# Patient Record
Sex: FEMALE | Race: WHITE | Employment: FULL TIME | URBAN - METROPOLITAN AREA
[De-identification: names, ages, dates, MRNs, and addresses within clinical notes are randomized per-mention and may not be internally consistent; named-entity substitution may affect disease eponyms.]

---

## 2017-01-12 ENCOUNTER — MYC MEDICAL ADVICE (OUTPATIENT)
Dept: NEUROLOGY | Facility: CLINIC | Age: 47
End: 2017-01-12

## 2017-01-12 ENCOUNTER — APPOINTMENT (OUTPATIENT)
Dept: VASCULAR SURGERY | Facility: CLINIC | Age: 47
End: 2017-01-12
Payer: COMMERCIAL

## 2017-01-12 PROCEDURE — 99201 ZZC OFFICE/OUTPT VISIT, NEW, LEVEL I: CPT | Performed by: SURGERY

## 2017-01-12 PROCEDURE — 93971 EXTREMITY STUDY: CPT | Performed by: SURGERY

## 2017-01-12 NOTE — TELEPHONE ENCOUNTER
Patient sent [employer] message stating that she is having right leg pain, and was evaluated by her PCP to rule out DVT; [employer] message returned to her to obtain a few more details.    Polly Rodríguez MS RN Care Coordinator

## 2017-01-12 NOTE — TELEPHONE ENCOUNTER
Dr Perez, please see Chayo's Sgnam message; I'm not sure what to recommend for her at all; Please let me know what you think; Thank you.    Polly Rodríguez, MS RN Care Coordinator

## 2017-01-13 NOTE — TELEPHONE ENCOUNTER
Her description is very vague not sure what to make of it. Does not sound MS related. She can see me if she likes when I am down in a couple weeks then I can examine her and try to pin her down a little more

## 2017-01-16 NOTE — TELEPHONE ENCOUNTER
Chayo is going to come see Dr Perez next week on Wednesday, January 25th at 9:30am.    Polly Rodríguez, MS RN Care Coordinator

## 2017-01-25 ENCOUNTER — MYC MEDICAL ADVICE (OUTPATIENT)
Dept: NEUROLOGY | Facility: CLINIC | Age: 47
End: 2017-01-25

## 2017-01-25 NOTE — TELEPHONE ENCOUNTER
Chayo is willing to see Loren; I will have our clinic coordinators call her to schedule.    Polly Rodríguez, MS RN Care Coordinator

## 2017-01-25 NOTE — TELEPHONE ENCOUNTER
Patient sent MyChart message stating that she can't come at all this week; MyChart message sent to her offering her to see Loren.    Polly Rodríguez, MS RN Care Coordinator

## 2017-02-09 ENCOUNTER — OFFICE VISIT (OUTPATIENT)
Dept: NEUROLOGY | Facility: CLINIC | Age: 47
End: 2017-02-09
Attending: NURSE PRACTITIONER
Payer: COMMERCIAL

## 2017-02-09 VITALS
WEIGHT: 189.4 LBS | HEIGHT: 66 IN | SYSTOLIC BLOOD PRESSURE: 132 MMHG | DIASTOLIC BLOOD PRESSURE: 78 MMHG | HEART RATE: 90 BPM | BODY MASS INDEX: 30.44 KG/M2

## 2017-02-09 DIAGNOSIS — M79.604 PAIN OF RIGHT LOWER EXTREMITY: ICD-10-CM

## 2017-02-09 DIAGNOSIS — R29.898 RIGHT LEG WEAKNESS: Primary | ICD-10-CM

## 2017-02-09 DIAGNOSIS — G37.9 DEMYELINATING DISEASE (H): ICD-10-CM

## 2017-02-09 PROCEDURE — 99212 OFFICE O/P EST SF 10 MIN: CPT | Mod: ZF

## 2017-02-09 RX ORDER — CYCLOBENZAPRINE HCL 5 MG
5 TABLET ORAL 2 TIMES DAILY
Qty: 30 TABLET | Refills: 0 | Status: SHIPPED | OUTPATIENT
Start: 2017-02-09 | End: 2019-01-08

## 2017-02-09 ASSESSMENT — PAIN SCALES - GENERAL: PAINLEVEL: EXTREME PAIN (8)

## 2017-02-09 NOTE — MR AVS SNAPSHOT
After Visit Summary   2/9/2017    Chayo Reid    MRN: [id number]           Patient Information     Date Of Birth          1970        Visit Information        Provider Department      2/9/2017 10:30 AM Loren Lennon APRN CNP [facility] Multiple Sclerosis        Today's Diagnoses     Right leg weakness    -  1     Demyelinating disease (H)         Pain of right lower extremity           Care Instructions    1. MRI Thoracic at your convenience    2. [facility] Physical Therapy referral       [facility]       [address], [place] [address]       Phone: [phone]       3. Flexeril 5 mg twice daily for 2 weeks for right leg pain        Follow-ups after your visit        Additional Services     PT Referral ([place])       *This therapy referral will be filtered to a centralized scheduling office at [facility] and the patient will receive a call to schedule an appointment at a [place] location most convenient for them. *     [facility] provides Physical Therapy evaluation and treatment and many specialty services across the [place] system.  If requesting a specialty program, please choose from the list below.    If you have not heard from the scheduling office within 2 business days, please call [phone] for all locations, with the exception of [place], please call [phone].  Treatment: Evaluation & Treatment  Special Instructions/Modalities: New onset right leg pain and weakness  Special Programs: [facility]      Please be aware that coverage of these services is subject to the terms and limitations of your health insurance plan.  Call member services at your health plan with any benefit or coverage questions.      **Note to Provider:  If you are referring outside of [place] for the therapy appointment, please list the name of the location in the  special instructions   above, print the referral and give to the patient to schedule the appointment.                  Your next 10 appointments already scheduled     Feb 17, 2017  4:45 PM   (Arrive by 4:30 PM)   MR THORACIC SPINE W/O & W CONTRAST with [facility] [facility] [place] [facility] ([facility])    [address] [address] [phone]           Take your medicines as usual, unless your doctor tells you not to. Bring a list of your current medicines to your exam (including vitamins, minerals and over-the-counter drugs).  You will be given intravenous contrast for this exam. To prepare:   The day before your exam, drink extra fluids at least six 8-ounce glasses (unless your doctor tells you to restrict your fluids).   Have a blood test (creatinine test) within 30 days of your exam. Go to your clinic or Diagnostic Imaging Department for this test.  The MRI machine uses a strong magnet. Please wear clothes without metal (snaps, zippers). A sweatsuit works well, or we may give you a hospital gown.  Please remove any body piercings and hair extensions before you arrive. You will also remove watches, jewelry, hairpins, wallets, dentures, partial dental plates and hearing aids. You may wear contact lenses, and you may be able to wear your rings. We have a safe place to keep your personal items, but it is safer to leave them at home.   **IMPORTANT** THE INSTRUCTIONS BELOW ARE ONLY FOR THOSE PATIENTS WHO HAVE BEEN TOLD THEY WILL RECEIVE SEDATION OR GENERAL ANESTHESIA DURING THEIR MRI PROCEDURE:  IF YOU WILL RECEIVE SEDATION (take medicine to help you relax during your exam):   You must get the medicine from your doctor before you arrive. Bring the medicine to the exam. Do not take it at home.   Arrive one hour early. Bring someone who can take you home after the test. Your medicine will make you sleepy. After the exam, you may not drive, take a bus or take a taxi by yourself.   No  eating 8 hours before your exam. You may have clear liquids up until 4 hours before your exam. (Clear liquids include water, clear tea, black coffee and fruit juice without pulp.)  IF YOU WILL RECEIVE ANESTHESIA (be asleep for your exam):   Arrive 1 1/2 hours early. Bring someone who can take you home after the test. You may not drive, take a bus or take a taxi by yourself.   No eating 8 hours before your exam. You may have clear liquids up until 4 hours before your exam. (Clear liquids include water, clear tea, black coffee and fruit juice without pulp.)  Please call the Imaging Department at your exam site with any questions.            May 23, 2017 10:00 AM   (Arrive by 9:45 AM)   Return Multiple Sclerosis with Neftaly Perez DO   [facility] Multiple Sclerosis ([email or web address])    [address] [place] [id number] [phone]              Future tests that were ordered for you today     Open Future Orders        Priority Expected Expires Ordered    MRI Thoracic spine w & w/o contrast Routine  2/9/2018 2/9/2017            Who to contact     If you have questions or need follow up information about today's clinic visit or your schedule please contact [facility] MULTIPLE SCLEROSIS directly at [phone].  Normal or non-critical lab and imaging results will be communicated to you by [employer], letter or phone within 4 business days after the clinic has received the results. If you do not hear from us within 7 days, please contact the clinic through [employer]oh[employer] or phone. If you have a critical or abnormal lab result, we will notify you by phone as soon as possible.  Submit refill requests through [employer] or call your pharmacy and they will forward the refill request to us. Please allow 3 business days for your refill to be completed.          Additional Information About Your Visit        [employer] Information     [employer] gives you secure access to your electronic health  "record. If you see a primary care provider, you can also send messages to your care team and make appointments. If you have questions, please call your primary care clinic.  If you do not have a primary care provider, please call [phone] and they will assist you.        Care EveryWhere ID     This is your Care EveryWhere ID. This could be used by other organizations to access your [place] medical records  [id number]        Your Vitals Were     Pulse Height BMI (Body Mass Index)             90 1.676 m (5' 6\") 30.58 kg/m2          Blood Pressure from Last 3 Encounters:   02/09/17 132/78   11/15/16 126/83   07/28/16 139/73    Weight from Last 3 Encounters:   02/09/17 85.911 kg (189 lb 6.4 oz)   11/15/16 81.511 kg (179 lb 11.2 oz)   04/29/15 91.627 kg (202 lb)              We Performed the Following     PT Referral ([place])          Today's Medication Changes          These changes are accurate as of: 2/9/17 11:43 AM.  If you have any questions, ask your nurse or doctor.               Start taking these medicines.        Dose/Directions    cyclobenzaprine 5 MG tablet   Commonly known as:  FLEXERIL   Used for:  Pain of right lower extremity   Started by:  Loren Lennon APRN CNP        Dose:  5 mg   Take 1 tablet (5 mg) by mouth 2 times daily   Quantity:  30 tablet   Refills:  0            Where to get your medicines      These medications were sent to [place] [address] [place] [address] [address]     Phone:  [phone]    - cyclobenzaprine 5 MG tablet             Primary Care Provider    Pcp Unknown Verified       No address on file        Thank you!     Thank you for choosing [facility] MULTIPLE SCLEROSIS  for your care. Our goal is always to provide you with excellent care. Hearing back from our patients is one way we can continue to improve our services. Please take a few minutes to complete the written survey that you may receive in the mail after your " visit with us. Thank you!             Your Updated Medication List - Protect others around you: Learn how to safely use, store and throw away your medicines at www.disposemymeds.org.          This list is accurate as of: 2/9/17 11:43 AM.  Always use your most recent med list.                   Brand Name Dispense Instructions for use    amitriptyline 25 MG tablet    ELAVIL    90 tablet    Take 1 at bedtime for 3 weeks then 2 at bedtime for 3 weeks then 3 at bedtime       amphetamine-dextroamphetamine 30 MG per 24 hr capsule    ADDERALL XR    30 capsule    Take 1 capsule (30 mg) by mouth daily       clonazePAM 1 MG tablet    klonoPIN    30 tablet    Take 1 at bedtime       cyclobenzaprine 5 MG tablet    FLEXERIL    30 tablet    Take 1 tablet (5 mg) by mouth 2 times daily       Glatiramer Acetate 40 MG/ML Sosy    COPAXONE    12 Syringe    Inject 40 mg Subcutaneous three times a week       modafinil 200 MG tablet    PROVIGIL    30 tablet    Take 1 tablet (200 mg) by mouth daily       oxybutynin 10 MG 24 hr tablet    DITROPAN XL    30 tablet    Take 1 tab 1 hour prior to bed       vitamin D 1000 UNITS capsule      Take 1 capsule by mouth daily

## 2017-02-09 NOTE — PROGRESS NOTES
[facility] Multiple Sclerosis Clinic  Name: Chayo Reid  MRN: [id number]  : 1970  JHONY: 2017  Purpose of Visit: New onset right leg pain and weakness  Last Visit: on 11/15/2016  with Dr. Perez   HPI: History collected from patient and previous clinic visit notes  : Patient had a brain MRI for long standing headaches and found to have white matter changes. MRI cervical spine and LP was unremarkable.   2012: Follow up brain MRI done which showed a slight increase in white matter lesions. She also had visual evoked potential which showed prolonged abduction in right anterior field. Repeat LP showed 6 oligoclonal bands and normal IgG index.   2013: Initial visit with Dr. Perez. 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 For fatigue, she was started on Provigil.   2013: Follow up brain MRI showed one subtle enhancing lesion and 3-4 new white matter lesions.   2014: She was started on Copaxone.   2015: Follow up brain MRI showed one enhancing lesion.   2016: Follow up brain MRI stable. Fatigue treatment switched to Adderall.  2016: Right inguinal pain. Diagnosed with myofascial pain and was started on Amitriptyline. Follow up brain and cervical spine unchanged.   2016: Most recent visit with Dr. Perez. Myofascial pain responded well to Amitriptyline. Overall doing well.   Interval history since last visit:   2017- 2017: Patient contacted our clinic with right leg pain and weakness. She saw me in the clinic today in regards to this. Today patient reports her pain as constant and not radiating. She had right inguinal pain in  which responded well to amitriptyline. She tells me this recent right leg pain is different. This involves her whole right leg. No back pain or swelling. She had an US done by her PCP and r/o DVT. Currently rates the pain at 8/10. She also reports weakness in her right leg which  "is new for her. Feels like her legs are \"heavy\" Climbing stairs is very difficult for her. She fell 3 times recently. Also reports some sensory changes- tingling in her right lower leg. No recent illness or infections. She had tried NSAIDs without much help. She is able to drive. Denies issues with speech, swallowing, bowel or bladder, balance, dizziness or vertigo. She had blurry vision in her right eye in the past and then completely resolved.   She continues to be on Copaxone 40 mg three times a week. Tolerating it well without any injection site reactions. Adderall helps with her fatigue.    Vital Signs: /78 mmHg  Pulse 90  Ht 1.676 m (5' 6\")  Wt 85.911 kg (189 lb 6.4 oz)  BMI 30.58 kg/m2  07/06/2017 MRI Brain:  The study demonstrates 5-10,  foci of T2-hyperintensity  within the cerebral white matter consistent with the clinical  suspicion of demyelinating disease. There is no enhancement noted.  There is no interval change from the prior study.    07/06/2017 MRI Cervical spine:  Single focus of cord signal abnormality at inferior C2 level on the  right, along the lateral aspect of the cord without associated  enhancement. This is most compatible with a demyelinating lesion given  history of multiple sclerosis. No other lesions noted.     C5-C6: Disc bulge with superimposed central disc extrusion and  left-sided prominent uncinate hypertrophy with resultant severe left  neural foraminal stenosis, mild right neural foraminal stenosis and  mild canal narrowing without myelopathy.  Current MS Medication: Copaxone 40 mg three times a week   REVIEW OF SYSTEMS: As per EPIC  PHYSICAL EXAMINATION:   GENERAL: The patient is a well-nourished female who presents to the evaluation alone, awake and alert and in no acute distress.     NEUROLOGIC:   MENTAL STATUS: Alert and awake, oriented times four.   CRANIAL NERVES:  The pupils are equal, round and react to light and there is no Antonio Arlene pupil. Funduscopic " examination demonstrates no optic pallor, papilledema or retinal hemorrhage/exudate. Extraocular movements are intact with no internuclear ophthalmoplegia. Facial strength and sensation are normal. Hearing is normal. Palate elevation and tongue protrusion are normal.   POWER: Strength is normal (5/5) in the following muscles/groups: Deltoids, biceps, triceps, wrist extensors, finger abductors, Left hip flexors, knee flexors, foot dorsiflexors. Mild weakness in right hip flexion 4/5.   SENSORY: Reports diminished sensation to light touch in right lower leg. Able to discriminate sharp/ dull sensation without any trouble.   REFLEXES: Reflexes are normal, present and symmetric at biceps, triceps, brachioradialis and ankles. Hyperreflexia in right knee.   MOTOR/CEREBELLAR: There are no tremors, myoclonus or other abnormal movements. Tone is within normal limits in the limbs. There is no appendicular ataxia on finger-to-nose testing and rapid alternating movements are normal in the extremities. There is no pronator drift.   GAIT: Gait is narrow-based and steady and the patient is able to do tandem without any difficulty.     ASSESSMENT/ PLAN:  1. Radiologically Isolated Syndrome (RIS), on Copaxone: New onset right leg pain and weakness along with sensory changes. Exam slightly different from her last exam in November. Her most recent MRI brain and cervical spine from July, 2016 were stable. We will plan to get a MRI thoracic spine to r/o new/ enhancing lesions. For right leg weakness and recent falls, I referred her to  [facility]. She agrees with this plan. I also gave her a prescription for Flexeril 5 mg twice daily for 2 weeks. I explained the side effects of dizziness and somnolence.     2. I will follow up with you after MRI.    3. Please contact us with questions or concerns.      I spent 50 minutes with the patient and over 50% of the time was spent counseling and coordinating care.    Loren Lennon,  CNP  Outpatient Neurology Clinic  [facility]

## 2017-02-09 NOTE — PATIENT INSTRUCTIONS
1. MRI Thoracic at your convenience    2.  [facility] Physical Therapy referral       [facility]       [address], [place] [address]       Phone: [phone]       3. Flexeril 5 mg twice daily for 2 weeks for right leg pain

## 2017-02-09 NOTE — NURSING NOTE
Chief Complaint   Patient presents with     RECHECK     [facility] RETURN MULTIPLE SCLEROSIS     Barbara Penn MA

## 2017-02-09 NOTE — Clinical Note
2017       RE: Chayo Reid  [address]  [facility] [facility] [address]     Dear Colleague,    Thank you for referring your patient, Chayo Reid, to the [facility] MULTIPLE SCLEROSIS at [facility]. Please see a copy of my visit note below.    [facility] Multiple Sclerosis Clinic  Name: Chayo Reid  MRN: [id number]  : 1970  JHONY: 2017  Purpose of Visit: New onset right leg pain and weakness  Last Visit: on 11/15/2016  with Dr. Perez   HPI: History collected from patient and previous clinic visit notes  : Patient had a brain MRI for long standing headaches and found to have white matter changes. MRI cervical spine and LP was unremarkable.   2012: Follow up brain MRI done which showed a slight increase in white matter lesions. She also had visual evoked potential which showed prolonged abduction in right anterior field. Repeat LP showed 6 oligoclonal bands and normal IgG index.   2013: Initial visit with Dr. Perez. 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 For fatigue, she was started on Provigil.   2013: Follow up brain MRI showed one subtle enhancing lesion and 3-4 new white matter lesions.   2014: She was started on Copaxone.   2015: Follow up brain MRI showed one enhancing lesion.   2016: Follow up brain MRI stable. Fatigue treatment switched to Adderall.  2016: Right inguinal pain. Diagnosed with myofascial pain and was started on Amitriptyline. Follow up brain and cervical spine unchanged.   2016: Most recent visit with Dr. Perez. Myofascial pain responded well to Amitriptyline. Overall doing well.   Interval history since last visit:   2017- 2017: Patient contacted our clinic with right leg pain and weakness. She saw me in the clinic today in regards to this. Today patient reports her pain as constant and not radiating. She had right inguinal pain in  "July, 2016 which responded well to amitriptyline. She tells me this recent right leg pain is different. This involves her whole right leg. No back pain or swelling. She had an US done by her PCP and r/o DVT. Currently rates the pain at 8/10. She also reports weakness in her right leg which is new for her. Feels like her legs are \"heavy\" Climbing stairs is very difficult for her. She fell 3 times recently. Also reports some sensory changes- tingling in her right lower leg. No recent illness or infections. She had tried NSAIDs without much help. She is able to drive. Denies issues with speech, swallowing, bowel or bladder, balance, dizziness or vertigo. She had blurry vision in her right eye in the past and then completely resolved.   She continues to be on Copaxone 40 mg three times a week. Tolerating it well without any injection site reactions. Adderall helps with her fatigue.    Vital Signs: /78 mmHg  Pulse 90  Ht 1.676 m (5' 6\")  Wt 85.911 kg (189 lb 6.4 oz)  BMI 30.58 kg/m2  07/06/2017 MRI Brain:  The study demonstrates 5-10,  foci of T2-hyperintensity  within the cerebral white matter consistent with the clinical  suspicion of demyelinating disease. There is no enhancement noted.  There is no interval change from the prior study.    07/06/2017 MRI Cervical spine:  Single focus of cord signal abnormality at inferior C2 level on the  right, along the lateral aspect of the cord without associated  enhancement. This is most compatible with a demyelinating lesion given  history of multiple sclerosis. No other lesions noted.     C5-C6: Disc bulge with superimposed central disc extrusion and  left-sided prominent uncinate hypertrophy with resultant severe left  neural foraminal stenosis, mild right neural foraminal stenosis and  mild canal narrowing without myelopathy.  Current MS Medication: Copaxone 40 mg three times a week   REVIEW OF SYSTEMS: As per EPIC  PHYSICAL EXAMINATION:   GENERAL: The patient is a " well-nourished female who presents to the evaluation alone, awake and alert and in no acute distress.     NEUROLOGIC:   MENTAL STATUS: Alert and awake, oriented times four.   CRANIAL NERVES:  The pupils are equal, round and react to light and there is no Antonio Arlene pupil. Funduscopic examination demonstrates no optic pallor, papilledema or retinal hemorrhage/exudate. Extraocular movements are intact with no internuclear ophthalmoplegia. Facial strength and sensation are normal. Hearing is normal. Palate elevation and tongue protrusion are normal.   POWER: Strength is normal (5/5) in the following muscles/groups: Deltoids, biceps, triceps, wrist extensors, finger abductors, Left hip flexors, knee flexors, foot dorsiflexors. Mild weakness in right hip flexion 4/5.   SENSORY: Reports diminished sensation to light touch in right lower leg. Able to discriminate sharp/ dull sensation without any trouble.   REFLEXES: Reflexes are normal, present and symmetric at biceps, triceps, brachioradialis and ankles. Hyperreflexia in right knee.   MOTOR/CEREBELLAR: There are no tremors, myoclonus or other abnormal movements. Tone is within normal limits in the limbs. There is no appendicular ataxia on finger-to-nose testing and rapid alternating movements are normal in the extremities. There is no pronator drift.   GAIT: Gait is narrow-based and steady and the patient is able to do tandem without any difficulty.     ASSESSMENT/ PLAN:  1. Radiologically Isolated Syndrome (RIS), on Copaxone: New onset right leg pain and weakness along with sensory changes. Exam slightly different from her last exam in November. Her most recent MRI brain and cervical spine from July, 2016 were stable. We will plan to get a MRI thoracic spine to r/o new/ enhancing lesions. For right leg weakness and recent falls, I referred her to  [facility]. She agrees with this plan. I also gave her a prescription for Flexeril 5 mg twice daily for 2  weeks. I explained the side effects of dizziness and somnolence.     2. I will follow up with you after MRI.    3. Please contact us with questions or concerns.      I spent 50 minutes with the patient and over 50% of the time was spent counseling and coordinating care.    Loren Lennon, CNP  Outpatient Neurology Clinic  [facility]

## 2017-02-17 ENCOUNTER — HOSPITAL ENCOUNTER (OUTPATIENT)
Dept: MRI IMAGING | Facility: CLINIC | Age: 47
Discharge: HOME | End: 2017-02-17
Attending: NURSE PRACTITIONER | Admitting: NURSE PRACTITIONER
Payer: COMMERCIAL

## 2017-02-17 ENCOUNTER — HOSPITAL ENCOUNTER (OUTPATIENT)
Dept: MRI IMAGING | Facility: CLINIC | Age: 47
End: 2017-02-17
Attending: NURSE PRACTITIONER
Payer: COMMERCIAL

## 2017-02-17 DIAGNOSIS — G35 MULTIPLE SCLEROSIS (H): ICD-10-CM

## 2017-02-17 DIAGNOSIS — G37.9 DEMYELINATING DISEASE (H): ICD-10-CM

## 2017-02-17 DIAGNOSIS — G35 MULTIPLE SCLEROSIS (H): Primary | ICD-10-CM

## 2017-02-17 PROCEDURE — A9585 GADOBUTROL INJECTION: HCPCS | Performed by: NURSE PRACTITIONER

## 2017-02-17 PROCEDURE — 25500064 ZZH RX 255 OP 636: Performed by: NURSE PRACTITIONER

## 2017-02-17 PROCEDURE — 72157 MRI CHEST SPINE W/O & W/DYE: CPT

## 2017-02-17 PROCEDURE — 72156 MRI NECK SPINE W/O & W/DYE: CPT

## 2017-02-17 RX ORDER — GADOBUTROL 604.72 MG/ML
7.5 INJECTION INTRAVENOUS ONCE
Status: COMPLETED | OUTPATIENT
Start: 2017-02-17 | End: 2017-02-17

## 2017-02-17 RX ADMIN — GADOBUTROL 7.5 ML: 604.72 INJECTION INTRAVENOUS at 13:31

## 2017-02-20 ENCOUNTER — MYC MEDICAL ADVICE (OUTPATIENT)
Dept: NEUROLOGY | Facility: CLINIC | Age: 47
End: 2017-02-20

## 2017-02-23 ENCOUNTER — HOSPITAL ENCOUNTER (OUTPATIENT)
Dept: PHYSICAL THERAPY | Facility: CLINIC | Age: 47
Setting detail: THERAPIES SERIES
End: 2017-02-23
Attending: NURSE PRACTITIONER
Payer: COMMERCIAL

## 2017-02-23 PROCEDURE — 97161 PT EVAL LOW COMPLEX 20 MIN: CPT | Mod: GP | Performed by: PHYSICAL THERAPIST

## 2017-02-23 PROCEDURE — 97530 THERAPEUTIC ACTIVITIES: CPT | Mod: GP | Performed by: PHYSICAL THERAPIST

## 2017-02-23 PROCEDURE — 97110 THERAPEUTIC EXERCISES: CPT | Mod: GP | Performed by: PHYSICAL THERAPIST

## 2017-02-23 PROCEDURE — 40000719 ZZHC STATISTIC PT DEPARTMENT NEURO VISIT: Performed by: PHYSICAL THERAPIST

## 2017-02-23 NOTE — PROGRESS NOTES
02/23/17 0900   General Information   Start of Care Date 02/23/17   Referring Physician Loren Lennon NP   Orders Evaluate and Treat as Indicated   Order Date 02/09/17   Medical Diagnosis R leg weakness   Surgical/Medical history reviewed Yes   Pertinent history of current problem (include personal factors and/or comorbidities that impact the POC) Chayo here alone -early Hiro R leg weaker, uses rail on stairs R leg weaker.  squat or kneel need to pull up w arms. so this is a change.  curbs are hard.  some N/T comes and goes.  no foot drag.  first PT.  falls?  twice last summer leg gave out and fell no injury.  never used cane or walking pole.  i can walk a long way was in [facility] walked curbs are hard but straight line is fine.  walked an hour.  on copaxone.     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     General Information Comments lives w husb and 15 yo dtr.   doing fine at home.     Fall Risk Screen   Fall screen completed by PT   Per patient - Fall 2 or more times in past year? Yes   Per patient - Fall with injury in past year? No   Pain   Patient currently in pain Yes   Pain location R ant knee/leg   Pain rating 5   Pain description Ache   Pain description comment worse at night.    Pain comments recently got mmrelaxants for night; they help.    Cognitive Status Examination   Orientation orientation to person, place and time   Level of Consciousness alert   Follows Commands and Answers Questions 100% of the time   Personal Safety and Judgment intact   Memory intact   Integumentary   Integumentary No deficits were identified   Posture   Posture Normal   Range of Motion (ROM)   ROM Quick Adds no deficits were identified   ROM Comment mild tight DF.     Strength   Strength Comments gr 4+ R  hip flexion and ext   Bed Mobility   Bed Mobility Comments indep   Transfer Skills   Transfer Comments floor tx indep w furn; needs cues/demo w/out furniture.  sit/stand indep w/out armrests   Locomotion   Wheel Chair Mobility Comments n/a   Gait Special Tests   Gait Special Tests FUNCTIONAL GAIT ASSESSMENT;25 FOOT TIMED WALK   Gait Special Tests 25 Foot Timed Walk   Seconds 6.6   Steps 13 Steps   Gait Special Tests Functional Gait Assessment Score out of 30   Score out of 30 28   Balance   Balance Comments can sls 10 sec bilat   Sensory Examination   Sensory Perception no deficits were identified   Coordination   Coordination no deficits were identified   Muscle Tone   Muscle Tone no deficits were identified   Clinical Impression   Criteria for Skilled Therapeutic Interventions Met yes, treatment indicated   PT Diagnosis RIS on copaxone   Influenced by the following impairments mild R hip weakness   Functional limitations due to impairments falls   Clinical Presentation Stable/Uncomplicated   Clinical Presentation Rationale stable disease, good support, no pmh concerning, drives/ functioning at work and home   Clinical Decision Making (Complexity) Low complexity   Therapy Frequency other (see comments)   Predicted Duration of Therapy Intervention (days/wks) up to 3x in 90 days   Risk & Benefits of therapy have been explained Yes   Patient, Family & other staff in agreement with plan of care Yes   Clinical Impression Comments 45 yo w RIS, open to exer for R leg   Education Assessment   Preferred Learning Style Listening;Demonstration   Barriers to Learning No barriers   GOALS   PT Eval Goals 1;2;3   Goal 1   Goal Identifier floor tx   Goal Description indep floor tx w/out furniture   Target Date 05/23/17   Goal 2   Goal Identifier falls prev   Goal Description pt to verbalize 3 falls prev strategies   Target Date 05/23/17   Goal 3   Goal Identifier HEP   Goal Description pt to be indep w gentle stretch/strengthen  and aerobic program to maximize R leg function   Target Date 05/23/17   Total Evaluation Time   Total Evaluation Time (Minutes) 30

## 2017-02-23 NOTE — IP AVS SNAPSHOT
"                  MRN:[id number]                      After Visit Summary   2/23/2017    Chayo Reid    MRN: [id number]           Visit Information        Provider Department      2/23/2017  9:30 AM Audra Reyes, PT [facility], [place], Physical Therapy - Outpatient        Your next 10 appointments already scheduled     May 23, 2017 10:00 AM CDT   (Arrive by 9:45 AM)   Return Multiple Sclerosis with Neftaly Perez DO   [facility] Multiple Sclerosis ([facility])    [address] [place] [address] [phone]                Further instructions from your care team       2/23/17    Exercises:    Go to floor and get to hands and knees position/ try to do this daily -practice w no furniture like we did.    Do \"arch and sag\" of the back 5-10 x  Then kick one leg back at a time - alternate and do 10 x each leg.      Then stand at a wall and do calf stretch 30-45 sec each leg - 2x each leg.    Audra Reyes PT  [email or web address]  [phone]  :  [phone]        MyChart Information     [employer] gives you secure access to your electronic health record. If you see a primary care provider, you can also send messages to your care team and make appointments. If you have questions, please call your primary care clinic.  If you do not have a primary care provider, please call [phone] and they will assist you.        Care EveryWhere ID     This is your Care EveryWhere ID. This could be used by other organizations to access your [place] medical records  [id number]        "

## 2017-02-23 NOTE — DISCHARGE INSTRUCTIONS
"2/23/17    Exercises:    Go to floor and get to hands and knees position/ try to do this daily -practice w no furniture like we did.    Do \"arch and sag\" of the back 5-10 x  Then kick one leg back at a time - alternate and do 10 x each leg.      Then stand at a wall and do calf stretch 30-45 sec each leg - 2x each leg.    Audra Reyes PT  [email or web address]  [phone]  :  [phone]      "

## 2017-02-26 ENCOUNTER — HOSPITAL ENCOUNTER (EMERGENCY)
Facility: CLINIC | Age: 47
Discharge: HOME | End: 2017-02-26
Attending: EMERGENCY MEDICINE | Admitting: EMERGENCY MEDICINE
Payer: COMMERCIAL

## 2017-02-26 ENCOUNTER — OFFICE VISIT (OUTPATIENT)
Dept: URGENT CARE | Facility: URGENT CARE | Age: 47
End: 2017-02-26
Payer: COMMERCIAL

## 2017-02-26 ENCOUNTER — APPOINTMENT (OUTPATIENT)
Dept: ULTRASOUND IMAGING | Facility: CLINIC | Age: 47
End: 2017-02-26
Attending: EMERGENCY MEDICINE
Payer: COMMERCIAL

## 2017-02-26 ENCOUNTER — APPOINTMENT (OUTPATIENT)
Dept: CT IMAGING | Facility: CLINIC | Age: 47
End: 2017-02-26
Attending: EMERGENCY MEDICINE
Payer: COMMERCIAL

## 2017-02-26 VITALS
OXYGEN SATURATION: 97 % | HEART RATE: 78 BPM | RESPIRATION RATE: 20 BRPM | TEMPERATURE: 97.9 F | SYSTOLIC BLOOD PRESSURE: 121 MMHG | DIASTOLIC BLOOD PRESSURE: 77 MMHG

## 2017-02-26 DIAGNOSIS — R10.31 ABDOMINAL PAIN, RIGHT LOWER QUADRANT: ICD-10-CM

## 2017-02-26 DIAGNOSIS — N83.201 CYST OF RIGHT OVARY: ICD-10-CM

## 2017-02-26 DIAGNOSIS — R10.31 ABDOMINAL PAIN, RIGHT LOWER QUADRANT: Primary | ICD-10-CM

## 2017-02-26 LAB
ALBUMIN SERPL-MCNC: 3.7 G/DL (ref 3.4–5)
ALBUMIN UR-MCNC: NEGATIVE MG/DL
ALP SERPL-CCNC: 68 U/L (ref 40–150)
ALT SERPL W P-5'-P-CCNC: 23 U/L (ref 0–50)
AMORPH CRY #/AREA URNS HPF: ABNORMAL /HPF
ANION GAP SERPL CALCULATED.3IONS-SCNC: 6 MMOL/L (ref 3–14)
APPEARANCE UR: ABNORMAL
AST SERPL W P-5'-P-CCNC: 20 U/L (ref 0–45)
BASOPHILS # BLD AUTO: 0.1 10E9/L (ref 0–0.2)
BASOPHILS NFR BLD AUTO: 0.7 %
BILIRUB SERPL-MCNC: 0.6 MG/DL (ref 0.2–1.3)
BILIRUB UR QL STRIP: NEGATIVE
BUN SERPL-MCNC: 15 MG/DL (ref 7–30)
CALCIUM SERPL-MCNC: 8.7 MG/DL (ref 8.5–10.1)
CHLORIDE SERPL-SCNC: 101 MMOL/L (ref 94–109)
CO2 SERPL-SCNC: 31 MMOL/L (ref 20–32)
COLOR UR AUTO: YELLOW
CREAT BLD-MCNC: 1 MG/DL (ref 0.52–1.04)
CREAT SERPL-MCNC: 0.99 MG/DL (ref 0.52–1.04)
DIFFERENTIAL METHOD BLD: ABNORMAL
EOSINOPHIL # BLD AUTO: 0.2 10E9/L (ref 0–0.7)
EOSINOPHIL NFR BLD AUTO: 1.4 %
ERYTHROCYTE [DISTWIDTH] IN BLOOD BY AUTOMATED COUNT: 11.8 % (ref 10–15)
GFR SERPL CREATININE-BSD FRML MDRD: 59 ML/MIN/1.7M2
GFR SERPL CREATININE-BSD FRML MDRD: 60 ML/MIN/1.7M2
GLUCOSE SERPL-MCNC: 122 MG/DL (ref 70–99)
GLUCOSE UR STRIP-MCNC: NEGATIVE MG/DL
HCG UR QL: NEGATIVE
HCT VFR BLD AUTO: 39 % (ref 35–47)
HGB BLD-MCNC: 13.1 G/DL (ref 11.7–15.7)
HGB UR QL STRIP: NEGATIVE
IMM GRANULOCYTES # BLD: 0.1 10E9/L (ref 0–0.4)
IMM GRANULOCYTES NFR BLD: 0.4 %
KETONES UR STRIP-MCNC: NEGATIVE MG/DL
LEUKOCYTE ESTERASE UR QL STRIP: NEGATIVE
LIPASE SERPL-CCNC: 144 U/L (ref 73–393)
LYMPHOCYTES # BLD AUTO: 1.5 10E9/L (ref 0.8–5.3)
LYMPHOCYTES NFR BLD AUTO: 10 %
MCH RBC QN AUTO: 29.7 PG (ref 26.5–33)
MCHC RBC AUTO-ENTMCNC: 33.6 G/DL (ref 31.5–36.5)
MCV RBC AUTO: 88 FL (ref 78–100)
MONOCYTES # BLD AUTO: 0.7 10E9/L (ref 0–1.3)
MONOCYTES NFR BLD AUTO: 4.7 %
MUCOUS THREADS #/AREA URNS LPF: PRESENT /LPF
NEUTROPHILS # BLD AUTO: 12.2 10E9/L (ref 1.6–8.3)
NEUTROPHILS NFR BLD AUTO: 82.8 %
NITRATE UR QL: NEGATIVE
NRBC # BLD AUTO: 0 10*3/UL
NRBC BLD AUTO-RTO: 0 /100
PH UR STRIP: 6.5 PH (ref 5–7)
PLATELET # BLD AUTO: 288 10E9/L (ref 150–450)
POTASSIUM SERPL-SCNC: 3.4 MMOL/L (ref 3.4–5.3)
PROT SERPL-MCNC: 8 G/DL (ref 6.8–8.8)
RBC # BLD AUTO: 4.41 10E12/L (ref 3.8–5.2)
RBC #/AREA URNS AUTO: 1 /HPF (ref 0–2)
SODIUM SERPL-SCNC: 138 MMOL/L (ref 133–144)
SP GR UR STRIP: 1.01 (ref 1–1.03)
SQUAMOUS #/AREA URNS AUTO: <1 /HPF (ref 0–1)
TRANS CELLS #/AREA URNS HPF: <1 /HPF (ref 0–1)
URN SPEC COLLECT METH UR: ABNORMAL
UROBILINOGEN UR STRIP-MCNC: NORMAL MG/DL (ref 0–2)
WBC # BLD AUTO: 14.7 10E9/L (ref 4–11)
WBC #/AREA URNS AUTO: 1 /HPF (ref 0–2)

## 2017-02-26 PROCEDURE — 25000128 H RX IP 250 OP 636: Performed by: EMERGENCY MEDICINE

## 2017-02-26 PROCEDURE — 81001 URINALYSIS AUTO W/SCOPE: CPT | Performed by: EMERGENCY MEDICINE

## 2017-02-26 PROCEDURE — 25500064 ZZH RX 255 OP 636: Performed by: EMERGENCY MEDICINE

## 2017-02-26 PROCEDURE — 76830 TRANSVAGINAL US NON-OB: CPT

## 2017-02-26 PROCEDURE — 74177 CT ABD & PELVIS W/CONTRAST: CPT

## 2017-02-26 PROCEDURE — 82565 ASSAY OF CREATININE: CPT

## 2017-02-26 PROCEDURE — 85025 COMPLETE CBC W/AUTO DIFF WBC: CPT | Performed by: EMERGENCY MEDICINE

## 2017-02-26 PROCEDURE — 96361 HYDRATE IV INFUSION ADD-ON: CPT

## 2017-02-26 PROCEDURE — 76705 ECHO EXAM OF ABDOMEN: CPT

## 2017-02-26 PROCEDURE — 96374 THER/PROPH/DIAG INJ IV PUSH: CPT

## 2017-02-26 PROCEDURE — 99207 ZZC NO CHARGE LOS: CPT

## 2017-02-26 PROCEDURE — 99285 EMERGENCY DEPT VISIT HI MDM: CPT | Mod: 25

## 2017-02-26 PROCEDURE — 83690 ASSAY OF LIPASE: CPT | Performed by: EMERGENCY MEDICINE

## 2017-02-26 PROCEDURE — 96375 TX/PRO/DX INJ NEW DRUG ADDON: CPT

## 2017-02-26 PROCEDURE — 80053 COMPREHEN METABOLIC PANEL: CPT | Performed by: EMERGENCY MEDICINE

## 2017-02-26 PROCEDURE — 81025 URINE PREGNANCY TEST: CPT | Performed by: EMERGENCY MEDICINE

## 2017-02-26 RX ORDER — ONDANSETRON 4 MG/1
4 TABLET, ORALLY DISINTEGRATING ORAL EVERY 8 HOURS PRN
Qty: 10 TABLET | Refills: 0 | Status: SHIPPED | OUTPATIENT
Start: 2017-02-26 | End: 2017-03-01

## 2017-02-26 RX ORDER — HYDROMORPHONE HYDROCHLORIDE 1 MG/ML
0.5 INJECTION, SOLUTION INTRAMUSCULAR; INTRAVENOUS; SUBCUTANEOUS ONCE
Status: COMPLETED | OUTPATIENT
Start: 2017-02-26 | End: 2017-02-26

## 2017-02-26 RX ORDER — IOPAMIDOL 755 MG/ML
500 INJECTION, SOLUTION INTRAVASCULAR ONCE
Status: COMPLETED | OUTPATIENT
Start: 2017-02-26 | End: 2017-02-26

## 2017-02-26 RX ORDER — KETOROLAC TROMETHAMINE 15 MG/ML
15 INJECTION, SOLUTION INTRAMUSCULAR; INTRAVENOUS ONCE
Status: COMPLETED | OUTPATIENT
Start: 2017-02-26 | End: 2017-02-26

## 2017-02-26 RX ORDER — OXYCODONE HYDROCHLORIDE 5 MG/1
5-10 TABLET ORAL EVERY 6 HOURS PRN
Qty: 15 TABLET | Refills: 0 | Status: SHIPPED | OUTPATIENT
Start: 2017-02-26 | End: 2017-09-13

## 2017-02-26 RX ORDER — HYDROMORPHONE HYDROCHLORIDE 1 MG/ML
0.5 INJECTION, SOLUTION INTRAMUSCULAR; INTRAVENOUS; SUBCUTANEOUS
Status: DISCONTINUED | OUTPATIENT
Start: 2017-02-26 | End: 2017-02-26 | Stop reason: HOSPADM

## 2017-02-26 RX ADMIN — KETOROLAC TROMETHAMINE 15 MG: 15 INJECTION, SOLUTION INTRAMUSCULAR; INTRAVENOUS at 16:17

## 2017-02-26 RX ADMIN — SODIUM CHLORIDE 64 ML: 9 INJECTION, SOLUTION INTRAVENOUS at 16:45

## 2017-02-26 RX ADMIN — IOPAMIDOL 95 ML: 755 INJECTION, SOLUTION INTRAVENOUS at 16:41

## 2017-02-26 RX ADMIN — HYDROMORPHONE HYDROCHLORIDE 0.5 MG: 10 INJECTION, SOLUTION INTRAMUSCULAR; INTRAVENOUS; SUBCUTANEOUS at 16:14

## 2017-02-26 RX ADMIN — HYDROMORPHONE HYDROCHLORIDE 0.5 MG: 10 INJECTION, SOLUTION INTRAMUSCULAR; INTRAVENOUS; SUBCUTANEOUS at 16:25

## 2017-02-26 RX ADMIN — HYDROMORPHONE HYDROCHLORIDE 0.5 MG: 10 INJECTION, SOLUTION INTRAMUSCULAR; INTRAVENOUS; SUBCUTANEOUS at 16:53

## 2017-02-26 RX ADMIN — SODIUM CHLORIDE 1000 ML: 9 INJECTION, SOLUTION INTRAVENOUS at 16:17

## 2017-02-26 ASSESSMENT — ENCOUNTER SYMPTOMS
VOMITING: 0
DIARRHEA: 0
ABDOMINAL PAIN: 1
DYSURIA: 0
HEMATURIA: 0
NAUSEA: 1

## 2017-02-26 NOTE — ED AVS SNAPSHOT
[facility] Emergency Department    [address]    [facility] [address]    Phone:  [phone]    Fax:  [phone]                                       Chayo Reid   MRN: [id number]    Department:  [facility] Emergency Department   Date of Visit:  2/26/2017           After Visit Summary Signature Page     I have received my discharge instructions, and my questions have been answered. I have discussed any challenges I see with this plan with the nurse or doctor.    ..........................................................................................................................................  Patient/Patient Representative Signature      ..........................................................................................................................................  Patient Representative Print Name and Relationship to Patient    ..................................................               ................................................  Date                                            Time    ..........................................................................................................................................  Reviewed by Signature/Title    ...................................................              ..............................................  Date                                                            Time

## 2017-02-26 NOTE — ED NOTES
In Triage: ABC's intact. Alert and oriented x 3.  Pt c/o right sided abdominal pain x2 hours. Denies n/v/d or having pain like this in the past.

## 2017-02-26 NOTE — ED PROVIDER NOTES
History     Chief Complaint:  Abdominal Pain    HPI   Chayo Reid is a 46 year old female with history of MS who presents to the emergency department today for evaluation of worsening generalized lower abdominal pain that began 2 hours PTA. Patient reports never having pain like this before. Pain constantly radiates into back and is more pronounced in the LRQ. Patient also reports nausea, but no vomiting. Patient has not tried anything for pain control. She reports no urinary or bowel movement symptoms. She also denies recent illness. Patient still has ovaries. No other concerns were voiced at this time. MS is well managed in clinic.     Allergies:  No Known Drug Allergies     Medications:    Flexeril  Adderall  Provigil   Copaxone   Elavil  Ditropan   Klonopin  Vitamin D    Past Medical History:    Corneal ulcer  Demyelinating disease  Fatigue  Hyperopia with astigmatism and presbyopia  Migraine  Ptosis     Past Surgical History:     section  Breast surgery     Family History:    Maternal grandmother - MS    Social History:  The patient was accompanied to the ED by family.  Smoking Status: Negative  Alcohol Use: Positive  Marital Status:   [4]     Review of Systems   Gastrointestinal: Positive for abdominal pain and nausea. Negative for diarrhea and vomiting.   Genitourinary: Negative for dysuria and hematuria.   All other systems reviewed and are negative.    Physical Exam   First Vitals:  BP: 134/69  Pulse: 78  Temp: 97.9  F (36.6  C)  Resp: 20  SpO2: 100 %    Physical Exam   Constitutional: She appears distressed (uncomfortable due to pain).   HENT:   Right Ear: External ear normal.   Left Ear: External ear normal.   Nose: Nose normal.   Eyes: Conjunctivae and lids are normal.   Neck: Neck supple. No tracheal deviation present.   Cardiovascular: Regular rhythm and intact distal pulses.    Pulmonary/Chest: Breath sounds normal. No respiratory distress.   Abdominal: Soft. She exhibits no  distension. There is tenderness (severe ttp RLQ, also some ttp epigastrium and RUQ). There is no rebound and no guarding.   Musculoskeletal:   No peripheral edema   Neurological:   MAEE, no gross focal motor or sensory deficit   Skin: Skin is warm and dry. She is not diaphoretic.   Psychiatric: She has a normal mood and affect.   Nursing note and vitals reviewed.      Emergency Department Course     Imaging:  Radiology findings were communicated with the patient who voiced understanding of the findings.    POC US ABDOMEN LIMITED  Final Result  PROCEDURE NOTE   Emergency Bedside Ultrasound - Right Upper Quadrant  Preformed by: Leroy Marino MD    Indication - RUQ/Epigastric abdominal pain       Suspicion of cholelithiasis/cholecystitis/CBD ston  Probe: low frequency curvilinear probe  Windows - longitudinal and transverse views   Findings - no Cholelithiasis, no pericholestatic fluid, no wall thickness, no sonographic Hopkins's sign, No R sided hydronephrosis, No free fluid R paracolic gutter  Impression - Unremarkable limited RUQ US  Images saved on ultrasound internal hard drive per protocol    Abd/ Pelvis CT IV contrast only:  Large amount of stool in the colon. No other findings to  suggest an etiology of the patient's abdominal pain. Specifically,  normal appendix.  Reading per radiology    US Pel w/trans  1.  3.1 cm follicle in the right ovary. Otherwise normal pelvic  ultrasound status post hysterectomy.  Reading per radiology    Laboratory:  Laboratory findings were communicated with the patient who voiced understanding of the findings.  UA: Mucous urine: Present(A), Amorphus crystals: Few(A)  HCG qualitative: Negative  CBC: WBC 14.7(H), HGB 13.1,    CMP: Glucose: 122(H), GFR Estimate: 60(L), Creatinine 0.99  Lipase: 144  Creatinine POCT: Creatinine: 1.0, GFR Estimate: 59(L)    Interventions:  1614 Dilaudid 0.5 mg IV  1617 Toradol 15 mg IV  1617 NS 1000 mL IV  1625 Dilaudid 0.5 mg IV  1653 Dilaudid 0.5  mg IV     Emergency Department Course:  Nursing notes and vitals reviewed.  I performed an exam of the patient as documented above.   IV was inserted and blood was drawn for laboratory testing, results above.  The patient provided a urine sample here in the emergency department. This was sent for laboratory testing, findings above.  The patient was sent for a US abdomen and CT abdomen while in the emergency department, results above.     Nursing notes that patient's pain was improved after the above interventions.   At 1610 the patient was rechecked.    1749: I spoke with Dr. Vaca of the radiology service regarding patient's imaging.   At 1756 the patient was rechecked and states that her pain is better after the above intervention.    At 1901 the patient was rechecked and updated on remaining imaging results. I discussed the treatment plan with the patient. They expressed understanding of this plan and consented to discharge. They will be discharged home with instructions for care and follow up. In addition, the patient will return to the emergency department if their symptoms persist, worsen, if new symptoms arise or if there is any concern.  All questions were answered.     I personally reviewed the laboratory results with the Patient and answered all related questions prior to discharge.    Impression & Plan      Medical Decision Making:  here with acute onset of abdominal pain of hours ago. She is quite tender in the RLQ on examination here, writhing in pain. Concerns are appendicitis, ureteral stone, pyelonephritis, ovarian cyst, ovarin torsion. Less likely hepatobiliarly etiology, referred pain from acute coronary syndrome, PE, leaking or ruptured AAA.     Initial workup unremarkable other than an ovarian cyst on CT scan. We will do an US to evaluate for torsion or rupture. Pain much better after initial analgesics. Pain improved. US showing no signs of torsion or pelvic free fluid. Symptoms may be related  to the cyst, it may be incidental finding as well. Regardless, there is no significant infectious, cervical, or vascular process ongoing at this time. I think she is safe and stable for discharge home. Return with new or worsening symptoms. Advised her to get repeat abdominal US.     Diagnosis:    ICD-10-CM    1. Abdominal pain, right lower quadrant R10.31    2. Cyst of right ovary N83.201        Disposition:   Discharge home; plan for follow up with Verified, Pcp Unknown    Discharge Medications:  New Prescriptions    ONDANSETRON (ZOFRAN ODT) 4 MG ODT TAB    Take 1 tablet (4 mg) by mouth every 8 hours as needed    OXYCODONE (ROXICODONE) 5 MG IR TABLET    Take 1-2 tablets (5-10 mg) by mouth every 6 hours as needed for moderate to severe pain (severe pain)       Scribe Disclosure:  I, Campbell Hodge, am serving as a scribe at 4:04 PM on 2/26/2017 to document services personally performed by Leroy Marino, *, based on my observations and the provider's statements to me.    [facility] EMERGENCY DEPARTMENT       Leroy Marino MD  02/26/17 8024

## 2017-02-26 NOTE — PROGRESS NOTES
Acute onset right-sided lower quadrant abdominal discomfort which is extremely severe to the point of using a wheelchair at this time. Slight body aches and chills. No fevers. No diarrhea. No constipation. No trauma. No urinary symptoms. She has MS.    Symptoms are very suspicious of appendicitis. Given her limited capacity for further evaluation on site, patient was referred to the emergency room for further evaluation and management. She will be transported to the emergency room with family members.      BETH Fitzpatrick CNP

## 2017-02-26 NOTE — MR AVS SNAPSHOT
After Visit Summary   2/26/2017    Chayo Reid    MRN: [id number]           Patient Information     Date Of Birth          1970        Visit Information        Provider Department      2/26/2017 3:30 PM Provider, Alexys Escobar [facility] URGENT CARE        Today's Diagnoses     Abdominal pain, right lower quadrant    -  1       Follow-ups after your visit        Your next 10 appointments already scheduled     May 23, 2017 10:00 AM CDT   (Arrive by 9:45 AM)   Return Multiple Sclerosis with Neftaly Perez DO   [facility] Multiple Sclerosis ([facility])    [address] [place] [address]  [address] [facility] [address] [phone]              Who to contact     If you have questions or need follow up information about today's clinic visit or your schedule please contact [facility] URGENT CARE directly at [phone].  Normal or non-critical lab and imaging results will be communicated to you by MyChart, letter or phone within 4 business days after the clinic has received the results. If you do not hear from us within 7 days, please contact the clinic through [employer] or phone. If you have a critical or abnormal lab result, we will notify you by phone as soon as possible.  Submit refill requests through [employer] or call your pharmacy and they will forward the refill request to us. Please allow 3 business days for your refill to be completed.          Additional Information About Your Visit        MyChart Information     [employer] gives you secure access to your electronic health record. If you see a primary care provider, you can also send messages to your care team and make appointments. If you have questions, please call your primary care clinic.  If you do not have a primary care provider, please call [phone] and they will assist you.        Care EveryWhere ID     This is your Care EveryWhere ID. This could be used by other organizations to access your  [place] medical records  [id number]         Blood Pressure from Last 3 Encounters:   02/09/17 132/78   11/15/16 126/83   07/28/16 139/73    Weight from Last 3 Encounters:   02/09/17 189 lb 6.4 oz (85.9 kg)   11/15/16 179 lb 11.2 oz (81.5 kg)   04/29/15 202 lb (91.6 kg)              Today, you had the following     No orders found for display       Primary Care Provider    Pcp Unknown Verified       No address on file        Thank you!     Thank you for choosing [facility] URGENT CARE  for your care. Our goal is always to provide you with excellent care. Hearing back from our patients is one way we can continue to improve our services. Please take a few minutes to complete the written survey that you may receive in the mail after your visit with us. Thank you!             Your Updated Medication List - Protect others around you: Learn how to safely use, store and throw away your medicines at www.disposemymeds.org.          This list is accurate as of: 2/26/17  3:33 PM.  Always use your most recent med list.                   Brand Name Dispense Instructions for use    amitriptyline 25 MG tablet    ELAVIL    90 tablet    Take 1 at bedtime for 3 weeks then 2 at bedtime for 3 weeks then 3 at bedtime       amphetamine-dextroamphetamine 30 MG per 24 hr capsule    ADDERALL XR    30 capsule    Take 1 capsule (30 mg) by mouth daily       clonazePAM 1 MG tablet    klonoPIN    30 tablet    Take 1 at bedtime       cyclobenzaprine 5 MG tablet    FLEXERIL    30 tablet    Take 1 tablet (5 mg) by mouth 2 times daily       Glatiramer Acetate 40 MG/ML Sosy    COPAXONE    12 Syringe    Inject 40 mg Subcutaneous three times a week       modafinil 200 MG tablet    PROVIGIL    30 tablet    Take 1 tablet (200 mg) by mouth daily       oxybutynin 10 MG 24 hr tablet    DITROPAN XL    30 tablet    Take 1 tab 1 hour prior to bed       vitamin D 1000 UNITS capsule      Take 1 capsule by mouth daily

## 2017-02-26 NOTE — ED AVS SNAPSHOT
[facility]    [address] [place] [address]    Phone:  [phone]    Fax:  [phone]                                       Chayo Reid   MRN: [id number]    Department:  [facility]   Date of Visit:  2/26/2017           Patient Information     Date Of Birth          1970        Your diagnoses for this visit were:     Abdominal pain, right lower quadrant     Cyst of right ovary        You were seen by Leroy Marino MD.        Discharge Instructions       Please make an appointment to follow up with your primary care provider in 3-5 days if not improving.    Return to ER immediately if you develop: worsening pain, Fever > 101, persistent nausea or vomiting OR you have any other concerns about your health.    You should have a repeat pelvic ultrasound in 4-6 weeks to reevaluate your ovarian cyst      Discharge Instructions  Abdominal Pain    Abdominal pain can be caused by many things. Your evaluation today does not show the exact cause for your pain. Your doctor today has decided that it is unlikely your pain is due to a life threatening problem, or a problem requiring surgery or hospital admission. Sometimes those problems cannot be found right away, so it is very important that you follow up as directed.  Sometimes only the changes which occur over time allow the cause of your pain to be found.    Return to the Emergency Department for a recheck in 8-12 hours if your pain continues.  If your pain gets worse, changes in location, or feels different, return to the Emergency Department right away.    ADULTS:  Return to the Emergency Department right away if:      You get an oral temperature above 102oF or as directed by your doctor.    You have blood in your stools (bright red or black, tarry stools).    You keep throwing up or can t drink liquids.    You see blood when you throw up.    You can t have a bowel  movement or you can t pass gas.    Your stomach gets bloated or bigger.    Your skin or the whites of your eyes look yellow.    You faint.    You have bloody, frequent or painful urination.    You have new symptoms or anything that worries you.    CHILDREN:  Return to the Emergency Department right away if your child has any of the above-listed symptoms or the following:      Pushes your hand away or screams/cries when his/her belly is touched.    You notice your child is very fussy or weak.    Your child is very tired and is too tired to eat or drink.    Your child is dehydrated.  Signs of dehydration can be:  o Your infant has had no wet diapers in 4-5 hours.  o Your older child has not passed urine in 6-8 hours.  o Your infant or child starts to have dry mouth and lips, or no saliva or tears.    PREGNANT WOMEN:  Return to the Emergency Department right away if you have any of the above-listed symptoms or the following:      You have bleeding, leaking fluid or passing tissue from the vagina.    You have worse pain or cramping, or pain in your shoulder or back.    You have vomiting that will not stop.    You have painful or bloody urination.    You have a temperature of 100oF or more.    Your baby is not moving as much as usual.    You faint.    You get a bad headache with or without eye problems and abdominal pain.    You have a convulsion or seizure.    You have unusual discharge from your vagina and abdominal pain.    Abdominal pain is pretty common during pregnancy.  Your pain may or may not be related to your pregnancy. You should follow-up closely with your OB doctor so they can evaluate you and your baby.  Until you follow-up with your regular doctor, do the following:       Avoid sex and do not put anything in your vagina.    Drink clear fluids.    Only take medications approved by your doctor.    MORE INFORMATION:    Appendicitis:  A possible cause of abdominal pain in any person who still has their  "appendix is acute appendicitis. Appendicitis is often hard to diagnose.  Testing does not always rule out early appendicitis or other causes of abdominal pain. Close follow-up with your doctor and re-evaluations may be needed to figure out the reason for your abdominal pain.    Follow-up:  It is very important that you make an appointment with your clinic and go to the appointment.  If you do not follow-up with your primary doctor, it may result in missing an important development which could result in permanent injury or disability and/or lasting pain.  If there is any problem keeping your appointment, call your doctor or return to the Emergency Department.    Medications:  Take your medications as directed by your doctor today.  Before using over-the-counter medications, ask your doctor and make sure to take the medications as directed.  If you have any questions about medications, ask your doctor.    Diet:  Resume your normal diet as much as possible, but do not eat fried, fatty or spicy foods while you have pain.  Do not drink alcohol or have caffeine.  Do not smoke tobacco.    Probiotics: If you have been given an antibiotic, you may want to also take a probiotic pill or eat yogurt with live cultures. Probiotics have \"good bacteria\" to help your intestines stay healthy. Studies have shown that probiotics help prevent diarrhea and other intestine problems (including C. diff infection) when you take antibiotics. You can buy these without a prescription in the pharmacy section of the store.     If you were given a prescription for medicine here today, be sure to read all of the information (including the package insert) that comes with your prescription.  This will include important information about the medicine, its side effects, and any warnings that you need to know about.  The pharmacist who fills the prescription can provide more information and answer questions you may have about the medicine.  If you have " questions or concerns that the pharmacist cannot address, please call or return to the Emergency Department.         Opioid Medication Information    Pain medications are among the most commonly prescribed medicines, so we are including this information for all our patients. If you did not receive pain medication or get a prescription for pain medicine, you can ignore it.     You may have been given a prescription for an opioid (narcotic) pain medicine and/or have received a pain medicine while here in the Emergency Department. These medicines can make you drowsy or impaired. You must not drive, operate dangerous equipment, or engage in any other dangerous activities while taking these medications. If you drive while taking these medications, you could be arrested for DUI, or driving under the influence. Do not drink any alcohol while you are taking these medications.     Opioid pain medications can cause addiction. If you have a history of chemical dependency of any type, you are at a higher risk of becoming addicted to pain medications.  Only take these prescribed medications to treat your pain when all other options have been tried. Take it for as short a time and as few doses as possible. Store your pain pills in a secure place, as they are frequently stolen and provide a dangerous opportunity for children or visitors in your house to start abusing these powerful medications. We will not replace any lost or stolen medicine.  As soon as your pain is better, you should flush all your remaining medication.     Many prescription pain medications contain Tylenol  (acetaminophen), including Vicodin , Tylenol #3 , Norco , Lortab , and Percocet .  You should not take any extra pills of Tylenol  if you are using these prescription medications or you can get very sick.  Do not ever take more than 3000 mg of acetaminophen in any 24 hour period.    All opioids tend to cause constipation. Drink plenty of water and eat foods  that have a lot of fiber, such as fruits, vegetables, prune juice, apple juice and high fiber cereal.  Take a laxative if you don t move your bowels at least every other day. Miralax , Milk of Magnesia, Colace , or Senna  can be used to keep you regular.      Remember that you can always come back to the Emergency Department if you are not able to see your regular doctor in the amount of time listed above, if you get any new symptoms, or if there is anything that worries you.      Discharge Instructions  Ovarian Cyst    Abdominal pain can be caused by many things. Your doctor today has found that you have a cyst on the ovary, which appears to be the cause of your pain. Women in their reproductive years form cysts every month, but only cause pain if they are very large, or if they rupture and release blood or fluid. Fortunately, they rarely require surgery or hospitalization. The pain from a ruptured cyst usually gets gradually better, and should be much better within a few days. If there is a large cyst, it will usually go away within 1-2 months, but needs to be watched to be sure it does go away, since sometimes a large cyst can become a cancer. There can be complications of a cyst, or other problems that cannot be found right away, so it is very important that you follow up as directed.      Return to the Emergency Department for a recheck if your pain gets worse, changes in location, or feels different.    Return to the Emergency Department right away if:    You get an oral temperature above 102oF or as directed by your doctor.    You have blood in your stools (bright red or black, tarry stools), or in your vomit.    You keep throwing up or can t drink liquids.    You can t have a bowel movement or you can t pass gas.    You faint, or feel very weak.    You have bloody, frequent or painful urination.    You have new symptoms or anything that worries you.    What can I do to help myself?    Take any medication  prescribed by your doctor.    You may use Tylenol  (acetaminophen) or Advil , Motrin  (ibuprofen) for pain. Be sure to read and follow the package directions, and ask your doctor if you have questions.    Narcotic pain pills. If you have been given a narcotic such as Vicodin  (hydrocodone with acetaminophen), Percocet  (oxycodone with acetaminophen), codeine, do not drive for four hours after you have taken it.  If the narcotic contains Tylenol  (acetaminophen), do not take Tylenol  with it. All narcotics will cause constipation, so eat a high fiber diet.      Avoid sex for several days, because it will probably be painful.    Follow-up:      See your doctor within 2-3 days for a re-check.    If you were given a prescription for medicine here today, be sure to read all of the information (including the package insert) that comes with your prescription.  This will include important information about the medicine, its side effects, and any warnings that you need to know about.  The pharmacist who fills the prescription can provide more information and answer questions you may have about the medicine.  If you have questions or concerns that the pharmacist cannot address, please call or return to the Emergency Department.         Opioid Medication Information    Pain medications are among the most commonly prescribed medicines, so we are including this information for all our patients. If you did not receive pain medication or get a prescription for pain medicine, you can ignore it.     You may have been given a prescription for an opioid (narcotic) pain medicine and/or have received a pain medicine while here in the Emergency Department. These medicines can make you drowsy or impaired. You must not drive, operate dangerous equipment, or engage in any other dangerous activities while taking these medications. If you drive while taking these medications, you could be arrested for DUI, or driving under the influence. Do  not drink any alcohol while you are taking these medications.     Opioid pain medications can cause addiction. If you have a history of chemical dependency of any type, you are at a higher risk of becoming addicted to pain medications.  Only take these prescribed medications to treat your pain when all other options have been tried. Take it for as short a time and as few doses as possible. Store your pain pills in a secure place, as they are frequently stolen and provide a dangerous opportunity for children or visitors in your house to start abusing these powerful medications. We will not replace any lost or stolen medicine.  As soon as your pain is better, you should flush all your remaining medication.     Many prescription pain medications contain Tylenol  (acetaminophen), including Vicodin , Tylenol #3 , Norco , Lortab , and Percocet .  You should not take any extra pills of Tylenol  if you are using these prescription medications or you can get very sick.  Do not ever take more than 3000 mg of acetaminophen in any 24 hour period.    All opioids tend to cause constipation. Drink plenty of water and eat foods that have a lot of fiber, such as fruits, vegetables, prune juice, apple juice and high fiber cereal.  Take a laxative if you don t move your bowels at least every other day. Miralax , Milk of Magnesia, Colace , or Senna  can be used to keep you regular.      Remember that you can always come back to the Emergency Department if you are not able to see your regular doctor in the amount of time listed above, if you get any new symptoms, or if there is anything that worries you.        Future Appointments        Provider Department Dept Phone Center    5/23/2017 10:00 AM Neftaly Perez DO [facility] [phone] [facility]      24 Hour Appointment Hotline       To make an appointment at any [facility], call [phone] ([phone]). If you don't have a family doctor or clinic, we  will help you find one. [facility] are conveniently located to serve the needs of you and your family.             Review of your medicines      START taking        Dose / Directions Last dose taken    ondansetron 4 MG ODT tab   Commonly known as:  ZOFRAN ODT   Dose:  4 mg   Quantity:  10 tablet        Take 1 tablet (4 mg) by mouth every 8 hours as needed   Refills:  0        oxyCODONE 5 MG IR tablet   Commonly known as:  ROXICODONE   Dose:  5-10 mg   Quantity:  15 tablet        Take 1-2 tablets (5-10 mg) by mouth every 6 hours as needed for moderate to severe pain (severe pain)   Refills:  0          Our records show that you are taking the medicines listed below. If these are incorrect, please call your family doctor or clinic.        Dose / Directions Last dose taken    amitriptyline 25 MG tablet   Commonly known as:  ELAVIL   Quantity:  90 tablet        Take 1 at bedtime for 3 weeks then 2 at bedtime for 3 weeks then 3 at bedtime   Refills:  6        amphetamine-dextroamphetamine 30 MG per 24 hr capsule   Commonly known as:  ADDERALL XR   Dose:  30 mg   Quantity:  30 capsule        Take 1 capsule (30 mg) by mouth daily   Refills:  0        clonazePAM 1 MG tablet   Commonly known as:  klonoPIN   Quantity:  30 tablet        Take 1 at bedtime   Refills:  3        cyclobenzaprine 5 MG tablet   Commonly known as:  FLEXERIL   Dose:  5 mg   Quantity:  30 tablet        Take 1 tablet (5 mg) by mouth 2 times daily   Refills:  0        Glatiramer Acetate 40 MG/ML Sosy   Commonly known as:  COPAXONE   Dose:  40 mg   Quantity:  12 Syringe        Inject 40 mg Subcutaneous three times a week   Refills:  11        modafinil 200 MG tablet   Commonly known as:  PROVIGIL   Dose:  200 mg   Quantity:  30 tablet        Take 1 tablet (200 mg) by mouth daily   Refills:  5        oxybutynin 10 MG 24 hr tablet   Commonly known as:  DITROPAN XL   Quantity:  30 tablet        Take 1 tab 1 hour prior to bed   Refills:  11         vitamin D 1000 UNITS capsule   Dose:  1 capsule        Take 1 capsule by mouth daily   Refills:  0                Prescriptions were sent or printed at these locations (2 Prescriptions)                   Other Prescriptions                Printed at Department/Unit printer (2 of 2)         oxyCODONE (ROXICODONE) 5 MG IR tablet               ondansetron (ZOFRAN ODT) 4 MG ODT tab                Procedures and tests performed during your visit     Abd/pelvis CT,  IV  contrast only TRAUMA / AAA    CBC with platelets differential    Comprehensive metabolic panel    Creatinine POCT    HCG qualitative urine    ISTAT creatinine nursing POCT    Lipase    POC US ABDOMEN LIMITED    UA with Microscopic    US Pel W/Trans*      Orders Needing Specimen Collection     None      Pending Results     No orders found from 2/24/2017 to 2/27/2017.            Pending Culture Results     No orders found from 2/24/2017 to 2/27/2017.             Test Results from your hospital stay     2/26/2017  4:25 PM - Interface, [employer] Results      Component Results     Component Value Ref Range & Units Status    WBC 14.7 (H) 4.0 - 11.0 10e9/L Final    RBC Count 4.41 3.8 - 5.2 10e12/L Final    Hemoglobin 13.1 11.7 - 15.7 g/dL Final    Hematocrit 39.0 35.0 - 47.0 % Final    MCV 88 78 - 100 fl Final    MCH 29.7 26.5 - 33.0 pg Final    MCHC 33.6 31.5 - 36.5 g/dL Final    RDW 11.8 10.0 - 15.0 % Final    Platelet Count 288 150 - 450 10e9/L Final    Diff Method Automated Method  Final    % Neutrophils 82.8 % Final    % Lymphocytes 10.0 % Final    % Monocytes 4.7 % Final    % Eosinophils 1.4 % Final    % Basophils 0.7 % Final    % Immature Granulocytes 0.4 % Final    Nucleated RBCs 0 0 /100 Final    Absolute Neutrophil 12.2 (H) 1.6 - 8.3 10e9/L Final    Absolute Lymphocytes 1.5 0.8 - 5.3 10e9/L Final    Absolute Monocytes 0.7 0.0 - 1.3 10e9/L Final    Absolute Eosinophils 0.2 0.0 - 0.7 10e9/L Final    Absolute Basophils 0.1 0.0 - 0.2 10e9/L Final    Abs  Immature Granulocytes 0.1 0 - 0.4 10e9/L Final    Absolute Nucleated RBC 0.0  Final         2/26/2017  4:49 PM - Interface, [employer] Results      Component Results     Component Value Ref Range & Units Status    Sodium 138 133 - 144 mmol/L Final    Potassium 3.4 3.4 - 5.3 mmol/L Final    Chloride 101 94 - 109 mmol/L Final    Carbon Dioxide 31 20 - 32 mmol/L Final    Anion Gap 6 3 - 14 mmol/L Final    Glucose 122 (H) 70 - 99 mg/dL Final    Urea Nitrogen 15 7 - 30 mg/dL Final    Creatinine 0.99 0.52 - 1.04 mg/dL Final    GFR Estimate 60 (L) >60 mL/min/1.7m2 Final    Non  GFR Calc    GFR Estimate If Black 73 >60 mL/min/1.7m2 Final    African American GFR Calc    Calcium 8.7 8.5 - 10.1 mg/dL Final    Bilirubin Total 0.6 0.2 - 1.3 mg/dL Final    Albumin 3.7 3.4 - 5.0 g/dL Final    Protein Total 8.0 6.8 - 8.8 g/dL Final    Alkaline Phosphatase 68 40 - 150 U/L Final    ALT 23 0 - 50 U/L Final    AST 20 0 - 45 U/L Final         2/26/2017  4:49 PM - Interface, [employer] Results      Component Results     Component Value Ref Range & Units Status    Lipase 144 73 - 393 U/L Final         2/26/2017  4:50 PM - Interface, [employer] Results      Component Results     Component Value Ref Range & Units Status    Color Urine Yellow  Final    Appearance Urine Slightly Cloudy  Final    Glucose Urine Negative NEG mg/dL Final    Bilirubin Urine Negative NEG Final    Ketones Urine Negative NEG mg/dL Final    Specific Gravity Urine 1.015 1.003 - 1.035 Final    Blood Urine Negative NEG Final    pH Urine 6.5 5.0 - 7.0 pH Final    Protein Albumin Urine Negative NEG mg/dL Final    Urobilinogen mg/dL Normal 0.0 - 2.0 mg/dL Final    Nitrite Urine Negative NEG Final    Leukocyte Esterase Urine Negative NEG Final    Source Midstream Urine  Final    WBC Urine 1 0 - 2 /HPF Final    RBC Urine 1 0 - 2 /HPF Final    Squamous Epithelial /HPF Urine <1 0 - 1 /HPF Final    Transitional Epi <1 0 - 1 /HPF Final    Mucous Urine Present (A) NEG  /LPF Final    Amorphous Crystals Few (A) NEG /HPF Final         2/26/2017  5:01 PM - Interface, [employer] Results      Component Results     Component Value Ref Range & Units Status    HCG Qual Urine Negative NEG Final         2/26/2017  5:13 PM - Interface, [facility]      Narrative     CT ABDOMEN AND PELVIS WITH CONTRAST  2/26/2017 4:48 PM    History: Acute onset right lower quadrant pain.    Comparison: None    Technique: Volumetric acquisition with reconstruction in the axial,  coronal planes through the abdomen and pelvis with contrast. Radiation  dose for this scan was reduced using automated exposure control,  adjustment of the mA and/or kV according to patient size, or iterative  reconstruction technique.    Contrast: 95mL Isovue-370    Findings:   Lung Bases: Mild dependent atelectasis. No pleural or pericardial  effusion.    Abdomen: Gallbladder is decompressed. Liver, spleen, kidneys, adrenal  glands and pancreas are normal. Tiny hiatal hernia.    Large amount of stool throughout the colon. No areas of bowel wall  thickening or bowel dilatation. Normal appendix. No free fluid. No  abdominal or pelvic lymphadenopathy.    Bones: No concerning lytic or sclerotic lesions.        Impression     Impression: Large amount of stool in the colon. No other findings to  suggest an etiology of the patient's abdominal pain. Specifically,  normal appendix.    ZARINA BAILEY         2/26/2017  4:55 PM - Leroy Marino MD      Impression     PROCEDURE NOTE     Emergency Bedside Ultrasound - Right Upper Quadrant    Preformed by: Leroy Marino MD    Indication - RUQ/Epigastric abdominal pain       Suspicion of cholelithiasis/cholecystitis/CBD ston    Probe: low frequency curvilinear probe    Windows - longitudinal and transverse views     Findings - no Cholelithiasis, no pericholestatic fluid, no wall thickness, no sonographic Hopkins's sign, No R sided hydronephrosis, No free fluid R paracolic gutter    Impression -  Unremarkable limited RUQ US    Images saved on ultrasound internal hard drive per protocol                 2/26/2017  4:26 PM - Interface, [employer] Results      Component Results     Component Value Ref Range & Units Status    Creatinine 1.0 0.52 - 1.04 mg/dL Final    GFR Estimate 59 (L) >60 mL/min/1.7m2 Final    GFR Estimate If Black 72 >60 mL/min/1.7m2 Final         2/26/2017  6:35 PM - Interface, [facility] Ib      Narrative     EXAMINATION: US PELVIC COMPLETE WITH TRANSVAGINAL, 2/26/2017 6:29 PM     COMPARISON: None.    HISTORY: Right lower quadrant pain, ovarian cyst on CT.    TECHNIQUE: The pelvis was scanned in standard fashion with  transabdominal and transvaginal transducer(s) using both grey scale  and color Doppler techniques.    FINDINGS:  The uterus is surgically absent. There is trace free fluid in the  pelvis.    The right ovary measures 4.0 x 3.0 x 3.3 cm and the left ovary  measures 3.2 x 1.3 x 1.6 cm. 3.1 x 2.7 x 2.6 cm simple cyst in the  right ovary, likely representing follicle. Normal blood flow is seen  in both ovaries.          Impression     IMPRESSION:   1.  3.1 cm follicle in the right ovary. Otherwise normal pelvic  ultrasound status post hysterectomy.    ZARINA BAILEY                Clinical Quality Measure: Blood Pressure Screening     Your blood pressure was checked while you were in the emergency department today. The last reading we obtained was  BP: 121/81 . Please read the guidelines below about what these numbers mean and what you should do about them.  If your systolic blood pressure (the top number) is less than 120 and your diastolic blood pressure (the bottom number) is less than 80, then your blood pressure is normal. There is nothing more that you need to do about it.  If your systolic blood pressure (the top number) is 120-139 or your diastolic blood pressure (the bottom number) is 80-89, your blood pressure may be higher than it should be. You should have your blood pressure  rechecked within a year by a primary care provider.  If your systolic blood pressure (the top number) is 140 or greater or your diastolic blood pressure (the bottom number) is 90 or greater, you may have high blood pressure. High blood pressure is treatable, but if left untreated over time it can put you at risk for heart attack, stroke, or kidney failure. You should have your blood pressure rechecked by a primary care provider within the next 4 weeks.  If your provider in the emergency department today gave you specific instructions to follow-up with your doctor or provider even sooner than that, you should follow that instruction and not wait for up to 4 weeks for your follow-up visit.        Thank you for choosing [facility]       Thank you for choosing [facility] for your care. Our goal is always to provide you with excellent care. Hearing back from our patients is one way we can continue to improve our services. Please take a few minutes to complete the written survey that you may receive in the mail after you visit with us. Thank you!        [employer] Information     [employer] gives you secure access to your electronic health record. If you see a primary care provider, you can also send messages to your care team and make appointments. If you have questions, please call your primary care clinic.  If you do not have a primary care provider, please call [phone] and they will assist you.        Care EveryWhere ID     This is your Care EveryWhere ID. This could be used by other organizations to access your [facility] medical records  [id number]        After Visit Summary       This is your record. Keep this with you and show to your community pharmacist(s) and doctor(s) at your next visit.

## 2017-02-27 ENCOUNTER — MYC MEDICAL ADVICE (OUTPATIENT)
Dept: NEUROLOGY | Facility: CLINIC | Age: 47
End: 2017-02-27

## 2017-02-27 DIAGNOSIS — R29.898 RIGHT LEG WEAKNESS: Primary | ICD-10-CM

## 2017-02-27 NOTE — DISCHARGE INSTRUCTIONS
Please make an appointment to follow up with your primary care provider in 3-5 days if not improving.    Return to ER immediately if you develop: worsening pain, Fever > 101, persistent nausea or vomiting OR you have any other concerns about your health.    You should have a repeat pelvic ultrasound in 4-6 weeks to reevaluate your ovarian cyst      Discharge Instructions  Abdominal Pain    Abdominal pain can be caused by many things. Your evaluation today does not show the exact cause for your pain. Your doctor today has decided that it is unlikely your pain is due to a life threatening problem, or a problem requiring surgery or hospital admission. Sometimes those problems cannot be found right away, so it is very important that you follow up as directed.  Sometimes only the changes which occur over time allow the cause of your pain to be found.    Return to the Emergency Department for a recheck in 8-12 hours if your pain continues.  If your pain gets worse, changes in location, or feels different, return to the Emergency Department right away.    ADULTS:  Return to the Emergency Department right away if:      You get an oral temperature above 102oF or as directed by your doctor.    You have blood in your stools (bright red or black, tarry stools).    You keep throwing up or can t drink liquids.    You see blood when you throw up.    You can t have a bowel movement or you can t pass gas.    Your stomach gets bloated or bigger.    Your skin or the whites of your eyes look yellow.    You faint.    You have bloody, frequent or painful urination.    You have new symptoms or anything that worries you.    CHILDREN:  Return to the Emergency Department right away if your child has any of the above-listed symptoms or the following:      Pushes your hand away or screams/cries when his/her belly is touched.    You notice your child is very fussy or weak.    Your child is very tired and is too tired to eat or drink.    Your  child is dehydrated.  Signs of dehydration can be:  o Your infant has had no wet diapers in 4-5 hours.  o Your older child has not passed urine in 6-8 hours.  o Your infant or child starts to have dry mouth and lips, or no saliva or tears.    PREGNANT WOMEN:  Return to the Emergency Department right away if you have any of the above-listed symptoms or the following:      You have bleeding, leaking fluid or passing tissue from the vagina.    You have worse pain or cramping, or pain in your shoulder or back.    You have vomiting that will not stop.    You have painful or bloody urination.    You have a temperature of 100oF or more.    Your baby is not moving as much as usual.    You faint.    You get a bad headache with or without eye problems and abdominal pain.    You have a convulsion or seizure.    You have unusual discharge from your vagina and abdominal pain.    Abdominal pain is pretty common during pregnancy.  Your pain may or may not be related to your pregnancy. You should follow-up closely with your OB doctor so they can evaluate you and your baby.  Until you follow-up with your regular doctor, do the following:       Avoid sex and do not put anything in your vagina.    Drink clear fluids.    Only take medications approved by your doctor.    MORE INFORMATION:    Appendicitis:  A possible cause of abdominal pain in any person who still has their appendix is acute appendicitis. Appendicitis is often hard to diagnose.  Testing does not always rule out early appendicitis or other causes of abdominal pain. Close follow-up with your doctor and re-evaluations may be needed to figure out the reason for your abdominal pain.    Follow-up:  It is very important that you make an appointment with your clinic and go to the appointment.  If you do not follow-up with your primary doctor, it may result in missing an important development which could result in permanent injury or disability and/or lasting pain.  If there is  "any problem keeping your appointment, call your doctor or return to the Emergency Department.    Medications:  Take your medications as directed by your doctor today.  Before using over-the-counter medications, ask your doctor and make sure to take the medications as directed.  If you have any questions about medications, ask your doctor.    Diet:  Resume your normal diet as much as possible, but do not eat fried, fatty or spicy foods while you have pain.  Do not drink alcohol or have caffeine.  Do not smoke tobacco.    Probiotics: If you have been given an antibiotic, you may want to also take a probiotic pill or eat yogurt with live cultures. Probiotics have \"good bacteria\" to help your intestines stay healthy. Studies have shown that probiotics help prevent diarrhea and other intestine problems (including C. diff infection) when you take antibiotics. You can buy these without a prescription in the pharmacy section of the store.     If you were given a prescription for medicine here today, be sure to read all of the information (including the package insert) that comes with your prescription.  This will include important information about the medicine, its side effects, and any warnings that you need to know about.  The pharmacist who fills the prescription can provide more information and answer questions you may have about the medicine.  If you have questions or concerns that the pharmacist cannot address, please call or return to the Emergency Department.         Opioid Medication Information    Pain medications are among the most commonly prescribed medicines, so we are including this information for all our patients. If you did not receive pain medication or get a prescription for pain medicine, you can ignore it.     You may have been given a prescription for an opioid (narcotic) pain medicine and/or have received a pain medicine while here in the Emergency Department. These medicines can make you drowsy or " impaired. You must not drive, operate dangerous equipment, or engage in any other dangerous activities while taking these medications. If you drive while taking these medications, you could be arrested for DUI, or driving under the influence. Do not drink any alcohol while you are taking these medications.     Opioid pain medications can cause addiction. If you have a history of chemical dependency of any type, you are at a higher risk of becoming addicted to pain medications.  Only take these prescribed medications to treat your pain when all other options have been tried. Take it for as short a time and as few doses as possible. Store your pain pills in a secure place, as they are frequently stolen and provide a dangerous opportunity for children or visitors in your house to start abusing these powerful medications. We will not replace any lost or stolen medicine.  As soon as your pain is better, you should flush all your remaining medication.     Many prescription pain medications contain Tylenol  (acetaminophen), including Vicodin , Tylenol #3 , Norco , Lortab , and Percocet .  You should not take any extra pills of Tylenol  if you are using these prescription medications or you can get very sick.  Do not ever take more than 3000 mg of acetaminophen in any 24 hour period.    All opioids tend to cause constipation. Drink plenty of water and eat foods that have a lot of fiber, such as fruits, vegetables, prune juice, apple juice and high fiber cereal.  Take a laxative if you don t move your bowels at least every other day. Miralax , Milk of Magnesia, Colace , or Senna  can be used to keep you regular.      Remember that you can always come back to the Emergency Department if you are not able to see your regular doctor in the amount of time listed above, if you get any new symptoms, or if there is anything that worries you.      Discharge Instructions  Ovarian Cyst    Abdominal pain can be caused by many things.  Your doctor today has found that you have a cyst on the ovary, which appears to be the cause of your pain. Women in their reproductive years form cysts every month, but only cause pain if they are very large, or if they rupture and release blood or fluid. Fortunately, they rarely require surgery or hospitalization. The pain from a ruptured cyst usually gets gradually better, and should be much better within a few days. If there is a large cyst, it will usually go away within 1-2 months, but needs to be watched to be sure it does go away, since sometimes a large cyst can become a cancer. There can be complications of a cyst, or other problems that cannot be found right away, so it is very important that you follow up as directed.      Return to the Emergency Department for a recheck if your pain gets worse, changes in location, or feels different.    Return to the Emergency Department right away if:    You get an oral temperature above 102oF or as directed by your doctor.    You have blood in your stools (bright red or black, tarry stools), or in your vomit.    You keep throwing up or can t drink liquids.    You can t have a bowel movement or you can t pass gas.    You faint, or feel very weak.    You have bloody, frequent or painful urination.    You have new symptoms or anything that worries you.    What can I do to help myself?    Take any medication prescribed by your doctor.    You may use Tylenol  (acetaminophen) or Advil , Motrin  (ibuprofen) for pain. Be sure to read and follow the package directions, and ask your doctor if you have questions.    Narcotic pain pills. If you have been given a narcotic such as Vicodin  (hydrocodone with acetaminophen), Percocet  (oxycodone with acetaminophen), codeine, do not drive for four hours after you have taken it.  If the narcotic contains Tylenol  (acetaminophen), do not take Tylenol  with it. All narcotics will cause constipation, so eat a high fiber diet.      Avoid  sex for several days, because it will probably be painful.    Follow-up:      See your doctor within 2-3 days for a re-check.    If you were given a prescription for medicine here today, be sure to read all of the information (including the package insert) that comes with your prescription.  This will include important information about the medicine, its side effects, and any warnings that you need to know about.  The pharmacist who fills the prescription can provide more information and answer questions you may have about the medicine.  If you have questions or concerns that the pharmacist cannot address, please call or return to the Emergency Department.         Opioid Medication Information    Pain medications are among the most commonly prescribed medicines, so we are including this information for all our patients. If you did not receive pain medication or get a prescription for pain medicine, you can ignore it.     You may have been given a prescription for an opioid (narcotic) pain medicine and/or have received a pain medicine while here in the Emergency Department. These medicines can make you drowsy or impaired. You must not drive, operate dangerous equipment, or engage in any other dangerous activities while taking these medications. If you drive while taking these medications, you could be arrested for DUI, or driving under the influence. Do not drink any alcohol while you are taking these medications.     Opioid pain medications can cause addiction. If you have a history of chemical dependency of any type, you are at a higher risk of becoming addicted to pain medications.  Only take these prescribed medications to treat your pain when all other options have been tried. Take it for as short a time and as few doses as possible. Store your pain pills in a secure place, as they are frequently stolen and provide a dangerous opportunity for children or visitors in your house to start abusing these powerful  medications. We will not replace any lost or stolen medicine.  As soon as your pain is better, you should flush all your remaining medication.     Many prescription pain medications contain Tylenol  (acetaminophen), including Vicodin , Tylenol #3 , Norco , Lortab , and Percocet .  You should not take any extra pills of Tylenol  if you are using these prescription medications or you can get very sick.  Do not ever take more than 3000 mg of acetaminophen in any 24 hour period.    All opioids tend to cause constipation. Drink plenty of water and eat foods that have a lot of fiber, such as fruits, vegetables, prune juice, apple juice and high fiber cereal.  Take a laxative if you don t move your bowels at least every other day. Miralax , Milk of Magnesia, Colace , or Senna  can be used to keep you regular.      Remember that you can always come back to the Emergency Department if you are not able to see your regular doctor in the amount of time listed above, if you get any new symptoms, or if there is anything that worries you.

## 2017-02-27 NOTE — TELEPHONE ENCOUNTER
Loren, please see Chayo's MyChart message and let me know what you think (or respond to her directly); Thank you.    Polly Rodríguez, MS RN Care Coordinator

## 2017-02-28 ENCOUNTER — MYC MEDICAL ADVICE (OUTPATIENT)
Dept: NEUROLOGY | Facility: CLINIC | Age: 47
End: 2017-02-28

## 2017-03-01 ENCOUNTER — TELEPHONE (OUTPATIENT)
Dept: OTHER | Facility: CLINIC | Age: 47
End: 2017-03-01

## 2017-03-01 NOTE — TELEPHONE ENCOUNTER
I called Chayo and went over her MRI cervical and thoracic results. I also explained to her why we would like to do a brain MRI now. She agrees understanding. She has the contact number for Radiology scheduling and will schedule the MRI at her convenience.

## 2017-03-02 ENCOUNTER — MYC REFILL (OUTPATIENT)
Dept: NEUROLOGY | Facility: CLINIC | Age: 47
End: 2017-03-02

## 2017-03-02 DIAGNOSIS — G35 MS (MULTIPLE SCLEROSIS) (H): ICD-10-CM

## 2017-03-02 DIAGNOSIS — R53.83 OTHER FATIGUE: ICD-10-CM

## 2017-03-02 RX ORDER — DEXTROAMPHETAMINE SACCHARATE, AMPHETAMINE ASPARTATE MONOHYDRATE, DEXTROAMPHETAMINE SULFATE AND AMPHETAMINE SULFATE 7.5; 7.5; 7.5; 7.5 MG/1; MG/1; MG/1; MG/1
30 CAPSULE, EXTENDED RELEASE ORAL DAILY
Qty: 30 CAPSULE | Refills: 0 | Status: SHIPPED | OUTPATIENT
Start: 2017-03-02 | End: 2017-05-15

## 2017-03-02 NOTE — TELEPHONE ENCOUNTER
Patient sent My Chart message requesting refill of their Adderall; Patient was last seen in February and has follow up appointment in May with Dr. Perez; Pended rx to Loren Lennon CNP for signature on behalf of Dr. Perez, as Dr. Perez is away from the office and will mail to the pharmacy once signed.    Nataliia Nunes MS RN Care Coordinator

## 2017-03-02 NOTE — TELEPHONE ENCOUNTER
Prescription for Adderall placed in mail to pharmacy. My Chart message sent to patient letting her know that this was done.    Nataliia Nunes MS RN Care Coordinator

## 2017-03-02 NOTE — TELEPHONE ENCOUNTER
Message from MyChart:  Original authorizing provider: Carlos Trotter MD    Chayo Aramisdivya would like a refill of the following medications:  amphetamine-dextroamphetamine (ADDERALL XR) 30 MG per 24 hr capsule [Carlos Trotter MD]    Preferred pharmacy: [facility] [id number] [address]    Comment:

## 2017-03-06 ENCOUNTER — TELEPHONE (OUTPATIENT)
Dept: NEUROLOGY | Facility: CLINIC | Age: 47
End: 2017-03-06

## 2017-03-06 NOTE — TELEPHONE ENCOUNTER
I left a voice message to Chayo regarding her recent MRI brain results which was done on 03/03. MRI brain is stable without any new or active lesions.       03/03/2017 MRI Brain:  Impression: The study demonstrates stable appearance of 5-10 foci of  T2-hyperintensity within the cerebral white matter consistent with the  clinical suspicion of demyelinating disease. No contrast enhancement  to suggest active demyelination. No interval development of new  lesion.

## 2017-03-29 ENCOUNTER — OFFICE VISIT (OUTPATIENT)
Dept: OBGYN | Facility: CLINIC | Age: 47
End: 2017-03-29
Payer: COMMERCIAL

## 2017-03-29 ENCOUNTER — TELEPHONE (OUTPATIENT)
Dept: OBGYN | Facility: CLINIC | Age: 47
End: 2017-03-29

## 2017-03-29 VITALS
WEIGHT: 189 LBS | SYSTOLIC BLOOD PRESSURE: 108 MMHG | BODY MASS INDEX: 30.51 KG/M2 | DIASTOLIC BLOOD PRESSURE: 64 MMHG | HEART RATE: 102 BPM | OXYGEN SATURATION: 99 %

## 2017-03-29 DIAGNOSIS — N83.201 CYST OF RIGHT OVARY: Primary | ICD-10-CM

## 2017-03-29 DIAGNOSIS — R10.2 PELVIC PAIN IN FEMALE: ICD-10-CM

## 2017-03-29 PROCEDURE — 99213 OFFICE O/P EST LOW 20 MIN: CPT | Performed by: NURSE PRACTITIONER

## 2017-03-29 NOTE — PROGRESS NOTES
"  SUBJECTIVE:                                                   Chayo Reid is a 46 year old who presents to clinic today for the following health issue(s):  Patient presents with:  Consult      HPI:  Patient went to ER on 17 due to RLQ abd pain that did not resolve for several hours.  During her workup in the ER, it was found that patient had right ovarian cyst; the rest of the workup was negative.  Patient states that since her visit to the ER she has continued to have RLQ.  She describes the pain as sharp, intermittent, cramping that sometimes pulls.  She states she also feels \"bloated\" with the pain.  Her mother was recently diagnosed with stage 3 ovarian cancer in 2016, and patient is concerned that her ovarian cyst may be cancerous.      No LMP recorded. Patient has had a hysterectomy.  Menstrual History: status post hysterectomy  Patient is sexually active  No obstetric history on file..  Using hysterectomy for contraception.   Health maintenance updated:  yes  STI infx testing offered:  NA    Last PHQ-9 score on record = No flowsheet data found.  Last GAD7 score on record = No flowsheet data found.  Alcohol Score = 0    Problem list and histories reviewed & adjusted, as indicated.  Additional history: as documented.    Patient Active Problem List   Diagnosis     MS (multiple sclerosis) (H)     Fatigue     Cyst of right ovary     Past Surgical History:   Procedure Laterality Date     BREAST SURGERY  2012      SECTION  ,      HYSTERECTOMY  2015    partial; still has tubes and ovaries     TUBAL LIGATION Bilateral       Social History   Substance Use Topics     Smoking status: Never Smoker     Smokeless tobacco: Never Used     Alcohol use 0.6 oz/week     1 Glasses of wine per week      Comment: occasional      Problem (# of Occurrences) Relation (Name,Age of Onset)    Neurologic Disorder (1) Maternal Grandmother: Multiple Sclerosis    Ovarian Cancer (1) Mother (76): " diagnosed 12/2016, stage 3            Current Outpatient Prescriptions   Medication Sig     amphetamine-dextroamphetamine (ADDERALL XR) 30 MG per 24 hr capsule Take 1 capsule (30 mg) by mouth daily     cyclobenzaprine (FLEXERIL) 5 MG tablet Take 1 tablet (5 mg) by mouth 2 times daily     modafinil (PROVIGIL) 200 MG tablet Take 1 tablet (200 mg) by mouth daily     Glatiramer Acetate (COPAXONE) 40 MG/ML SOSY Inject 40 mg Subcutaneous three times a week     amitriptyline (ELAVIL) 25 MG tablet Take 1 at bedtime for 3 weeks then 2 at bedtime for 3 weeks then 3 at bedtime     oxybutynin (DITROPAN XL) 10 MG 24 hr tablet Take 1 tab 1 hour prior to bed     clonazePAM (KLONOPIN) 1 MG tablet Take 1 at bedtime     Cholecalciferol (VITAMIN D) 1000 UNITS capsule Take 1 capsule by mouth daily     oxyCODONE (ROXICODONE) 5 MG IR tablet Take 1-2 tablets (5-10 mg) by mouth every 6 hours as needed for moderate to severe pain (severe pain) (Patient not taking: Reported on 3/29/2017)     No current facility-administered medications for this visit.      No Known Allergies    ROS:  12 point review of systems negative other than symptoms noted below.  Constitutional: Fatigue    OBJECTIVE:     /64 (BP Location: Right arm, Cuff Size: Adult Regular)  Pulse 102  Wt 189 lb (85.7 kg)  SpO2 99%  BMI 30.51 kg/m2  Body mass index is 30.51 kg/(m^2).    PHYSICAL EXAM:  Constitutional:  Appearance: Well nourished, well developed alert, in no acute distress  Neurologic/Psychiatric:  Mental Status:  Oriented X3      In-Clinic Test Results:  No results found for this or any previous visit (from the past 24 hour(s)).    ASSESSMENT/PLAN:                                                        ICD-10-CM    1. Cyst of right ovary N83.201 US Transvaginal Non OB   2. Pelvic pain in female R10.2        COUNSELING:    Counseled on need for US and MD follow up for right ovarian cyst with continued pain  Counseled on possible treatment options including  surgery for ovarian cyst  Counseled on need for possible BRCA screening d/t mother having ovarian CA  return to clinic for US and f/u with MD ASAP    25 minutes was spent face to face with the patient today discussing her history, diagnosis, and follow-up plan as noted above. Over 50% of the visit was spent in counseling and coordination of care.    Total Visit Time: 25 minutes.     Priscila CAMPOS CNM

## 2017-03-29 NOTE — NURSING NOTE
"Chief Complaint   Patient presents with     Consult   Right ovarian cyst - still having pain  Initial /64 (BP Location: Right arm, Cuff Size: Adult Regular)  Pulse 102  Wt 189 lb (85.7 kg)  SpO2 99%  BMI 30.51 kg/m2 Estimated body mass index is 30.51 kg/(m^2) as calculated from the following:    Height as of 2/9/17: 5' 6\" (1.676 m).    Weight as of this encounter: 189 lb (85.7 kg).  Medication Reconciliation: complete   Angella Drake MA      "

## 2017-03-29 NOTE — MR AVS SNAPSHOT
After Visit Summary   3/29/2017    Chayo Reid    MRN: [id number]           Patient Information     Date Of Birth          1970        Visit Information        Provider Department      3/29/2017 11:00 AM Priscila Aquino APRN CNM [facility]        Today's Diagnoses     Cyst of right ovary    -  1    Pelvic pain in female          Care Instructions    Bladder Infection (UTI'S)    To help reduce the symptoms of your bladder infection:       Drink 8-10 glasses of water every day      Decrease caffeine, sugar and alcohol      Take all of the medication as it has been prescribed, don't stop before the last dose is gone      You may use OTC Uristat or Pyridium (this can turn your urine orange)  to soothe your bladder and reduce the burning but be aware that it may stain your clothing      Take Ibuprofen as directed for pain (not in pregnancy)      Take Vitamin C:  250-500 mg a day, Zinc: 30-50 mg day      Cranactin (tableted cranberry juice concentrate) take 1-2 tablets every 3-4 hours with plenty of water        To prevent future urinary tract infections:    AVOID CHEMICAL IRRITANTS:  Bath gels, perfumed products, deodorant pads or tampons, douching    CLOTHING: That increases moisture and bacterial growth:  nylon, Lycra, panty liners, tight clothing and thong underwear      ACIDIFY URINE: Cranberry tablets (Cranactin) or juice (naturally sweetened) to acidify urine and decrease bacterial growth.     URINATE:  Frequently and before and after intercourse.    If bladder infections are a common problem for you, consider washing your vaginal area before intercourse as well.       If symptoms persist or you experience fever, chills or back pain, please call:    [facility]   [phone]             Follow-ups after your visit        Follow-up notes from your care team     Return in about 2 weeks (around 4/12/2017) for Office visit.      Your next 10  appointments already scheduled     Apr 11, 2017 11:15 AM CDT   US TRANSVAGINAL NON OB with OXUS1   [facility]    [address] [id number] [phone]           Please bring a list of your medicines (including vitamins, minerals and over-the-counter drugs). Also, tell your doctor about any allergies you may have. Wear comfortable clothes and leave your valuables at home.  You do not need to do anything special to prepare for your exam.  Please call the Imaging Department at your exam site with any questions.            Apr 11, 2017  1:45 PM CDT   Office Visit with Aries Bella MD   [facility] ([facility])    [address] [place] [id number] [phone]           Bring a current list of meds and any records pertaining to this visit.  For Physicals, please bring immunization records and any forms needing to be filled out.  Please arrive 10 minutes early to complete paperwork.            May 23, 2017 10:00 AM CDT   (Arrive by 9:45 AM)   Return Multiple Sclerosis with Neftaly Perez,    [facility] Multiple Sclerosis ([facility])    [address] [facility]  3rd [facility] [address] [phone]              Future tests that were ordered for you today     Open Future Orders        Priority Expected Expires Ordered    US Transvaginal Non OB Routine  3/30/2018 3/29/2017            Who to contact     If you have questions or need follow up information about today's clinic visit or your schedule please contact [facility] directly at [phone].  Normal or non-critical lab and imaging results will be communicated to you by MyChart, letter or phone within 4 business days after the clinic has received the results. If you do not hear from us within 7 days, please contact the clinic through [employer] or  phone. If you have a critical or abnormal lab result, we will notify you by phone as soon as possible.  Submit refill requests through SolAeroMed or call your pharmacy and they will forward the refill request to us. Please allow 3 business days for your refill to be completed.          Additional Information About Your Visit        Callio Technologieshart Information     [employer] gives you secure access to your electronic health record. If you see a primary care provider, you can also send messages to your care team and make appointments. If you have questions, please call your primary care clinic.  If you do not have a primary care provider, please call [phone] and they will assist you.        Care EveryWhere ID     This is your Care EveryWhere ID. This could be used by other organizations to access your [place] medical records  [id number]        Your Vitals Were     Pulse Pulse Oximetry BMI (Body Mass Index)             102 99% 30.51 kg/m2          Blood Pressure from Last 3 Encounters:   03/29/17 108/64   02/26/17 121/77   02/09/17 132/78    Weight from Last 3 Encounters:   03/29/17 189 lb (85.7 kg)   02/09/17 189 lb 6.4 oz (85.9 kg)   11/15/16 179 lb 11.2 oz (81.5 kg)               Primary Care Provider    Pcp Unknown Verified       No address on file        Thank you!     Thank you for choosing [facility]  for your care. Our goal is always to provide you with excellent care. Hearing back from our patients is one way we can continue to improve our services. Please take a few minutes to complete the written survey that you may receive in the mail after your visit with us. Thank you!             Your Updated Medication List - Protect others around you: Learn how to safely use, store and throw away your medicines at www.disposemymeds.org.          This list is accurate as of: 3/29/17  5:28 PM.  Always use your most recent med list.                   Brand Name Dispense Instructions for use     amitriptyline 25 MG tablet    ELAVIL    90 tablet    Take 1 at bedtime for 3 weeks then 2 at bedtime for 3 weeks then 3 at bedtime       amphetamine-dextroamphetamine 30 MG per 24 hr capsule    ADDERALL XR    30 capsule    Take 1 capsule (30 mg) by mouth daily       clonazePAM 1 MG tablet    klonoPIN    30 tablet    Take 1 at bedtime       cyclobenzaprine 5 MG tablet    FLEXERIL    30 tablet    Take 1 tablet (5 mg) by mouth 2 times daily       Glatiramer Acetate 40 MG/ML Sosy    COPAXONE    12 Syringe    Inject 40 mg Subcutaneous three times a week       modafinil 200 MG tablet    PROVIGIL    30 tablet    Take 1 tablet (200 mg) by mouth daily       oxybutynin 10 MG 24 hr tablet    DITROPAN XL    30 tablet    Take 1 tab 1 hour prior to bed       oxyCODONE 5 MG IR tablet    ROXICODONE    15 tablet    Take 1-2 tablets (5-10 mg) by mouth every 6 hours as needed for moderate to severe pain (severe pain)       vitamin D 1000 UNITS capsule      Take 1 capsule by mouth daily

## 2017-04-03 DIAGNOSIS — G47.9 SLEEP DISORDER: ICD-10-CM

## 2017-04-03 DIAGNOSIS — M79.18 MYOFASCIAL PAIN SYNDROME: ICD-10-CM

## 2017-04-03 NOTE — TELEPHONE ENCOUNTER
Received refill request for amitriptyline from [facility]; Patient was last seen in February by Loren Lennon and has follow up appointment in May with Dr Perez; Refilled for 1 year per [place] refill protocol.    Polly Rodríguez, MS RN Care Coordinator

## 2017-04-11 ENCOUNTER — RADIANT APPOINTMENT (OUTPATIENT)
Dept: ULTRASOUND IMAGING | Facility: CLINIC | Age: 47
End: 2017-04-11
Payer: COMMERCIAL

## 2017-04-11 ENCOUNTER — OFFICE VISIT (OUTPATIENT)
Dept: OBGYN | Facility: CLINIC | Age: 47
End: 2017-04-11
Payer: COMMERCIAL

## 2017-04-11 VITALS
BODY MASS INDEX: 30.51 KG/M2 | HEART RATE: 86 BPM | SYSTOLIC BLOOD PRESSURE: 110 MMHG | OXYGEN SATURATION: 99 % | DIASTOLIC BLOOD PRESSURE: 64 MMHG | WEIGHT: 189 LBS

## 2017-04-11 DIAGNOSIS — N83.202 CYSTS OF BOTH OVARIES: Primary | ICD-10-CM

## 2017-04-11 DIAGNOSIS — N83.201 CYST OF RIGHT OVARY: ICD-10-CM

## 2017-04-11 DIAGNOSIS — N83.201 CYSTS OF BOTH OVARIES: Primary | ICD-10-CM

## 2017-04-11 PROCEDURE — 76830 TRANSVAGINAL US NON-OB: CPT | Mod: 59 | Performed by: OBSTETRICS & GYNECOLOGY

## 2017-04-11 PROCEDURE — 99212 OFFICE O/P EST SF 10 MIN: CPT | Performed by: OBSTETRICS & GYNECOLOGY

## 2017-04-11 PROCEDURE — 76856 US EXAM PELVIC COMPLETE: CPT | Performed by: OBSTETRICS & GYNECOLOGY

## 2017-04-11 NOTE — MR AVS SNAPSHOT
After Visit Summary   4/11/2017    Chayo Reid    MRN: [id number]           Patient Information     Date Of Birth          1970        Visit Information        Provider Department      4/11/2017 3:30 PM Aries Bella MD [facility]        Today's Diagnoses     Cysts of both ovaries    -  1       Follow-ups after your visit        Your next 10 appointments already scheduled     Jun 06, 2017 11:15 AM CDT   US PELVIC LIMITED with OXUS1   [facility]    [address] [phone]           Please bring a list of your medicines (including vitamins, minerals and over-the-counter drugs). Also, tell your doctor about any allergies you may have. Wear comfortable clothes and leave your valuables at home.  Adults: Drink six 8-ounce glasses of fluid one hour before your exam. Do NOT empty your bladder.  If you need to empty your bladder before your exam, try to release only a little bit of urine. Then, drink another 8oz glass of fluid.  Children: Children who are potty trained should drink at least 4 cups (32 oz) of liquid 45 minutes to one hour prior to the exam. The child s bladder must be full in order to achieve a diagnostic exam. If your child is very uncomfortable or has an urgent need to pee, please notify a technologist; they will try to find out how much longer the wait may be and provide instructions to help relieve the pressure. Occasionally it is medically necessary to insert a urinary catheter to fill the bladder.  Please call the Imaging Department at your exam site with any questions.            Jun 06, 2017  1:30 PM CDT   Office Visit with Aries Bella MD   [facility]    [address] [phone]           Bring a current list of meds and any records  pertaining to this visit.  For Physicals, please bring immunization records and any forms needing to be filled out.  Please arrive 10 minutes early to complete paperwork.              Who to contact     If you have questions or need follow up information about today's clinic visit or your schedule please contact [facility] directly at [phone].  Normal or non-critical lab and imaging results will be communicated to you by MyChart, letter or phone within 4 business days after the clinic has received the results. If you do not hear from us within 7 days, please contact the clinic through YesGraphhart or phone. If you have a critical or abnormal lab result, we will notify you by phone as soon as possible.  Submit refill requests through [employer]ure or call your pharmacy and they will forward the refill request to us. Please allow 3 business days for your refill to be completed.          Additional Information About Your Visit        MyChart Information     [employer] gives you secure access to your electronic health record. If you see a primary care provider, you can also send messages to your care team and make appointments. If you have questions, please call your primary care clinic.  If you do not have a primary care provider, please call [phone] and they will assist you.        Care EveryWhere ID     This is your Care EveryWhere ID. This could be used by other organizations to access your [place] medical records  [id number]        Your Vitals Were     Pulse Pulse Oximetry BMI (Body Mass Index)             86 99% 30.51 kg/m2          Blood Pressure from Last 3 Encounters:   04/11/17 110/64   03/29/17 108/64   02/26/17 121/77    Weight from Last 3 Encounters:   04/11/17 189 lb (85.7 kg)   03/29/17 189 lb (85.7 kg)   02/09/17 189 lb 6.4 oz (85.9 kg)               Primary Care Provider    Pcp Unknown Verified       No address on file        Thank you!     Thank you for choosing [facility]  for your care. Our goal is always to provide you with excellent care. Hearing back from our patients is one way we can continue to improve our services. Please take a few minutes to complete the written survey that you may receive in the mail after your visit with us. Thank you!             Your Updated Medication List - Protect others around you: Learn how to safely use, store and throw away your medicines at www.disposemymeds.org.          This list is accurate as of: 4/11/17 11:59 PM.  Always use your most recent med list.                   Brand Name Dispense Instructions for use    amitriptyline 25 MG tablet    ELAVIL    90 tablet    Take 3 tablets (75 mg) by mouth At Bedtime       amphetamine-dextroamphetamine 30 MG per 24 hr capsule    ADDERALL XR    30 capsule    Take 1 capsule (30 mg) by mouth daily       clonazePAM 1 MG tablet    klonoPIN    30 tablet    Take 1 at bedtime       cyclobenzaprine 5 MG tablet    FLEXERIL    30 tablet    Take 1 tablet (5 mg) by mouth 2 times daily       Glatiramer Acetate 40 MG/ML Sosy    COPAXONE    12 Syringe    Inject 40 mg Subcutaneous three times a week       modafinil 200 MG tablet    PROVIGIL    30 tablet    Take 1 tablet (200 mg) by mouth daily       oxybutynin 10 MG 24 hr tablet    DITROPAN XL    30 tablet    Take 1 tab 1 hour prior to bed       oxyCODONE 5 MG IR tablet    ROXICODONE    15 tablet    Take 1-2 tablets (5-10 mg) by mouth every 6 hours as needed for moderate to severe pain (severe pain)       vitamin D 1000 UNITS capsule      Take 1 capsule by mouth daily

## 2017-04-11 NOTE — NURSING NOTE
"Chief Complaint   Patient presents with     Results   Repeat ultrasound completed today for Right Ovarian Cyst  Initial /64 (BP Location: Right arm, Patient Position: Chair, Cuff Size: Adult Regular)  Pulse 86  Wt 189 lb (85.7 kg)  SpO2 99%  BMI 30.51 kg/m2 Estimated body mass index is 30.51 kg/(m^2) as calculated from the following:    Height as of 2/9/17: 5' 6\" (1.676 m).    Weight as of this encounter: 189 lb (85.7 kg).  Medication Reconciliation: complete   Angella Drake MA      "

## 2017-04-11 NOTE — PROGRESS NOTES
SUBJECTIVE:  Chayo Reid is a 46 year old,female here for follow-up ultrasound that noted ovarian cysts. Complete pelvic ultrasound using realtime   transabdominal and transvaginal scanning    Bilateral simple ovarian cysts; in comparison to previous   -ultrasound 17, there are now bilateral ovarian cysts  -with the R ovarian cyst larger than previously noted      Past Medical History:   Diagnosis Date     Corneal ulcer      Demyelinating disease (H)     Multiple sclerosis     Fatigue      Hyperopia with astigmatism and presbyopia      Migraine      OAB (overactive bladder)      Ptosis                                           Past Surgical History:   Procedure Laterality Date     BREAST SURGERY  2012      SECTION  ,      HYSTERECTOMY  2015    partial; still has tubes and ovaries     TUBAL LIGATION Bilateral        Current Outpatient Prescriptions   Medication     amitriptyline (ELAVIL) 25 MG tablet     amphetamine-dextroamphetamine (ADDERALL XR) 30 MG per 24 hr capsule     oxyCODONE (ROXICODONE) 5 MG IR tablet     cyclobenzaprine (FLEXERIL) 5 MG tablet     modafinil (PROVIGIL) 200 MG tablet     Glatiramer Acetate (COPAXONE) 40 MG/ML SOSY     oxybutynin (DITROPAN XL) 10 MG 24 hr tablet     clonazePAM (KLONOPIN) 1 MG tablet     Cholecalciferol (VITAMIN D) 1000 UNITS capsule     No current facility-administered medications for this visit.          No Known Allergies                                                Social History   Substance Use Topics     Smoking status: Never Smoker     Smokeless tobacco: Never Used     Alcohol use 0.6 oz/week     1 Glasses of wine per week      Comment: occasional                      Review of Systems    CONSTITUTIONAL:NEGATIVE  EYES: NEGATIVE  ENT/MOUTH: NEGATIVE  RESP: NEGATIVE  CV: NEGATIVE  GI: NEGATIVE  : NEGATIVE  MUSCULOSKELATAL: NEGATIVE  INTEGUMENTARY/SKIN: NEGATIVE  BREAST: NEGATIVE  NEURO: NEGATIVE.      OBJECTIVE:  /64  (BP Location: Right arm, Patient Position: Chair, Cuff Size: Adult Regular)  Pulse 86  Wt 189 lb (85.7 kg)  SpO2 99%  BMI 30.51 kg/m2  Pelvis: deferred        ASSESSMENT:  Bilateral simple ovarian cysts.    PLAN:    1) Repeat US, further management dependent upon the results of that US. Total time spent was 20 minutes. 20 minutes of face to face time spent counseling and or coordination of care regarding ovarian cysts .

## 2017-04-24 ENCOUNTER — MYC MEDICAL ADVICE (OUTPATIENT)
Dept: OBGYN | Facility: CLINIC | Age: 47
End: 2017-04-24

## 2017-04-24 ENCOUNTER — MYC MEDICAL ADVICE (OUTPATIENT)
Dept: NEUROLOGY | Facility: CLINIC | Age: 47
End: 2017-04-24

## 2017-04-25 NOTE — TELEPHONE ENCOUNTER
[employer] message sent to patient providing her options; Will await to hear back from her.    Polly Rodríguez MS RN Care Coordinator

## 2017-04-25 NOTE — TELEPHONE ENCOUNTER
Dr Perez, please see Chayo's Neurodyn message; Should I have her come and see Loren then? You last saw her in November, and Loren saw her in February; Thank you.    Polly Rodríguez, MS RN Care Coordinator

## 2017-05-02 ENCOUNTER — MYC REFILL (OUTPATIENT)
Dept: NEUROLOGY | Facility: CLINIC | Age: 47
End: 2017-05-02

## 2017-05-02 DIAGNOSIS — R53.83 OTHER FATIGUE: ICD-10-CM

## 2017-05-02 DIAGNOSIS — G35 MS (MULTIPLE SCLEROSIS) (H): ICD-10-CM

## 2017-05-02 RX ORDER — DEXTROAMPHETAMINE SACCHARATE, AMPHETAMINE ASPARTATE MONOHYDRATE, DEXTROAMPHETAMINE SULFATE AND AMPHETAMINE SULFATE 7.5; 7.5; 7.5; 7.5 MG/1; MG/1; MG/1; MG/1
30 CAPSULE, EXTENDED RELEASE ORAL DAILY
Qty: 30 CAPSULE | Refills: 0 | Status: CANCELLED | OUTPATIENT
Start: 2017-05-02

## 2017-05-15 DIAGNOSIS — R53.83 OTHER FATIGUE: ICD-10-CM

## 2017-05-15 DIAGNOSIS — G35 MS (MULTIPLE SCLEROSIS) (H): ICD-10-CM

## 2017-05-15 RX ORDER — DEXTROAMPHETAMINE SACCHARATE, AMPHETAMINE ASPARTATE MONOHYDRATE, DEXTROAMPHETAMINE SULFATE AND AMPHETAMINE SULFATE 7.5; 7.5; 7.5; 7.5 MG/1; MG/1; MG/1; MG/1
30 CAPSULE, EXTENDED RELEASE ORAL DAILY
Qty: 30 CAPSULE | Refills: 0 | Status: SHIPPED | OUTPATIENT
Start: 2017-05-15 | End: 2017-05-22

## 2017-05-17 ENCOUNTER — MYC REFILL (OUTPATIENT)
Dept: NEUROLOGY | Facility: CLINIC | Age: 47
End: 2017-05-17

## 2017-05-17 DIAGNOSIS — G35 MS (MULTIPLE SCLEROSIS) (H): ICD-10-CM

## 2017-05-17 DIAGNOSIS — R53.83 OTHER FATIGUE: ICD-10-CM

## 2017-05-17 RX ORDER — DEXTROAMPHETAMINE SACCHARATE, AMPHETAMINE ASPARTATE MONOHYDRATE, DEXTROAMPHETAMINE SULFATE AND AMPHETAMINE SULFATE 7.5; 7.5; 7.5; 7.5 MG/1; MG/1; MG/1; MG/1
30 CAPSULE, EXTENDED RELEASE ORAL DAILY
Qty: 30 CAPSULE | Refills: 0 | Status: CANCELLED | OUTPATIENT
Start: 2017-05-17

## 2017-05-18 ENCOUNTER — MYC REFILL (OUTPATIENT)
Dept: NEUROLOGY | Facility: CLINIC | Age: 47
End: 2017-05-18

## 2017-05-18 DIAGNOSIS — G35 MS (MULTIPLE SCLEROSIS) (H): ICD-10-CM

## 2017-05-18 DIAGNOSIS — R53.83 OTHER FATIGUE: ICD-10-CM

## 2017-05-18 RX ORDER — DEXTROAMPHETAMINE SACCHARATE, AMPHETAMINE ASPARTATE MONOHYDRATE, DEXTROAMPHETAMINE SULFATE AND AMPHETAMINE SULFATE 7.5; 7.5; 7.5; 7.5 MG/1; MG/1; MG/1; MG/1
30 CAPSULE, EXTENDED RELEASE ORAL DAILY
Qty: 30 CAPSULE | Refills: 0 | Status: CANCELLED | OUTPATIENT
Start: 2017-05-18

## 2017-05-22 ENCOUNTER — MYC REFILL (OUTPATIENT)
Dept: NEUROLOGY | Facility: CLINIC | Age: 47
End: 2017-05-22

## 2017-05-22 DIAGNOSIS — G35 MS (MULTIPLE SCLEROSIS) (H): ICD-10-CM

## 2017-05-22 DIAGNOSIS — R53.83 OTHER FATIGUE: ICD-10-CM

## 2017-05-31 NOTE — TELEPHONE ENCOUNTER
Patient sent My Chart message requesting refill of their Adderall; Patient was last seen in February and has no follow up appointment with Loren Lennon CNP; Pended rx to Loren Lennon CNP for signature and will mail to the pharmacy once signed.    Nataliia Nunes MS RN Care Coordinator

## 2017-05-31 NOTE — TELEPHONE ENCOUNTER
Message from [facility]:  Suyapa Recinos LPN Wed May 31, 2017 8:03 AM        ----- Message -----   From: Chayo Reid   Sent: 5/22/2017 4:48 PM   To: Neurology Stephanie/Lpn-  Subject: Medication Renewal Request     Original authorizing provider: BETH Parada CNP would like a refill of the following medications:  amphetamine-dextroamphetamine (ADDERALL XR) 30 MG per 24 hr capsule [BETH Parada CNP]    Preferred pharmacy: [facility] [id number] [place] [address]    Comment:  I am following up on my request for Adderall. I sent a request in awhile ago and [facility] said they did not receive it. I requested for you to send the prescription last week and I have not heard if this was sent. I only have a few left so just following up on this. Thank you Chayo Reid

## 2017-06-01 RX ORDER — DEXTROAMPHETAMINE SACCHARATE, AMPHETAMINE ASPARTATE MONOHYDRATE, DEXTROAMPHETAMINE SULFATE AND AMPHETAMINE SULFATE 7.5; 7.5; 7.5; 7.5 MG/1; MG/1; MG/1; MG/1
30 CAPSULE, EXTENDED RELEASE ORAL DAILY
Qty: 30 CAPSULE | Refills: 0 | Status: SHIPPED | OUTPATIENT
Start: 2017-06-01 | End: 2017-07-24

## 2017-06-01 NOTE — TELEPHONE ENCOUNTER
Prescription mailed to pharmacy. My Chart message sent to Chayo letting her know this was done.    Nataliia Nunes MS RN Care Coordinator

## 2017-06-01 NOTE — TELEPHONE ENCOUNTER
Message from [facility]:  Suyapa Recinos LPN Wed May 31, 2017 8:05 AM        ----- Message -----   From: Chayo Reid   Sent: 5/18/2017 1:05 PM   To: Neurology Stephanie/Lpn-  Subject: Medication Renewal Request     Original authorizing provider: BETH Parada CNP would like a refill of the following medications:  amphetamine-dextroamphetamine (ADDERALL XR) 30 MG per 24 hr capsule [BETH Parada CNP]    Preferred pharmacy: [facility] [id number] [place] [address]    Comment:  Just following up to see if you got my request for the refill....I sent a request on May 2. [facility] did not have anything. This prescription seems to get lost a lot. Is there another way to have this filled.

## 2017-06-06 ENCOUNTER — OFFICE VISIT (OUTPATIENT)
Dept: OBGYN | Facility: CLINIC | Age: 47
End: 2017-06-06
Payer: COMMERCIAL

## 2017-06-06 ENCOUNTER — RADIANT APPOINTMENT (OUTPATIENT)
Dept: ULTRASOUND IMAGING | Facility: CLINIC | Age: 47
End: 2017-06-06
Attending: OBSTETRICS & GYNECOLOGY
Payer: COMMERCIAL

## 2017-06-06 VITALS
HEART RATE: 86 BPM | OXYGEN SATURATION: 99 % | BODY MASS INDEX: 30.86 KG/M2 | SYSTOLIC BLOOD PRESSURE: 110 MMHG | HEIGHT: 66 IN | WEIGHT: 192 LBS | DIASTOLIC BLOOD PRESSURE: 68 MMHG

## 2017-06-06 DIAGNOSIS — R10.84 ABDOMINAL PAIN, GENERALIZED: ICD-10-CM

## 2017-06-06 DIAGNOSIS — N83.202 CYSTS OF BOTH OVARIES: ICD-10-CM

## 2017-06-06 DIAGNOSIS — N83.201 CYST OF RIGHT OVARY: Primary | ICD-10-CM

## 2017-06-06 DIAGNOSIS — N83.201 CYSTS OF BOTH OVARIES: ICD-10-CM

## 2017-06-06 PROCEDURE — 76830 TRANSVAGINAL US NON-OB: CPT | Mod: 51 | Performed by: FAMILY MEDICINE

## 2017-06-06 PROCEDURE — 76857 US EXAM PELVIC LIMITED: CPT | Performed by: FAMILY MEDICINE

## 2017-06-06 PROCEDURE — 99212 OFFICE O/P EST SF 10 MIN: CPT | Performed by: OBSTETRICS & GYNECOLOGY

## 2017-06-06 NOTE — MR AVS SNAPSHOT
After Visit Summary   6/6/2017    Chayo Reid    MRN: [id number]           Patient Information     Date Of Birth          1970        Visit Information        Provider Department      6/6/2017 1:30 PM Aries Bella MD [facility]        Today's Diagnoses     Cyst of right ovary    -  1    Abdominal pain, generalized           Follow-ups after your visit        Your next 10 appointments already scheduled     Aug 25, 2017  9:15 AM CDT   (Arrive by 9:00 AM)   Return Multiple Sclerosis with Pietro Flores MD   [facility] Multiple [facility]clerosis ([facility])    [address] [facility]  3rd [facility] [address] [phone]              Who to contact     If you have questions or need follow up information about today's clinic visit or your schedule please contact [facility] directly at [phone].  Normal or non-critical lab and imaging results will be communicated to you by MyChart, letter or phone within 4 business days after the clinic has received the results. If you do not hear from us within 7 days, please contact the clinic through [employer]omond[employer] or phone. If you have a critical or abnormal lab result, we will notify you by phone as soon as possible.  Submit refill requests through LivePerson or call your pharmacy and they will forward the refill request to us. Please allow 3 business days for your refill to be completed.          Additional Information About Your Visit        MyChart Information     LivePerson gives you secure access to your electronic health record. If you see a primary care provider, you can also send messages to your care team and make appointments. If you have questions, please call your primary care clinic.  If you do not have a primary care provider, please call [phone] and they will assist you.        Care EveryWhere ID     This is your Care EveryWhere ID. This could be used by other  "organizations to access your [place] medical records  [id number]        Your Vitals Were     Pulse Height Pulse Oximetry BMI (Body Mass Index)          86 5' 6\" (1.676 m) 99% 30.99 kg/m2         Blood Pressure from Last 3 Encounters:   06/06/17 110/68   04/11/17 110/64   03/29/17 108/64    Weight from Last 3 Encounters:   06/06/17 192 lb (87.1 kg)   04/11/17 189 lb (85.7 kg)   03/29/17 189 lb (85.7 kg)              Today, you had the following     No orders found for display       Primary Care Provider    Pcp Unknown Verified       No address on file        Equal Access to Services     LÓPEZ ZAVALA : Hadii brianna Sarmiento, washey marie, anikte kaalmada nirmal, natalia willis . So [facility] [phone].    ATENCIÓN: Si habla español, tiene a hall disposición servicios gratuitos de asistencia lingüística. Llame al [phone].    We comply with applicable federal civil rights laws and [place] laws. We do not discriminate on the basis of race, color, national origin, age, disability sex, sexual orientation or gender identity.            Thank you!     Thank you for choosing [facility]  for your care. Our goal is always to provide you with excellent care. Hearing back from our patients is one way we can continue to improve our services. Please take a few minutes to complete the written survey that you may receive in the mail after your visit with us. Thank you!             Your Updated Medication List - Protect others around you: Learn how to safely use, store and throw away your medicines at www.disposemymeds.org.          This list is accurate as of: 6/6/17 11:59 PM.  Always use your most recent med list.                   Brand Name Dispense Instructions for use Diagnosis    amitriptyline 25 MG tablet    ELAVIL    90 tablet    Take 3 tablets (75 mg) by mouth At Bedtime    Myofascial pain syndrome, Sleep disorder       amphetamine-dextroamphetamine 30 MG " per 24 hr capsule    ADDERALL XR    30 capsule    Take 1 capsule (30 mg) by mouth daily    Other fatigue, MS (multiple sclerosis) (H)       clonazePAM 1 MG tablet    klonoPIN    30 tablet    Take 1 at bedtime    Sleep disorder       cyclobenzaprine 5 MG tablet    FLEXERIL    30 tablet    Take 1 tablet (5 mg) by mouth 2 times daily    Pain of right lower extremity       Glatiramer Acetate 40 MG/ML Sosy    COPAXONE    12 Syringe    Inject 40 mg Subcutaneous three times a week    MS (multiple sclerosis) (H)       modafinil 200 MG tablet    PROVIGIL    30 tablet    Take 1 tablet (200 mg) by mouth daily    Fatigue       oxybutynin 10 MG 24 hr tablet    DITROPAN XL    30 tablet    Take 1 tab 1 hour prior to bed    MS (multiple sclerosis) (H), Neurogenic bladder       oxyCODONE 5 MG IR tablet    ROXICODONE    15 tablet    Take 1-2 tablets (5-10 mg) by mouth every 6 hours as needed for moderate to severe pain (severe pain)        vitamin D 1000 UNITS capsule      Take 1 capsule by mouth daily

## 2017-06-06 NOTE — NURSING NOTE
"Chief Complaint   Patient presents with     RECHECK   Recheck for ovarian cysts and pain   Ultrasound completed today   Initial /68 (BP Location: Right arm, Patient Position: Chair, Cuff Size: Adult Regular)  Pulse 86  Ht 5' 6\" (1.676 m)  Wt 192 lb (87.1 kg)  SpO2 99%  BMI 30.99 kg/m2 Estimated body mass index is 30.99 kg/(m^2) as calculated from the following:    Height as of this encounter: 5' 6\" (1.676 m).    Weight as of this encounter: 192 lb (87.1 kg).  Medication Reconciliation: complete   Angella Drake MA      "

## 2017-06-21 ENCOUNTER — MYC REFILL (OUTPATIENT)
Dept: NEUROLOGY | Facility: CLINIC | Age: 47
End: 2017-06-21

## 2017-06-21 DIAGNOSIS — G35 MS (MULTIPLE SCLEROSIS) (H): ICD-10-CM

## 2017-06-21 DIAGNOSIS — R53.83 OTHER FATIGUE: ICD-10-CM

## 2017-06-21 RX ORDER — DEXTROAMPHETAMINE SACCHARATE, AMPHETAMINE ASPARTATE MONOHYDRATE, DEXTROAMPHETAMINE SULFATE AND AMPHETAMINE SULFATE 7.5; 7.5; 7.5; 7.5 MG/1; MG/1; MG/1; MG/1
30 CAPSULE, EXTENDED RELEASE ORAL DAILY
Qty: 30 CAPSULE | Refills: 0 | Status: CANCELLED | OUTPATIENT
Start: 2017-06-21

## 2017-06-24 NOTE — PROGRESS NOTES
SUBJECTIVE:  Chayo Reid is a 46 year old,  female,  who presents for follow-up ovarian cysts, pelvic pain discussion. Previous hysterectomy. Pain is vague but apparent. No localizing symptoms. Repeat US notes smaller right ovarian simple cyst and resolution of left ovarian cyst. Probably physiologic.    Past Medical History:   Diagnosis Date     Corneal ulcer      Demyelinating disease (H)     Multiple sclerosis     Fatigue      Hyperopia with astigmatism and presbyopia      Migraine      OAB (overactive bladder)      Ptosis                                           Past Surgical History:   Procedure Laterality Date     BREAST SURGERY  2012      SECTION  ,      HYSTERECTOMY  2015    partial; still has tubes and ovaries     TUBAL LIGATION Bilateral        Current Outpatient Prescriptions   Medication     amphetamine-dextroamphetamine (ADDERALL XR) 30 MG per 24 hr capsule     amitriptyline (ELAVIL) 25 MG tablet     oxyCODONE (ROXICODONE) 5 MG IR tablet     cyclobenzaprine (FLEXERIL) 5 MG tablet     modafinil (PROVIGIL) 200 MG tablet     Glatiramer Acetate (COPAXONE) 40 MG/ML SOSY     oxybutynin (DITROPAN XL) 10 MG 24 hr tablet     clonazePAM (KLONOPIN) 1 MG tablet     Cholecalciferol (VITAMIN D) 1000 UNITS capsule     No current facility-administered medications for this visit.          No Known Allergies                                                Social History   Substance Use Topics     Smoking status: Never Smoker     Smokeless tobacco: Never Used     Alcohol use 0.6 oz/week     1 Glasses of wine per week      Comment: occasional                      Review of Systems    CONSTITUTIONAL:NEGATIVE  EYES: NEGATIVE  ENT/MOUTH: NEGATIVE  RESP: NEGATIVE  CV: NEGATIVE  GI: NEGATIVE  : NEGATIVE  MUSCULOSKELATAL: NEGATIVE  INTEGUMENTARY/SKIN: NEGATIVE  BREAST: NEGATIVE  NEURO: NEGATIVE.      OBJECTIVE:  /68 (BP Location: Right arm, Patient Position: Chair, Cuff Size:  "Adult Regular)  Pulse 86  Ht 5' 6\" (1.676 m)  Wt 192 lb (87.1 kg)  SpO2 99%  BMI 30.99 kg/m2  Abdomen: BS active. Soft, non-tender, no masses or organomegaly.           ASSESSMENT:  Abdominal pain, probably not related to gyn.    PLAN:    1) Discussed option of diagnostic laparoscopy. Will have her see her PCP to r/o other causes of abdominal pain. Total time spent was 15 minutes. 15 minutes of face to face time spent counseling and or coordination of care regarding pain .   "

## 2017-06-28 ENCOUNTER — TELEPHONE (OUTPATIENT)
Dept: NEUROLOGY | Facility: CLINIC | Age: 47
End: 2017-06-28

## 2017-06-28 NOTE — TELEPHONE ENCOUNTER
Patient called asking if she should be having an MRI done prior to her visit with Dr Flores in September; Dr Perez, she had imaging done in March; Is there any reason you feel imaging would be warranted prior to that visit? Thank you.    Polly Rodríguez, MS RN Care Coordinator

## 2017-06-30 ENCOUNTER — MYC MEDICAL ADVICE (OUTPATIENT)
Dept: NEUROLOGY | Facility: CLINIC | Age: 47
End: 2017-06-30

## 2017-06-30 NOTE — TELEPHONE ENCOUNTER
[employer] message sent to patient with Dr Perez's input.    Polly Rodríguez, MS RN Care Coordinator

## 2017-07-05 ENCOUNTER — MYC MEDICAL ADVICE (OUTPATIENT)
Dept: NEUROLOGY | Facility: CLINIC | Age: 47
End: 2017-07-05

## 2017-07-24 ENCOUNTER — MYC REFILL (OUTPATIENT)
Dept: NEUROLOGY | Facility: CLINIC | Age: 47
End: 2017-07-24

## 2017-07-24 DIAGNOSIS — G35 MS (MULTIPLE SCLEROSIS) (H): ICD-10-CM

## 2017-07-24 DIAGNOSIS — N31.9 NEUROGENIC BLADDER: ICD-10-CM

## 2017-07-24 DIAGNOSIS — R53.83 OTHER FATIGUE: ICD-10-CM

## 2017-07-24 RX ORDER — GLATIRAMER 40 MG/ML
40 INJECTION, SOLUTION SUBCUTANEOUS
Qty: 12 SYRINGE | Refills: 11 | Status: CANCELLED | OUTPATIENT
Start: 2017-07-24

## 2017-07-24 RX ORDER — OXYBUTYNIN CHLORIDE 10 MG/1
TABLET, EXTENDED RELEASE ORAL
Qty: 30 TABLET | Refills: 11 | Status: SHIPPED | OUTPATIENT
Start: 2017-07-24 | End: 2019-01-08

## 2017-07-24 NOTE — TELEPHONE ENCOUNTER
Received refill request for oxybutynin from [facility]; Patient was last seen in February and has follow up appointment in September with Dr Flores; Refilled for 1 year per [place] refill protocol.    Polly Rodríguez MS RN Care Coordinator

## 2017-07-25 ENCOUNTER — MYC REFILL (OUTPATIENT)
Dept: NEUROLOGY | Facility: CLINIC | Age: 47
End: 2017-07-25

## 2017-07-25 DIAGNOSIS — R53.83 OTHER FATIGUE: ICD-10-CM

## 2017-07-25 DIAGNOSIS — G35 MS (MULTIPLE SCLEROSIS) (H): ICD-10-CM

## 2017-07-25 RX ORDER — DEXTROAMPHETAMINE SACCHARATE, AMPHETAMINE ASPARTATE MONOHYDRATE, DEXTROAMPHETAMINE SULFATE AND AMPHETAMINE SULFATE 7.5; 7.5; 7.5; 7.5 MG/1; MG/1; MG/1; MG/1
30 CAPSULE, EXTENDED RELEASE ORAL DAILY
Qty: 30 CAPSULE | Refills: 0 | Status: CANCELLED | OUTPATIENT
Start: 2017-07-25

## 2017-07-25 RX ORDER — DEXTROAMPHETAMINE SACCHARATE, AMPHETAMINE ASPARTATE MONOHYDRATE, DEXTROAMPHETAMINE SULFATE AND AMPHETAMINE SULFATE 7.5; 7.5; 7.5; 7.5 MG/1; MG/1; MG/1; MG/1
30 CAPSULE, EXTENDED RELEASE ORAL DAILY
Qty: 30 CAPSULE | Refills: 0 | Status: SHIPPED | OUTPATIENT
Start: 2017-07-25 | End: 2017-09-04

## 2017-07-25 NOTE — TELEPHONE ENCOUNTER
Patient sent [employer] message requesting refill of their Adderall; Patient was last seen in February by Loren Lennon and has follow up appointment in September with Dr Flores; Pended rx to Dr Stuart for signature, as Loren is out of the office the rest of the week, and will mail to the home once signed.    Polly Rodríguez, MS RN Care Coordinator

## 2017-07-25 NOTE — TELEPHONE ENCOUNTER
Message from [employer]:  Original authorizing provider: Neftaly Perez DO    Chayo Nickkay would like a refill of the following medications:  Glatiramer Acetate (COPAXONE) 40 MG/ML SOSY [Neftaly Perez DO]    Preferred pharmacy: Other - [facility] for the adderall and [facility]     Comment:      Medication renewals requested in this message routed to other providers:  amphetamine-dextroamphetamine (ADDERALL XR) 30 MG per 24 hr capsule [Loren Lennon, APRN CNP]

## 2017-07-25 NOTE — TELEPHONE ENCOUNTER
Patient sent [employer] message asking for a refill of her Copaxone; There is a current prescription on file with [facility]; Sent patient MyChart message advising her to call them to set up delivery.    Polly Rodríguez, MS RN Care Coordinator

## 2017-07-25 NOTE — TELEPHONE ENCOUNTER
Rx placed in the mail to patient's home; MyChart message sent to her letting her know this.    Polly Rodríguez, MS RN Care Coordinator

## 2017-07-27 NOTE — TELEPHONE ENCOUNTER
Message from [employer]:  Original authorizing provider: MD Santosh Hunteryn Franklin would like a refill of the following medications:  amphetamine-dextroamphetamine (ADDERALL XR) 30 MG per 24 hr capsule [Umer Stuart MD]    Preferred pharmacy: [place] [id number] [place] [address]    Comment:  i was wondering Polly. Did you send this to my home or to [facility]. Guillermo Domingo, Your Adderall prescription is being placed in the mail to your home today. Have a great day! Sincerely, Polly Rodríguez, MS RN Care Coordinator

## 2017-09-04 ENCOUNTER — MYC REFILL (OUTPATIENT)
Dept: NEUROLOGY | Facility: CLINIC | Age: 47
End: 2017-09-04

## 2017-09-04 DIAGNOSIS — R53.83 FATIGUE: ICD-10-CM

## 2017-09-04 DIAGNOSIS — G35 MS (MULTIPLE SCLEROSIS) (H): ICD-10-CM

## 2017-09-04 DIAGNOSIS — R53.83 OTHER FATIGUE: ICD-10-CM

## 2017-09-04 RX ORDER — MODAFINIL 200 MG/1
200 TABLET ORAL DAILY
Qty: 30 TABLET | Refills: 5 | Status: CANCELLED | OUTPATIENT
Start: 2017-09-04

## 2017-09-05 RX ORDER — DEXTROAMPHETAMINE SACCHARATE, AMPHETAMINE ASPARTATE MONOHYDRATE, DEXTROAMPHETAMINE SULFATE AND AMPHETAMINE SULFATE 7.5; 7.5; 7.5; 7.5 MG/1; MG/1; MG/1; MG/1
30 CAPSULE, EXTENDED RELEASE ORAL DAILY
Qty: 30 CAPSULE | Refills: 0 | Status: SHIPPED | OUTPATIENT
Start: 2017-09-05 | End: 2017-10-13

## 2017-09-05 NOTE — TELEPHONE ENCOUNTER
Message from [employer]:  Original authorizing provider: Umer Stuart MD    Chayo Reid would like a refill of the following medications:  modafinil (PROVIGIL) 200 MG tablet [Umer Stuart MD]  amphetamine-dextroamphetamine (ADDERALL XR) 30 MG per 24 hr capsule [Umer Stuart MD]    Preferred pharmacy: [address]    Comment:

## 2017-09-05 NOTE — TELEPHONE ENCOUNTER
Adderall prescription placed in the mail to patient's home, as her pharmacy has previously lost the prescriptions for Adderall more than once; Loren, regarding her request for Modafinil, I don't see that she discussed with you dual use of Modafinil and Adderall when you saw her, and was not comfortable having Dr. Stuart sign this on your behalf; Is this something she discussed with you? Or would you rather she discuss these medications with Dr. Flores next week when she sees him? Thank you.    Polly Rodríguez, MS RN Care Coordinator

## 2017-09-05 NOTE — TELEPHONE ENCOUNTER
Patient sent [employer] message requesting refill of their Adderall XR; Patient was last seen in February by Loren Lennon and has follow up appointment in September with Dr. Flores; Pended rx to Dr. Stuart, on behalf of Loren, for signature and will mail to the pharmacy once signed.    Polly Rodríguez, MS RN Care Coordinator

## 2017-09-06 NOTE — PROGRESS NOTES
Chart review obtained from Dr. Perez s note on 5/8/13  Since 2005, she reportedly started getting migraines. Her headaches occurred intermittently; at most, it would be 1-2 times per week.  Imitrex aborts the headaches well, but because of the headaches, she eventual had a MRI, which revealed some nonspecific white matter changes.  The patient did have an MRI of the cervical spine done at that time, which was unremarkable.  The patient also had a lumbar puncture performed, which was normal on 04/15/2005.  The patient did well, and then around 07/2012, a decision was made just to go ahead and do a followup MRI of the brain given the fact she had these white matter lesions, and there was a slight increase in number.  Visual evoked potential was done and showed to have prolonged abduction in the right anterior field.  Because of this, a repeat lumbar puncture was done.  This lumbar puncture revealed the presence of 9 oligoclonal bands.  The IgG index was normal at 0.63.  She has been seen at [facility].  She presented to Dr. Perez for a second opinion. 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 She has not received any DMT over the years. Her last MRI However, she has had progressive worsening of fatigue and pain.

## 2017-09-07 ENCOUNTER — MYC MEDICAL ADVICE (OUTPATIENT)
Dept: NEUROLOGY | Facility: CLINIC | Age: 47
End: 2017-09-07

## 2017-09-07 NOTE — TELEPHONE ENCOUNTER
Florence message sent to patient letting her know that the stimulant discussion needs to be had with Dr. Flores next week.    Polly Rodríguez, MS RN Care Coordinator

## 2017-09-13 ENCOUNTER — OFFICE VISIT (OUTPATIENT)
Dept: NEUROLOGY | Facility: CLINIC | Age: 47
End: 2017-09-13
Attending: PSYCHIATRY & NEUROLOGY
Payer: COMMERCIAL

## 2017-09-13 VITALS
HEIGHT: 66 IN | WEIGHT: 199.2 LBS | DIASTOLIC BLOOD PRESSURE: 67 MMHG | BODY MASS INDEX: 32.02 KG/M2 | SYSTOLIC BLOOD PRESSURE: 117 MMHG | HEART RATE: 86 BPM

## 2017-09-13 DIAGNOSIS — M54.16 LUMBAR RADICULOPATHY: ICD-10-CM

## 2017-09-13 DIAGNOSIS — R13.12 OROPHARYNGEAL DYSPHAGIA: Primary | ICD-10-CM

## 2017-09-13 PROCEDURE — 99212 OFFICE O/P EST SF 10 MIN: CPT | Mod: ZF

## 2017-09-13 RX ORDER — DULOXETIN HYDROCHLORIDE 20 MG/1
20 CAPSULE, DELAYED RELEASE ORAL 2 TIMES DAILY
Qty: 60 CAPSULE | Refills: 1 | Status: SHIPPED | OUTPATIENT
Start: 2017-09-13 | End: 2017-10-13

## 2017-09-13 ASSESSMENT — PAIN SCALES - GENERAL: PAINLEVEL: EXTREME PAIN (8)

## 2017-09-13 NOTE — MR AVS SNAPSHOT
After Visit Summary   9/13/2017    Chayo Reid    MRN: [id number]           Patient Information     Date Of Birth          1970        Visit Information        Provider Department      9/13/2017 10:00 AM Pietro Flores MD [facility] Multiple Sclerosis        Today's Diagnoses     Oropharyngeal dysphagia    -  1    Lumbar radiculopathy          Care Instructions    We get an EMG    Will get a swallow study    Take 2 Elavil tonight    Tomorrow take 1 Cymbalta in the morning and  2 Elavil at night    Do this for 1 week     Then take 2 Cymbalta in the morning and 1 elavil at night for a week    Then just Cymbalta 2 pill per days    Talk to PCP about your stuffiness.          Follow-ups after your visit        Additional Services     Speech Therapy Referral       *This order will print in the [facility] Central Scheduling Office*    [facility] provides Speech Therapy evaluation and treatment and many specialty services across the [facility].  If requesting a specialty program, please choose from the list below.    Call [phone] to schedule [facility] at all locations, with the exception of [facility], please call [phone].     Treatment: Evaluation & Treatment  Speech Treatment Diagnosis: Dysphagia  Special Instructions: Swallow study    Please be aware that coverage of these services is subject to the terms and limitations of your health insurance plan.  Call member services at your health plan with any benefit or coverage questions.      **Note to Provider** To refer patients to therapy outside of the location list, change the order class to External Referral in the order composer.                  Follow-up notes from your care team     Return in about 2 months (around 11/13/2017).      Your next 10 appointments already scheduled     Oct 04, 2017  1:30 PM CDT   (Arrive by 1:15 PM)   EMG with Bradly BOOKER  MD Jin   [facility] EMG ([facility])    [address] [address] [phone]           Do not use lotions or creams on the area to be tested. If you are on blood thinners (Warfarin, Coumadin, Lovenox, etc), please contact your primary care physician to check if it is safe to stop them 3 days prior to testing. If you have anxiety, please check with your referring physician to obtain anti-anxiety medication for the procedure.            Nov 21, 2017  1:00 PM CST   (Arrive by 12:45 PM)   Return Multiple Sclerosis with Pietro Flores MD   [facility] Multiple Sclerosis ([facility])    [address] [address] [phone]              Future tests that were ordered for you today     Open Future Orders        Priority Expected Expires Ordered    EMG Routine  9/13/2018 9/13/2017            Who to contact     If you have questions or need follow up information about today's clinic visit or your schedule please contact [facility] directly at [phone].  Normal or non-critical lab and imaging results will be communicated to you by MyChart, letter or phone within 4 business days after the clinic has received the results. If you do not hear from us within 7 days, please contact the clinic through [employer] or phone. If you have a critical or abnormal lab result, we will notify you by phone as soon as possible.  Submit refill requests through [employer] or call your pharmacy and they will forward the refill request to us. Please allow 3 business days for your refill to be completed.          Additional Information About Your Visit        [employer] Information     [employer] gives you secure access to your electronic health record. If you see a primary care provider, you can also send messages to your care team and make appointments. If you have questions, please call your primary care  "clinic.  If you do not have a primary care provider, please call [phone] and they will assist you.        Care EveryWhere ID     This is your Care EveryWhere ID. This could be used by other organizations to access your [place] medical records  [id number]        Your Vitals Were     Pulse Height BMI (Body Mass Index)             86 1.676 m (5' 6\") 32.15 kg/m2          Blood Pressure from Last 3 Encounters:   09/13/17 117/67   06/06/17 110/68   04/11/17 110/64    Weight from Last 3 Encounters:   09/13/17 90.4 kg (199 lb 3.2 oz)   06/06/17 87.1 kg (192 lb)   04/11/17 85.7 kg (189 lb)              We Performed the Following     Speech Therapy Referral          Today's Medication Changes          These changes are accurate as of: 9/13/17 11:20 AM.  If you have any questions, ask your nurse or doctor.               Start taking these medicines.        Dose/Directions    DULoxetine 20 MG EC capsule   Commonly known as:  CYMBALTA   Used for:  Lumbar radiculopathy   Started by:  Pietro Flores MD        Dose:  20 mg   Take 1 capsule (20 mg) by mouth 2 times daily   Quantity:  60 capsule   Refills:  1            Where to get your medicines      These medications were sent to [place] [address] [place] [address] [address]     Phone:  [phone]     DULoxetine 20 MG EC capsule                Primary Care Provider    Pcp Unknown Verified       No address on file        Equal Access to Services     FAN ZAVALA AH: Mary vazo Soburak, waaxda luqadaha, qaybta kaalmada natalia kinney. So [facility] [phone].    ATENCIÓN: Si habla amrita, tiene a hall disposición servicios gratuitos de asistencia lingüística. Llame al [phone].    We comply with applicable federal civil rights laws and [place] laws. We do not discriminate on the basis of race, color, national origin, age, disability sex, sexual orientation or gender " identity.            Thank you!     Thank you for choosing [facility]  for your care. Our goal is always to provide you with excellent care. Hearing back from our patients is one way we can continue to improve our services. Please take a few minutes to complete the written survey that you may receive in the mail after your visit with us. Thank you!             Your Updated Medication List - Protect others around you: Learn how to safely use, store and throw away your medicines at www.disposemymeds.org.          This list is accurate as of: 9/13/17 11:20 AM.  Always use your most recent med list.                   Brand Name Dispense Instructions for use Diagnosis    amitriptyline 25 MG tablet    ELAVIL    90 tablet    Take 3 tablets (75 mg) by mouth At Bedtime    Myofascial pain syndrome, Sleep disorder       amphetamine-dextroamphetamine 30 MG per 24 hr capsule    ADDERALL XR    30 capsule    Take 1 capsule (30 mg) by mouth daily    Other fatigue, MS (multiple sclerosis) (H)       clonazePAM 1 MG tablet    klonoPIN    30 tablet    Take 1 at bedtime    Sleep disorder       cyclobenzaprine 5 MG tablet    FLEXERIL    30 tablet    Take 1 tablet (5 mg) by mouth 2 times daily    Pain of right lower extremity       DULoxetine 20 MG EC capsule    CYMBALTA    60 capsule    Take 1 capsule (20 mg) by mouth 2 times daily    Lumbar radiculopathy       Glatiramer Acetate 40 MG/ML Sosy    COPAXONE    12 Syringe    Inject 40 mg Subcutaneous three times a week    MS (multiple sclerosis) (H)       modafinil 200 MG tablet    PROVIGIL    30 tablet    Take 1 tablet (200 mg) by mouth daily    Fatigue       oxybutynin 10 MG 24 hr tablet    DITROPAN-XL    30 tablet    TAKE 1 TABLET BY MOUTH ONE HOUR PRIOR TO BED.    MS (multiple sclerosis) (H), Neurogenic bladder       vitamin D 1000 UNITS capsule      Take 1 capsule by mouth daily

## 2017-09-13 NOTE — PROGRESS NOTES
Leg pain start in right hip. The pain is present 80% of the time. The pain is 8-9/10. Shoot down the right leg down to the foot. Massage makes it better. Nothing makes it worse. This has been getting slowly worse over the time.

## 2017-09-13 NOTE — PROGRESS NOTES
MULTIPLE SCLEROSIS CLINIC AT THE [facility]  FOLLOWUP/ESTABLISHED PATIENT VISIT      PRINCIPAL NEUROLOGIC DIAGNOSIS: RIS    Date of Onset: 7/2012  Date of Diagnosis: 7/2012  Initial Clinical Course: Progressive  Current Clinical Course: Progressive  Past Disease Modifying Therapy(ies): None  Current Disease Modifying Therapy(ies): None  Most Recent MRI of the Brain: 1/27  Most Recent MRI of the Cervical Cord 2/17  Most Recent MRI of the Thoracic Cord: 2/17  Most Recent Lumbar Puncture: Positive for OCB        CHIEF COMPLAINT: RIS follow up    HPI:Since 2005, she reportedly started getting migraines. Her headaches occurred intermittently; at most, it would be 1-2 times per week.  Imitrex aborts the headaches well, but because of the headaches, she eventual had a MRI, which revealed some nonspecific white matter changes.  The patient did have an MRI of the cervical spine done at that time, which was unremarkable.  The patient also had a lumbar puncture performed, which was normal on 04/15/2005.  The patient did well, and then around 07/2012, a decision was made just to go ahead and do a followup MRI of the brain given the fact she had these white matter lesions, and there was a slight increase in number.  Visual evoked potential was done and showed to have prolonged abduction in the right anterior field.  Because of this, a repeat lumbar puncture was done.  This lumbar puncture revealed the presence of 9 oligoclonal bands.  The IgG index was normal at 0.63.  She has been seen at [facility].  She presented to Dr. Perez for a second opinion. 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 She has not received any DMT over the years. Her last MRI However, she has had progressive worsening of fatigue and pain.     INTERVAL HISTORY:  She reports that the chronic pain in her left leg that starts in her right hip and radiated down her leg to her toes. The pain is present 80% of the time. The pain is 8-9/10 stabbing pain. Massage  makes it better. Nothing makes it worse. This has been getting slowly worse over the time.    Also reports for the past year and a half she s been having difficulty swallowing food. A occurs whenever she eats. The difficulties described as food getting stuck in her esophagus. She denies choking on food. This is been slowly getting worse over time.    She also endorses high anxiety. She has difficulty leaving her house. This has been getting worse over time.      Issues with current MS therapy: Not on DMT    REVIEW OF SYSTEMS:    Mood: Very anxious  Spasticity:None  Bladder: none  Bowel: none  Pain related to today's visit:reviewed on nursing intake documentation  Fatigue: severe tired all the time not sleeping well  Sleep: not sleeping well      PAST HISTORY was reviewed and updated:      MEDICATIONS and ALLERGIES were reviewed and updated.    SOCIAL HISTORY was reviewed and updated:      EXAM:    PHYSICAL EXAMINATION:   VITAL SIGNS:  B/P: 117/67, T: Data Unavailable, P: 86, R: Data Unavailable    GENERAL: The patient is a well-nourished who presents to the evaluation with her .    NEUROLOGIC:   MENTAL STATUS: Alert,awake and oriented times four.   CRANIAL NERVES: Visual acuity is 20/20 in OS and 20/20-3 OD with a near card (with corrective lenses). Visual fields are full to confrontation. The pupils are round and react to light and there is no Antonio Arlene pupil. Funduscopic examination demonstrates no optic pallor, papilledema or retinal hemorrhage/exudate. Extraocular movements are intact with no internuclear ophthalmoplegia. No nystagmus. Facial strength and sensation are  normal. Hearing is  normal. Palate elevation and tongue protrusion are  normal.   POWER:     Motor    Upper      Right Left   Shoulder Abduction 5 5   Elbow Flexion 5 5   Elbow Extension 5 5   Wrist Extension 5 5   Digit Extension 5 5   Digit Flexion 5 5   APB 5 5   Tone 2 Normal 2 Normal   Lower       Right Left   Hip Flexion 5 5   Knee  Extension 5 5   Knee Flexion 5 5   Foot Dorsiflexion 5 5   Foot Plantar Flexion 5 5   EH 5 5   Toe Flexion 5 5   Tone 2 Normal 2 Normal         SENSORY:   Light touch: intact      MOTOR/CEREBELLAR:    Right Left   RRM 0 Normal 0 Normal   MAGDA 0 Normal 0 Normal   FTN 0 Normal 0 Normal   RRM 0 Normal 0 Normal   HKS 0 Normal 0 Normal           GAIT: Gait is narrow-based and steady and the patient is able to walk on heels, toes and in tandem without difficulty.    Romberg Stable    RESULTS:  Monitoring labs:  LABS    No visits with results within 6 Month(s) from this visit.  Latest known visit with results is:    Admission on 02/26/2017, Discharged on 02/26/2017   Component Date Value Ref Range Status     WBC 02/26/2017 14.7* 4.0 - 11.0 10e9/L Final     RBC Count 02/26/2017 4.41  3.8 - 5.2 10e12/L Final     Hemoglobin 02/26/2017 13.1  11.7 - 15.7 g/dL Final     Hematocrit 02/26/2017 39.0  35.0 - 47.0 % Final     MCV 02/26/2017 88  78 - 100 fl Final     MCH 02/26/2017 29.7  26.5 - 33.0 pg Final     MCHC 02/26/2017 33.6  31.5 - 36.5 g/dL Final     RDW 02/26/2017 11.8  10.0 - 15.0 % Final     Platelet Count 02/26/2017 288  150 - 450 10e9/L Final     Diff Method 02/26/2017 Automated Method   Final     % Neutrophils 02/26/2017 82.8  % Final     % Lymphocytes 02/26/2017 10.0  % Final     % Monocytes 02/26/2017 4.7  % Final     % Eosinophils 02/26/2017 1.4  % Final     % Basophils 02/26/2017 0.7  % Final     % Immature Granulocytes 02/26/2017 0.4  % Final     Nucleated RBCs 02/26/2017 0  0 /100 Final     Absolute Neutrophil 02/26/2017 12.2* 1.6 - 8.3 10e9/L Final     Absolute Lymphocytes 02/26/2017 1.5  0.8 - 5.3 10e9/L Final     Absolute Monocytes 02/26/2017 0.7  0.0 - 1.3 10e9/L Final     Absolute Eosinophils 02/26/2017 0.2  0.0 - 0.7 10e9/L Final     Absolute Basophils 02/26/2017 0.1  0.0 - 0.2 10e9/L Final     Abs Immature Granulocytes 02/26/2017 0.1  0 - 0.4 10e9/L Final     Absolute Nucleated RBC 02/26/2017 0.0   Final      Sodium 02/26/2017 138  133 - 144 mmol/L Final     Potassium 02/26/2017 3.4  3.4 - 5.3 mmol/L Final     Chloride 02/26/2017 101  94 - 109 mmol/L Final     Carbon Dioxide 02/26/2017 31  20 - 32 mmol/L Final     Anion Gap 02/26/2017 6  3 - 14 mmol/L Final     Glucose 02/26/2017 122* 70 - 99 mg/dL Final     Urea Nitrogen 02/26/2017 15  7 - 30 mg/dL Final     Creatinine 02/26/2017 0.99  0.52 - 1.04 mg/dL Final     GFR Estimate 02/26/2017 60* >60 mL/min/1.7m2 Final     GFR Estimate If Black 02/26/2017 73  >60 mL/min/1.7m2 Final     Calcium 02/26/2017 8.7  8.5 - 10.1 mg/dL Final     Bilirubin Total 02/26/2017 0.6  0.2 - 1.3 mg/dL Final     Albumin 02/26/2017 3.7  3.4 - 5.0 g/dL Final     Protein Total 02/26/2017 8.0  6.8 - 8.8 g/dL Final     Alkaline Phosphatase 02/26/2017 68  40 - 150 U/L Final     ALT 02/26/2017 23  0 - 50 U/L Final     AST 02/26/2017 20  0 - 45 U/L Final     Lipase 02/26/2017 144  73 - 393 U/L Final     Color Urine 02/26/2017 Yellow   Final     Appearance Urine 02/26/2017 Slightly Cloudy   Final     Glucose Urine 02/26/2017 Negative  NEG mg/dL Final     Bilirubin Urine 02/26/2017 Negative  NEG Final     Ketones Urine 02/26/2017 Negative  NEG mg/dL Final     Specific Gravity Urine 02/26/2017 1.015  1.003 - 1.035 Final     Blood Urine 02/26/2017 Negative  NEG Final     pH Urine 02/26/2017 6.5  5.0 - 7.0 pH Final     Protein Albumin Urine 02/26/2017 Negative  NEG mg/dL Final     Urobilinogen mg/dL 02/26/2017 Normal  0.0 - 2.0 mg/dL Final     Nitrite Urine 02/26/2017 Negative  NEG Final     Leukocyte Esterase Urine 02/26/2017 Negative  NEG Final     Source 02/26/2017 Midstream Urine   Final     WBC Urine 02/26/2017 1  0 - 2 /HPF Final     RBC Urine 02/26/2017 1  0 - 2 /HPF Final     Squamous Epithelial /HPF Urine 02/26/2017 <1  0 - 1 /HPF Final     Transitional Epi 02/26/2017 <1  0 - 1 /HPF Final     Mucous Urine 02/26/2017 Present* NEG /LPF Final     Amorphous Crystals 02/26/2017 Few* NEG /HPF Final      HCG Qual Urine 02/26/2017 Negative  NEG Final     Creatinine 02/26/2017 1.0  0.52 - 1.04 mg/dL Final     GFR Estimate 02/26/2017 59* >60 mL/min/1.7m2 Final     GFR Estimate If Black 02/26/2017 72  >60 mL/min/1.7m2 Final         MRI neuroaxis  2/2017  Mild t2 disease burden in brain  Single T2 lesion in the cercial cord        ASSESSMENT/PLAN:  Patient is a 47-year-old female past medical history of radiographic isolated syndrome is been followed by Dr. De the past and is presenting today to establish care with me. Overall, the patient appears slightly worse subjectively. However, objectively her exam is fairly benign. Therefore, it is difficult to determine whether this slow worsening could represent primary progressive multiple sclerosis or potential fibromyalgia. Given the quality of pain and she s having her right hip is mildly concerning for radiculopathy. Therefore, I will give EMG to evaluate this. Also I ll get a swallow study to evaluate for her difficulty eating. The EMG is negative and there is definitive pathology on her swallowing test then the diagnosis of primary progressive multiple sclerosis needs to be entertained. After this work is done I will follow-up in 2 months.    To try to treat her pain, I will start her on Cymbalta. She was given a titration schedule to wean off Elavil and start Cymbalta over the next 2 weeks.    PLan  Get EMG.  Get Swallow Study.  Start Cymbalta, wean elavil.  Follow up in 2 months.     Pietro Flores MD [facility]  Staff Neurologist   09/13/17

## 2017-09-13 NOTE — NURSING NOTE
"Chief Complaint   Patient presents with     RECHECK     [facility] RETURN - MULTIPE SCLEROSIS       Initial /67 (BP Location: Left arm, Patient Position: Sitting, Cuff Size: Adult Large)  Pulse 86  Ht 1.676 m (5' 6\")  Wt 90.4 kg (199 lb 3.2 oz)  BMI 32.15 kg/m2 Estimated body mass index is 32.15 kg/(m^2) as calculated from the following:    Height as of this encounter: 1.676 m (5' 6\").    Weight as of this encounter: 90.4 kg (199 lb 3.2 oz).  Medication Reconciliation: complete     Barbara Penn MA      "

## 2017-09-13 NOTE — LETTER
9/13/2017       RE: Chayo Reid  [address] [facility] [address]     Dear Colleague,    Thank you for referring your patient, Chayo Reid, to the [facility] MULTIPLE SCLEROSIS at [facility]. Please see a copy of my visit note below.    MULTIPLE SCLEROSIS CLINIC AT THE [facility]  FOLLOWUP/ESTABLISHED PATIENT VISIT      PRINCIPAL NEUROLOGIC DIAGNOSIS: RIS    Date of Onset: 7/2012  Date of Diagnosis: 7/2012  Initial Clinical Course: Progressive  Current Clinical Course: Progressive  Past Disease Modifying Therapy(ies): None  Current Disease Modifying Therapy(ies): None  Most Recent MRI of the Brain: 1/27  Most Recent MRI of the Cervical Cord 2/17  Most Recent MRI of the Thoracic Cord: 2/17  Most Recent Lumbar Puncture: Positive for OCB        CHIEF COMPLAINT: RIS follow up    HPI:Since 2005, she reportedly started getting migraines. Her headaches occurred intermittently; at most, it would be 1-2 times per week.  Imitrex aborts the headaches well, but because of the headaches, she eventual had a MRI, which revealed some nonspecific white matter changes.  The patient did have an MRI of the cervical spine done at that time, which was unremarkable.  The patient also had a lumbar puncture performed, which was normal on 04/15/2005.  The patient did well, and then around 07/2012, a decision was made just to go ahead and do a followup MRI of the brain given the fact she had these white matter lesions, and there was a slight increase in number.  Visual evoked potential was done and showed to have prolonged abduction in the right anterior field.  Because of this, a repeat lumbar puncture was done.  This lumbar puncture revealed the presence of 9 oligoclonal bands.  The IgG index was normal at 0.63.  She has been seen at [facility].  She presented to Dr. Perez for a second opinion. 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 She has not received any DMT over the years. Her  last MRI However, she has had progressive worsening of fatigue and pain.     INTERVAL HISTORY:  She reports that the chronic pain in her left leg that starts in her right hip and radiated down her leg to her toes. The pain is present 80% of the time. The pain is 8-9/10 stabbing pain. Massage makes it better. Nothing makes it worse. This has been getting slowly worse over the time.    Also reports for the past year and a half she s been having difficulty swallowing food. A occurs whenever she eats. The difficulties described as food getting stuck in her esophagus. She denies choking on food. This is been slowly getting worse over time.    She also endorses high anxiety. She has difficulty leaving her house. This has been getting worse over time.      Issues with current MS therapy: Not on DMT    REVIEW OF SYSTEMS:    Mood: Very anxious  Spasticity:None  Bladder: none  Bowel: none  Pain related to today's visit:reviewed on nursing intake documentation  Fatigue: severe tired all the time not sleeping well  Sleep: not sleeping well      PAST HISTORY was reviewed and updated:      MEDICATIONS and ALLERGIES were reviewed and updated.    SOCIAL HISTORY was reviewed and updated:      EXAM:    PHYSICAL EXAMINATION:   VITAL SIGNS:  B/P: 117/67, T: Data Unavailable, P: 86, R: Data Unavailable    GENERAL: The patient is a well-nourished who presents to the evaluation with her .    NEUROLOGIC:   MENTAL STATUS: Alert,awake and oriented times four.   CRANIAL NERVES: Visual acuity is 20/20 in OS and 20/20-3 OD with a near card (with corrective lenses). Visual fields are full to confrontation. The pupils are round and react to light and there is no Antonio Arlene pupil. Funduscopic examination demonstrates no optic pallor, papilledema or retinal hemorrhage/exudate. Extraocular movements are intact with no internuclear ophthalmoplegia. No nystagmus. Facial strength and sensation are  normal. Hearing is  normal. Palate elevation  and tongue protrusion are  normal.   POWER:     Motor    Upper      Right Left   Shoulder Abduction 5 5   Elbow Flexion 5 5   Elbow Extension 5 5   Wrist Extension 5 5   Digit Extension 5 5   Digit Flexion 5 5   APB 5 5   Tone 2 Normal 2 Normal   Lower       Right Left   Hip Flexion 5 5   Knee Extension 5 5   Knee Flexion 5 5   Foot Dorsiflexion 5 5   Foot Plantar Flexion 5 5   EH 5 5   Toe Flexion 5 5   Tone 2 Normal 2 Normal         SENSORY:   Light touch: intact      MOTOR/CEREBELLAR:    Right Left   RRM 0 Normal 0 Normal   MAGDA 0 Normal 0 Normal   FTN 0 Normal 0 Normal   RRM 0 Normal 0 Normal   HKS 0 Normal 0 Normal           GAIT: Gait is narrow-based and steady and the patient is able to walk on heels, toes and in tandem without difficulty.    Romberg Stable    RESULTS:  Monitoring labs:  LABS    No visits with results within 6 Month(s) from this visit.  Latest known visit with results is:    Admission on 02/26/2017, Discharged on 02/26/2017   Component Date Value Ref Range Status     WBC 02/26/2017 14.7* 4.0 - 11.0 10e9/L Final     RBC Count 02/26/2017 4.41  3.8 - 5.2 10e12/L Final     Hemoglobin 02/26/2017 13.1  11.7 - 15.7 g/dL Final     Hematocrit 02/26/2017 39.0  35.0 - 47.0 % Final     MCV 02/26/2017 88  78 - 100 fl Final     MCH 02/26/2017 29.7  26.5 - 33.0 pg Final     MCHC 02/26/2017 33.6  31.5 - 36.5 g/dL Final     RDW 02/26/2017 11.8  10.0 - 15.0 % Final     Platelet Count 02/26/2017 288  150 - 450 10e9/L Final     Diff Method 02/26/2017 Automated Method   Final     % Neutrophils 02/26/2017 82.8  % Final     % Lymphocytes 02/26/2017 10.0  % Final     % Monocytes 02/26/2017 4.7  % Final     % Eosinophils 02/26/2017 1.4  % Final     % Basophils 02/26/2017 0.7  % Final     % Immature Granulocytes 02/26/2017 0.4  % Final     Nucleated RBCs 02/26/2017 0  0 /100 Final     Absolute Neutrophil 02/26/2017 12.2* 1.6 - 8.3 10e9/L Final     Absolute Lymphocytes 02/26/2017 1.5  0.8 - 5.3 10e9/L Final     Absolute  Monocytes 02/26/2017 0.7  0.0 - 1.3 10e9/L Final     Absolute Eosinophils 02/26/2017 0.2  0.0 - 0.7 10e9/L Final     Absolute Basophils 02/26/2017 0.1  0.0 - 0.2 10e9/L Final     Abs Immature Granulocytes 02/26/2017 0.1  0 - 0.4 10e9/L Final     Absolute Nucleated RBC 02/26/2017 0.0   Final     Sodium 02/26/2017 138  133 - 144 mmol/L Final     Potassium 02/26/2017 3.4  3.4 - 5.3 mmol/L Final     Chloride 02/26/2017 101  94 - 109 mmol/L Final     Carbon Dioxide 02/26/2017 31  20 - 32 mmol/L Final     Anion Gap 02/26/2017 6  3 - 14 mmol/L Final     Glucose 02/26/2017 122* 70 - 99 mg/dL Final     Urea Nitrogen 02/26/2017 15  7 - 30 mg/dL Final     Creatinine 02/26/2017 0.99  0.52 - 1.04 mg/dL Final     GFR Estimate 02/26/2017 60* >60 mL/min/1.7m2 Final     GFR Estimate If Black 02/26/2017 73  >60 mL/min/1.7m2 Final     Calcium 02/26/2017 8.7  8.5 - 10.1 mg/dL Final     Bilirubin Total 02/26/2017 0.6  0.2 - 1.3 mg/dL Final     Albumin 02/26/2017 3.7  3.4 - 5.0 g/dL Final     Protein Total 02/26/2017 8.0  6.8 - 8.8 g/dL Final     Alkaline Phosphatase 02/26/2017 68  40 - 150 U/L Final     ALT 02/26/2017 23  0 - 50 U/L Final     AST 02/26/2017 20  0 - 45 U/L Final     Lipase 02/26/2017 144  73 - 393 U/L Final     Color Urine 02/26/2017 Yellow   Final     Appearance Urine 02/26/2017 Slightly Cloudy   Final     Glucose Urine 02/26/2017 Negative  NEG mg/dL Final     Bilirubin Urine 02/26/2017 Negative  NEG Final     Ketones Urine 02/26/2017 Negative  NEG mg/dL Final     Specific Gravity Urine 02/26/2017 1.015  1.003 - 1.035 Final     Blood Urine 02/26/2017 Negative  NEG Final     pH Urine 02/26/2017 6.5  5.0 - 7.0 pH Final     Protein Albumin Urine 02/26/2017 Negative  NEG mg/dL Final     Urobilinogen mg/dL 02/26/2017 Normal  0.0 - 2.0 mg/dL Final     Nitrite Urine 02/26/2017 Negative  NEG Final     Leukocyte Esterase Urine 02/26/2017 Negative  NEG Final     Source 02/26/2017 Midstream Urine   Final     WBC Urine 02/26/2017 1   0 - 2 /HPF Final     RBC Urine 02/26/2017 1  0 - 2 /HPF Final     Squamous Epithelial /HPF Urine 02/26/2017 <1  0 - 1 /HPF Final     Transitional Epi 02/26/2017 <1  0 - 1 /HPF Final     Mucous Urine 02/26/2017 Present* NEG /LPF Final     Amorphous Crystals 02/26/2017 Few* NEG /HPF Final     HCG Qual Urine 02/26/2017 Negative  NEG Final     Creatinine 02/26/2017 1.0  0.52 - 1.04 mg/dL Final     GFR Estimate 02/26/2017 59* >60 mL/min/1.7m2 Final     GFR Estimate If Black 02/26/2017 72  >60 mL/min/1.7m2 Final         MRI neuroaxis  2/2017  Mild t2 disease burden in brain  Single T2 lesion in the cercial cord        ASSESSMENT/PLAN:  Patient is a 47-year-old female past medical history of radiographic isolated syndrome is been followed by Dr. De the past and is presenting today to establish care with me. Overall, the patient appears slightly worse subjectively. However, objectively her exam is fairly benign. Therefore, it is difficult to determine whether this slow worsening could represent primary progressive multiple sclerosis or potential fibromyalgia. Given the quality of pain and she s having her right hip is mildly concerning for radiculopathy. Therefore, I will give EMG to evaluate this. Also I ll get a swallow study to evaluate for her difficulty eating. The EMG is negative and there is definitive pathology on her swallowing test then the diagnosis of primary progressive multiple sclerosis needs to be entertained. After this work is done I will follow-up in 2 months.    To try to treat her pain, I will start her on Cymbalta. She was given a titration schedule to wean off Elavil and start Cymbalta over the next 2 weeks.    PLan  Get EMG.  Get Swallow Study.  Start Cymbalta, wean elavil.  Follow up in 2 months.     Pietro Flores MD [facility]  Staff Neurologist   09/13/17

## 2017-09-15 DIAGNOSIS — Z53.9 ERRONEOUS ENCOUNTER--DISREGARD: Primary | ICD-10-CM

## 2017-09-15 DIAGNOSIS — G35 MULTIPLE SCLEROSIS (H): Primary | ICD-10-CM

## 2017-09-15 DIAGNOSIS — R13.14 PHARYNGOESOPHAGEAL DYSPHAGIA: ICD-10-CM

## 2017-09-15 DIAGNOSIS — R13.19 ESOPHAGEAL DYSPHAGIA: Primary | ICD-10-CM

## 2017-10-04 ENCOUNTER — OFFICE VISIT (OUTPATIENT)
Dept: NEUROLOGY | Facility: CLINIC | Age: 47
End: 2017-10-04

## 2017-10-04 DIAGNOSIS — M54.16 LUMBAR RADICULOPATHY: ICD-10-CM

## 2017-10-04 DIAGNOSIS — M54.17 L-S RADICULOPATHY: Primary | ICD-10-CM

## 2017-10-04 NOTE — MR AVS SNAPSHOT
After Visit Summary   10/4/2017    Chayo Reid    MRN: [id number]           Patient Information     Date Of Birth          1970        Visit Information        Provider Department      10/4/2017 1:30 PM Bradly Abdi MD [facility] EMG        Today's Diagnoses     L-S radiculopathy    -  1    Lumbar radiculopathy           Follow-ups after your visit        Your next 10 appointments already scheduled     Oct 05, 2017  1:00 PM CDT   XR VIDEO SPEECH EVALUATION WITH ESOPHAGRAM with UCXR2, UC GIGU RAD   [facility] Imaging [place] [facility] ([facility])    [address] [facility] [address] [facility]  [address] [facility] [address] [phone]           Please bring a list of your current medicines to your exam. (Include vitamins, minerals and over-the-counter medicines.) Leave your valuables at home.  Tell the doctor if there is a chance you could be pregnant.  Do not eat for 4 hours before the exam. Keep drinking clear liquids until 2 hours before the exam.  You may take pain medicine (with a sip of water) up to 4 hours before the exam.  Do not swallow any other medicines unless your doctor tells you to. Talk to your doctor to be sure it s safe to stop your medicines.  Please call the Imaging Department at your exam site with any questions.            Oct 05, 2017  1:00 PM CDT   Video Swallow with JYOTI Lundy   [facility] Rehab ([facility])    [address] [place] [address]  [address]th [facility] [address] [phone]           Please check in for this visit in Imaging on the 1st floor.            Nov 21, 2017  1:00 PM CST   (Arrive by 12:45 PM)   Return Multiple Sclerosis with Pietro Flores MD   [facility] Multiple Sclerosis ([facility])    [address] [place] [address]  [address] [facility] [address] [phone]              Who to contact     Please call your clinic at [phone] to:    Ask questions about your health    Make or  cancel appointments    Discuss your medicines    Learn about your test results    Speak to your doctor   If you have compliments or concerns about an experience at your clinic, or if you wish to file a complaint, please contact [facility] Patient Relations at [phone] or email us at [email or web address]         Additional Information About Your Visit        Live[employer] Information     [employer] gives you secure access to your electronic health record. If you see a primary care provider, you can also send messages to your care team and make appointments. If you have questions, please call your primary care clinic.  If you do not have a primary care provider, please call [phone] and they will assist you.      "[employer] is an electronic gateway that provides easy, online access to your medical records. With "[employer] you can request a clinic appointment, read your test results, renew a prescription or communicate with your care team.     To access your existing account, please contact your [facility] or call [phone] for assistance.        Care EveryWhere ID     This is your Care EveryWhere ID. This could be used by other organizations to access your [facility] medical records  [id number]         Blood Pressure from Last 3 Encounters:   09/13/17 117/67   06/06/17 110/68   04/11/17 110/64    Weight from Last 3 Encounters:   09/13/17 90.4 kg (199 lb 3.2 oz)   06/06/17 87.1 kg (192 lb)   04/11/17 85.7 kg (189 lb)              We Performed the Following     EMG     HC NCS MOTOR W OR W/O F-WAVE, 3 OR 4     HC NEEDLE EMG EA EXTREMTY W/PARASPINAL AREA COMPLETE        Primary Care Provider    None Specified       No primary provider on file.        Equal Access to Services     FAN ZAVALA : bernarda Santana qaybta kaalmada adeegyada, waxay idiin hayaan adeeg kharash la'aan ah. So [facility] [phone].    ATENCIÓN: Miguel justin  español, tiene a hall disposición servicios gratuitos de asistencia lingüística. Silvino park [phone].    We comply with applicable federal civil rights laws and [place] laws. We do not discriminate on the basis of race, color, national origin, age, disability, sex, sexual orientation, or gender identity.            Thank you!     Thank you for choosing [facility]  for your care. Our goal is always to provide you with excellent care. Hearing back from our patients is one way we can continue to improve our services. Please take a few minutes to complete the written survey that you may receive in the mail after your visit with us. Thank you!             Your Updated Medication List - Protect others around you: Learn how to safely use, store and throw away your medicines at www.disposemymeds.org.          This list is accurate as of: 10/4/17  2:03 PM.  Always use your most recent med list.                   Brand Name Dispense Instructions for use Diagnosis    amitriptyline 25 MG tablet    ELAVIL    90 tablet    Take 3 tablets (75 mg) by mouth At Bedtime    Myofascial pain syndrome, Sleep disorder       amphetamine-dextroamphetamine 30 MG per 24 hr capsule    ADDERALL XR    30 capsule    Take 1 capsule (30 mg) by mouth daily    Other fatigue, MS (multiple sclerosis) (H)       clonazePAM 1 MG tablet    klonoPIN    30 tablet    Take 1 at bedtime    Sleep disorder       cyclobenzaprine 5 MG tablet    FLEXERIL    30 tablet    Take 1 tablet (5 mg) by mouth 2 times daily    Pain of right lower extremity       DULoxetine 20 MG EC capsule    CYMBALTA    60 capsule    Take 1 capsule (20 mg) by mouth 2 times daily    Lumbar radiculopathy       Glatiramer Acetate 40 MG/ML Sosy    COPAXONE    12 Syringe    Inject 40 mg Subcutaneous three times a week    MS (multiple sclerosis) (H)       modafinil 200 MG tablet    PROVIGIL    30 tablet    Take 1 tablet (200 mg) by mouth daily    Fatigue       oxybutynin 10 MG 24 hr tablet     DITROPAN-XL    30 tablet    TAKE 1 TABLET BY MOUTH ONE HOUR PRIOR TO BED.    MS (multiple sclerosis) (H), Neurogenic bladder       vitamin D 1000 UNITS capsule      Take 1 capsule by mouth daily

## 2017-10-04 NOTE — PROGRESS NOTES
[facility]  Electrodiagnostic Laboratory    Nerve Conduction & EMG Report          Patient:       Chayo Reid  Patient ID:    [id number]  Gender:        Female  YOB: 1970  Age:           47 Years 1 Months        History & Examination:  47 year old woman with pain in right groin and leg. No back pain. She denies numbness or weakness in the leg, but reports that sometimes the leg will give out. Eval for radiculopathy.     Techniques: Motor and sensory conduction studies were done with surface recording electrodes. EMG was done with a concentric needle electrode.     Results:  Nerve conduction studies:   1. Right sural and superficial peroneal sensory responses were normal.   2. Right peroneal-EDB and tibial-AH motor responses were normal.     Needle EMG of selected proximal and distal right leg muscles was performed as tabulated below. No abnormal spontaneous activity was observed in the sampled muscles. Motor unit potential morphology and recruitment patterns were normal.     Interpretation:  This is a normal study. There is no electrophysiologic evidence of a lumbosacral radiculopathy affecting the right lower limb on the basis of this study.     Bradly Abdi MD  Department of Neurology           Sensory NCS      Nerve / Sites Rec. Site Onset Peak NP Amp Ref. PP Amp Dist Didier Ref. Temp     ms ms  V  V  V cm m/s m/s  C   R SURAL - Lat Mall      Calf Ankle 2.45 3.02 12.4 8.0 11.4 12.5 51.1 38.0 30.4   R SUP PERONEAL      Lat Leg Shah 2.71 3.59 7.9  9.1 12.5 46.2 38.0 30.4       Motor NCS      Nerve / Sites Rec. Site Lat Ref. Amp Ref. Rel Amp Dist Didier Ref. Dur. Area Temp.     ms ms mV mV % cm m/s m/s ms %  C   R DEEP PERONEAL - EDB      Ankle EDB 3.75 6.00 3.6 2.5 100 8   6.04 100 30.4      FibHead EDB 10.63  3.1  86.2 30 43.6 38.0 7.03 86.7 30.4      Pop Fos EDB 11.98  2.8  77.9 8 59.1 38.0 6.72 80.3 30.6   R TIBIAL - AH      Ankle AH 3.07 6.00 5.5 4.0 100 8   6.46 100 30.4       Pop Fos AH 10.78  3.4  62.7 38 49.3 38.0 7.55 49.5 30.4       F  Wave      Nerve Min F Lat Max F Lat Mean FLat Temp.    ms ms ms  C   R TIBIAL 49.22 52.19 50.38 30.8       EMG Summary Table     Spontaneous MUAP Recruitment    IA Fib PSW Fasc H.F. Amp Dur. PPP Pattern   R. VAST LATERALIS N None None None None N N N N   R. TIB ANTERIOR N None None None None N N N N   R. GASTROCN (MED) N None None None None N N N N   R. PERON LONGUS N None None None None N N N N   R. TIB POSTERIOR N None None None None N N N N

## 2017-10-04 NOTE — LETTER
10/4/2017       RE: Chayo Reid  [address] [facility] [address]     Dear Colleague,    Thank you for referring your patient, Chayo Reid, to the [facility] EMG at [facility]. Please see a copy of my visit note below.        [facility]  Electrodiagnostic Laboratory    Nerve Conduction & EMG Report          Patient:       Chayo Reid  Patient ID:    [id number]  Gender:        Female  YOB: 1970  Age:           47 Years 1 Months        History & Examination:  47 year old woman with pain in right groin and leg. No back pain. She denies numbness or weakness in the leg, but reports that sometimes the leg will give out. Eval for radiculopathy.     Techniques: Motor and sensory conduction studies were done with surface recording electrodes. EMG was done with a concentric needle electrode.     Results:  Nerve conduction studies:   1. Right sural and superficial peroneal sensory responses were normal.   2. Right peroneal-EDB and tibial-AH motor responses were normal.     Needle EMG of selected proximal and distal right leg muscles was performed as tabulated below. No abnormal spontaneous activity was observed in the sampled muscles. Motor unit potential morphology and recruitment patterns were normal.     Interpretation:  This is a normal study. There is no electrophysiologic evidence of a lumbosacral radiculopathy affecting the right lower limb on the basis of this study.     Bradly Abdi MD  Department of Neurology           Sensory NCS      Nerve / Sites Rec. Site Onset Peak NP Amp Ref. PP Amp Dist Didier Ref. Temp     ms ms  V  V  V cm m/s m/s  C   R SURAL - Lat Mall      Calf Ankle 2.45 3.02 12.4 8.0 11.4 12.5 51.1 38.0 30.4   R SUP PERONEAL      Lat Leg Shah 2.71 3.59 7.9  9.1 12.5 46.2 38.0 30.4       Motor NCS      Nerve / Sites Rec. Site Lat Ref. Amp Ref. Rel Amp Dist Didier Ref. Dur. Area Temp.     ms ms mV mV % cm m/s m/s ms %  C   R  DEEP PERONEAL - EDB      Ankle EDB 3.75 6.00 3.6 2.5 100 8   6.04 100 30.4      FibHead EDB 10.63  3.1  86.2 30 43.6 38.0 7.03 86.7 30.4      Pop Fos EDB 11.98  2.8  77.9 8 59.1 38.0 6.72 80.3 30.6   R TIBIAL - AH      Ankle AH 3.07 6.00 5.5 4.0 100 8   6.46 100 30.4      Pop Fos AH 10.78  3.4  62.7 38 49.3 38.0 7.55 49.5 30.4       F  Wave      Nerve Min F Lat Max F Lat Mean FLat Temp.    ms ms ms  C   R TIBIAL 49.22 52.19 50.38 30.8       EMG Summary Table     Spontaneous MUAP Recruitment    IA Fib PSW Fasc H.F. Amp Dur. PPP Pattern   R. VAST LATERALIS N None None None None N N N N   R. TIB ANTERIOR N None None None None N N N N   R. GASTROCN (MED) N None None None None N N N N   R. PERON LONGUS N None None None None N N N N   R. TIB POSTERIOR N None None None None N N N N                      Again, thank you for allowing me to participate in the care of your patient.      Sincerely,    Bradly Abdi MD

## 2017-10-05 ENCOUNTER — TELEPHONE (OUTPATIENT)
Dept: NEUROLOGY | Facility: CLINIC | Age: 47
End: 2017-10-05

## 2017-10-05 ENCOUNTER — THERAPY VISIT (OUTPATIENT)
Dept: SPEECH THERAPY | Facility: CLINIC | Age: 47
End: 2017-10-05

## 2017-10-05 DIAGNOSIS — R13.10 DYSPHAGIA, UNSPECIFIED TYPE: Primary | ICD-10-CM

## 2017-10-05 DIAGNOSIS — R47.1 DYSARTHRIA: Primary | ICD-10-CM

## 2017-10-05 NOTE — MR AVS SNAPSHOT
After Visit Summary   10/5/2017    Chayo Reid    MRN: [id number]           Patient Information     Date Of Birth          1970        Visit Information        Provider Department      10/5/2017 1:00 PM Zee Guevara SLP [facility] Re[facility]        Today's Diagnoses     Dysphagia, unspecified type    -  1       Follow-ups after your visit        Your next 10 appointments already scheduled     Nov 21, 2017  1:00 PM CST   (Arrive by 12:45 PM)   Return Multiple Sclerosis with Pietro Flores MD   [facility] Multiple Sclerosis ([facility])    [address] [place] [address] [phone]              Who to contact     Please call your clinic at [phone] to:    Ask questions about your health    Make or cancel appointments    Discuss your medicines    Learn about your test results    Speak to your doctor   If you have compliments or concerns about an experience at your clinic, or if you wish to file a complaint, please contact [facility] Patient Relations at [phone] or email us at [email or web address]         Additional Information About Your Visit        MyChart Information     [employer] gives you secure access to your electronic health record. If you see a primary care provider, you can also send messages to your care team and make appointments. If you have questions, please call your primary care clinic.  If you do not have a primary care provider, please call [phone] and they will assist you.      [employer] is an electronic gateway that provides easy, online access to your medical records. With [employer], you can request a clinic appointment, read your test results, renew a prescription or communicate with your care team.     To access your existing account, please contact your [facility] or call [phone] for assistance.        Care EveryWhere ID     This is your  Care EveryWhere ID. This could be used by other organizations to access your [place] medical records  [id number]         Blood Pressure from Last 3 Encounters:   09/13/17 117/67   06/06/17 110/68   04/11/17 110/64    Weight from Last 3 Encounters:   09/13/17 90.4 kg (199 lb 3.2 oz)   06/06/17 87.1 kg (192 lb)   04/11/17 85.7 kg (189 lb)              Today, you had the following     No orders found for display       Primary Care Provider    None Specified       No primary provider on file.        Equal Access to Services     [facility]: Hadii brianna Sarmiento, bernarda marie, aniket kinney, natalia willis . So [facility] [phone].    ATENCIÓN: Si habla español, tiene a hall disposición servicios gratuitos de asistencia lingüística. Llame al [phone].    We comply with applicable federal civil rights laws and [place] laws. We do not discriminate on the basis of race, color, national origin, age, disability, sex, sexual orientation, or gender identity.            Thank you!     Thank you for choosing [facility]  for your care. Our goal is always to provide you with excellent care. Hearing back from our patients is one way we can continue to improve our services. Please take a few minutes to complete the written survey that you may receive in the mail after your visit with us. Thank you!             Your Updated Medication List - Protect others around you: Learn how to safely use, store and throw away your medicines at www.disposemymeds.org.          This list is accurate as of: 10/5/17  2:41 PM.  Always use your most recent med list.                   Brand Name Dispense Instructions for use Diagnosis    amitriptyline 25 MG tablet    ELAVIL    90 tablet    Take 3 tablets (75 mg) by mouth At Bedtime    Myofascial pain syndrome, Sleep disorder       amphetamine-dextroamphetamine 30 MG per 24 hr capsule    ADDERALL XR    30 capsule    Take 1 capsule (30 mg) by  mouth daily    Other fatigue, MS (multiple sclerosis) (H)       clonazePAM 1 MG tablet    klonoPIN    30 tablet    Take 1 at bedtime    Sleep disorder       cyclobenzaprine 5 MG tablet    FLEXERIL    30 tablet    Take 1 tablet (5 mg) by mouth 2 times daily    Pain of right lower extremity       DULoxetine 20 MG EC capsule    CYMBALTA    60 capsule    Take 1 capsule (20 mg) by mouth 2 times daily    Lumbar radiculopathy       Glatiramer Acetate 40 MG/ML Sosy    COPAXONE    12 Syringe    Inject 40 mg Subcutaneous three times a week    MS (multiple sclerosis) (H)       modafinil 200 MG tablet    PROVIGIL    30 tablet    Take 1 tablet (200 mg) by mouth daily    Fatigue       oxybutynin 10 MG 24 hr tablet    DITROPAN-XL    30 tablet    TAKE 1 TABLET BY MOUTH ONE HOUR PRIOR TO BED.    MS (multiple sclerosis) (H), Neurogenic bladder       vitamin D 1000 UNITS capsule      Take 1 capsule by mouth daily

## 2017-10-05 NOTE — TELEPHONE ENCOUNTER
Review results of Swallow study. Will refer patient to ENT and speech therapy per their recommendations

## 2017-10-05 NOTE — PROGRESS NOTES
10/05/17 1400   General Information   Type Of Visit Initial   Start Of Care Date 10/05/17   Referring Physician Pietro Flores   Orders Evaluate And Treat  (MBSS)   Medical Diagnosis Dysphagia; unspecified   Onset Of Illness/injury Or Date Of Surgery (2016)   Precautions/limitations No Known Precautions/limitations   Hearing WFL   Pertinent History of Current Problem/OT: Additional Occupational Profile Info Pt complaining primarily of foods and medications getting stuck after swallowing.  She states it burns and hurts.  No s/s with liquids.   notes her voice sounds more hoarse.  She also complains of persistent dry mouth.  She states these symptoms happen at all meals and that she wakes up at night sometimes with regurgitation.   Respiratory Status Room air   Prior Level Of Function Comment Independent   Living Environment [facility]   General Observations Pt pleasant and willing to participate in evaluation.   Patient/family Goals To learn more about her swallowing problems.   Clinical Swallow Evaluation   Oral Musculature generally intact   Structural Abnormalities none present   Dentition present and adequate   Mucosal Quality good  (pt c/o dry mouth)   Mandibular Strength and Mobility intact   Oral Labial Strength and Mobility WFL   Lingual Strength and Mobility WFL   Velar Elevation intact   Buccal Strength and Mobility intact   Laryngeal Function Swallow;Voicing initiated   Additional evaluation(s) completed today Yes   Rationale for completing additional evaluation VFSS indicated to assess oral pharyngeal phases of swallow function to rule out aspiration.   VFSS Evaluation   VFSS Additional Documentation Yes   VFSS Eval: Thin Liquid Texture Trial   Mode of Presentation, Thin Liquid cup   Order of Presentation 2;4   Preparatory Phase WFL   Oral Phase, Thin Liquid WFL   Pharyngeal Phase, Thin Liquid Residue in pyriform sinus  (mild on swallow #4, possible Zeinkers observed swallow #4)    Rosenbek's Penetration Aspiration Scale: Thin Liquid Trial Results 1 - no aspiration, contrast does not enter airway   Diagnostic Statement Normal oral pharyngeal swallow observed with mild pyriform residue 1/3 swallows.   VFSS Eval: Puree Solid Texture Trial   Mode of Presentation, Puree spoon   Order of Presentation 1   Preparatory Phase WFL   Oral Phase, Puree WFL   Pharyngeal Phase, Puree WFL   Rosenbek's Penetration Aspiration Scale: Puree Food Trial Results 1 - no aspiration, contrast does not enter airway   Diagnostic Statement Normal oral pharyngeal swallow observed.   VFSS Eval: Solid Food Texture Trial   Mode of Presentation, Solid self-fed   Order of Presentation 3; barium pill 5   Preparatory Phase WFL   Oral Phase, Solid WFL   Pharyngeal Phase, Solid Residue in valleculae  (mild)   Rosenbek's Penetration Aspiration Scale: Solid Food Trial Results 1 - no aspiration, contrast does not enter airway   Diagnostic Statement Normal oral pharyngeal swallow observed with mild vallecule residue x1.  Barium pill swallow (A-P view) timely entrance into and thru the UES.   Esophageal Phase of Swallow   Patient reports or presents with symptoms of esophageal dysphagia Yes   Esophageal sweep performed during today s vidofluoroscopic exam  Yes;Please refer to radiologist's report for details   Swallow Eval: Clinical Impressions   Skilled Criteria for Therapy Intervention No problems identified which require skilled intervention   Treatment Diagnosis Normal oral pharyngeal swallow function observed this date.   Diet texture recommendations Regular diet;Thin liquids   Risks and Benefits of Treatment have been explained. Yes   Patient, family and/or staff in agreement with Plan of Care Yes   Clinical Impression Comments Results reviewed with patient.  Pt's s/s appear consistent with possible GERD.  Recommend further evaluation of esophageal dysphagia.  Also discussed recommendation of referral to ENT to scope her vocal  folds secondary to her GERD s/s as well as to further evaluate for vocal hoarseness.  She would likely benefit from a session of voice therapy with SLP to address vocal hygiene and ways to reduce vocal hoarseness.   Total Session Time   Total Session Time 30   Total Evaluation Time 30

## 2017-10-06 ENCOUNTER — MYC MEDICAL ADVICE (OUTPATIENT)
Dept: NEUROLOGY | Facility: CLINIC | Age: 47
End: 2017-10-06

## 2017-10-09 NOTE — TELEPHONE ENCOUNTER
Clinic Coordinator called patient to have her schedule f/u with speech and ENT. They left a voicemail message with numbers to call and schedule.     Francy Simpson RN Care Coordinator

## 2017-10-10 NOTE — TELEPHONE ENCOUNTER
Dr. Flores, please see Chayo's Playroll message; I have no problem sending her the office visit note, but would you be able to help answer her questions? Thank you.    Polly Rodríguez, MS RN Care Coordinator

## 2017-10-13 ENCOUNTER — MYC REFILL (OUTPATIENT)
Dept: NEUROLOGY | Facility: CLINIC | Age: 47
End: 2017-10-13

## 2017-10-13 DIAGNOSIS — G35 MS (MULTIPLE SCLEROSIS) (H): ICD-10-CM

## 2017-10-13 DIAGNOSIS — M54.16 LUMBAR RADICULOPATHY: ICD-10-CM

## 2017-10-13 DIAGNOSIS — R53.83 OTHER FATIGUE: ICD-10-CM

## 2017-10-16 ENCOUNTER — MYC MEDICAL ADVICE (OUTPATIENT)
Dept: NEUROLOGY | Facility: CLINIC | Age: 47
End: 2017-10-16

## 2017-10-16 RX ORDER — DEXTROAMPHETAMINE SACCHARATE, AMPHETAMINE ASPARTATE MONOHYDRATE, DEXTROAMPHETAMINE SULFATE AND AMPHETAMINE SULFATE 7.5; 7.5; 7.5; 7.5 MG/1; MG/1; MG/1; MG/1
30 CAPSULE, EXTENDED RELEASE ORAL DAILY
Qty: 30 CAPSULE | Refills: 0 | Status: SHIPPED | OUTPATIENT
Start: 2017-10-16 | End: 2017-11-27

## 2017-10-16 RX ORDER — DULOXETIN HYDROCHLORIDE 20 MG/1
20 CAPSULE, DELAYED RELEASE ORAL 2 TIMES DAILY
Qty: 60 CAPSULE | Refills: 3 | Status: SHIPPED | OUTPATIENT
Start: 2017-10-16 | End: 2017-10-17

## 2017-10-16 NOTE — TELEPHONE ENCOUNTER
Dr. Flores, please see Chayo's MyChart message; I believe she is likely referring to the duloxetine; Anything else you recommend for her? Thank you.    Polly Rodríguez, MS RN Care Coordinator

## 2017-10-16 NOTE — TELEPHONE ENCOUNTER
Patient sent [employer] message requesting refill of therir duloxetine; Patient was last seen in September and has follow up appointment in November with Dr. Flores. Refilled for 4 months per [facility] refill protocol.    Francy Simpson RN Care Coordinator

## 2017-10-16 NOTE — TELEPHONE ENCOUNTER
This prescription was signed and mailed to the [facility] pharmacy in [place], [place] as requested below.    Priscila Ruth RN

## 2017-10-16 NOTE — TELEPHONE ENCOUNTER
Patient sent [employer] message requesting refill of their Adderall; Patient was last seen in September and has follow up appointment in November with Dr. Flores. Pended rx to Dr. Flores for signature and will mail to the pharmacy once signed.    Francy Simpson RN Care Coordinator

## 2017-10-17 ENCOUNTER — MYC MEDICAL ADVICE (OUTPATIENT)
Dept: NEUROLOGY | Facility: CLINIC | Age: 47
End: 2017-10-17

## 2017-10-17 DIAGNOSIS — M54.16 LUMBAR RADICULOPATHY: Primary | ICD-10-CM

## 2017-10-17 DIAGNOSIS — M54.16 LUMBAR RADICULOPATHY: ICD-10-CM

## 2017-10-17 RX ORDER — DULOXETIN HYDROCHLORIDE 20 MG/1
60 CAPSULE, DELAYED RELEASE ORAL DAILY
Qty: 90 CAPSULE | Refills: 3 | Status: SHIPPED | OUTPATIENT
Start: 2017-10-17 | End: 2017-10-19 | Stop reason: DRUGHIGH

## 2017-10-17 RX ORDER — DULOXETIN HYDROCHLORIDE 20 MG/1
60 CAPSULE, DELAYED RELEASE ORAL DAILY
Qty: 30 CAPSULE | Refills: 3 | Status: SHIPPED | OUTPATIENT
Start: 2017-10-17 | End: 2017-10-17

## 2017-10-17 NOTE — TELEPHONE ENCOUNTER
Dr. Flores, please see Chayo's MyChart message below. Sounds like she is willing to increase her Cymbalta. Also wondering what a pain clinic would do.  Please advise.    Thank you,    Francy Simpson RN Care Coordinator

## 2017-10-19 ENCOUNTER — TRANSFERRED RECORDS (OUTPATIENT)
Dept: HEALTH INFORMATION MANAGEMENT | Facility: CLINIC | Age: 47
End: 2017-10-19

## 2017-10-19 RX ORDER — DULOXETIN HYDROCHLORIDE 60 MG/1
60 CAPSULE, DELAYED RELEASE ORAL DAILY
Qty: 30 CAPSULE | Refills: 3 | Status: SHIPPED | OUTPATIENT
Start: 2017-10-19 | End: 2018-03-09

## 2017-10-19 NOTE — TELEPHONE ENCOUNTER
Dr. Flores, regarding the increase in Cymbalta..the pharmacy said that there is a max of 2 pills/day. We need to submit a new Rx for either 30mg tabs or 60mg tabs. What do you prefer?    Thank you,    Francy Simpson, RN Care Coordinator

## 2017-10-19 NOTE — TELEPHONE ENCOUNTER
Per Dr. Flores, order entered for Cymbalta 60mg QD and sent to pharmacy.    Francy Simpson, RN Care Coordinator

## 2017-11-07 ENCOUNTER — HOSPITAL ENCOUNTER (OUTPATIENT)
Dept: SPEECH THERAPY | Facility: CLINIC | Age: 47
Setting detail: THERAPIES SERIES
End: 2017-11-07
Attending: PSYCHIATRY & NEUROLOGY
Payer: COMMERCIAL

## 2017-11-07 PROCEDURE — 92507 TX SP LANG VOICE COMM INDIV: CPT | Mod: GN | Performed by: STUDENT IN AN ORGANIZED HEALTH CARE EDUCATION/TRAINING PROGRAM

## 2017-11-07 PROCEDURE — 40000251 ZZH STATISTIC VOICE CENTER VISIT: Performed by: STUDENT IN AN ORGANIZED HEALTH CARE EDUCATION/TRAINING PROGRAM

## 2017-11-07 PROCEDURE — 92524 BEHAVRAL QUALIT ANALYS VOICE: CPT | Mod: GN | Performed by: STUDENT IN AN ORGANIZED HEALTH CARE EDUCATION/TRAINING PROGRAM

## 2017-11-20 ENCOUNTER — TELEPHONE (OUTPATIENT)
Dept: NEUROLOGY | Facility: CLINIC | Age: 47
End: 2017-11-20

## 2017-11-20 DIAGNOSIS — G35 MS (MULTIPLE SCLEROSIS) (H): ICD-10-CM

## 2017-11-20 RX ORDER — GLATIRAMER 40 MG/ML
40 INJECTION, SOLUTION SUBCUTANEOUS
Qty: 12 SYRINGE | Refills: 11 | Status: SHIPPED | OUTPATIENT
Start: 2017-11-20 | End: 2019-01-08

## 2017-11-20 NOTE — TELEPHONE ENCOUNTER
Prior Authorization Approval    Authorization Effective Date: 11/20/2017  Authorization Expiration Date:    Medication: Copaxone 40mg NO PA REQUIRED  Approved Dose/Quantity: 12 per 28 days  Reference #:     Insurance Company: [employer] [place] - Phone [phone] Fax [phone]  Expected CoPay: $0     CoPay Card Available:      Foundation Assistance Needed:    Which Pharmacy is filling the prescription (Not needed for infusion/clinic administered): [facility] - [place] [address]  Pharmacy Notified: No  Patient Notified: No

## 2017-11-20 NOTE — TELEPHONE ENCOUNTER
Received refill request for Copaxone from [facility]; Patient was last seen in September and has follow up appointment in November with Dr. Flores; Refilled for 1 year per MS refill protocol.    Polly Rodríguez MS RN Care Coordinator

## 2017-11-21 ENCOUNTER — OFFICE VISIT (OUTPATIENT)
Dept: NEUROLOGY | Facility: CLINIC | Age: 47
End: 2017-11-21
Attending: PSYCHIATRY & NEUROLOGY
Payer: COMMERCIAL

## 2017-11-21 VITALS — SYSTOLIC BLOOD PRESSURE: 139 MMHG | HEART RATE: 91 BPM | DIASTOLIC BLOOD PRESSURE: 88 MMHG | HEIGHT: 66 IN

## 2017-11-21 DIAGNOSIS — G35 MS (MULTIPLE SCLEROSIS) (H): Primary | ICD-10-CM

## 2017-11-21 PROCEDURE — 40000809 ZZH STATISTIC NO DOCUMENTATION TO SUPPORT CHARGE

## 2017-11-21 PROCEDURE — 99211 OFF/OP EST MAY X REQ PHY/QHP: CPT | Mod: ZF

## 2017-11-21 RX ORDER — PREGABALIN 50 MG/1
50 CAPSULE ORAL 3 TIMES DAILY
Qty: 270 CAPSULE | Refills: 3 | Status: SHIPPED | OUTPATIENT
Start: 2017-11-21 | End: 2019-01-08

## 2017-11-21 ASSESSMENT — PAIN SCALES - GENERAL: PAINLEVEL: MODERATE PAIN (5)

## 2017-11-21 NOTE — PATIENT INSTRUCTIONS
Will try Lyrica. Take 50mg at night for one week. If you tolerate this dose then take one 50mg tab once in the morning and then once at night. Then after one week you can take 1 tab in the morning and two tabs at night. Call me in 4 weeks to tell me how you are doing    Will follow you up in 4 months with a MRI of the brain    You saw a neurology provider today at the [facility].  You may have also met with the MS RN Care Coordinator.  In order to get a message to your MS Center provider, you should contact [phone] option 3 for the triage nurse line.    You should contact us via this protocol if you have any of the following symptoms:    New or worsening neurologic symptoms that persist for 24-48 hours, such as:  o New onset of pain or marked worsening of pain  o Difficulty with speech, swallowing, or breathing  o New onset of vertigo or dizziness  o Change in bowel or bladder function (incontinence, difficulty urinating)    Increasing difficulty in self care    Marked changes in vision (double vision, blurred vision, graying of vision)    Change in mobility    Change in cognitive function    Falling    Worsening numbness, tingling or pain with a change in function    Worsening fatigue lasting more than 2 weeks  If you had labs completed today, we will contact you with the results.  If you are active in [employer], they will be released to you there.  Otherwise, your results will be provided to you via mail or telephone call.  Some results take up to 2 weeks for completion.  If you haven t heard anything about your lab results within 2 weeks, you can call or send a [employer] message to obtain your results.  If you have an MRI scheduled in the week or two prior to your next appointment, we will go over the results at your scheduled follow up appointment.  If you are not scheduled to see your MS Center provider within about 2 weeks after your MRI, please call or send a [employer]  message to obtain your results if you haven t heard anything within 2 weeks.  Please be aware that it takes at least 5 business days after routine MRIs for your results to be reviewed by both the radiologist and your doctor.  MRIs completed at facilities outside of the [facility] system take about 2 weeks in order for the MRI disc to be mailed to our clinic and uploaded into your medical record.    Prescription refills should be faxed to us by your pharmacy.  Our fax number for prescription refills is [phone].  Please do your best to come to your appointments, and to arrive 15 minutes early to allow time for checking in.  [facility] reserves the right to terminate care of established patients if a patient misses three or more appointments in a clinic without providing notification within a 12-month period.    Developing Your Care Team  Individuals living with chronic illnesses like MS may be unaware that they are at risk for the same range of medical problems as everyone else.  This is why you must establish a relationship with other health care providers in addition to your Multiple Sclerosis doctor.  It can be difficult at times to figure out whether a health concern is related to your MS, or whether it is related to something else, such as hormonal changes, pseduoexacerbations, changes in your core body temperature, flu-like reactions to interferons, exercise, or infections.  Urinary tract infections (UTIs) are common culprits that can cause fatigue, weakness, or other  MS attack -like symptoms without classic symptoms of a UTI.  For this reason, if you call or come in to discuss symptoms, you may be asked to get in touch with your primary provider or another specialist, so that you receive the comprehensive care you need.  What is Multiple Sclerosis (MS)?  MS is a disease in which the nerve tissues in the brain and/or spinal cord are attacked by immune cells in the body.  These immune  cells are present in everyone, and their normal role is to fight off infections.  In people with MS, these cells change the way they function and cross into the nervous system.  Once there, they cause inflammation that damages the myelin (or the protective coating of a nerve cell, much like the plastic covering on an electrical cord) and parts of the nerve cell itself.  So far, a clear cause for this immune system dysfunction has not been found.  MS often starts out as the  relapsing-remitting  form.  This means there are episodes when you have symptoms, and other times when you recover to normal or near-normal.  Over time, if the damage to the nervous system continues, the disease can cause additional disability, such as difficulty walking.  If the relapses and nerve damage can be prevented with available medications, many patients with MS can go many years between relapses and have relatively little disability.  Remember: MS is a condition that changes and must be evaluated on an ongoing basis!  What is a Relapse? (Also called flare-ups, attacks, or exacerbations)  Relapses are due to the occurrence of inflammation in some part of the brain and/or spinal cord.  A relapse is new or recurrent symptoms which persist for at least 24 hours and sometimes worsen over 48 hours.  New symptoms need to be  by at least 1 month in order to be considered separate relapses. Most of the time, symptoms reach their maximal intensity within 2 weeks and then begin to slowly resolve.  At times, your symptoms may not recover fully for up to 6 months, depending on the severity of the episode.  The frequency of relapses is generally higher early in the disease, but can vary greatly among individuals with MS.  Improvement of symptoms for an individual is unpredictable with each relapse.    It is important to remember that an increase in symptoms and changes in function may not necessarily be a relapse.  There are other factors  that contribute to such changes, such as hot weather, increased body temperature, infection/illness, stress and sleep deprivation.  The worsening of symptoms may feel like a relapse when in reality it is not.  These episodes are referred to as pseudorelapses.  Once the underlying cause is addressed, symptoms usually fade away and you feel better.  If you experience a worsening of symptoms that lasts more than 48 hours and does not improve with cooling down, decreasing stress, or treatment of an infection, please call us and we can help to better determine whether you are having a pseudorelapse versus a relapse.

## 2017-11-21 NOTE — LETTER
11/21/2017       RE: Chayo Reid  [address]  [facility][1] [id number]     Dear Colleague,    Thank you for referring your patient, Chayo Reid, to the [facility] MULTIPLE SCLEROSIS at [facility]. Please see a copy of my visit note below.    MULTIPLE SCLEROSIS CLINIC AT THE [facility]  FOLLOWUP/ESTABLISHED PATIENT VISIT      PRINCIPAL NEUROLOGIC DIAGNOSIS: RIS     Date of Onset: 7/2012  Date of Diagnosis: 7/2012  Initial Clinical Course: Progressive  Current Clinical Course: Progressive  Past Disease Modifying Therapy(ies): None  Current Disease Modifying Therapy(ies): None  Most Recent MRI of the Brain: 1/27  Most Recent MRI of the Cervical Cord 2/17  Most Recent MRI of the Thoracic Cord: 2/17  Most Recent Lumbar Puncture: Positive for OCB    CHIEF COMPLAINT: Follow up on DMT      INTERVAL HISTORY:    Overall the patient denies any change in her things. She is still having intense pain in her legs. On average the pain is about 5 out of 10. The pain is not changed in quality or character since last being seen. The patient is not getting any better or worse. The pain had no response to the Cymbalta. However, she has had a great improvement in mood per her .    She had her swallow study which was largely unremarkable. Therefore she was referred to in ENT. ENT did not find anything abnormal on her exam and recommended G.I. follow-up.    Issues with current MS therapy: Tolerating DMT without issue    REVIEW OF SYSTEMS:    Mood: unchanged  Spasticity:none  Bladder: none  Bowel: unchanged  Pain related to today's visit:reviewed on nursing intake documentation  Fatigue: unchanged  Sleep: well/ no problem  Memory/Concentration: unchanged    PAST HISTORY was reviewed and updated:      MEDICATIONS and ALLERGIES were reviewed and updated.    SOCIAL HISTORY was reviewed and updated:        EXAM:    PHYSICAL EXAMINATION:   VITAL SIGNS:  B/P: 139/88, T: Data  Unavailable, P: 91, R: Data Unavailable    GENERAL: The patient is a well-nourished  who presents to the evaluation with her .  NEUROLOGIC:   MENTAL STATUS: Alert,awake and  oriented times four.   CRANIAL NERVES: Visual fields are full to confrontation. The pupils are  round and react to light and there is no Antonio Arlene pupil. Extraocular movements are  intact with no  internuclear ophthalmoplegia. No nystagmus. Facial strength and sensation are  normal. Hearing is  normal. Palate elevation and tongue protrusion are  normal.   POWER:     Motor    Upper      Right Left   Shoulder Abduction 5 5   Elbow Flexion 5 5   Elbow Extension 5 5   Wrist Extension 5 5   Digit Extension 5 5   Digit Flexion 5 5   APB 5 5   Tone 0 0   Lower       Right Left   Hip Flexion 5 5   Knee Extension 5 5   Knee Flexion 5 5   Foot Dorsiflexion 5 5   Foot Plantar Flexion 5 5   EH 5 5   Toe Flexion 5 5   Tone 1 1           Grade Description   0 No increase in muscle tone   1 Slight increase in muscle tone, manifested by a catch and release or by minimal resistance at the end of the range of motion when the affected part(s) is moved in flexion or extension   1+ Slight increase in muscle tone, manifested by a catch, followed by minimal resistance throughout the remainder (less than half) of the ROM   2 More marked increase in muscle tone through most of the ROM, but affected part(s) easily moved   3 Considerable increase in muscle tone, passive movement difficult   4 Affected part(s) rigid in flexion or extension           SENSORY:     Light touch:  Intact in all extremities        MOTOR/CEREBELLAR:    Right Left   RRM 0 Normal 0 Normal   MAGDA 0 Normal 0 Normal   FTN 0 Normal 0 Normal   RRM 0 Normal 0 Normal   HKS 0 Normal 0 Normal           GAIT: Gait is   narrow-based and steady and the patient is  able to walk on heels, toes and in tandem without difficulty.    Romberg: Stable with eye(s) closed    RESULTS:  Monitoring labs:    No  visits with results within 6 Month(s) from this visit.  Latest known visit with results is:    Admission on 02/26/2017, Discharged on 02/26/2017   Component Date Value Ref Range Status     WBC 02/26/2017 14.7* 4.0 - 11.0 10e9/L Final     RBC Count 02/26/2017 4.41  3.8 - 5.2 10e12/L Final     Hemoglobin 02/26/2017 13.1  11.7 - 15.7 g/dL Final     Hematocrit 02/26/2017 39.0  35.0 - 47.0 % Final     MCV 02/26/2017 88  78 - 100 fl Final     MCH 02/26/2017 29.7  26.5 - 33.0 pg Final     MCHC 02/26/2017 33.6  31.5 - 36.5 g/dL Final     RDW 02/26/2017 11.8  10.0 - 15.0 % Final     Platelet Count 02/26/2017 288  150 - 450 10e9/L Final     Diff Method 02/26/2017 Automated Method   Final     % Neutrophils 02/26/2017 82.8  % Final     % Lymphocytes 02/26/2017 10.0  % Final     % Monocytes 02/26/2017 4.7  % Final     % Eosinophils 02/26/2017 1.4  % Final     % Basophils 02/26/2017 0.7  % Final     % Immature Granulocytes 02/26/2017 0.4  % Final     Nucleated RBCs 02/26/2017 0  0 /100 Final     Absolute Neutrophil 02/26/2017 12.2* 1.6 - 8.3 10e9/L Final     Absolute Lymphocytes 02/26/2017 1.5  0.8 - 5.3 10e9/L Final     Absolute Monocytes 02/26/2017 0.7  0.0 - 1.3 10e9/L Final     Absolute Eosinophils 02/26/2017 0.2  0.0 - 0.7 10e9/L Final     Absolute Basophils 02/26/2017 0.1  0.0 - 0.2 10e9/L Final     Abs Immature Granulocytes 02/26/2017 0.1  0 - 0.4 10e9/L Final     Absolute Nucleated RBC 02/26/2017 0.0   Final     Sodium 02/26/2017 138  133 - 144 mmol/L Final     Potassium 02/26/2017 3.4  3.4 - 5.3 mmol/L Final     Chloride 02/26/2017 101  94 - 109 mmol/L Final     Carbon Dioxide 02/26/2017 31  20 - 32 mmol/L Final     Anion Gap 02/26/2017 6  3 - 14 mmol/L Final     Glucose 02/26/2017 122* 70 - 99 mg/dL Final     Urea Nitrogen 02/26/2017 15  7 - 30 mg/dL Final     Creatinine 02/26/2017 0.99  0.52 - 1.04 mg/dL Final     GFR Estimate 02/26/2017 60* >60 mL/min/1.7m2 Final     GFR Estimate If Black 02/26/2017 73  >60  mL/min/1.7m2 Final     Calcium 02/26/2017 8.7  8.5 - 10.1 mg/dL Final     Bilirubin Total 02/26/2017 0.6  0.2 - 1.3 mg/dL Final     Albumin 02/26/2017 3.7  3.4 - 5.0 g/dL Final     Protein Total 02/26/2017 8.0  6.8 - 8.8 g/dL Final     Alkaline Phosphatase 02/26/2017 68  40 - 150 U/L Final     ALT 02/26/2017 23  0 - 50 U/L Final     AST 02/26/2017 20  0 - 45 U/L Final     Lipase 02/26/2017 144  73 - 393 U/L Final     Color Urine 02/26/2017 Yellow   Final     Appearance Urine 02/26/2017 Slightly Cloudy   Final     Glucose Urine 02/26/2017 Negative  NEG mg/dL Final     Bilirubin Urine 02/26/2017 Negative  NEG Final     Ketones Urine 02/26/2017 Negative  NEG mg/dL Final     Specific Gravity Urine 02/26/2017 1.015  1.003 - 1.035 Final     Blood Urine 02/26/2017 Negative  NEG Final     pH Urine 02/26/2017 6.5  5.0 - 7.0 pH Final     Protein Albumin Urine 02/26/2017 Negative  NEG mg/dL Final     Urobilinogen mg/dL 02/26/2017 Normal  0.0 - 2.0 mg/dL Final     Nitrite Urine 02/26/2017 Negative  NEG Final     Leukocyte Esterase Urine 02/26/2017 Negative  NEG Final     Source 02/26/2017 Midstream Urine   Final     WBC Urine 02/26/2017 1  0 - 2 /HPF Final     RBC Urine 02/26/2017 1  0 - 2 /HPF Final     Squamous Epithelial /HPF Urine 02/26/2017 <1  0 - 1 /HPF Final     Transitional Epi 02/26/2017 <1  0 - 1 /HPF Final     Mucous Urine 02/26/2017 Present* NEG /LPF Final     Amorphous Crystals 02/26/2017 Few* NEG /HPF Final     HCG Qual Urine 02/26/2017 Negative  NEG Final     Creatinine 02/26/2017 1.0  0.52 - 1.04 mg/dL Final     GFR Estimate 02/26/2017 59* >60 mL/min/1.7m2 Final     GFR Estimate If Black 02/26/2017 72  >60 mL/min/1.7m2 Final   ]      MRI brain:    no new MRI to review  EMG 10/4/17: unremarkable EMG  Swallow Study 10/5/17: Essentially normal concerning for GERD  ASSESSMENT/PLAN:  The patient is a 47-year-old woman with a past medical history of multiple sclerosis who is presenting for evaluation and follow-up of  her disease. The patient s EMG was negative. Therefore, the most likely explanation of her symptoms is central pain. There is no obvious MRI lesion that can explain this. However, is very well possible that this represents a sub resolution lesion. Therefore, I will try to once again treat her for neurologic pain. I will start the patient Lyrica plan to titrate up 150 mg per day. Given that she has had no change in her MRI after her symptoms started, I m uncertain that this truly represents relapse work this represents progression. Therefore, I will continue the patient on Copaxone for now I will follow up with an MRI in the spring. In regards to her swallowing I feel it s reasonable to continue a voice. I think it is more likely not GERD. I think a referral to [facility] would be reasonable.    Plan   Start lyrica with plan to wean up to one 50mg tab in the morning and two 50mg Tabs at night  Follow up with MRI in 4 months.   I spent 25 minutes in this visit, with >50% direct patient time spent counseling about prognosis, treatment options, and coordination of care.    Pietro Flores MD [facility]  Staff Neurologist   11/21/17

## 2017-11-21 NOTE — PROGRESS NOTES
MULTIPLE SCLEROSIS CLINIC AT THE [facility]  FOLLOWUP/ESTABLISHED PATIENT VISIT      PRINCIPAL NEUROLOGIC DIAGNOSIS: RIS     Date of Onset: 7/2012  Date of Diagnosis: 7/2012  Initial Clinical Course: Progressive  Current Clinical Course: Progressive  Past Disease Modifying Therapy(ies): None  Current Disease Modifying Therapy(ies): None  Most Recent MRI of the Brain: 1/27  Most Recent MRI of the Cervical Cord 2/17  Most Recent MRI of the Thoracic Cord: 2/17  Most Recent Lumbar Puncture: Positive for OCB    CHIEF COMPLAINT: Follow up on DMT      INTERVAL HISTORY:    Overall the patient denies any change in her things. She is still having intense pain in her legs. On average the pain is about 5 out of 10. The pain is not changed in quality or character since last being seen. The patient is not getting any better or worse. The pain had no response to the Cymbalta. However, she has had a great improvement in mood per her .    She had her swallow study which was largely unremarkable. Therefore she was referred to in ENT. ENT did not find anything abnormal on her exam and recommended G.I. follow-up.    Issues with current MS therapy: Tolerating DMT without issue    REVIEW OF SYSTEMS:    Mood: unchanged  Spasticity:none  Bladder: none  Bowel: unchanged  Pain related to today's visit:reviewed on nursing intake documentation  Fatigue: unchanged  Sleep: well/ no problem  Memory/Concentration: unchanged    PAST HISTORY was reviewed and updated:      MEDICATIONS and ALLERGIES were reviewed and updated.    SOCIAL HISTORY was reviewed and updated:        EXAM:    PHYSICAL EXAMINATION:   VITAL SIGNS:  B/P: 139/88, T: Data Unavailable, P: 91, R: Data Unavailable    GENERAL: The patient is a well-nourished  who presents to the evaluation with her .  NEUROLOGIC:   MENTAL STATUS: Alert,awake and  oriented times four.   CRANIAL NERVES: Visual fields are full to confrontation. The pupils are  round and react  to light and there is no Antonio Arlene pupil. Extraocular movements are  intact with no  internuclear ophthalmoplegia. No nystagmus. Facial strength and sensation are  normal. Hearing is  normal. Palate elevation and tongue protrusion are  normal.   POWER:     Motor    Upper      Right Left   Shoulder Abduction 5 5   Elbow Flexion 5 5   Elbow Extension 5 5   Wrist Extension 5 5   Digit Extension 5 5   Digit Flexion 5 5   APB 5 5   Tone 0 0   Lower       Right Left   Hip Flexion 5 5   Knee Extension 5 5   Knee Flexion 5 5   Foot Dorsiflexion 5 5   Foot Plantar Flexion 5 5   EH 5 5   Toe Flexion 5 5   Tone 1 1           Grade Description   0 No increase in muscle tone   1 Slight increase in muscle tone, manifested by a catch and release or by minimal resistance at the end of the range of motion when the affected part(s) is moved in flexion or extension   1+ Slight increase in muscle tone, manifested by a catch, followed by minimal resistance throughout the remainder (less than half) of the ROM   2 More marked increase in muscle tone through most of the ROM, but affected part(s) easily moved   3 Considerable increase in muscle tone, passive movement difficult   4 Affected part(s) rigid in flexion or extension           SENSORY:     Light touch:  Intact in all extremities        MOTOR/CEREBELLAR:    Right Left   RRM 0 Normal 0 Normal   MAGDA 0 Normal 0 Normal   FTN 0 Normal 0 Normal   RRM 0 Normal 0 Normal   HKS 0 Normal 0 Normal           GAIT: Gait is   narrow-based and steady and the patient is  able to walk on heels, toes and in tandem without difficulty.    Romberg: Stable with eye(s) closed    RESULTS:  Monitoring labs:    No visits with results within 6 Month(s) from this visit.  Latest known visit with results is:    Admission on 02/26/2017, Discharged on 02/26/2017   Component Date Value Ref Range Status     WBC 02/26/2017 14.7* 4.0 - 11.0 10e9/L Final     RBC Count 02/26/2017 4.41  3.8 - 5.2 10e12/L Final      Hemoglobin 02/26/2017 13.1  11.7 - 15.7 g/dL Final     Hematocrit 02/26/2017 39.0  35.0 - 47.0 % Final     MCV 02/26/2017 88  78 - 100 fl Final     MCH 02/26/2017 29.7  26.5 - 33.0 pg Final     MCHC 02/26/2017 33.6  31.5 - 36.5 g/dL Final     RDW 02/26/2017 11.8  10.0 - 15.0 % Final     Platelet Count 02/26/2017 288  150 - 450 10e9/L Final     Diff Method 02/26/2017 Automated Method   Final     % Neutrophils 02/26/2017 82.8  % Final     % Lymphocytes 02/26/2017 10.0  % Final     % Monocytes 02/26/2017 4.7  % Final     % Eosinophils 02/26/2017 1.4  % Final     % Basophils 02/26/2017 0.7  % Final     % Immature Granulocytes 02/26/2017 0.4  % Final     Nucleated RBCs 02/26/2017 0  0 /100 Final     Absolute Neutrophil 02/26/2017 12.2* 1.6 - 8.3 10e9/L Final     Absolute Lymphocytes 02/26/2017 1.5  0.8 - 5.3 10e9/L Final     Absolute Monocytes 02/26/2017 0.7  0.0 - 1.3 10e9/L Final     Absolute Eosinophils 02/26/2017 0.2  0.0 - 0.7 10e9/L Final     Absolute Basophils 02/26/2017 0.1  0.0 - 0.2 10e9/L Final     Abs Immature Granulocytes 02/26/2017 0.1  0 - 0.4 10e9/L Final     Absolute Nucleated RBC 02/26/2017 0.0   Final     Sodium 02/26/2017 138  133 - 144 mmol/L Final     Potassium 02/26/2017 3.4  3.4 - 5.3 mmol/L Final     Chloride 02/26/2017 101  94 - 109 mmol/L Final     Carbon Dioxide 02/26/2017 31  20 - 32 mmol/L Final     Anion Gap 02/26/2017 6  3 - 14 mmol/L Final     Glucose 02/26/2017 122* 70 - 99 mg/dL Final     Urea Nitrogen 02/26/2017 15  7 - 30 mg/dL Final     Creatinine 02/26/2017 0.99  0.52 - 1.04 mg/dL Final     GFR Estimate 02/26/2017 60* >60 mL/min/1.7m2 Final     GFR Estimate If Black 02/26/2017 73  >60 mL/min/1.7m2 Final     Calcium 02/26/2017 8.7  8.5 - 10.1 mg/dL Final     Bilirubin Total 02/26/2017 0.6  0.2 - 1.3 mg/dL Final     Albumin 02/26/2017 3.7  3.4 - 5.0 g/dL Final     Protein Total 02/26/2017 8.0  6.8 - 8.8 g/dL Final     Alkaline Phosphatase 02/26/2017 68  40 - 150 U/L Final     ALT  02/26/2017 23  0 - 50 U/L Final     AST 02/26/2017 20  0 - 45 U/L Final     Lipase 02/26/2017 144  73 - 393 U/L Final     Color Urine 02/26/2017 Yellow   Final     Appearance Urine 02/26/2017 Slightly Cloudy   Final     Glucose Urine 02/26/2017 Negative  NEG mg/dL Final     Bilirubin Urine 02/26/2017 Negative  NEG Final     Ketones Urine 02/26/2017 Negative  NEG mg/dL Final     Specific Gravity Urine 02/26/2017 1.015  1.003 - 1.035 Final     Blood Urine 02/26/2017 Negative  NEG Final     pH Urine 02/26/2017 6.5  5.0 - 7.0 pH Final     Protein Albumin Urine 02/26/2017 Negative  NEG mg/dL Final     Urobilinogen mg/dL 02/26/2017 Normal  0.0 - 2.0 mg/dL Final     Nitrite Urine 02/26/2017 Negative  NEG Final     Leukocyte Esterase Urine 02/26/2017 Negative  NEG Final     Source 02/26/2017 Midstream Urine   Final     WBC Urine 02/26/2017 1  0 - 2 /HPF Final     RBC Urine 02/26/2017 1  0 - 2 /HPF Final     Squamous Epithelial /HPF Urine 02/26/2017 <1  0 - 1 /HPF Final     Transitional Epi 02/26/2017 <1  0 - 1 /HPF Final     Mucous Urine 02/26/2017 Present* NEG /LPF Final     Amorphous Crystals 02/26/2017 Few* NEG /HPF Final     HCG Qual Urine 02/26/2017 Negative  NEG Final     Creatinine 02/26/2017 1.0  0.52 - 1.04 mg/dL Final     GFR Estimate 02/26/2017 59* >60 mL/min/1.7m2 Final     GFR Estimate If Black 02/26/2017 72  >60 mL/min/1.7m2 Final   ]      MRI brain:    no new MRI to review  EMG 10/4/17: unremarkable EMG  Swallow Study 10/5/17: Essentially normal concerning for GERD  ASSESSMENT/PLAN:  The patient is a 47-year-old woman with a past medical history of multiple sclerosis who is presenting for evaluation and follow-up of her disease. The patient s EMG was negative. Therefore, the most likely explanation of her symptoms is central pain. There is no obvious MRI lesion that can explain this. However, is very well possible that this represents a sub resolution lesion. Therefore, I will try to once again treat her for  neurologic pain. I will start the patient Lyrica plan to titrate up 150 mg per day. Given that she has had no change in her MRI after her symptoms started, I m uncertain that this truly represents relapse work this represents progression. Therefore, I will continue the patient on Copaxone for now I will follow up with an MRI in the spring. In regards to her swallowing I feel it s reasonable to continue a voice. I think it is more likely not GERD. I think a referral to G.I. would be reasonable.    Plan   Start lyrica with plan to wean up to one 50mg tab in the morning and two 50mg Tabs at night  Follow up with MRI in 4 months.   I spent 25 minutes in this visit, with >50% direct patient time spent counseling about prognosis, treatment options, and coordination of care.    Pietro Flores MD [facility]  Staff Neurologist   11/21/17

## 2017-11-21 NOTE — MR AVS SNAPSHOT
After Visit Summary   11/21/2017    Chayo Reid    MRN: [id number]           Patient Information     Date Of Birth          1970        Visit Information        Provider Department      11/21/2017 1:00 PM Pietro Flores MD [facility] Multiple Sclerosis        Today's Diagnoses     MS (multiple sclerosis) (H)    -  1      Care Instructions    Will try Lyrica. Take 50mg at night for one week. If you tolerate this dose then take one 50mg tab once in the morning and then once at night. Then after one week you can take 1 tab in the morning and two tabs at night. Call me in 4 weeks to tell me how you are doing    Will follow you up in 4 months with a MRI of the brain    You saw a neurology provider today at the [facility].  You may have also met with the MS RN Care Coordinator.  In order to get a message to your MS Center provider, you should contact [phone] option 3 for the triage nurse line.    You should contact us via this protocol if you have any of the following symptoms:    New or worsening neurologic symptoms that persist for 24-48 hours, such as:  o New onset of pain or marked worsening of pain  o Difficulty with speech, swallowing, or breathing  o New onset of vertigo or dizziness  o Change in bowel or bladder function (incontinence, difficulty urinating)    Increasing difficulty in self care    Marked changes in vision (double vision, blurred vision, graying of vision)    Change in mobility    Change in cognitive function    Falling    Worsening numbness, tingling or pain with a change in function    Worsening fatigue lasting more than 2 weeks  If you had labs completed today, we will contact you with the results.  If you are active in [place], they will be released to you there.  Otherwise, your results will be provided to you via mail or telephone call.  Some results take up to 2 weeks for completion.  If you haven t heard anything  about your lab results within 2 weeks, you can call or send a CloudWalkhart message to obtain your results.  If you have an MRI scheduled in the week or two prior to your next appointment, we will go over the results at your scheduled follow up appointment.  If you are not scheduled to see your MS Center provider within about 2 weeks after your MRI, please call or send a CloudWalkhart message to obtain your results if you haven t heard anything within 2 weeks.  Please be aware that it takes at least 5 business days after routine MRIs for your results to be reviewed by both the radiologist and your doctor.  MRIs completed at facilities outside of the [facility] take about 2 weeks in order for the MRI disc to be mailed to our clinic and uploaded into your medical record.    Prescription refills should be faxed to us by your pharmacy.  Our fax number for prescription refills is [phone].  Please do your best to come to your appointments, and to arrive 15 minutes early to allow time for checking in.  [facility] reserves the right to terminate care of established patients if a patient misses three or more appointments in a clinic without providing notification within a 12-month period.    Developing Your Care Team  Individuals living with chronic illnesses like MS may be unaware that they are at risk for the same range of medical problems as everyone else.  This is why you must establish a relationship with other health care providers in addition to your Multiple Sclerosis doctor.  It can be difficult at times to figure out whether a health concern is related to your MS, or whether it is related to something else, such as hormonal changes, pseduoexacerbations, changes in your core body temperature, flu-like reactions to interferons, exercise, or infections.  Urinary tract infections (UTIs) are common culprits that can cause fatigue, weakness, or other  MS attack -like symptoms without classic  symptoms of a UTI.  For this reason, if you call or come in to discuss symptoms, you may be asked to get in touch with your primary provider or another specialist, so that you receive the comprehensive care you need.  What is Multiple Sclerosis (MS)?  MS is a disease in which the nerve tissues in the brain and/or spinal cord are attacked by immune cells in the body.  These immune cells are present in everyone, and their normal role is to fight off infections.  In people with MS, these cells change the way they function and cross into the nervous system.  Once there, they cause inflammation that damages the myelin (or the protective coating of a nerve cell, much like the plastic covering on an electrical cord) and parts of the nerve cell itself.  So far, a clear cause for this immune system dysfunction has not been found.  MS often starts out as the  relapsing-remitting  form.  This means there are episodes when you have symptoms, and other times when you recover to normal or near-normal.  Over time, if the damage to the nervous system continues, the disease can cause additional disability, such as difficulty walking.  If the relapses and nerve damage can be prevented with available medications, many patients with MS can go many years between relapses and have relatively little disability.  Remember: MS is a condition that changes and must be evaluated on an ongoing basis!  What is a Relapse? (Also called flare-ups, attacks, or exacerbations)  Relapses are due to the occurrence of inflammation in some part of the brain and/or spinal cord.  A relapse is new or recurrent symptoms which persist for at least 24 hours and sometimes worsen over 48 hours.  New symptoms need to be  by at least 1 month in order to be considered separate relapses. Most of the time, symptoms reach their maximal intensity within 2 weeks and then begin to slowly resolve.  At times, your symptoms may not recover fully for up to 6 months,  depending on the severity of the episode.  The frequency of relapses is generally higher early in the disease, but can vary greatly among individuals with MS.  Improvement of symptoms for an individual is unpredictable with each relapse.    It is important to remember that an increase in symptoms and changes in function may not necessarily be a relapse.  There are other factors that contribute to such changes, such as hot weather, increased body temperature, infection/illness, stress and sleep deprivation.  The worsening of symptoms may feel like a relapse when in reality it is not.  These episodes are referred to as pseudorelapses.  Once the underlying cause is addressed, symptoms usually fade away and you feel better.  If you experience a worsening of symptoms that lasts more than 48 hours and does not improve with cooling down, decreasing stress, or treatment of an infection, please call us and we can help to better determine whether you are having a pseudorelapse versus a relapse.                 Follow-ups after your visit        Follow-up notes from your care team     Return in about 4 months (around 3/21/2018).      Your next 10 appointments already scheduled     Dec 05, 2017  9:30 AM CST   Voice Treatment with Allison E Alpers, SLP [facility] ([facility])    [address] [place] [id number]   [phone]            Dec 12, 2017 11:00 AM CST   Voice Treatment with Allison E Alpers, SLP [facility] ([facility])    [address] [place] [id number]   [phone]            Dec 19, 2017 10:30 AM CST   Voice Treatment with Allison E Alpers, SLP [facility] ([facility])    [address] [place] [address]   [phone]            Dec 27, 2017  1:30 PM CST   Voice Treatment with Allison E Alpers, SLP [facility] ([facility])    [facility]  "[address] [place] [address]  [address]  [place] [address]   [phone]            Jan 23, 2018 11:15 AM CST   Voice Treatment with Allison E Alpers, JYOTI   [facility] [place] [facility] ([facility])    [address] [place] [address] [address]  [place] [address]   [phone]              Future tests that were ordered for you today     Open Future Orders        Priority Expected Expires Ordered    MRI Brain w & w/o contrast Routine 3/21/2018 11/21/2018 11/21/2017            Who to contact     If you have questions or need follow up information about today's clinic visit or your schedule please contact [facility] MULTIPLE SCLEROSIS directly at [phone].  Normal or non-critical lab and imaging results will be communicated to you by Avocadoâ„¢hart, letter or phone within 4 business days after the clinic has received the results. If you do not hear from us within 7 days, please contact the clinic through Retora Blackt or phone. If you have a critical or abnormal lab result, we will notify you by phone as soon as possible.  Submit refill requests through Zazoom or call your pharmacy and they will forward the refill request to us. Please allow 3 business days for your refill to be completed.          Additional Information About Your Visit        Zazoom Information     Zazoom gives you secure access to your electronic health record. If you see a primary care provider, you can also send messages to your care team and make appointments. If you have questions, please call your primary care clinic.  If you do not have a primary care provider, please call [phone] and they will assist you.        Care EveryWhere ID     This is your Care EveryWhere ID. This could be used by other organizations to access your [place] medical records  [id number]        Your Vitals Were     Pulse Height                91 1.676 m (5' 6\")           Blood Pressure from Last 3 Encounters:   11/21/17 139/88   09/13/17 117/67   06/06/17 110/68    Weight from " Last 3 Encounters:   09/13/17 90.4 kg (199 lb 3.2 oz)   06/06/17 87.1 kg (192 lb)   04/11/17 85.7 kg (189 lb)                 Today's Medication Changes          These changes are accurate as of: 11/21/17  1:29 PM.  If you have any questions, ask your nurse or doctor.               Start taking these medicines.        Dose/Directions    pregabalin 50 MG capsule   Commonly known as:  LYRICA   Used for:  MS (multiple sclerosis) (H)   Started by:  Pietro Flores MD        Dose:  50 mg   Take 1 capsule (50 mg) by mouth 3 times daily   Quantity:  270 capsule   Refills:  3            Where to get your medicines      Some of these will need a paper prescription and others can be bought over the counter.  Ask your nurse if you have questions.     Bring a paper prescription for each of these medications     pregabalin 50 MG capsule                Primary Care Provider Fax #    Provider Not In System [phone]                Equal Access to Services     FAN ZAVALA : Mary Sarmiento, waaxda frank, qaybta kaalmada nirmal, natalia willis . So [facility] [phone].    ATENCIÓN: Si habla español, tiene a hall disposición servicios gratuitos de asistencia lingüística. Llame al [phone].    We comply with applicable federal civil rights laws and [place] laws. We do not discriminate on the basis of race, color, national origin, age, disability, sex, sexual orientation, or gender identity.            Thank you!     Thank you for choosing [facility] MULTIPLE SCLEROSIS  for your care. Our goal is always to provide you with excellent care. Hearing back from our patients is one way we can continue to improve our services. Please take a few minutes to complete the written survey that you may receive in the mail after your visit with us. Thank you!             Your Updated Medication List - Protect others around you: Learn how to safely use, store and throw away your medicines at  www.disposemymeds.org.          This list is accurate as of: 11/21/17  1:29 PM.  Always use your most recent med list.                   Brand Name Dispense Instructions for use Diagnosis    amitriptyline 25 MG tablet    ELAVIL    90 tablet    Take 3 tablets (75 mg) by mouth At Bedtime    Myofascial pain syndrome, Sleep disorder       amphetamine-dextroamphetamine 30 MG per 24 hr capsule    ADDERALL XR    30 capsule    Take 1 capsule (30 mg) by mouth daily    Other fatigue, MS (multiple sclerosis) (H)       clonazePAM 1 MG tablet    klonoPIN    30 tablet    Take 1 at bedtime    Sleep disorder       cyclobenzaprine 5 MG tablet    FLEXERIL    30 tablet    Take 1 tablet (5 mg) by mouth 2 times daily    Pain of right lower extremity       DULoxetine 60 MG EC capsule    CYMBALTA    30 capsule    Take 1 capsule (60 mg) by mouth daily    Lumbar radiculopathy       Glatiramer Acetate 40 MG/ML Sosy    COPAXONE    12 Syringe    Inject 40 mg Subcutaneous three times a week    MS (multiple sclerosis) (H)       modafinil 200 MG tablet    PROVIGIL    30 tablet    Take 1 tablet (200 mg) by mouth daily    Fatigue       oxybutynin 10 MG 24 hr tablet    DITROPAN-XL    30 tablet    TAKE 1 TABLET BY MOUTH ONE HOUR PRIOR TO BED.    MS (multiple sclerosis) (H), Neurogenic bladder       pregabalin 50 MG capsule    LYRICA    270 capsule    Take 1 capsule (50 mg) by mouth 3 times daily    MS (multiple sclerosis) (H)       vitamin D 1000 UNITS capsule      Take 1 capsule by mouth daily

## 2017-11-22 NOTE — PROGRESS NOTES
"[facility] OP SLP Voice Evaluation     11/07/17 1000   General Information   Type Of Visit Initial   Start Of Care Date 11/07/17   Referring Physician Pietro Flores MD  (Neurologist)   Orders Evaluate And Treat   Medical Diagnosis MS, Diseases of the vocal cords, Dysarthria, Dysphagia   Onset Of Illness/injury Or Date Of Surgery 10/05/17  (order date)   Precautions/Limitations no known precautions/limitations   Hearing WFL for 1:1 conversation   Surgical/Medical history reviewed Yes   Pertinent History Of Current Problem 48yo female with MS presenting with 6+month history of consistent dysphonia and discomfort in the chest with swallowing.  Pt reports that her voice is constantly hoarse and does not worsen with voice use.  She occasionally has episodes of dysarthria about 1x/week where \"the words don't come out right\" and it sounds like she is slurring her words.  She reports discomfort in the mid-chest with swallowing, noting that it feels like things get stuck in the esophagus.  She does have a hernia and acid reflux; she takes Prilosec for these but has not noticed a change in her symptoms with this medication.  VFSS indicated normal oropharyngeal swallow.  Pt has had anxiety for the past 3-4 years.  She has dry mouth.  She denies difficulty with breathing and cough/throat clear.   Prior Level of Functioning No previous problems.   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   Patient/family Goals To have a clear voice quality, to swallow without discomfort   Personal Rating / Voice Use Rating   Describe the amount of voice use required for your job Very little   Describe the intensity of voice use required for your job Moderate   Describe the amount of typical non-work voice use Moderate   Describe the intensity of typical non-work voice use " Moderate   Comments Voice use primarily consists of conversational speech.  Voice problems are upsetting.  People sometimes have difficulty understanding her because of her voice/speech, especially in background noise.  VHI-10: 9/40.   Evaluation Results   Voice Observations VISIBLE TENSION: neck, shoulders.  PALPATION OF THYROHYOID REGION: firm musculature with reduced and tender thyrohyoid space, no additional closure on phonation.  SCMs are also tight bilaterally, with tenderness on the right.   Voice Profile during conversation, 1 min monologue and paragraph reading   Voice Quality Glottal lopez   Voice quality comments SPEECH: Consistent mild-moderate strain and frequent intermittent mild-moderate roughness vs glottal lopez that is most significant at the ends of sentences.  No dysarthria observed today.  SINGING: Consistent mild strain with roughness at lower pitches, otherwise improved relative to speech.  VOWEL PROLONGATION: comfortable pitch /a/: Eb3, consistent strain and mild breathiness and roughness; high pitch /a/: Bb3, increased breathiness but less roughness and strain; low pitch /a/: F#3, Consistent breathiness and roughness but less strain.  THERAPY PROBES: improvement in voice quality with flow mode and forward focus probes.   Voice quality severity rating continuum (1=Severe, 7=WNL) 5  (CAPE-V Overall Severity: 34/100)   Breath control Tight   Breath Control comments Excessive thoracic muscle use pattern with clavicular elevation on inspiration for speech.  Inspirations are inadequate for speech in volume and frequency.  Talks to past end of breath stream with poor respiratory/phonatory coordination.   Breath control severity rating continuum (1=Severe, 7=WNL) 5   Voice Use / Effort Pinched / squeezed larynx;Contraction of neck muscles;Throat push   Voice Use / Effort comments Pt rates her phonatory effort for speech as 5/10 (10 is maximum effort).   Voice use / Effort severity rating continuum  "(1=Severe, 7=WNL) 5   Fundamental frequency (Hz) (Centered around F#3)   Pitch / Frequency comments Habitual pitch is WNL but sounds low for her, particularly due to the rough quality.   Pitch / Frequency severity rating continuum (1=Severe, 7=WNL) 6   Volume comments Volume for conversational speech is WFL and appropriate for the setting.  A whisper is normal.  Soft phonation is rough.  Loud phonation is strained and mildly reduced in volume, with neck involvement but no roughness.   Volume severity rating continuum (1=Severe, 7=WNL) 6   Neuromuscular Control WNL   Neuromuscular Control severity rating continuum (1=Severe, 7=WNL) 7   Resonance WNL   Resonance severity rating continuum (1=Severe, 7=WNL) 7   Comments Mild-moderate dysphonia characterized by strain, roughness vs glottal lopez, poor respiratory/phonatory coordination, increased phonatory effort, and tight laryngeal musculature with neck involvement.   Adduction /Abduction Function   Laryngeal diadokinetic speed (WNL)   Laryngeal diadokinetic strength (Strained)   Laryngeal diadokinetic consistency Regular   Adduction / Abduction function scale Age 11 - 65, norm per sec:  5+   Function of Lengtheners / Shorteners (CT and TA Muscles)   Pitch glides Lower pitches more dysphonic;Upper register present  (Lowest pitch: C#3; Highest pitch: Ab5)   Videostroboscopy / Endoscopy   Other observations Completed by ENT Dr. Simeon on 10/19/2017.  Significant findings, per report: \"Base of tongue visibly normal without any lesions or inflammation.  Hypopharynx normal without visible pooling secretions or masses.  Vocal cord mobility normal.  No nodules polyps or notable edema.\"   General Therapy Interventions   Planned Therapy Interventions Voice   Voice Breath flow to sound flow;Relaxed breath sequence techniques;Voice quality/pitch or volume tasks;Resonant voice techniques   Impressions and Recommendations   Communication Diagnosis Dysphonia   Summary Ms. Reid " presents with Mild-moderate dysphonia characterized by strain, roughness vs glottal lopez, poor respiratory/phonatory coordination, increased phonatory effort, and tight laryngeal musculature with neck involvement.  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  Dysphagia symptoms are likely esophageal in nature based on patient's reported symptoms and results of recent VFSS.  Dysphonia is upsetting and frustrating to Ms. Reid, and dysphonia makes it difficult for her to meet her voice quality demands.   Recommendations A course of skilled speech therapy is recommended in order to optimize vocal technique, improve voice quality, and reduce laryngeal effort, discomfort, and irritation so that patient is able to meet her daily vocal demands.  It is recommended that patient consult with GI regarding her swallowing discomfort.  While throat discomfort with swallowing is sometimes a symptom of muscle tension dysphonia, pt reports chest discomfort rather than throat discomfort with swallowing, and recent VFSS indicated that her swallowing symptoms may be esophageal in nature.   Frequency and Duration Six weekly sessions with two monthly follow-ups.   Prognosis  Good with intervention   Prognosis comments Prognosis good to meet voice-related goals stated below.  Swallowing discomfort will likely be best managed by GI given pt's symptoms and VFSS results.   Risks and Benefits of Treatment have been explained. Yes   Patient & /or Caregiver  in agreement with plan of care Yes   Patient Education SLP provided education regarding muscle tension dysphonia causes, symptoms, and treatment.  SLP recommended that pt pursue GI consult regarding her swallowing problems.  Therapy initiated today.   Educational Assessment   Barriers to Learning No barriers   Preferred Learning Style Listening;Reading;Demonstration;Pictures / Video   Voice Goals   Voice  Goals 1;2;3   Voice Goal 1   Goal Identifier Generalization   Goal Description Patient will report a week of typical activities in which dysphonia and effort do not exceed a level of 2 out of 10, 90% of the time, so that patient is able to meet her vocal demands and to improve quality of life.   Target Date 02/07/18   Voice Goal 2   Goal Identifier Voice quality   Goal Description In a 20-minute conversation task, patient will demonstrate roughness vs glottal lopez and strain that do not exceed a level of 2 out of 10, 90% of the time by SLP judgment, so that patient is able to meet her voice quality demands.   Target Date 02/07/18   Voice Goal 3   Goal Identifier Throat discomfort   Goal Description Patient will learn, demonstrate, and implement 2-3 techniques to decrease throat discomfort (e.g. circumlaryngeal massage, hydration, reflux management) independently so that patient reports a week of typical activities in which throat discomfort does not exceed a level of 2 out of 10, 90% of the time.   Target Date 02/07/18   Total Session Time   Total Session Time 45   Total Evaluation Time 35     Thank you for the referral of this patient.    Allison Alpers, B.A. (music), M.A., CCC-SLP  Speech-Language Pathologist  Certificate of Vocology  [facility]  [phone]

## 2017-11-27 ENCOUNTER — MYC REFILL (OUTPATIENT)
Dept: NEUROLOGY | Facility: CLINIC | Age: 47
End: 2017-11-27

## 2017-11-27 DIAGNOSIS — G35 MS (MULTIPLE SCLEROSIS) (H): ICD-10-CM

## 2017-11-27 DIAGNOSIS — R53.83 OTHER FATIGUE: ICD-10-CM

## 2017-11-27 NOTE — TELEPHONE ENCOUNTER
Patient sent [employer] message requesting refill of their Adderall; Patient was last seen in November and has no follow up appointment with Dr. Flores; Pended rx to Dr. Flores for signature and will mail to the pharmacy once signed.    Polly Rodríguez MS RN Care Coordinator

## 2017-11-27 NOTE — TELEPHONE ENCOUNTER
Message from [employer]:  Original authorizing provider: Pietro Flores MD    Chayo Reid would like a refill of the following medications:  amphetamine-dextroamphetamine (ADDERALL XR) 30 MG per 24 hr capsule [Pietro Flores MD]    Preferred pharmacy: [facility] [id number] [address]    Comment:

## 2017-11-28 RX ORDER — DEXTROAMPHETAMINE SACCHARATE, AMPHETAMINE ASPARTATE MONOHYDRATE, DEXTROAMPHETAMINE SULFATE AND AMPHETAMINE SULFATE 7.5; 7.5; 7.5; 7.5 MG/1; MG/1; MG/1; MG/1
30 CAPSULE, EXTENDED RELEASE ORAL DAILY
Qty: 30 CAPSULE | Refills: 0 | Status: SHIPPED | OUTPATIENT
Start: 2017-11-28 | End: 2018-05-16

## 2017-11-28 NOTE — TELEPHONE ENCOUNTER
Rx placed in the mail to the pharmacy; MyChart message sent to patient letting her know this.    Polly Rodríguez MS RN Care Coordinator

## 2017-12-07 ENCOUNTER — MYC MEDICAL ADVICE (OUTPATIENT)
Dept: NEUROLOGY | Facility: CLINIC | Age: 47
End: 2017-12-07

## 2017-12-07 DIAGNOSIS — R53.83 OTHER FATIGUE: ICD-10-CM

## 2017-12-07 DIAGNOSIS — G35 MS (MULTIPLE SCLEROSIS) (H): ICD-10-CM

## 2017-12-07 RX ORDER — DEXTROAMPHETAMINE SACCHARATE, AMPHETAMINE ASPARTATE MONOHYDRATE, DEXTROAMPHETAMINE SULFATE AND AMPHETAMINE SULFATE 5; 5; 5; 5 MG/1; MG/1; MG/1; MG/1
20 CAPSULE, EXTENDED RELEASE ORAL 2 TIMES DAILY
Qty: 60 CAPSULE | Refills: 0 | Status: SHIPPED | OUTPATIENT
Start: 2018-02-07 | End: 2018-05-16

## 2017-12-07 RX ORDER — DEXTROAMPHETAMINE SACCHARATE, AMPHETAMINE ASPARTATE MONOHYDRATE, DEXTROAMPHETAMINE SULFATE AND AMPHETAMINE SULFATE 5; 5; 5; 5 MG/1; MG/1; MG/1; MG/1
20 CAPSULE, EXTENDED RELEASE ORAL 2 TIMES DAILY
Qty: 60 CAPSULE | Refills: 0 | Status: SHIPPED | OUTPATIENT
Start: 2018-02-07 | End: 2017-12-07

## 2017-12-07 RX ORDER — DEXTROAMPHETAMINE SACCHARATE, AMPHETAMINE ASPARTATE MONOHYDRATE, DEXTROAMPHETAMINE SULFATE AND AMPHETAMINE SULFATE 5; 5; 5; 5 MG/1; MG/1; MG/1; MG/1
20 CAPSULE, EXTENDED RELEASE ORAL 2 TIMES DAILY
Qty: 60 CAPSULE | Refills: 0 | Status: SHIPPED | OUTPATIENT
Start: 2017-12-07 | End: 2017-12-07

## 2017-12-07 RX ORDER — DEXTROAMPHETAMINE SACCHARATE, AMPHETAMINE ASPARTATE MONOHYDRATE, DEXTROAMPHETAMINE SULFATE AND AMPHETAMINE SULFATE 5; 5; 5; 5 MG/1; MG/1; MG/1; MG/1
20 CAPSULE, EXTENDED RELEASE ORAL 2 TIMES DAILY
Qty: 60 CAPSULE | Refills: 0 | Status: SHIPPED | OUTPATIENT
Start: 2018-01-07 | End: 2017-12-07

## 2017-12-07 RX ORDER — DEXTROAMPHETAMINE SACCHARATE, AMPHETAMINE ASPARTATE MONOHYDRATE, DEXTROAMPHETAMINE SULFATE AND AMPHETAMINE SULFATE 5; 5; 5; 5 MG/1; MG/1; MG/1; MG/1
20 CAPSULE, EXTENDED RELEASE ORAL 2 TIMES DAILY
Qty: 60 CAPSULE | Refills: 0 | Status: SHIPPED | OUTPATIENT
Start: 2017-12-07 | End: 2018-02-07

## 2017-12-07 RX ORDER — DEXTROAMPHETAMINE SACCHARATE, AMPHETAMINE ASPARTATE MONOHYDRATE, DEXTROAMPHETAMINE SULFATE AND AMPHETAMINE SULFATE 5; 5; 5; 5 MG/1; MG/1; MG/1; MG/1
20 CAPSULE, EXTENDED RELEASE ORAL 2 TIMES DAILY
Qty: 60 CAPSULE | Refills: 0 | Status: SHIPPED | OUTPATIENT
Start: 2018-01-07 | End: 2018-02-07

## 2017-12-07 NOTE — TELEPHONE ENCOUNTER
Dr. Flores, please see Chayo's MyChart message; What, if anything, do you recommend for her? Thank you.    Polly Rodríguez MS RN Care Coordinator

## 2017-12-07 NOTE — TELEPHONE ENCOUNTER
Dr. Flores sent Chayo a [employer] message directly; Dr. Flores, would you like to have the prescription printed there and mailed out? Or would you like to wait until Tuesday? Thank you.    Polly Rodríguez MS RN Care Coordinator

## 2017-12-19 ENCOUNTER — TELEPHONE (OUTPATIENT)
Dept: NEUROLOGY | Facility: CLINIC | Age: 47
End: 2017-12-19

## 2017-12-19 NOTE — TELEPHONE ENCOUNTER
Central Prior Authorization Team   Phone: [phone]      PA Initiation    Medication: Adderall XR 20mg-PA Initiated  Insurance Company: [employer] [place] - Phone [phone] Fax [phone]  Pharmacy Filling the Rx: [id number] [place] - [address] [place] [address]  Filling Pharmacy Phone: [phone]  Filling Pharmacy Fax:    Start Date: 12/19/2017

## 2017-12-19 NOTE — TELEPHONE ENCOUNTER
Prior Authorization Retail Medication Request  Medication/Dose: Adderall XR 20mg twice daily  Diagnosis and ICD code: Chronic fatigue and multiple sclerosis, R53.82 and G35  New/Renewal/Insurance Change PA: New  Previously Tried and Failed Therapies: Adderall 30mg once daily and modafinil    Insurance ID (if provided): [id number]  Insurance Phone (if provided): n/a    Any additional info from fax request: Insurance denying twice daily coverage. Multiple Sclerosis patients experience a type of fatigue unique to the MS patient population, which is lassitude fatigue, meaning it worsens throughout the day, which is why twice a day dosing was prescribed. Please approve.    If you received a fax notification from an outside Pharmacy:  Pharmacy Name: [facility]  Pharmacy #: [phone]  Pharmacy Fax: n/a

## 2017-12-21 NOTE — TELEPHONE ENCOUNTER
Prior Authorization Approval    Authorization Effective Date: 12/14/2017  Authorization Expiration Date: 12/14/2018  Medication: Adderall XR 20mg-APPROVED  Approved Dose/Quantity:   Reference #: [id number]   Insurance Company: [employer] [place] - Phone [phone] Fax [phone]  Expected CoPay: $436.39     CoPay Card Available:      Foundation Assistance Needed:    Which Pharmacy is filling the prescription (Not needed for infusion/clinic administered): [id number] [place] [address]  Pharmacy Notified: Yes  Patient Notified: Yes

## 2017-12-28 ENCOUNTER — MYC MEDICAL ADVICE (OUTPATIENT)
Dept: NEUROLOGY | Facility: CLINIC | Age: 47
End: 2017-12-28

## 2017-12-28 NOTE — TELEPHONE ENCOUNTER
Dr. Flores, please see patient's [employer] message; She is currently scheduled with you for March with an MRI prior; What would you like to do? Thank you.    Polly Rodríguez, MS RN Care Coordinator

## 2018-01-02 NOTE — TELEPHONE ENCOUNTER
Dr. Flores registered Chayo for medical cannabis; I will have a clinic coordinator call her to arrange the MRI and appointment with Dr. Flores; [employer] message sent to patient letting her know this.    Polly Rodríguez, MS RN Care Coordinator

## 2018-02-07 ENCOUNTER — OFFICE VISIT (OUTPATIENT)
Dept: NEUROLOGY | Facility: CLINIC | Age: 48
End: 2018-02-07
Attending: PSYCHIATRY & NEUROLOGY
Payer: COMMERCIAL

## 2018-02-07 ENCOUNTER — RADIANT APPOINTMENT (OUTPATIENT)
Dept: MRI IMAGING | Facility: CLINIC | Age: 48
End: 2018-02-07
Attending: PSYCHIATRY & NEUROLOGY
Payer: COMMERCIAL

## 2018-02-07 ENCOUNTER — APPOINTMENT (OUTPATIENT)
Dept: LAB | Facility: CLINIC | Age: 48
End: 2018-02-07
Payer: COMMERCIAL

## 2018-02-07 VITALS
WEIGHT: 200.4 LBS | HEIGHT: 66 IN | OXYGEN SATURATION: 98 % | BODY MASS INDEX: 32.21 KG/M2 | DIASTOLIC BLOOD PRESSURE: 86 MMHG | SYSTOLIC BLOOD PRESSURE: 132 MMHG | RESPIRATION RATE: 24 BRPM | HEART RATE: 86 BPM | TEMPERATURE: 98 F

## 2018-02-07 DIAGNOSIS — G35 MS (MULTIPLE SCLEROSIS) (H): ICD-10-CM

## 2018-02-07 DIAGNOSIS — G35 MS (MULTIPLE SCLEROSIS) (H): Primary | ICD-10-CM

## 2018-02-07 PROBLEM — N83.201 CYST OF RIGHT OVARY: Status: ACTIVE | Noted: 2017-03-29

## 2018-02-07 LAB
ALBUMIN SERPL-MCNC: 3.7 G/DL (ref 3.4–5)
ALP SERPL-CCNC: 65 U/L (ref 40–150)
ALT SERPL W P-5'-P-CCNC: 20 U/L (ref 0–50)
ANION GAP SERPL CALCULATED.3IONS-SCNC: 5 MMOL/L (ref 3–14)
AST SERPL W P-5'-P-CCNC: 15 U/L (ref 0–45)
BASOPHILS # BLD AUTO: 0 10E9/L (ref 0–0.2)
BASOPHILS NFR BLD AUTO: 0.5 %
BILIRUB SERPL-MCNC: 0.3 MG/DL (ref 0.2–1.3)
BUN SERPL-MCNC: 18 MG/DL (ref 7–30)
CALCIUM SERPL-MCNC: 8.9 MG/DL (ref 8.5–10.1)
CHLORIDE SERPL-SCNC: 103 MMOL/L (ref 94–109)
CO2 SERPL-SCNC: 30 MMOL/L (ref 20–32)
CREAT SERPL-MCNC: 0.86 MG/DL (ref 0.52–1.04)
DIFFERENTIAL METHOD BLD: NORMAL
EOSINOPHIL # BLD AUTO: 0.7 10E9/L (ref 0–0.7)
EOSINOPHIL NFR BLD AUTO: 7.7 %
ERYTHROCYTE [DISTWIDTH] IN BLOOD BY AUTOMATED COUNT: 11.9 % (ref 10–15)
GFR SERPL CREATININE-BSD FRML MDRD: 70 ML/MIN/1.7M2
GLUCOSE SERPL-MCNC: 93 MG/DL (ref 70–99)
HCT VFR BLD AUTO: 40 % (ref 35–47)
HGB BLD-MCNC: 13.5 G/DL (ref 11.7–15.7)
IMM GRANULOCYTES # BLD: 0 10E9/L (ref 0–0.4)
IMM GRANULOCYTES NFR BLD: 0.4 %
LYMPHOCYTES # BLD AUTO: 1.7 10E9/L (ref 0.8–5.3)
LYMPHOCYTES NFR BLD AUTO: 19.4 %
MCH RBC QN AUTO: 30.2 PG (ref 26.5–33)
MCHC RBC AUTO-ENTMCNC: 33.8 G/DL (ref 31.5–36.5)
MCV RBC AUTO: 90 FL (ref 78–100)
MONOCYTES # BLD AUTO: 0.7 10E9/L (ref 0–1.3)
MONOCYTES NFR BLD AUTO: 7.7 %
NEUTROPHILS # BLD AUTO: 5.5 10E9/L (ref 1.6–8.3)
NEUTROPHILS NFR BLD AUTO: 64.3 %
NRBC # BLD AUTO: 0 10*3/UL
NRBC BLD AUTO-RTO: 0 /100
PLATELET # BLD AUTO: 257 10E9/L (ref 150–450)
POTASSIUM SERPL-SCNC: 4.1 MMOL/L (ref 3.4–5.3)
PROT SERPL-MCNC: 7.8 G/DL (ref 6.8–8.8)
RBC # BLD AUTO: 4.47 10E12/L (ref 3.8–5.2)
SODIUM SERPL-SCNC: 139 MMOL/L (ref 133–144)
TSH SERPL DL<=0.005 MIU/L-ACNC: 2.74 MU/L (ref 0.4–4)
WBC # BLD AUTO: 8.6 10E9/L (ref 4–11)

## 2018-02-07 PROCEDURE — 40000975 ZZHCL STATISTIC JC VIR AB INDEX INHIB: Performed by: PSYCHIATRY & NEUROLOGY

## 2018-02-07 PROCEDURE — 84443 ASSAY THYROID STIM HORMONE: CPT | Performed by: PSYCHIATRY & NEUROLOGY

## 2018-02-07 PROCEDURE — 82306 VITAMIN D 25 HYDROXY: CPT | Performed by: PSYCHIATRY & NEUROLOGY

## 2018-02-07 PROCEDURE — 86480 TB TEST CELL IMMUN MEASURE: CPT | Performed by: PSYCHIATRY & NEUROLOGY

## 2018-02-07 PROCEDURE — 86704 HEP B CORE ANTIBODY TOTAL: CPT | Performed by: PSYCHIATRY & NEUROLOGY

## 2018-02-07 PROCEDURE — 87522 HEPATITIS C REVRS TRNSCRPJ: CPT | Performed by: PSYCHIATRY & NEUROLOGY

## 2018-02-07 PROCEDURE — 86787 VARICELLA-ZOSTER ANTIBODY: CPT | Performed by: PSYCHIATRY & NEUROLOGY

## 2018-02-07 PROCEDURE — 87340 HEPATITIS B SURFACE AG IA: CPT | Performed by: PSYCHIATRY & NEUROLOGY

## 2018-02-07 PROCEDURE — 85025 COMPLETE CBC W/AUTO DIFF WBC: CPT | Performed by: PSYCHIATRY & NEUROLOGY

## 2018-02-07 PROCEDURE — 80053 COMPREHEN METABOLIC PANEL: CPT | Performed by: PSYCHIATRY & NEUROLOGY

## 2018-02-07 PROCEDURE — 86706 HEP B SURFACE ANTIBODY: CPT | Performed by: PSYCHIATRY & NEUROLOGY

## 2018-02-07 PROCEDURE — G0463 HOSPITAL OUTPT CLINIC VISIT: HCPCS | Mod: ZF

## 2018-02-07 PROCEDURE — 36415 COLL VENOUS BLD VENIPUNCTURE: CPT | Performed by: PSYCHIATRY & NEUROLOGY

## 2018-02-07 RX ORDER — BACLOFEN 10 MG/1
10 TABLET ORAL 2 TIMES DAILY
Qty: 60 TABLET | Refills: 1 | Status: SHIPPED | OUTPATIENT
Start: 2018-02-07 | End: 2018-04-09

## 2018-02-07 RX ORDER — FERROUS SULFATE 325(65) MG
1 TABLET ORAL DAILY
COMMUNITY
Start: 2015-11-20 | End: 2019-11-05

## 2018-02-07 RX ORDER — GADOBUTROL 604.72 MG/ML
10 INJECTION INTRAVENOUS ONCE
Status: COMPLETED | OUTPATIENT
Start: 2018-02-07 | End: 2018-02-07

## 2018-02-07 RX ADMIN — GADOBUTROL 10 ML: 604.72 INJECTION INTRAVENOUS at 11:43

## 2018-02-07 ASSESSMENT — PAIN SCALES - GENERAL: PAINLEVEL: SEVERE PAIN (7)

## 2018-02-07 NOTE — NURSING NOTE
"Chief Complaint   Patient presents with     RECHECK     UMP- MULTIPLE SCLEROSIS, 4 MONTHS F/U        Initial /86  Pulse 86  Temp 98  F (36.7  C) (Oral)  Resp 24  Ht 1.676 m (5' 6\")  Wt 90.9 kg (200 lb 6.4 oz)  SpO2 98%  Breastfeeding? No  BMI 32.35 kg/m2 Estimated body mass index is 32.35 kg/(m^2) as calculated from the following:    Height as of this encounter: 1.676 m (5' 6\").    Weight as of this encounter: 90.9 kg (200 lb 6.4 oz).  Medication Reconciliation: complete     Won Ohara, CMA      "

## 2018-02-07 NOTE — PROGRESS NOTES
MULTIPLE SCLEROSIS CLINIC AT THE [facility]  FOLLOWUP/ESTABLISHED PATIENT VISIT      PRINCIPAL NEUROLOGIC DIAGNOSIS: RIS      Date of Onset: 7/2012  Date of Diagnosis: 7/2012  Initial Clinical Course: Progressive  Current Clinical Course: Progressive  Past Disease Modifying Therapy(ies): None  Current Disease Modifying Therapy(ies): None  Most Recent MRI of the Brain: 2/6/18 last one 1/27  Most Recent MRI of the Cervical Cord 2/17  Most Recent MRI of the Thoracic Cord: 2/17  Most Recent Lumbar Puncture: Positive for OCB  Most Recent OCT: NA   Most Recent JCV: NA   Most Recent Remote Hepatitis Panel: NA   Most Recent VZV IgG: NA   Most Recent TB Quant: NA       CHIEF COMPLAINT: Follow up on DMT      INTERVAL HISTORY:    Overall the patient reports that she is doing better since discontinue her copaxone, elavil, and Cymbalta. She reports that she is still having pain in her legs. However her stiffness is significantly better with the medical marijuana. Her leg pain is described as soreness and spasms. Generally, she has this pain all day. However, it is worse at night. Overall, the patient reports little interest in going back on the medication she just stopped.    Issues with current MS therapy: Not on DMT    REVIEW OF SYSTEMS:    Mood: unchanged  Spasticity:better  Bladder: unchanged  Bowel: unchanged  Pain related to today's visit:reviewed on nursing intake documentation  Fatigue: unchanged  Sleep: unchanged  Memory/Concentration: unchanged    PAST HISTORY was reviewed and updated:      MEDICATIONS and ALLERGIES were reviewed and updated.    SOCIAL HISTORY was reviewed and updated:      EXAM:    PHYSICAL EXAMINATION:   VITAL SIGNS:  B/P: 132/86, T: 98, P: 86, R: 24    GENERAL: The patient is a well-nourished  who presents to the evaluation with her .  NEUROLOGIC:   MENTAL STATUS: Alert,awake and  oriented times four.   CRANIAL NERVES:  Visual fields are full to confrontation. The pupils are   round and react to light and there is no Antonio Arlene pupil.  Extraocular movements are  intact with no  internuclear ophthalmoplegia. No nystagmus. Facial strength and sensation are  normal. Hearing is  normal. Palate elevation and tongue protrusion are  normal.   POWER:     Motor    Upper      Right Left   Shoulder Abduction 5 5   Elbow Flexion 5 5   Elbow Extension 5 5   Wrist Extension 5 5   Digit Extension 5 5   Digit Flexion 5 5   APB 5 5   Tone 1+ 1+   Lower       Right Left   Hip Flexion 5 5   Knee Extension 5 5   Knee Flexion 5 5   Foot Dorsiflexion 5 5   Foot Plantar Flexion 5 5   EH 5 5   Toe Flexion 5 5   Tone 1+ 1+           Grade Description   0 No increase in muscle tone   1 Slight increase in muscle tone, manifested by a catch and release or by minimal resistance at the end of the range of motion when the affected part(s) is moved in flexion or extension   1+ Slight increase in muscle tone, manifested by a catch, followed by minimal resistance throughout the remainder (less than half) of the ROM   2 More marked increase in muscle tone through most of the ROM, but affected part(s) easily moved   3 Considerable increase in muscle tone, passive movement difficult   4 Affected part(s) rigid in flexion or extension           SENSORY:     Light touch:  Intact in all extremities    MOTOR/CEREBELLAR:    Right Left   RRM 0 Normal 0 Normal   MAGDA 0 Normal 0 Normal   FTN 0 Normal 0 Normal   RRM 0 Normal 0 Normal   HKS 0 Normal 0 Normal           GAIT: Gait is  narrow-based and steady and the patient is  able to walk on heels, toes and in tandem without difficulty.    Romberg: Sways with eye(s)  closed    RESULTS:  Monitoring labs:    No visits with results within 6 Month(s) from this visit.  Latest known visit with results is:    Admission on 02/26/2017, Discharged on 02/26/2017   Component Date Value Ref Range Status     WBC 02/26/2017 14.7* 4.0 - 11.0 10e9/L Final     RBC Count 02/26/2017 4.41  3.8 - 5.2  10e12/L Final     Hemoglobin 02/26/2017 13.1  11.7 - 15.7 g/dL Final     Hematocrit 02/26/2017 39.0  35.0 - 47.0 % Final     MCV 02/26/2017 88  78 - 100 fl Final     MCH 02/26/2017 29.7  26.5 - 33.0 pg Final     MCHC 02/26/2017 33.6  31.5 - 36.5 g/dL Final     RDW 02/26/2017 11.8  10.0 - 15.0 % Final     Platelet Count 02/26/2017 288  150 - 450 10e9/L Final     Diff Method 02/26/2017 Automated Method   Final     % Neutrophils 02/26/2017 82.8  % Final     % Lymphocytes 02/26/2017 10.0  % Final     % Monocytes 02/26/2017 4.7  % Final     % Eosinophils 02/26/2017 1.4  % Final     % Basophils 02/26/2017 0.7  % Final     % Immature Granulocytes 02/26/2017 0.4  % Final     Nucleated RBCs 02/26/2017 0  0 /100 Final     Absolute Neutrophil 02/26/2017 12.2* 1.6 - 8.3 10e9/L Final     Absolute Lymphocytes 02/26/2017 1.5  0.8 - 5.3 10e9/L Final     Absolute Monocytes 02/26/2017 0.7  0.0 - 1.3 10e9/L Final     Absolute Eosinophils 02/26/2017 0.2  0.0 - 0.7 10e9/L Final     Absolute Basophils 02/26/2017 0.1  0.0 - 0.2 10e9/L Final     Abs Immature Granulocytes 02/26/2017 0.1  0 - 0.4 10e9/L Final     Absolute Nucleated RBC 02/26/2017 0.0   Final     Sodium 02/26/2017 138  133 - 144 mmol/L Final     Potassium 02/26/2017 3.4  3.4 - 5.3 mmol/L Final     Chloride 02/26/2017 101  94 - 109 mmol/L Final     Carbon Dioxide 02/26/2017 31  20 - 32 mmol/L Final     Anion Gap 02/26/2017 6  3 - 14 mmol/L Final     Glucose 02/26/2017 122* 70 - 99 mg/dL Final     Urea Nitrogen 02/26/2017 15  7 - 30 mg/dL Final     Creatinine 02/26/2017 0.99  0.52 - 1.04 mg/dL Final     GFR Estimate 02/26/2017 60* >60 mL/min/1.7m2 Final     GFR Estimate If Black 02/26/2017 73  >60 mL/min/1.7m2 Final     Calcium 02/26/2017 8.7  8.5 - 10.1 mg/dL Final     Bilirubin Total 02/26/2017 0.6  0.2 - 1.3 mg/dL Final     Albumin 02/26/2017 3.7  3.4 - 5.0 g/dL Final     Protein Total 02/26/2017 8.0  6.8 - 8.8 g/dL Final     Alkaline Phosphatase 02/26/2017 68  40 - 150 U/L  Final     ALT 02/26/2017 23  0 - 50 U/L Final     AST 02/26/2017 20  0 - 45 U/L Final     Lipase 02/26/2017 144  73 - 393 U/L Final     Color Urine 02/26/2017 Yellow   Final     Appearance Urine 02/26/2017 Slightly Cloudy   Final     Glucose Urine 02/26/2017 Negative  NEG mg/dL Final     Bilirubin Urine 02/26/2017 Negative  NEG Final     Ketones Urine 02/26/2017 Negative  NEG mg/dL Final     Specific Gravity Urine 02/26/2017 1.015  1.003 - 1.035 Final     Blood Urine 02/26/2017 Negative  NEG Final     pH Urine 02/26/2017 6.5  5.0 - 7.0 pH Final     Protein Albumin Urine 02/26/2017 Negative  NEG mg/dL Final     Urobilinogen mg/dL 02/26/2017 Normal  0.0 - 2.0 mg/dL Final     Nitrite Urine 02/26/2017 Negative  NEG Final     Leukocyte Esterase Urine 02/26/2017 Negative  NEG Final     Source 02/26/2017 Midstream Urine   Final     WBC Urine 02/26/2017 1  0 - 2 /HPF Final     RBC Urine 02/26/2017 1  0 - 2 /HPF Final     Squamous Epithelial /HPF Urine 02/26/2017 <1  0 - 1 /HPF Final     Transitional Epi 02/26/2017 <1  0 - 1 /HPF Final     Mucous Urine 02/26/2017 Present* NEG /LPF Final     Amorphous Crystals 02/26/2017 Few* NEG /HPF Final     HCG Qual Urine 02/26/2017 Negative  NEG Final     Creatinine 02/26/2017 1.0  0.52 - 1.04 mg/dL Final     GFR Estimate 02/26/2017 59* >60 mL/min/1.7m2 Final     GFR Estimate If Black 02/26/2017 72  >60 mL/min/1.7m2 Final   ]      MRI brain:    MRI Dated 2/7/18:  Mild T2 disease burden with 0 enhancion lesion(s).  compared to MRI on 1/27/17 there are 0 new T2 lesion(s).    ASSESSMENT/PLAN:  The patient is a 47-year-old woman with a past medical history of relapsing remitting multiple sclerosis who is presenting today as a follow-up. Overall, the patient reports to be clinically improved after discontinuing several of her medications. She also attributes some of her improvement to the use of medical marijuana. I explained to her that medical marijuana only treat symptoms and could  potentially cause cognitive side effects with long-term use; however, it may be an acceptable usage of this medication given her severe subjective symptoms. The patient wanted to be treated off of disease modifying therapy for MS. Therefore, she stopped her Copaxone. We made an agreement that we will repeat a MRI in 6 months to ensure that she is not developing any new lesions. If she does develop new lesions, the she will have to go back on disease modifying therapy. I checked several screening labs determine what disease modifying therapy she could go on if she needs to. As long as she's doing well I will see her in 6 months. However, I'm sheltered her to call my office if she developed any issues or problems.    In the meantime to try to treat some of her symptoms I will try the patient on baclofen given her goods response medical marijuana. It could be possible that some of her symptoms are really just do to spasticity. Although admittedly, she does not have that much spasticity on exam. I review the risk and benefits of baclofen with the patient. I will also refer the patient to physical and occupational therapy as well. Finally in regards to her stomach discomfort I recommend that you try omeprazole.       Start Prilosec   Start baclofen  Refer to PT  Will follow up in 6 months with a MRI.         I spent 25 minutes in this visit, with >50% direct patient time spent counseling about prognosis, treatment options, and coordination of care.    Pietro Flores MD [facility]  Staff Neurologist   02/07/18

## 2018-02-07 NOTE — DISCHARGE INSTRUCTIONS
MRI Contrast Discharge Instructions    The IV contrast you received today will pass out of your body in your  urine. This will happen in the next 24 hours. You will not feel this process.  Your urine will not change color.    Drink at least 4 extra glasses of water or juice today (unless your doctor  has restricted your fluids). This reduces the stress on your kidneys.  You may take your regular medicines.    If you are on dialysis: It is best to have dialysis today.    If you have a reaction: Most reactions happen right away. If you have  any new symptoms after leaving the hospital (such as hives or swelling),  call your hospital at the correct number below. Or call your family doctor.  If you have breathing distress or wheezing, call 911.    Special instructions: ***    I have read and understand the above information.    Signature:______________________________________ Date:___________    Staff:__________________________________________ Date:___________     Time:__________    [facility] Departments:    ___[facility]: [phone]  ___[facility]: [phone]  ___[facility]: [phone] ___[facility]: [phone]  ___[facility]: [phone]  ___[facility]: [phone]  ___[phone]  ___[facility]: [phone]  ___[phone]

## 2018-02-07 NOTE — MR AVS SNAPSHOT
After Visit Summary   2/7/2018    Chayo Reid    MRN: [id number]           Patient Information     Date Of Birth          1970        Visit Information        Provider Department      2/7/2018 1:00 PM Pietro Flores MD [facility]        Today's Diagnoses     MS (multiple sclerosis) (H)    -  1      Care Instructions    Will start baclofen. Call me in one month to let me know how you are doing    Will refer to PT    Will have you follow up in 6 months with a MRI of the brain.    Will get blood work    Start taking Prilosec.     Patient Education    Baclofen Oral tablet    Baclofen Solution for injection  Baclofen Oral tablet  What is this medicine?  BACLOFEN (CINTIA mahin fen) helps relieve spasms and cramping of muscles. It may be used to treat symptoms of multiple sclerosis or spinal cord injury.  This medicine may be used for other purposes; ask your health care provider or pharmacist if you have questions.  What should I tell my health care provider before I take this medicine?  They need to know if you have any of these conditions:    kidney disease    seizures    stroke    an unusual or allergic reaction to baclofen, other medicines, foods, dyes, or preservatives    pregnant or trying to get pregnant    breast-feeding  How should I use this medicine?  Take this medicine by mouth. Swallow it with a drink of water. Follow the directions on the prescription label. Do not take more medicine than you are told to take.  Talk to your pediatrician regarding the use of this medicine in children. Special care may be needed.  Overdosage: If you think you have taken too much of this medicine contact a poison control center or emergency room at once.  NOTE: This medicine is only for you. Do not share this medicine with others.  What if I miss a dose?  If you miss a dose, take it as soon as you can. If it is almost time for your next dose, take only that dose. Do not take double  or extra doses.  What may interact with this medicine?    alcohol    antihistamines    medicines for depression, anxiety, and other mental conditions    medicines for pain like codeine, oxycodone, tramadol, and propoxyphene    medicines for sleep    phenobarbital  This list may not describe all possible interactions. Give your health care provider a list of all the medicines, herbs, non-prescription drugs, or dietary supplements you use. Also tell them if you smoke, drink alcohol, or use illegal drugs. Some items may interact with your medicine.  What should I watch for while using this medicine?  Do not suddenly stop taking your medicine. If you do, you may develop a severe reaction. If your doctor wants you to stop the medicine, the dose will be slowly lowered over time to avoid any side effects. Follow the advice of your doctor.  Baclofen can affect blood sugar levels. If you have diabetes, talk with your doctor or health care professional before you take the medicine.  You may get drowsy or dizzy when you first start taking the medicine or change doses. Do not drive, use machinery, or do anything that may be dangerous until you know how the medicine affects you. Stand or sit up slowly.  What side effects may I notice from receiving this medicine?  Side effects that you should report to your doctor or health care professional as soon as possible:    allergic reactions like skin rash, itching or hives, swelling of the face, lips, or tongue    chest pain    hallucinations    seizure  Side effects that usually do not require medical attention (report to your doctor or health care professional if they continue or are bothersome):    confusion    difficulty sleeping    headache    nausea  This list may not describe all possible side effects. Call your doctor for medical advice about side effects. You may report side effects to FDA at 0-112-FDA-8544.  Where should I keep my medicine?  Keep out of the reach of  children.  Store at room temperature between 15 and 30 degrees C (59 and 86 degrees F). Keep container tightly closed. Throw away any unused medicine after the expiration date.  NOTE:This sheet is a summary. It may not cover all possible information. If you have questions about this medicine, talk to your doctor, pharmacist, or health care provider. Copyright  2016 Gold Standard      You saw a neurology provider today at the [facility].  You may have also met with the MS RN Care Coordinator.  In order to get a message to your MS Center provider, you should contact [phone] option 3 for the triage nurse line.    You should contact us via this protocol if you have any of the following symptoms:    New or worsening neurologic symptoms that persist for 24-48 hours, such as:  o New onset of pain or marked worsening of pain  o Difficulty with speech, swallowing, or breathing  o New onset of vertigo or dizziness  o Change in bowel or bladder function (incontinence, difficulty urinating)    Increasing difficulty in self care    Marked changes in vision (double vision, blurred vision, graying of vision)    Change in mobility    Change in cognitive function    Falling    Worsening numbness, tingling or pain with a change in function    Worsening fatigue lasting more than 2 weeks  If you had labs completed today, we will contact you with the results.  If you are active in [employer], they will be released to you there.  Otherwise, your results will be provided to you via mail or telephone call.  Some results take up to 2 weeks for completion.  If you haven t heard anything about your lab results within 2 weeks, you can call or send a [employer] message to obtain your results.  If you have an MRI scheduled in the week or two prior to your next appointment, we will go over the results at your scheduled follow up appointment.  If you are not scheduled to see your MS Center provider within  about 2 weeks after your MRI, please call or send a [employer] message to obtain your results if you haven t heard anything within 2 weeks.  Please be aware that it takes at least 5 business days after routine MRIs for your results to be reviewed by both the radiologist and your doctor.  MRIs completed at facilities outside of the [facility] system take about 2 weeks in order for the MRI disc to be mailed to our clinic and uploaded into your medical record.    Prescription refills should be faxed to us by your pharmacy.  Our fax number for prescription refills is [phone].  Please do your best to come to your appointments, and to arrive 15 minutes early to allow time for checking in.  [facility] reserves the right to terminate care of established patients if a patient misses three or more appointments in a clinic without providing notification within a 12-month period.    Developing Your Care Team  Individuals living with chronic illnesses like MS may be unaware that they are at risk for the same range of medical problems as everyone else.  This is why you must establish a relationship with other health care providers in addition to your Multiple Sclerosis doctor.  It can be difficult at times to figure out whether a health concern is related to your MS, or whether it is related to something else, such as hormonal changes, pseduoexacerbations, changes in your core body temperature, flu-like reactions to interferons, exercise, or infections.  Urinary tract infections (UTIs) are common culprits that can cause fatigue, weakness, or other  MS attack -like symptoms without classic symptoms of a UTI.  For this reason, if you call or come in to discuss symptoms, you may be asked to get in touch with your primary provider or another specialist, so that you receive the comprehensive care you need.  What is Multiple Sclerosis (MS)?  MS is a disease in which the nerve tissues in the brain and/or spinal  cord are attacked by immune cells in the body.  These immune cells are present in everyone, and their normal role is to fight off infections.  In people with MS, these cells change the way they function and cross into the nervous system.  Once there, they cause inflammation that damages the myelin (or the protective coating of a nerve cell, much like the plastic covering on an electrical cord) and parts of the nerve cell itself.  So far, a clear cause for this immune system dysfunction has not been found.  MS often starts out as the  relapsing-remitting  form.  This means there are episodes when you have symptoms, and other times when you recover to normal or near-normal.  Over time, if the damage to the nervous system continues, the disease can cause additional disability, such as difficulty walking.  If the relapses and nerve damage can be prevented with available medications, many patients with MS can go many years between relapses and have relatively little disability.  Remember: MS is a condition that changes and must be evaluated on an ongoing basis!  What is a Relapse? (Also called flare-ups, attacks, or exacerbations)  Relapses are due to the occurrence of inflammation in some part of the brain and/or spinal cord.  A relapse is new or recurrent symptoms which persist for at least 24 hours and sometimes worsen over 48 hours.  New symptoms need to be  by at least 1 month in order to be considered separate relapses. Most of the time, symptoms reach their maximal intensity within 2 weeks and then begin to slowly resolve.  At times, your symptoms may not recover fully for up to 6 months, depending on the severity of the episode.  The frequency of relapses is generally higher early in the disease, but can vary greatly among individuals with MS.  Improvement of symptoms for an individual is unpredictable with each relapse.    It is important to remember that an increase in symptoms and changes in function  "may not necessarily be a relapse.  There are other factors that contribute to such changes, such as hot weather, increased body temperature, infection/illness, stress and sleep deprivation.  The worsening of symptoms may feel like a relapse when in reality it is not.  These episodes are referred to as pseudorelapses.  Once the underlying cause is addressed, symptoms usually fade away and you feel better.  If you experience a worsening of symptoms that lasts more than 48 hours and does not improve with cooling down, decreasing stress, or treatment of an infection, please call us and we can help to better determine whether you are having a pseudorelapse versus a relapse.                Follow-ups after your visit        Additional Services     PHYSICAL THERAPY REFERRAL       *This therapy referral will be filtered to a centralized scheduling office at [facility] and the patient will receive a call to schedule an appointment at a [place] location most convenient for them. *     [facility] provides Physical Therapy evaluation and treatment and many specialty services across the [place] system.  If requesting a specialty program, please choose from the list below.    If you have not heard from the scheduling office within 2 business days, please call [phone] for all locations, with the exception of [place], please call [phone].  Treatment: Evaluation & Treatment  Special Instructions/Modalities:   Special Programs: None    Please be aware that coverage of these services is subject to the terms and limitations of your health insurance plan.  Call member services at your health plan with any benefit or coverage questions.      **Note to Provider:  If you are referring outside of [facility] for the therapy appointment, please list the name of the location in the \"special instructions\" above, print the referral and give to the patient to schedule the appointment.            "       Follow-up notes from your care team     Return in about 6 months (around 8/7/2018).      Your next 10 appointments already scheduled     Feb 07, 2018  2:15 PM CST   LAB with  LAB   [facility] ([facility])    [address] [place] [id number] [phone]           Please do not eat 10-12 hours before your appointment if you are coming in fasting for labs on lipids, cholesterol, or glucose (sugar). This does not apply to pregnant women. Water, hot tea and black coffee (with nothing added) are okay. Do not drink other fluids, diet soda or chew gum.            Aug 08, 2018 12:15 PM CDT   (Arrive by 12:00 PM)   MR BRAIN W/O & W CONTRAST with [address] MRI ([facility])    [address] [place] [id number] [phone]           Take your medicines as usual, unless your doctor tells you not to. Bring a list of your current medicines to your exam (including vitamins, minerals and over-the-counter drugs).  You may or may not receive intravenous (IV) contrast for this exam pending the discretion of the Radiologist.  You do not need to do anything special to prepare.  The MRI machine uses a strong magnet. Please wear clothes without metal (snaps, zippers). A sweatsuit works well, or we may give you a hospital gown.  Please remove any body piercings and hair extensions before you arrive. You will also remove watches, jewelry, hairpins, wallets, dentures, partial dental plates and hearing aids. You may wear contact lenses, and you may be able to wear your rings. We have a safe place to keep your personal items, but it is safer to leave them at home.  **IMPORTANT** THE INSTRUCTIONS BELOW ARE ONLY FOR THOSE PATIENTS WHO HAVE BEEN PRESCRIBED SEDATION OR GENERAL ANESTHESIA DURING THEIR MRI PROCEDURE:  IF YOUR DOCTOR PRESCRIBED ORAL SEDATION (take medicine to help you relax during your exam):   You  must get the medicine from your doctor (oral medication) before you arrive. Bring the medicine to the exam. Do not take it at home. You ll be told when to take it upon arriving for your exam.   Arrive one hour early. Bring someone who can take you home after the test. Your medicine will make you sleepy. After the exam, you may not drive, take a bus or take a taxi by yourself.  IF YOUR DOCTOR PRESCRIBED IV SEDATION:   Arrive one hour early. Bring someone who can take you home after the test. Your medicine will make you sleepy. After the exam, you may not drive, take a bus or take a taxi by yourself.   No eating 6 hours before your exam. You may have clear liquids up until 4 hours before your exam. (Clear liquids include water, clear tea, black coffee and fruit juice without pulp.)  IF YOUR DOCTOR PRESCRIBED ANESTHESIA (be asleep for your exam):   Arrive 1 1/2 hours early. Bring someone who can take you home after the test. You may not drive, take a bus or take a taxi by yourself.   No eating 8 hours before your exam. You may have clear liquids up until 4 hours before your exam. (Clear liquids include water, clear tea, black coffee and fruit juice without pulp.)   You will spend four to five hours in the recovery room.  Please call the Imaging Department at your exam site with any questions.            Aug 08, 2018  1:30 PM CDT   (Arrive by 1:15 PM)   Return Multiple Sclerosis with Pietro Flores MD   [facility] ([facility])    [address] [place] [id number] [phone]              Future tests that were ordered for you today     Open Future Orders        Priority Expected Expires Ordered    MRI Brain w & w/o contrast Routine 8/7/2018 2/7/2019 2/7/2018            Who to contact     If you have questions or need follow up information about today's clinic visit or your schedule please contact [facility] directly at  "[phone].  Normal or non-critical lab and imaging results will be communicated to you by [employer], letter or phone within 4 business days after the clinic has received the results. If you do not hear from us within 7 days, please contact the clinic through [employer] or phone. If you have a critical or abnormal lab result, we will notify you by phone as soon as possible.  Submit refill requests through "[employer] or call your pharmacy and they will forward the refill request to us. Please allow 3 business days for your refill to be completed.          Additional Information About Your Visit        [employer] Information     "[employer] gives you secure access to your electronic health record. If you see a primary care provider, you can also send messages to your care team and make appointments. If you have questions, please call your primary care clinic.  If you do not have a primary care provider, please call [phone] and they will assist you.        Care EveryWhere ID     This is your Care EveryWhere ID. This could be used by other organizations to access your [facility] medical records  [id number]        Your Vitals Were     Pulse Temperature Respirations Height Pulse Oximetry Breastfeeding?    86 98  F (36.7  C) (Oral) 24 1.676 m (5' 6\") 98% No    BMI (Body Mass Index)                   32.35 kg/m2            Blood Pressure from Last 3 Encounters:   02/07/18 132/86   11/21/17 139/88   09/13/17 117/67    Weight from Last 3 Encounters:   02/07/18 90.9 kg (200 lb 6.4 oz)   09/13/17 90.4 kg (199 lb 3.2 oz)   06/06/17 87.1 kg (192 lb)              We Performed the Following     CBC with platelets differential     Comprehensive metabolic panel     Hepatitis B core antibody     Hepatitis B Surface Antibody     Hepatitis B surface antigen     Hepatitis C RNA quantitative     VICKIE Virus Ab Index Reflex Inhibition     M Tuberculosis by Quantiferon     PHYSICAL THERAPY REFERRAL     TSH with free T4 reflex     Varicella Zoster " Virus Antibody IgG     Vitamin D Deficiency          Today's Medication Changes          These changes are accurate as of 2/7/18  2:04 PM.  If you have any questions, ask your nurse or doctor.               Start taking these medicines.        Dose/Directions    baclofen 10 MG tablet   Commonly known as:  LIORESAL   Used for:  MS (multiple sclerosis) (H)   Started by:  Pietro Flores MD        Dose:  10 mg   Take 1 tablet (10 mg) by mouth 2 times daily   Quantity:  60 tablet   Refills:  1            Where to get your medicines      These medications were sent to [facility] [address] [facility] [employer] [facility] [place] [facility] [address]  [address] , [facility] [address]     Phone:  [phone]     baclofen 10 MG tablet                Primary Care Provider Fax #    Physician No Ref-Primary [phone]       No address on file        Equal Access to Services     FAN ZAVALA : Mary thao Soburak, waaxda luqadaha, qaybta kaalmada adeegyada, natalia willis . So [facility] [phone].    ATENCIÓN: Si habla español, tiene a hall disposición servicios gratuitos de asistencia lingüística. Llame al [phone].    We comply with applicable federal civil rights laws and [place] laws. We do not discriminate on the basis of race, color, national origin, age, disability, sex, sexual orientation, or gender identity.            Thank you!     Thank you for choosing [facility]  for your care. Our goal is always to provide you with excellent care. Hearing back from our patients is one way we can continue to improve our services. Please take a few minutes to complete the written survey that you may receive in the mail after your visit with us. Thank you!             Your Updated Medication List - Protect others around you: Learn how to safely use, store and throw away your medicines at www.disposemymeds.org.          This list is accurate as of 2/7/18  2:04 PM.  Always use your most recent  med list.                   Brand Name Dispense Instructions for use Diagnosis    amitriptyline 25 MG tablet    ELAVIL    90 tablet    Take 3 tablets (75 mg) by mouth At Bedtime    Myofascial pain syndrome, Sleep disorder       * amphetamine-dextroamphetamine 30 MG per 24 hr capsule    ADDERALL XR    30 capsule    Take 1 capsule (30 mg) by mouth daily    Other fatigue, MS (multiple sclerosis) (H)       * amphetamine-dextroamphetamine 20 MG per 24 hr capsule    ADDERALL XR    60 capsule    Take 1 capsule (20 mg) by mouth 2 times daily    MS (multiple sclerosis) (H), Other fatigue       baclofen 10 MG tablet    LIORESAL    60 tablet    Take 1 tablet (10 mg) by mouth 2 times daily    MS (multiple sclerosis) (H)       cyclobenzaprine 5 MG tablet    FLEXERIL    30 tablet    Take 1 tablet (5 mg) by mouth 2 times daily    Pain of right lower extremity       DULoxetine 60 MG EC capsule    CYMBALTA    30 capsule    Take 1 capsule (60 mg) by mouth daily    Lumbar radiculopathy       ferrous sulfate 325 (65 FE) MG tablet    IRON     Take 1 tablet by mouth daily        Glatiramer Acetate 40 MG/ML Sosy    COPAXONE    12 Syringe    Inject 40 mg Subcutaneous three times a week    MS (multiple sclerosis) (H)       modafinil 200 MG tablet    PROVIGIL    30 tablet    Take 1 tablet (200 mg) by mouth daily    Fatigue       oxybutynin 10 MG 24 hr tablet    DITROPAN-XL    30 tablet    TAKE 1 TABLET BY MOUTH ONE HOUR PRIOR TO BED.    MS (multiple sclerosis) (H), Neurogenic bladder       pregabalin 50 MG capsule    LYRICA    270 capsule    Take 1 capsule (50 mg) by mouth 3 times daily    MS (multiple sclerosis) (H)       vitamin D 1000 UNITS capsule      Take 1 capsule by mouth daily        * Notice:  This list has 2 medication(s) that are the same as other medications prescribed for you. Read the directions carefully, and ask your doctor or other care provider to review them with you.

## 2018-02-07 NOTE — LETTER
2/7/2018       RE: Chayo Reid  [address]  [facility] [address]     Dear Colleague,    Thank you for referring your patient, Chayo Reid, to the [facility] MULTIPLE SCLEROSIS at [facility]. Please see a copy of my visit note below.    MULTIPLE SCLEROSIS CLINIC AT THE [facility]  FOLLOWUP/ESTABLISHED PATIENT VISIT      PRINCIPAL NEUROLOGIC DIAGNOSIS: RIS      Date of Onset: 7/2012  Date of Diagnosis: 7/2012  Initial Clinical Course: Progressive  Current Clinical Course: Progressive  Past Disease Modifying Therapy(ies): None  Current Disease Modifying Therapy(ies): None  Most Recent MRI of the Brain: 2/6/18 last one 1/27  Most Recent MRI of the Cervical Cord 2/17  Most Recent MRI of the Thoracic Cord: 2/17  Most Recent Lumbar Puncture: Positive for OCB  Most Recent OCT: NA   Most Recent JCV: NA   Most Recent Remote Hepatitis Panel: NA   Most Recent VZV IgG: NA   Most Recent TB Quant: NA       CHIEF COMPLAINT: Follow up on DMT      INTERVAL HISTORY:    Overall the patient reports that she is doing better since discontinue her copaxone, elavil, and Cymbalta. She reports that she is still having pain in her legs. However her stiffness is significantly better with the medical marijuana. Her leg pain is described as soreness and spasms. Generally, she has this pain all day. However, it is worse at night. Overall, the patient reports little interest in going back on the medication she just stopped.    Issues with current MS therapy: Not on DMT    REVIEW OF SYSTEMS:    Mood: unchanged  Spasticity:better  Bladder: unchanged  Bowel: unchanged  Pain related to today's visit:reviewed on nursing intake documentation  Fatigue: unchanged  Sleep: unchanged  Memory/Concentration: unchanged    PAST HISTORY was reviewed and updated:      MEDICATIONS and ALLERGIES were reviewed and updated.    SOCIAL HISTORY was reviewed and updated:      EXAM:    PHYSICAL EXAMINATION:    VITAL SIGNS:  B/P: 132/86, T: 98, P: 86, R: 24    GENERAL: The patient is a well-nourished  who presents to the evaluation with her .  NEUROLOGIC:   MENTAL STATUS: Alert,awake and  oriented times four.   CRANIAL NERVES:  Visual fields are full to confrontation. The pupils are  round and react to light and there is no Antonio Arlene pupil.  Extraocular movements are  intact with no  internuclear ophthalmoplegia. No nystagmus. Facial strength and sensation are  normal. Hearing is  normal. Palate elevation and tongue protrusion are  normal.   POWER:     Motor    Upper      Right Left   Shoulder Abduction 5 5   Elbow Flexion 5 5   Elbow Extension 5 5   Wrist Extension 5 5   Digit Extension 5 5   Digit Flexion 5 5   APB 5 5   Tone 1+ 1+   Lower       Right Left   Hip Flexion 5 5   Knee Extension 5 5   Knee Flexion 5 5   Foot Dorsiflexion 5 5   Foot Plantar Flexion 5 5   EH 5 5   Toe Flexion 5 5   Tone 1+ 1+           Grade Description   0 No increase in muscle tone   1 Slight increase in muscle tone, manifested by a catch and release or by minimal resistance at the end of the range of motion when the affected part(s) is moved in flexion or extension   1+ Slight increase in muscle tone, manifested by a catch, followed by minimal resistance throughout the remainder (less than half) of the ROM   2 More marked increase in muscle tone through most of the ROM, but affected part(s) easily moved   3 Considerable increase in muscle tone, passive movement difficult   4 Affected part(s) rigid in flexion or extension           SENSORY:     Light touch:  Intact in all extremities    MOTOR/CEREBELLAR:    Right Left   RRM 0 Normal 0 Normal   MAGDA 0 Normal 0 Normal   FTN 0 Normal 0 Normal   RRM 0 Normal 0 Normal   HKS 0 Normal 0 Normal           GAIT: Gait is  narrow-based and steady and the patient is  able to walk on heels, toes and in tandem without difficulty.    Romberg: Sways with eye(s)  closed    RESULTS:  Monitoring  labs:    No visits with results within 6 Month(s) from this visit.  Latest known visit with results is:    Admission on 02/26/2017, Discharged on 02/26/2017   Component Date Value Ref Range Status     WBC 02/26/2017 14.7* 4.0 - 11.0 10e9/L Final     RBC Count 02/26/2017 4.41  3.8 - 5.2 10e12/L Final     Hemoglobin 02/26/2017 13.1  11.7 - 15.7 g/dL Final     Hematocrit 02/26/2017 39.0  35.0 - 47.0 % Final     MCV 02/26/2017 88  78 - 100 fl Final     MCH 02/26/2017 29.7  26.5 - 33.0 pg Final     MCHC 02/26/2017 33.6  31.5 - 36.5 g/dL Final     RDW 02/26/2017 11.8  10.0 - 15.0 % Final     Platelet Count 02/26/2017 288  150 - 450 10e9/L Final     Diff Method 02/26/2017 Automated Method   Final     % Neutrophils 02/26/2017 82.8  % Final     % Lymphocytes 02/26/2017 10.0  % Final     % Monocytes 02/26/2017 4.7  % Final     % Eosinophils 02/26/2017 1.4  % Final     % Basophils 02/26/2017 0.7  % Final     % Immature Granulocytes 02/26/2017 0.4  % Final     Nucleated RBCs 02/26/2017 0  0 /100 Final     Absolute Neutrophil 02/26/2017 12.2* 1.6 - 8.3 10e9/L Final     Absolute Lymphocytes 02/26/2017 1.5  0.8 - 5.3 10e9/L Final     Absolute Monocytes 02/26/2017 0.7  0.0 - 1.3 10e9/L Final     Absolute Eosinophils 02/26/2017 0.2  0.0 - 0.7 10e9/L Final     Absolute Basophils 02/26/2017 0.1  0.0 - 0.2 10e9/L Final     Abs Immature Granulocytes 02/26/2017 0.1  0 - 0.4 10e9/L Final     Absolute Nucleated RBC 02/26/2017 0.0   Final     Sodium 02/26/2017 138  133 - 144 mmol/L Final     Potassium 02/26/2017 3.4  3.4 - 5.3 mmol/L Final     Chloride 02/26/2017 101  94 - 109 mmol/L Final     Carbon Dioxide 02/26/2017 31  20 - 32 mmol/L Final     Anion Gap 02/26/2017 6  3 - 14 mmol/L Final     Glucose 02/26/2017 122* 70 - 99 mg/dL Final     Urea Nitrogen 02/26/2017 15  7 - 30 mg/dL Final     Creatinine 02/26/2017 0.99  0.52 - 1.04 mg/dL Final     GFR Estimate 02/26/2017 60* >60 mL/min/1.7m2 Final     GFR Estimate If Black 02/26/2017 73   >60 mL/min/1.7m2 Final     Calcium 02/26/2017 8.7  8.5 - 10.1 mg/dL Final     Bilirubin Total 02/26/2017 0.6  0.2 - 1.3 mg/dL Final     Albumin 02/26/2017 3.7  3.4 - 5.0 g/dL Final     Protein Total 02/26/2017 8.0  6.8 - 8.8 g/dL Final     Alkaline Phosphatase 02/26/2017 68  40 - 150 U/L Final     ALT 02/26/2017 23  0 - 50 U/L Final     AST 02/26/2017 20  0 - 45 U/L Final     Lipase 02/26/2017 144  73 - 393 U/L Final     Color Urine 02/26/2017 Yellow   Final     Appearance Urine 02/26/2017 Slightly Cloudy   Final     Glucose Urine 02/26/2017 Negative  NEG mg/dL Final     Bilirubin Urine 02/26/2017 Negative  NEG Final     Ketones Urine 02/26/2017 Negative  NEG mg/dL Final     Specific Gravity Urine 02/26/2017 1.015  1.003 - 1.035 Final     Blood Urine 02/26/2017 Negative  NEG Final     pH Urine 02/26/2017 6.5  5.0 - 7.0 pH Final     Protein Albumin Urine 02/26/2017 Negative  NEG mg/dL Final     Urobilinogen mg/dL 02/26/2017 Normal  0.0 - 2.0 mg/dL Final     Nitrite Urine 02/26/2017 Negative  NEG Final     Leukocyte Esterase Urine 02/26/2017 Negative  NEG Final     Source 02/26/2017 Midstream Urine   Final     WBC Urine 02/26/2017 1  0 - 2 /HPF Final     RBC Urine 02/26/2017 1  0 - 2 /HPF Final     Squamous Epithelial /HPF Urine 02/26/2017 <1  0 - 1 /HPF Final     Transitional Epi 02/26/2017 <1  0 - 1 /HPF Final     Mucous Urine 02/26/2017 Present* NEG /LPF Final     Amorphous Crystals 02/26/2017 Few* NEG /HPF Final     HCG Qual Urine 02/26/2017 Negative  NEG Final     Creatinine 02/26/2017 1.0  0.52 - 1.04 mg/dL Final     GFR Estimate 02/26/2017 59* >60 mL/min/1.7m2 Final     GFR Estimate If Black 02/26/2017 72  >60 mL/min/1.7m2 Final   ]      MRI brain:    MRI Dated 2/7/18:  Mild T2 disease burden with 0 enhancion lesion(s).  compared to MRI on 1/27/17 there are 0 new T2 lesion(s).    ASSESSMENT/PLAN:  The patient is a 47-year-old woman with a past medical history of relapsing remitting multiple sclerosis who is  presenting today as a follow-up. Overall, the patient reports to be clinically improved after discontinuing several of her medications. She also attributes some of her improvement to the use of medical marijuana. I explained to her that medical marijuana only treat symptoms and could potentially cause cognitive side effects with long-term use; however, it may be an acceptable usage of this medication given her severe subjective symptoms. The patient wanted to be treated off of disease modifying therapy for MS. Therefore, she stopped her Copaxone. We made an agreement that we will repeat a MRI in 6 months to ensure that she is not developing any new lesions. If she does develop new lesions, the she will have to go back on disease modifying therapy. I checked several screening labs determine what disease modifying therapy she could go on if she needs to. As long as she's doing well I will see her in 6 months. However, I'm sheltered her to call my office if she developed any issues or problems.    In the meantime to try to treat some of her symptoms I will try the patient on baclofen given her goods response medical marijuana. It could be possible that some of her symptoms are really just do to spasticity. Although admittedly, she does not have that much spasticity on exam. I review the risk and benefits of baclofen with the patient. I will also refer the patient to physical and occupational therapy as well. Finally in regards to her stomach discomfort I recommend that you try omeprazole.       Start Prilosec   Start baclofen  Refer to PT  Will follow up in 6 months with a MRI.         I spent 25 minutes in this visit, with >50% direct patient time spent counseling about prognosis, treatment options, and coordination of care.    Again, thank you for allowing me to participate in the care of your patient.      Sincerely,    Pietro Flores MD

## 2018-02-07 NOTE — PATIENT INSTRUCTIONS
Will start baclofen. Call me in one month to let me know how you are doing    Will refer to PT    Will have you follow up in 6 months with a MRI of the brain.    Will get blood work    Start taking Prilosec.     Patient Education    Baclofen Oral tablet    Baclofen Solution for injection  Baclofen Oral tablet  What is this medicine?  BACLOFEN (CINTIA mahin fen) helps relieve spasms and cramping of muscles. It may be used to treat symptoms of multiple sclerosis or spinal cord injury.  This medicine may be used for other purposes; ask your health care provider or pharmacist if you have questions.  What should I tell my health care provider before I take this medicine?  They need to know if you have any of these conditions:    kidney disease    seizures    stroke    an unusual or allergic reaction to baclofen, other medicines, foods, dyes, or preservatives    pregnant or trying to get pregnant    breast-feeding  How should I use this medicine?  Take this medicine by mouth. Swallow it with a drink of water. Follow the directions on the prescription label. Do not take more medicine than you are told to take.  Talk to your pediatrician regarding the use of this medicine in children. Special care may be needed.  Overdosage: If you think you have taken too much of this medicine contact a poison control center or emergency room at once.  NOTE: This medicine is only for you. Do not share this medicine with others.  What if I miss a dose?  If you miss a dose, take it as soon as you can. If it is almost time for your next dose, take only that dose. Do not take double or extra doses.  What may interact with this medicine?    alcohol    antihistamines    medicines for depression, anxiety, and other mental conditions    medicines for pain like codeine, oxycodone, tramadol, and propoxyphene    medicines for sleep    phenobarbital  This list may not describe all possible interactions. Give your health care provider a list of all the  medicines, herbs, non-prescription drugs, or dietary supplements you use. Also tell them if you smoke, drink alcohol, or use illegal drugs. Some items may interact with your medicine.  What should I watch for while using this medicine?  Do not suddenly stop taking your medicine. If you do, you may develop a severe reaction. If your doctor wants you to stop the medicine, the dose will be slowly lowered over time to avoid any side effects. Follow the advice of your doctor.  Baclofen can affect blood sugar levels. If you have diabetes, talk with your doctor or health care professional before you take the medicine.  You may get drowsy or dizzy when you first start taking the medicine or change doses. Do not drive, use machinery, or do anything that may be dangerous until you know how the medicine affects you. Stand or sit up slowly.  What side effects may I notice from receiving this medicine?  Side effects that you should report to your doctor or health care professional as soon as possible:    allergic reactions like skin rash, itching or hives, swelling of the face, lips, or tongue    chest pain    hallucinations    seizure  Side effects that usually do not require medical attention (report to your doctor or health care professional if they continue or are bothersome):    confusion    difficulty sleeping    headache    nausea  This list may not describe all possible side effects. Call your doctor for medical advice about side effects. You may report side effects to FDA at 0-784-FDA-9503.  Where should I keep my medicine?  Keep out of the reach of children.  Store at room temperature between 15 and 30 degrees C (59 and 86 degrees F). Keep container tightly closed. Throw away any unused medicine after the expiration date.  NOTE:This sheet is a summary. It may not cover all possible information. If you have questions about this medicine, talk to your doctor, pharmacist, or health care provider. Copyright  2016 [employer]      You saw a neurology provider today at the [facility].  You may have also met with the MS RN Care Coordinator.  In order to get a message to your MS Center provider, you should contact [phone] option 3 for the triage nurse line.    You should contact us via this protocol if you have any of the following symptoms:    New or worsening neurologic symptoms that persist for 24-48 hours, such as:  o New onset of pain or marked worsening of pain  o Difficulty with speech, swallowing, or breathing  o New onset of vertigo or dizziness  o Change in bowel or bladder function (incontinence, difficulty urinating)    Increasing difficulty in self care    Marked changes in vision (double vision, blurred vision, graying of vision)    Change in mobility    Change in cognitive function    Falling    Worsening numbness, tingling or pain with a change in function    Worsening fatigue lasting more than 2 weeks  If you had labs completed today, we will contact you with the results.  If you are active in [employer], they will be released to you there.  Otherwise, your results will be provided to you via mail or telephone call.  Some results take up to 2 weeks for completion.  If you haven t heard anything about your lab results within 2 weeks, you can call or send a [employer] message to obtain your results.  If you have an MRI scheduled in the week or two prior to your next appointment, we will go over the results at your scheduled follow up appointment.  If you are not scheduled to see your MS Center provider within about 2 weeks after your MRI, please call or send a [employer] message to obtain your results if you haven t heard anything within 2 weeks.  Please be aware that it takes at least 5 business days after routine MRIs for your results to be reviewed by both the radiologist and your doctor.  MRIs completed at facilities outside of the [facility] take about 2 weeks in order for  the MRI disc to be mailed to our clinic and uploaded into your medical record.    Prescription refills should be faxed to us by your pharmacy.  Our fax number for prescription refills is [phone].  Please do your best to come to your appointments, and to arrive 15 minutes early to allow time for checking in.  [facility] reserves the right to terminate care of established patients if a patient misses three or more appointments in a clinic without providing notification within a 12-month period.    Developing Your Care Team  Individuals living with chronic illnesses like MS may be unaware that they are at risk for the same range of medical problems as everyone else.  This is why you must establish a relationship with other health care providers in addition to your Multiple Sclerosis doctor.  It can be difficult at times to figure out whether a health concern is related to your MS, or whether it is related to something else, such as hormonal changes, pseduoexacerbations, changes in your core body temperature, flu-like reactions to interferons, exercise, or infections.  Urinary tract infections (UTIs) are common culprits that can cause fatigue, weakness, or other  MS attack -like symptoms without classic symptoms of a UTI.  For this reason, if you call or come in to discuss symptoms, you may be asked to get in touch with your primary provider or another specialist, so that you receive the comprehensive care you need.  What is Multiple Sclerosis (MS)?  MS is a disease in which the nerve tissues in the brain and/or spinal cord are attacked by immune cells in the body.  These immune cells are present in everyone, and their normal role is to fight off infections.  In people with MS, these cells change the way they function and cross into the nervous system.  Once there, they cause inflammation that damages the myelin (or the protective coating of a nerve cell, much like the plastic covering on an  electrical cord) and parts of the nerve cell itself.  So far, a clear cause for this immune system dysfunction has not been found.  MS often starts out as the  relapsing-remitting  form.  This means there are episodes when you have symptoms, and other times when you recover to normal or near-normal.  Over time, if the damage to the nervous system continues, the disease can cause additional disability, such as difficulty walking.  If the relapses and nerve damage can be prevented with available medications, many patients with MS can go many years between relapses and have relatively little disability.  Remember: MS is a condition that changes and must be evaluated on an ongoing basis!  What is a Relapse? (Also called flare-ups, attacks, or exacerbations)  Relapses are due to the occurrence of inflammation in some part of the brain and/or spinal cord.  A relapse is new or recurrent symptoms which persist for at least 24 hours and sometimes worsen over 48 hours.  New symptoms need to be  by at least 1 month in order to be considered separate relapses. Most of the time, symptoms reach their maximal intensity within 2 weeks and then begin to slowly resolve.  At times, your symptoms may not recover fully for up to 6 months, depending on the severity of the episode.  The frequency of relapses is generally higher early in the disease, but can vary greatly among individuals with MS.  Improvement of symptoms for an individual is unpredictable with each relapse.    It is important to remember that an increase in symptoms and changes in function may not necessarily be a relapse.  There are other factors that contribute to such changes, such as hot weather, increased body temperature, infection/illness, stress and sleep deprivation.  The worsening of symptoms may feel like a relapse when in reality it is not.  These episodes are referred to as pseudorelapses.  Once the underlying cause is addressed, symptoms usually  fade away and you feel better.  If you experience a worsening of symptoms that lasts more than 48 hours and does not improve with cooling down, decreasing stress, or treatment of an infection, please call us and we can help to better determine whether you are having a pseudorelapse versus a relapse.

## 2018-02-08 LAB
DEPRECATED CALCIDIOL+CALCIFEROL SERPL-MC: 109 UG/L (ref 20–75)
HBV CORE AB SERPL QL IA: NONREACTIVE
HBV SURFACE AB SERPL IA-ACNC: 0.71 M[IU]/ML
HBV SURFACE AG SERPL QL IA: NONREACTIVE
HCV RNA SERPL NAA+PROBE-ACNC: NORMAL [IU]/ML
HCV RNA SERPL NAA+PROBE-LOG IU: NORMAL LOG IU/ML
VZV IGG SER QL IA: 3.2 AI (ref 0–0.8)

## 2018-02-09 LAB
M TB TUBERC IFN-G BLD QL: NEGATIVE
M TB TUBERC IFN-G/MITOGEN IGNF BLD: 0.25 IU/ML

## 2018-02-18 LAB — LAB SCANNED RESULT: ABNORMAL

## 2018-03-06 ENCOUNTER — MYC MEDICAL ADVICE (OUTPATIENT)
Dept: NEUROLOGY | Facility: CLINIC | Age: 48
End: 2018-03-06

## 2018-03-06 NOTE — TELEPHONE ENCOUNTER
Dr. Flores, please see Chayo's [employer] message; Did you get these results back? Thank you.    Polly Rodríguez, MS RN Care Coordinator

## 2018-03-09 DIAGNOSIS — M54.16 LUMBAR RADICULOPATHY: ICD-10-CM

## 2018-03-09 RX ORDER — DULOXETIN HYDROCHLORIDE 60 MG/1
60 CAPSULE, DELAYED RELEASE ORAL DAILY
Qty: 30 CAPSULE | Refills: 3 | Status: SHIPPED | OUTPATIENT
Start: 2018-03-09 | End: 2019-01-08

## 2018-03-09 NOTE — TELEPHONE ENCOUNTER
Received refill request for duloxetine from patient;  She was last seen in February and has follow up appointment in August with Dr. Flores. Refilled per MS refill protocol.    Francy Simpson, RN Care Coordinator

## 2018-04-08 ENCOUNTER — HEALTH MAINTENANCE LETTER (OUTPATIENT)
Age: 48
End: 2018-04-08

## 2018-04-09 DIAGNOSIS — G35 MS (MULTIPLE SCLEROSIS) (H): ICD-10-CM

## 2018-04-09 RX ORDER — BACLOFEN 10 MG/1
10 TABLET ORAL 2 TIMES DAILY
Qty: 60 TABLET | Refills: 5 | Status: SHIPPED | OUTPATIENT
Start: 2018-04-09 | End: 2019-01-08

## 2018-04-09 NOTE — TELEPHONE ENCOUNTER
Received refill request for Baclofen from [facility]; Patient was last seen in February and has follow up appointment in August with Dr. Flores; Refilled for 6 months per MS refill protocol.    Polly Rodríguez MS RN Care Coordinator

## 2018-05-16 ENCOUNTER — MYC REFILL (OUTPATIENT)
Dept: NEUROLOGY | Facility: CLINIC | Age: 48
End: 2018-05-16

## 2018-05-16 DIAGNOSIS — R53.83 OTHER FATIGUE: ICD-10-CM

## 2018-05-16 DIAGNOSIS — G35 MS (MULTIPLE SCLEROSIS) (H): ICD-10-CM

## 2018-05-16 RX ORDER — DEXTROAMPHETAMINE SACCHARATE, AMPHETAMINE ASPARTATE MONOHYDRATE, DEXTROAMPHETAMINE SULFATE AND AMPHETAMINE SULFATE 5; 5; 5; 5 MG/1; MG/1; MG/1; MG/1
20 CAPSULE, EXTENDED RELEASE ORAL 2 TIMES DAILY
Qty: 60 CAPSULE | Refills: 0 | Status: SHIPPED | OUTPATIENT
Start: 2018-05-16 | End: 2018-05-16

## 2018-05-16 RX ORDER — DEXTROAMPHETAMINE SACCHARATE, AMPHETAMINE ASPARTATE MONOHYDRATE, DEXTROAMPHETAMINE SULFATE AND AMPHETAMINE SULFATE 7.5; 7.5; 7.5; 7.5 MG/1; MG/1; MG/1; MG/1
30 CAPSULE, EXTENDED RELEASE ORAL DAILY
Qty: 30 CAPSULE | Refills: 0 | Status: SHIPPED | OUTPATIENT
Start: 2018-06-16 | End: 2018-05-16

## 2018-05-16 RX ORDER — DEXTROAMPHETAMINE SACCHARATE, AMPHETAMINE ASPARTATE MONOHYDRATE, DEXTROAMPHETAMINE SULFATE AND AMPHETAMINE SULFATE 5; 5; 5; 5 MG/1; MG/1; MG/1; MG/1
20 CAPSULE, EXTENDED RELEASE ORAL 2 TIMES DAILY
Qty: 60 CAPSULE | Refills: 0 | Status: SHIPPED | OUTPATIENT
Start: 2018-07-16 | End: 2018-12-03

## 2018-05-16 RX ORDER — DEXTROAMPHETAMINE SACCHARATE, AMPHETAMINE ASPARTATE MONOHYDRATE, DEXTROAMPHETAMINE SULFATE AND AMPHETAMINE SULFATE 5; 5; 5; 5 MG/1; MG/1; MG/1; MG/1
20 CAPSULE, EXTENDED RELEASE ORAL 2 TIMES DAILY
Qty: 60 CAPSULE | Refills: 0 | Status: SHIPPED | OUTPATIENT
Start: 2018-06-16 | End: 2018-05-16

## 2018-05-16 RX ORDER — DEXTROAMPHETAMINE SACCHARATE, AMPHETAMINE ASPARTATE MONOHYDRATE, DEXTROAMPHETAMINE SULFATE AND AMPHETAMINE SULFATE 5; 5; 5; 5 MG/1; MG/1; MG/1; MG/1
20 CAPSULE, EXTENDED RELEASE ORAL 2 TIMES DAILY
Qty: 60 CAPSULE | Refills: 0 | Status: CANCELLED | OUTPATIENT
Start: 2018-05-16

## 2018-05-16 RX ORDER — DEXTROAMPHETAMINE SACCHARATE, AMPHETAMINE ASPARTATE MONOHYDRATE, DEXTROAMPHETAMINE SULFATE AND AMPHETAMINE SULFATE 7.5; 7.5; 7.5; 7.5 MG/1; MG/1; MG/1; MG/1
30 CAPSULE, EXTENDED RELEASE ORAL DAILY
Qty: 30 CAPSULE | Refills: 0 | Status: SHIPPED | OUTPATIENT
Start: 2018-05-16 | End: 2018-05-16

## 2018-05-16 RX ORDER — DEXTROAMPHETAMINE SACCHARATE, AMPHETAMINE ASPARTATE MONOHYDRATE, DEXTROAMPHETAMINE SULFATE AND AMPHETAMINE SULFATE 7.5; 7.5; 7.5; 7.5 MG/1; MG/1; MG/1; MG/1
30 CAPSULE, EXTENDED RELEASE ORAL DAILY
Qty: 30 CAPSULE | Refills: 0 | Status: SHIPPED | OUTPATIENT
Start: 2018-07-16 | End: 2018-12-03

## 2018-05-16 NOTE — TELEPHONE ENCOUNTER
3 months worth of prescriptions signed by Dr. Flores. These have been placed in the mail to the patient's home.

## 2018-05-16 NOTE — TELEPHONE ENCOUNTER
Patient sent "[employer] message requesting refill of their Adderall; Patient was last seen in February and has follow up appointment in August with Dr. Flores. Pended rx to Dr. Flores for signature and will mail to the pharmacy once signed.    Francy Simpson RN Care Coordinator

## 2018-05-16 NOTE — TELEPHONE ENCOUNTER
Patient sent [employer] message requesting refill of their Adderall; Patient was last seen in February and has follow up appointment in August with Dr. Flores. Pended rx to Dr. Flores for signature and will mail to the patient's home once signed.    Francy Simpson RN Care Coordinator

## 2018-08-22 ENCOUNTER — OFFICE VISIT (OUTPATIENT)
Dept: NEUROLOGY | Facility: CLINIC | Age: 48
End: 2018-08-22
Attending: PSYCHIATRY & NEUROLOGY
Payer: COMMERCIAL

## 2018-08-22 ENCOUNTER — HOSPITAL ENCOUNTER (OUTPATIENT)
Dept: MRI IMAGING | Facility: CLINIC | Age: 48
Discharge: HOME | End: 2018-08-22
Attending: PSYCHIATRY & NEUROLOGY | Admitting: PSYCHIATRY & NEUROLOGY
Payer: COMMERCIAL

## 2018-08-22 VITALS
HEART RATE: 83 BPM | SYSTOLIC BLOOD PRESSURE: 124 MMHG | HEIGHT: 66 IN | OXYGEN SATURATION: 98 % | RESPIRATION RATE: 20 BRPM | DIASTOLIC BLOOD PRESSURE: 85 MMHG | BODY MASS INDEX: 30.47 KG/M2 | WEIGHT: 189.6 LBS

## 2018-08-22 DIAGNOSIS — G35 MS (MULTIPLE SCLEROSIS) (H): ICD-10-CM

## 2018-08-22 DIAGNOSIS — G35 MS (MULTIPLE SCLEROSIS) (H): Primary | ICD-10-CM

## 2018-08-22 LAB
ALBUMIN SERPL-MCNC: 3.6 G/DL (ref 3.4–5)
ALP SERPL-CCNC: 67 U/L (ref 40–150)
ALT SERPL W P-5'-P-CCNC: 17 U/L (ref 0–50)
ANION GAP SERPL CALCULATED.3IONS-SCNC: 5 MMOL/L (ref 3–14)
AST SERPL W P-5'-P-CCNC: 14 U/L (ref 0–45)
BASOPHILS # BLD AUTO: 0.1 10E9/L (ref 0–0.2)
BASOPHILS NFR BLD AUTO: 0.8 %
BILIRUB SERPL-MCNC: 0.5 MG/DL (ref 0.2–1.3)
BUN SERPL-MCNC: 15 MG/DL (ref 7–30)
CALCIUM SERPL-MCNC: 8.5 MG/DL (ref 8.5–10.1)
CD19 CELLS # BLD: 289 CELLS/UL (ref 107–698)
CD19 CELLS NFR BLD: 15 % (ref 6–27)
CHLORIDE SERPL-SCNC: 104 MMOL/L (ref 94–109)
CO2 SERPL-SCNC: 30 MMOL/L (ref 20–32)
CREAT SERPL-MCNC: 0.89 MG/DL (ref 0.52–1.04)
DEPRECATED CALCIDIOL+CALCIFEROL SERPL-MC: 78 UG/L (ref 20–75)
DIFFERENTIAL METHOD BLD: NORMAL
EOSINOPHIL # BLD AUTO: 0.4 10E9/L (ref 0–0.7)
EOSINOPHIL NFR BLD AUTO: 4.8 %
ERYTHROCYTE [DISTWIDTH] IN BLOOD BY AUTOMATED COUNT: 12.1 % (ref 10–15)
GFR SERPL CREATININE-BSD FRML MDRD: 68 ML/MIN/1.7M2
GLUCOSE SERPL-MCNC: 88 MG/DL (ref 70–99)
HBV CORE AB SERPL QL IA: NONREACTIVE
HBV SURFACE AB SERPL IA-ACNC: 0.2 M[IU]/ML
HBV SURFACE AG SERPL QL IA: NONREACTIVE
HCT VFR BLD AUTO: 42.5 % (ref 35–47)
HGB BLD-MCNC: 14.3 G/DL (ref 11.7–15.7)
HIV 1+2 AB+HIV1 P24 AG SERPL QL IA: NONREACTIVE
IMM GRANULOCYTES # BLD: 0 10E9/L (ref 0–0.4)
IMM GRANULOCYTES NFR BLD: 0.2 %
LYMPHOCYTES # BLD AUTO: 1.9 10E9/L (ref 0.8–5.3)
LYMPHOCYTES NFR BLD AUTO: 22.6 %
MCH RBC QN AUTO: 29.9 PG (ref 26.5–33)
MCHC RBC AUTO-ENTMCNC: 33.6 G/DL (ref 31.5–36.5)
MCV RBC AUTO: 89 FL (ref 78–100)
MONOCYTES # BLD AUTO: 0.6 10E9/L (ref 0–1.3)
MONOCYTES NFR BLD AUTO: 6.9 %
NEUTROPHILS # BLD AUTO: 5.4 10E9/L (ref 1.6–8.3)
NEUTROPHILS NFR BLD AUTO: 64.7 %
NRBC # BLD AUTO: 0 10*3/UL
NRBC BLD AUTO-RTO: 0 /100
PLATELET # BLD AUTO: 285 10E9/L (ref 150–450)
POTASSIUM SERPL-SCNC: 3.6 MMOL/L (ref 3.4–5.3)
PROT SERPL-MCNC: 7.6 G/DL (ref 6.8–8.8)
RBC # BLD AUTO: 4.78 10E12/L (ref 3.8–5.2)
SODIUM SERPL-SCNC: 138 MMOL/L (ref 133–144)
TSH SERPL DL<=0.005 MIU/L-ACNC: 1.28 MU/L (ref 0.4–4)
VIT B12 SERPL-MCNC: 2444 PG/ML (ref 193–986)
WBC # BLD AUTO: 8.4 10E9/L (ref 4–11)

## 2018-08-22 PROCEDURE — 87340 HEPATITIS B SURFACE AG IA: CPT | Performed by: PSYCHIATRY & NEUROLOGY

## 2018-08-22 PROCEDURE — 36415 COLL VENOUS BLD VENIPUNCTURE: CPT | Performed by: PSYCHIATRY & NEUROLOGY

## 2018-08-22 PROCEDURE — 85025 COMPLETE CBC W/AUTO DIFF WBC: CPT | Performed by: PSYCHIATRY & NEUROLOGY

## 2018-08-22 PROCEDURE — 86355 B CELLS TOTAL COUNT: CPT | Performed by: PSYCHIATRY & NEUROLOGY

## 2018-08-22 PROCEDURE — G0463 HOSPITAL OUTPT CLINIC VISIT: HCPCS | Mod: 25

## 2018-08-22 PROCEDURE — A9585 GADOBUTROL INJECTION: HCPCS | Performed by: PSYCHIATRY & NEUROLOGY

## 2018-08-22 PROCEDURE — 84207 ASSAY OF VITAMIN B-6: CPT | Performed by: PSYCHIATRY & NEUROLOGY

## 2018-08-22 PROCEDURE — 87522 HEPATITIS C REVRS TRNSCRPJ: CPT | Performed by: PSYCHIATRY & NEUROLOGY

## 2018-08-22 PROCEDURE — 84443 ASSAY THYROID STIM HORMONE: CPT | Performed by: PSYCHIATRY & NEUROLOGY

## 2018-08-22 PROCEDURE — 80053 COMPREHEN METABOLIC PANEL: CPT | Performed by: PSYCHIATRY & NEUROLOGY

## 2018-08-22 PROCEDURE — 86706 HEP B SURFACE ANTIBODY: CPT | Performed by: PSYCHIATRY & NEUROLOGY

## 2018-08-22 PROCEDURE — 25000128 H RX IP 250 OP 636: Performed by: PSYCHIATRY & NEUROLOGY

## 2018-08-22 PROCEDURE — 70553 MRI BRAIN STEM W/O & W/DYE: CPT

## 2018-08-22 PROCEDURE — 87389 HIV-1 AG W/HIV-1&-2 AB AG IA: CPT | Performed by: PSYCHIATRY & NEUROLOGY

## 2018-08-22 PROCEDURE — 40000975 ZZHCL STATISTIC JC VIR AB INDEX INHIB: Performed by: PSYCHIATRY & NEUROLOGY

## 2018-08-22 PROCEDURE — 86704 HEP B CORE ANTIBODY TOTAL: CPT | Performed by: PSYCHIATRY & NEUROLOGY

## 2018-08-22 PROCEDURE — 82306 VITAMIN D 25 HYDROXY: CPT | Performed by: PSYCHIATRY & NEUROLOGY

## 2018-08-22 PROCEDURE — 86787 VARICELLA-ZOSTER ANTIBODY: CPT | Performed by: PSYCHIATRY & NEUROLOGY

## 2018-08-22 PROCEDURE — 86480 TB TEST CELL IMMUN MEASURE: CPT | Performed by: PSYCHIATRY & NEUROLOGY

## 2018-08-22 PROCEDURE — 82607 VITAMIN B-12: CPT | Performed by: PSYCHIATRY & NEUROLOGY

## 2018-08-22 RX ORDER — FAMOTIDINE 40 MG/1
40 TABLET, FILM COATED ORAL AT BEDTIME
Qty: 30 TABLET | Refills: 1 | Status: SHIPPED | OUTPATIENT
Start: 2018-08-22 | End: 2019-01-08

## 2018-08-22 RX ORDER — PREDNISONE 20 MG/1
TABLET ORAL
Qty: 24 TABLET | Refills: 0 | Status: SHIPPED | OUTPATIENT
Start: 2018-08-22 | End: 2019-01-08

## 2018-08-22 RX ORDER — PREDNISONE 50 MG/1
1250 TABLET ORAL DAILY
Qty: 75 TABLET | Refills: 0 | Status: SHIPPED | OUTPATIENT
Start: 2018-08-22 | End: 2019-01-08

## 2018-08-22 RX ORDER — GADOBUTROL 604.72 MG/ML
10 INJECTION INTRAVENOUS ONCE
Status: COMPLETED | OUTPATIENT
Start: 2018-08-22 | End: 2018-08-22

## 2018-08-22 RX ADMIN — GADOBUTROL 9 ML: 604.72 INJECTION INTRAVENOUS at 09:11

## 2018-08-22 ASSESSMENT — PAIN SCALES - GENERAL: PAINLEVEL: SEVERE PAIN (7)

## 2018-08-22 NOTE — PATIENT INSTRUCTIONS
Will get blood work    Will treat with 3 days of 25 fifty mg pills per day for 3 days    After completing the high dose steroids, then take three 20mg tabs for 4 days, then take two 20mg tabs for 4 days, then take one 20mg tab for 4 days.    Take pepcid while on the steroids    Will follow up in 2 week    You saw a neurology provider today at the [facility].  You may have also met with the MS RN Care Coordinator.  In order to get a message to your MS Center provider, you should contact [phone] option 3 for the triage nurse line.    You should contact us via this protocol if you have any of the following symptoms:    New or worsening neurologic symptoms that persist for 24-48 hours, such as:  o New onset of pain or marked worsening of pain  o Difficulty with speech, swallowing, or breathing  o New onset of vertigo or dizziness  o Change in bowel or bladder function (incontinence, difficulty urinating)    Increasing difficulty in self care    Marked changes in vision (double vision, blurred vision, graying of vision)    Change in mobility    Change in cognitive function    Falling    Worsening numbness, tingling or pain with a change in function    Worsening fatigue lasting more than 2 weeks  If you had labs completed today, we will contact you with the results.  If you are active in [employer], they will be released to you there.  Otherwise, your results will be provided to you via mail or telephone call.  Some results take up to 2 weeks for completion.  If you haven t heard anything about your lab results within 2 weeks, you can call or send a [employer] message to obtain your results.  If you have an MRI scheduled in the week or two prior to your next appointment, we will go over the results at your scheduled follow up appointment.  If you are not scheduled to see your MS Center provider within about 2 weeks after your MRI, please call or send a [employer] message to obtain your  results if you haven t heard anything within 2 weeks.  Please be aware that it takes at least 5 business days after routine MRIs for your results to be reviewed by both the radiologist and your doctor.  MRIs completed at facilities outside of the [place] system take about 2 weeks in order for the MRI disc to be mailed to our clinic and uploaded into your medical record.    Prescription refills should be faxed to us by your pharmacy.  Our fax number for prescription refills is [phone].  Please do your best to come to your appointments, and to arrive 15 minutes early to allow time for checking in.  [facility] reserves the right to terminate care of established patients if a patient misses three or more appointments in a clinic without providing notification within a 12-month period.    Developing Your Care Team  Individuals living with chronic illnesses like MS may be unaware that they are at risk for the same range of medical problems as everyone else.  This is why you must establish a relationship with other health care providers in addition to your Multiple Sclerosis doctor.  It can be difficult at times to figure out whether a health concern is related to your MS, or whether it is related to something else, such as hormonal changes, pseduoexacerbations, changes in your core body temperature, flu-like reactions to interferons, exercise, or infections.  Urinary tract infections (UTIs) are common culprits that can cause fatigue, weakness, or other  MS attack -like symptoms without classic symptoms of a UTI.  For this reason, if you call or come in to discuss symptoms, you may be asked to get in touch with your primary provider or another specialist, so that you receive the comprehensive care you need.  What is Multiple Sclerosis (MS)?  MS is a disease in which the nerve tissues in the brain and/or spinal cord are attacked by immune cells in the body.  These immune cells are present in  everyone, and their normal role is to fight off infections.  In people with MS, these cells change the way they function and cross into the nervous system.  Once there, they cause inflammation that damages the myelin (or the protective coating of a nerve cell, much like the plastic covering on an electrical cord) and parts of the nerve cell itself.  So far, a clear cause for this immune system dysfunction has not been found.  MS often starts out as the  relapsing-remitting  form.  This means there are episodes when you have symptoms, and other times when you recover to normal or near-normal.  Over time, if the damage to the nervous system continues, the disease can cause additional disability, such as difficulty walking.  If the relapses and nerve damage can be prevented with available medications, many patients with MS can go many years between relapses and have relatively little disability.  Remember: MS is a condition that changes and must be evaluated on an ongoing basis!  What is a Relapse? (Also called flare-ups, attacks, or exacerbations)  Relapses are due to the occurrence of inflammation in some part of the brain and/or spinal cord.  A relapse is new or recurrent symptoms which persist for at least 24 hours and sometimes worsen over 48 hours.  New symptoms need to be  by at least 1 month in order to be considered separate relapses. Most of the time, symptoms reach their maximal intensity within 2 weeks and then begin to slowly resolve.  At times, your symptoms may not recover fully for up to 6 months, depending on the severity of the episode.  The frequency of relapses is generally higher early in the disease, but can vary greatly among individuals with MS.  Improvement of symptoms for an individual is unpredictable with each relapse.    It is important to remember that an increase in symptoms and changes in function may not necessarily be a relapse.  There are other factors that contribute to such  changes, such as hot weather, increased body temperature, infection/illness, stress and sleep deprivation.  The worsening of symptoms may feel like a relapse when in reality it is not.  These episodes are referred to as pseudorelapses.  Once the underlying cause is addressed, symptoms usually fade away and you feel better.  If you experience a worsening of symptoms that lasts more than 48 hours and does not improve with cooling down, decreasing stress, or treatment of an infection, please call us and we can help to better determine whether you are having a pseudorelapse versus a relapse.

## 2018-08-22 NOTE — PROGRESS NOTES
MULTIPLE SCLEROSIS CLINIC AT THE [facility]  FOLLOWUP/ESTABLISHED PATIENT VISIT      PRINCIPAL NEUROLOGIC DIAGNOSIS: Multiple Sclerosis    Date of Onset: 7/2012  Date of Diagnosis: 7/2012  Initial Clinical Course: Progressive  Current Clinical Course: Progressive  Past Disease Modifying Therapy(ies): copaxone  Current Disease Modifying Therapy(ies): None  Most Recent MRI of the Brain: 8/21/18 last one 1/27  Most Recent MRI of the Cervical Cord 2/17  Most Recent MRI of the Thoracic Cord: 2/17  Most Recent Lumbar Puncture: Positive for OCB  Most Recent OCT: NA   Most Recent JCV: NA   Most Recent Remote Hepatitis Panel: NA   Most Recent VZV IgG: NA   Most Recent TB Quant: NA          CHIEF COMPLAINT: Review of diagnostic testing      INTERVAL HISTORY:    The patient reports that she is having more problems with her legs and is having more pain. She reports that her legs are hurting and twiching at night. The patient's pain can be 10 out of 10 on some days. The pain is hurting pain, that is woorse with touch.  She reports that massage does not seems help it. The patient reports that she it significantly improved with medical marijuana. She reports that is takes a lot then, she may be drowsy.  The patient denies any other side effects. The pain is getting worse over time. She reports that her leg is given out on her. The patient reports that she has had a doozen falls since I seen her last. She has not significantly injuried herself with the falls    Issues with current MS therapy: Not on disease modifying therapy    REVIEW OF SYSTEMS:    Mood: unchanged up and down, anxiety.  Spasticity:interupts sleep, painful and worse  Bladder: nocturia times multiple otherwise unchanged  Bowel: unchanged  Pain related to today's visit:reviewed on nursing intake documentation  Fatigue: worse and tried to take naps  Sleep: interrupted  Memory/Concentration: unchanged      Otherwise 10 point ROS was neg other than the  symptoms noted above.    PAST HISTORY was reviewed and updated:      MEDICATIONS and ALLERGIES were reviewed and updated.    SOCIAL HISTORY was reviewed and updated:        EXAM:    PHYSICAL EXAMINATION:   VITAL SIGNS:  B/P: 124/85, T: Data Unavailable, P: 83, R: 20    GENERAL: The patient is a well-nourished  who presents to the evaluation alone.  NEUROLOGIC:   MENTAL STATUS: Alert,awake and  oriented times four.   CRANIAL NERVES:  Visual fields are full to confrontation. The pupils are  round and react to light and there is no Antonio Arlene pupil.  Extraocular movements are  intact with no  internuclear ophthalmoplegia. No nystagmus. Facial strength and sensation are  normal. Hearing is  normal. Palate elevation and tongue protrusion are  normal.   POWER:      Motor     Upper         Right Left   Shoulder Abduction 5 5   Elbow Flexion 5 5   Elbow Extension 5 5   Wrist Extension 5 5   Digit Extension 5 5   Digit Flexion 5 5   APB 5 5   Tone 1+ 1+   Lower          Right Left   Hip Flexion 5 5   Knee Extension 5 5   Knee Flexion 5 5   Foot Dorsiflexion 5 5   Foot Plantar Flexion 5 5   EH 5 5   Toe Flexion 5 5   Tone 1+ 1+             Grade Description   0 No increase in muscle tone   1 Slight increase in muscle tone, manifested by a catch and release or by minimal resistance at the end of the range of motion when the affected part(s) is moved in flexion or extension   1+ Slight increase in muscle tone, manifested by a catch, followed by minimal resistance throughout the remainder (less than half) of the ROM   2 More marked increase in muscle tone through most of the ROM, but affected part(s) easily moved   3 Considerable increase in muscle tone, passive movement difficult   4 Affected part(s) rigid in flexion or extension               SENSORY:      Light touch:  Intact in all extremities     MOTOR/CEREBELLAR:     Right Left   RRM 0 Normal 0 Normal   MAGDA 0 Normal 0 Normal   FTN 0 Normal 0 Normal   RRM 0 Normal 0 Normal    HKS 0 Normal 0 Normal               GAIT: Gait is  narrow-based and steady and the patient is  able to walk on heels, toes and in tandem without difficulty.    Romberg: Sways with eye(s)  closed      RESULTS:  Monitoring labs:    No visits with results within 6 Month(s) from this visit.  Latest known visit with results is:    Office Visit on 02/07/2018   Component Date Value Ref Range Status     WBC 02/07/2018 8.6  4.0 - 11.0 10e9/L Final     RBC Count 02/07/2018 4.47  3.8 - 5.2 10e12/L Final     Hemoglobin 02/07/2018 13.5  11.7 - 15.7 g/dL Final     Hematocrit 02/07/2018 40.0  35.0 - 47.0 % Final     MCV 02/07/2018 90  78 - 100 fl Final     MCH 02/07/2018 30.2  26.5 - 33.0 pg Final     MCHC 02/07/2018 33.8  31.5 - 36.5 g/dL Final     RDW 02/07/2018 11.9  10.0 - 15.0 % Final     Platelet Count 02/07/2018 257  150 - 450 10e9/L Final     Diff Method 02/07/2018 Automated Method   Final     % Neutrophils 02/07/2018 64.3  % Final     % Lymphocytes 02/07/2018 19.4  % Final     % Monocytes 02/07/2018 7.7  % Final     % Eosinophils 02/07/2018 7.7  % Final     % Basophils 02/07/2018 0.5  % Final     % Immature Granulocytes 02/07/2018 0.4  % Final     Nucleated RBCs 02/07/2018 0  0 /100 Final     Absolute Neutrophil 02/07/2018 5.5  1.6 - 8.3 10e9/L Final     Absolute Lymphocytes 02/07/2018 1.7  0.8 - 5.3 10e9/L Final     Absolute Monocytes 02/07/2018 0.7  0.0 - 1.3 10e9/L Final     Absolute Eosinophils 02/07/2018 0.7  0.0 - 0.7 10e9/L Final     Absolute Basophils 02/07/2018 0.0  0.0 - 0.2 10e9/L Final     Abs Immature Granulocytes 02/07/2018 0.0  0 - 0.4 10e9/L Final     Absolute Nucleated RBC 02/07/2018 0.0   Final     Sodium 02/07/2018 139  133 - 144 mmol/L Final     Potassium 02/07/2018 4.1  3.4 - 5.3 mmol/L Final     Chloride 02/07/2018 103  94 - 109 mmol/L Final     Carbon Dioxide 02/07/2018 30  20 - 32 mmol/L Final     Anion Gap 02/07/2018 5  3 - 14 mmol/L Final     Glucose 02/07/2018 93  70 - 99 mg/dL Final     Urea  Nitrogen 02/07/2018 18  7 - 30 mg/dL Final     Creatinine 02/07/2018 0.86  0.52 - 1.04 mg/dL Final     GFR Estimate 02/07/2018 70  >60 mL/min/1.7m2 Final     GFR Estimate If Black 02/07/2018 85  >60 mL/min/1.7m2 Final     Calcium 02/07/2018 8.9  8.5 - 10.1 mg/dL Final     Bilirubin Total 02/07/2018 0.3  0.2 - 1.3 mg/dL Final     Albumin 02/07/2018 3.7  3.4 - 5.0 g/dL Final     Protein Total 02/07/2018 7.8  6.8 - 8.8 g/dL Final     Alkaline Phosphatase 02/07/2018 65  40 - 150 U/L Final     ALT 02/07/2018 20  0 - 50 U/L Final     AST 02/07/2018 15  0 - 45 U/L Final     Hepatitis B Core Caroline 02/07/2018 Nonreactive  NR^Nonreactive Final     Hepatitis B Surface Antibody 02/07/2018 0.71  <8.00 m[IU]/mL Final     Hep B Surface Agn 02/07/2018 Nonreactive  NR^Nonreactive Final     HCV RNA Quant IU/ml 02/07/2018 HCV RNA Not Detected  HCVND^HCV RNA Not Detected [IU]/mL Final     Log of HCV RNA Qt 02/07/2018 Not Calculated  <1.2 Log IU/mL Final     Lab Scanned Result 02/07/2018 VICKIE VIR AB INDEX REFLEX-Scanned*  Final     M Tuberculosis Result 02/07/2018 Negative  NEG^Negative Final     M Tuberculosis Antigen Value 02/07/2018 0.25  IU/mL Final     Varicella Zoster Virus Antibody IgG 02/07/2018 3.2* 0.0 - 0.8 AI Final     Vitamin D Deficiency screening 02/07/2018 109* 20 - 75 ug/L Final     TSH 02/07/2018 2.74  0.40 - 4.00 mU/L Final   ]      MRI brain:    MRI Dated 8/22/18:  Mild to moderate  T2 disease burden with 1 enhancion lesion(s).  compared to MRI on 2/6/18 there are 2 new T2 lesion(s).    ASSESSMENT/PLAN:  The patient is a 47-year-old woman with a past medical history relapsing remitting multiple sclerosis who is presenting today as a follow-up.  The patient has clear evidence of disease activity on MRI.  Given her size of enhancing lesion and symptoms are in her right legs, I will treat the patient with high-dose oral steroids for 3 days.  After which, I will have the patient be on a taper.  I encouraged the patient to  take Pepcid while on the high dose.  I will check screening labs to determine what disease find therapy would be safe in her.  Tentatively, Ocrevus would be the best answer for her given her primary progressive course of her disease.  I will tentatively follow her up in 10 days to review her treatment options.  I instructed the patient to call me if she has any questions or concerns.  I spent some time reviewing her MRI and the potential risk and side effects of high-dose oral steroids.      Prednisone 1250mg for 3 days  After completing the high dose steroids, then take three 20mg tabs for 4 days, then take two 20mg tabs for 4 days, then take one 20mg tab for 4 days.  pepcid 40mg  CMP, CBC +diff, VZV IgG, JCV+index, remote hepatitis panel, TB quant, Vitamin D, Vitamin b12, folate, TSH, and copper   Follow up in 10 days        I spent 25 minutes in this visit, with >50% direct patient time spent counseling about prognosis, treatment options, and coordination of care.    Pietro Flores MD [facility]  Staff Neurologist   08/22/18       (Chart documentation was completed in part with Dragon voice-recognition software. Even though reviewed, some grammatical, spelling, and word errors may remain.)

## 2018-08-22 NOTE — MR AVS SNAPSHOT
After Visit Summary   8/22/2018    Chayo Reid    MRN: [id number]           Patient Information     Date Of Birth          1970        Visit Information        Provider Department      8/22/2018 11:00 AM Pietro Flores MD [facility] Multiple Sclerosis        Today's Diagnoses     MS (multiple sclerosis) (H)    -  1      Care Instructions    Will get blood work    Will treat with 3 days of 25 fifty mg pills per day for 3 days    After completing the high dose steroids, then take three 20mg tabs for 4 days, then take two 20mg tabs for 4 days, then take one 20mg tab for 4 days.    Take pepcid while on the steroids    Will follow up in 2 week    You saw a neurology provider today at the [facility].  You may have also met with the MS RN Care Coordinator.  In order to get a message to your MS Center provider, you should contact [phone] option 3 for the triage nurse line.    You should contact us via this protocol if you have any of the following symptoms:    New or worsening neurologic symptoms that persist for 24-48 hours, such as:  o New onset of pain or marked worsening of pain  o Difficulty with speech, swallowing, or breathing  o New onset of vertigo or dizziness  o Change in bowel or bladder function (incontinence, difficulty urinating)    Increasing difficulty in self care    Marked changes in vision (double vision, blurred vision, graying of vision)    Change in mobility    Change in cognitive function    Falling    Worsening numbness, tingling or pain with a change in function    Worsening fatigue lasting more than 2 weeks  If you had labs completed today, we will contact you with the results.  If you are active in [employer], they will be released to you there.  Otherwise, your results will be provided to you via mail or telephone call.  Some results take up to 2 weeks for completion.  If you haven t heard anything about your lab results  within 2 weeks, you can call or send a [employer] message to obtain your results.  If you have an MRI scheduled in the week or two prior to your next appointment, we will go over the results at your scheduled follow up appointment.  If you are not scheduled to see your MS Center provider within about 2 weeks after your MRI, please call or send a [employer] message to obtain your results if you haven t heard anything within 2 weeks.  Please be aware that it takes at least 5 business days after routine MRIs for your results to be reviewed by both the radiologist and your doctor.  MRIs completed at facilities outside of the [place] system take about 2 weeks in order for the MRI disc to be mailed to our clinic and uploaded into your medical record.    Prescription refills should be faxed to us by your pharmacy.  Our fax number for prescription refills is [phone].  Please do your best to come to your appointments, and to arrive 15 minutes early to allow time for checking in.  [facility] reserves the right to terminate care of established patients if a patient misses three or more appointments in a clinic without providing notification within a 12-month period.    Developing Your Care Team  Individuals living with chronic illnesses like MS may be unaware that they are at risk for the same range of medical problems as everyone else.  This is why you must establish a relationship with other health care providers in addition to your Multiple Sclerosis doctor.  It can be difficult at times to figure out whether a health concern is related to your MS, or whether it is related to something else, such as hormonal changes, pseduoexacerbations, changes in your core body temperature, flu-like reactions to interferons, exercise, or infections.  Urinary tract infections (UTIs) are common culprits that can cause fatigue, weakness, or other  MS attack -like symptoms without classic symptoms of a UTI.  For this  reason, if you call or come in to discuss symptoms, you may be asked to get in touch with your primary provider or another specialist, so that you receive the comprehensive care you need.  What is Multiple Sclerosis (MS)?  MS is a disease in which the nerve tissues in the brain and/or spinal cord are attacked by immune cells in the body.  These immune cells are present in everyone, and their normal role is to fight off infections.  In people with MS, these cells change the way they function and cross into the nervous system.  Once there, they cause inflammation that damages the myelin (or the protective coating of a nerve cell, much like the plastic covering on an electrical cord) and parts of the nerve cell itself.  So far, a clear cause for this immune system dysfunction has not been found.  MS often starts out as the  relapsing-remitting  form.  This means there are episodes when you have symptoms, and other times when you recover to normal or near-normal.  Over time, if the damage to the nervous system continues, the disease can cause additional disability, such as difficulty walking.  If the relapses and nerve damage can be prevented with available medications, many patients with MS can go many years between relapses and have relatively little disability.  Remember: MS is a condition that changes and must be evaluated on an ongoing basis!  What is a Relapse? (Also called flare-ups, attacks, or exacerbations)  Relapses are due to the occurrence of inflammation in some part of the brain and/or spinal cord.  A relapse is new or recurrent symptoms which persist for at least 24 hours and sometimes worsen over 48 hours.  New symptoms need to be  by at least 1 month in order to be considered separate relapses. Most of the time, symptoms reach their maximal intensity within 2 weeks and then begin to slowly resolve.  At times, your symptoms may not recover fully for up to 6 months, depending on the severity of  the episode.  The frequency of relapses is generally higher early in the disease, but can vary greatly among individuals with MS.  Improvement of symptoms for an individual is unpredictable with each relapse.    It is important to remember that an increase in symptoms and changes in function may not necessarily be a relapse.  There are other factors that contribute to such changes, such as hot weather, increased body temperature, infection/illness, stress and sleep deprivation.  The worsening of symptoms may feel like a relapse when in reality it is not.  These episodes are referred to as pseudorelapses.  Once the underlying cause is addressed, symptoms usually fade away and you feel better.  If you experience a worsening of symptoms that lasts more than 48 hours and does not improve with cooling down, decreasing stress, or treatment of an infection, please call us and we can help to better determine whether you are having a pseudorelapse versus a relapse.                Follow-ups after your visit        Follow-up notes from your care team     Return in about 10 days (around 9/1/2018).      Your next 10 appointments already scheduled     Sep 05, 2018 10:30 AM CDT   (Arrive by 10:15 AM)   Return Multiple Sclerosis with Pietro Flores MD   [facility] ([facility])    [address] [place] [address]  [address] [place] [id number] [phone]              Future tests that were ordered for you today     Open Future Orders        Priority Expected Expires Ordered    CD19 B Cell Count Routine  8/23/2019 8/22/2018            Who to contact     If you have questions or need follow up information about today's clinic visit or your schedule please contact [facility] directly at [phone].  Normal or non-critical lab and imaging results will be communicated to you by MyChart, letter or phone within 4 business days after the clinic has received the results. If  "you do not hear from us within 7 days, please contact the clinic through [employer] or phone. If you have a critical or abnormal lab result, we will notify you by phone as soon as possible.  Submit refill requests through [employer] or call your pharmacy and they will forward the refill request to us. Please allow 3 business days for your refill to be completed.          Additional Information About Your Visit        [employer] Information     [employer] gives you secure access to your electronic health record. If you see a primary care provider, you can also send messages to your care team and make appointments. If you have questions, please call your primary care clinic.  If you do not have a primary care provider, please call [phone] and they will assist you.        Care EveryWhere ID     This is your Care EveryWhere ID. This could be used by other organizations to access your [place] medical records  [id number]        Your Vitals Were     Pulse Respirations Height Pulse Oximetry BMI (Body Mass Index)       83 20 1.676 m (5' 6\") 98% 30.6 kg/m2        Blood Pressure from Last 3 Encounters:   08/22/18 124/85   02/07/18 132/86   11/21/17 139/88    Weight from Last 3 Encounters:   08/22/18 86 kg (189 lb 9.6 oz)   02/07/18 90.9 kg (200 lb 6.4 oz)   09/13/17 90.4 kg (199 lb 3.2 oz)              We Performed the Following     CBC with platelets differential     Comprehensive metabolic panel     Hepatitis B core antibody     Hepatitis B Surface Antibody     Hepatitis B surface antigen     Hepatitis C RNA quantitative     HIV Antigen Antibody Combo     VICKIE Virus Ab Index Reflex Inhibition     M Tuberculosis by Quantiferon     TSH with free T4 reflex     Varicella Zoster Virus Antibody IgG     Vitamin B12     Vitamin B6     Vitamin D Deficiency          Today's Medication Changes          These changes are accurate as of 8/22/18 11:26 AM.  If you have any questions, ask your nurse or doctor.               Start taking these " medicines.        Dose/Directions    famotidine 40 MG tablet   Commonly known as:  PEPCID   Used for:  MS (multiple sclerosis) (H)   Started by:  Pietro Flores MD        Dose:  40 mg   Take 1 tablet (40 mg) by mouth At Bedtime   Quantity:  30 tablet   Refills:  1       * predniSONE 20 MG tablet   Commonly known as:  DELTASONE   Used for:  MS (multiple sclerosis) (H)   Started by:  Pietro Flores MD        Afterthe high dose steroids, then take three 20mg tabs for 4 days, then take two 20mg tabs for 4 days, then take one 20mg tab for 4 days.   Quantity:  24 tablet   Refills:  0       * predniSONE 50 MG tablet   Commonly known as:  DELTASONE   Used for:  MS (multiple sclerosis) (H)   Started by:  Pietro Flores MD        Dose:  1250 mg   Take 25 tablets (1,250 mg) by mouth daily   Quantity:  75 tablet   Refills:  0       * Notice:  This list has 2 medication(s) that are the same as other medications prescribed for you. Read the directions carefully, and ask your doctor or other care provider to review them with you.         Where to get your medicines      These medications were sent to [place] [address] [place] [address] [address]     Phone:  [phone]     famotidine 40 MG tablet    predniSONE 20 MG tablet    predniSONE 50 MG tablet                Primary Care Provider Fax #    Physician No Ref-Primary [phone]       No address on file        Equal Access to Services     FAN ZAVALA AH: Hadgiorgi Sarmiento, waaxda luqadaha, qaybta kaalmada adecarmela, natalia willis . So [facility] [phone].    ATENCIÓN: Si habla español, tiene a hall disposición servicios gratuitos de asistencia lingüística. Llame al [phone].    We comply with applicable federal civil rights laws and [place] laws. We do not discriminate on the basis of race, color, national origin, age, disability, sex, sexual orientation, or gender  identity.            Thank you!     Thank you for choosing [facility]  for your care. Our goal is always to provide you with excellent care. Hearing back from our patients is one way we can continue to improve our services. Please take a few minutes to complete the written survey that you may receive in the mail after your visit with us. Thank you!             Your Updated Medication List - Protect others around you: Learn how to safely use, store and throw away your medicines at www.disposemymeds.org.          This list is accurate as of 8/22/18 11:26 AM.  Always use your most recent med list.                   Brand Name Dispense Instructions for use Diagnosis    amitriptyline 25 MG tablet    ELAVIL    90 tablet    Take 3 tablets (75 mg) by mouth At Bedtime    Myofascial pain syndrome, Sleep disorder       * amphetamine-dextroamphetamine 20 MG per 24 hr capsule    ADDERALL XR    60 capsule    Take 1 capsule (20 mg) by mouth 2 times daily    MS (multiple sclerosis) (H), Other fatigue       * amphetamine-dextroamphetamine 30 MG per 24 hr capsule    ADDERALL XR    30 capsule    Take 1 capsule (30 mg) by mouth daily    Other fatigue, MS (multiple sclerosis) (H)       baclofen 10 MG tablet    LIORESAL    60 tablet    Take 1 tablet (10 mg) by mouth 2 times daily    MS (multiple sclerosis) (H)       cyclobenzaprine 5 MG tablet    FLEXERIL    30 tablet    Take 1 tablet (5 mg) by mouth 2 times daily    Pain of right lower extremity       DULoxetine 60 MG EC capsule    CYMBALTA    30 capsule    Take 1 capsule (60 mg) by mouth daily    Lumbar radiculopathy       famotidine 40 MG tablet    PEPCID    30 tablet    Take 1 tablet (40 mg) by mouth At Bedtime    MS (multiple sclerosis) (H)       ferrous sulfate 325 (65 Fe) MG tablet    IRON     Take 1 tablet by mouth daily        Glatiramer Acetate 40 MG/ML Sosy    COPAXONE    12 Syringe    Inject 40 mg Subcutaneous three times a week    MS (multiple sclerosis) (H)        medical cannabis (Patient's own supply.  Not a prescription)      See Admin Instructions (This is NOT a prescription, and does not certify that the patient has a qualifying medical condition for medical cannabis.  The purpose of this order is  to document that the patient reports taking medical cannabis.)        modafinil 200 MG tablet    PROVIGIL    30 tablet    Take 1 tablet (200 mg) by mouth daily    Fatigue       oxybutynin 10 MG 24 hr tablet    DITROPAN-XL    30 tablet    TAKE 1 TABLET BY MOUTH ONE HOUR PRIOR TO BED.    MS (multiple sclerosis) (H), Neurogenic bladder       * predniSONE 20 MG tablet    DELTASONE    24 tablet    Afterthe high dose steroids, then take three 20mg tabs for 4 days, then take two 20mg tabs for 4 days, then take one 20mg tab for 4 days.    MS (multiple sclerosis) (H)       * predniSONE 50 MG tablet    DELTASONE    75 tablet    Take 25 tablets (1,250 mg) by mouth daily    MS (multiple sclerosis) (H)       pregabalin 50 MG capsule    LYRICA    270 capsule    Take 1 capsule (50 mg) by mouth 3 times daily    MS (multiple sclerosis) (H)       vitamin D 1000 units capsule      Take 1 capsule by mouth daily        * Notice:  This list has 4 medication(s) that are the same as other medications prescribed for you. Read the directions carefully, and ask your doctor or other care provider to review them with you.

## 2018-08-22 NOTE — LETTER
8/22/2018       RE: Chayo Reid  [address]  [facility] [address]     Dear Colleague,    Thank you for referring your patient, Chayo Reid, to the [facility] at [facility]. Please see a copy of my visit note below.    [facility]  FOLLOWUP/ESTABLISHED PATIENT VISIT      PRINCIPAL NEUROLOGIC DIAGNOSIS: Multiple Sclerosis    Date of Onset: 7/2012  Date of Diagnosis: 7/2012  Initial Clinical Course: Progressive  Current Clinical Course: Progressive  Past Disease Modifying Therapy(ies): copaxone  Current Disease Modifying Therapy(ies): None  Most Recent MRI of the Brain: 8/21/18 last one 1/27  Most Recent MRI of the Cervical Cord 2/17  Most Recent MRI of the Thoracic Cord: 2/17  Most Recent Lumbar Puncture: Positive for OCB  Most Recent OCT: NA   Most Recent JCV: NA   Most Recent Remote Hepatitis Panel: NA   Most Recent VZV IgG: NA   Most Recent TB Quant: NA          CHIEF COMPLAINT: Review of diagnostic testing      INTERVAL HISTORY:    The patient reports that she is having more problems with her legs and is having more pain. She reports that her legs are hurting and twiching at night. The patient's pain can be 10 out of 10 on some days. The pain is hurting pain, that is woorse with touch.  She reports that massage does not seems help it. The patient reports that she it significantly improved with medical marijuana. She reports that is takes a lot then, she may be drowsy.  The patient denies any other side effects. The pain is getting worse over time. She reports that her leg is given out on her. The patient reports that she has had a doozen falls since I seen her last. She has not significantly injuried herself with the falls    Issues with current MS therapy: Not on disease modifying therapy    PAST HISTORY was reviewed and updated:      MEDICATIONS and ALLERGIES were reviewed and updated.    SOCIAL  HISTORY was reviewed and updated:        EXAM:    PHYSICAL EXAMINATION:   VITAL SIGNS:  B/P: 124/85, T: Data Unavailable, P: 83, R: 20    GENERAL: The patient is a well-nourished  who presents to the evaluation alone.  NEUROLOGIC:   MENTAL STATUS: Alert,awake and  oriented times four.   CRANIAL NERVES:  Visual fields are full to confrontation. The pupils are  round and react to light and there is no Antonio Arlene pupil.  Extraocular movements are  intact with no  internuclear ophthalmoplegia. No nystagmus. Facial strength and sensation are  normal. Hearing is  normal. Palate elevation and tongue protrusion are  normal.   POWER:      Motor     Upper         Right Left   Shoulder Abduction 5 5   Elbow Flexion 5 5   Elbow Extension 5 5   Wrist Extension 5 5   Digit Extension 5 5   Digit Flexion 5 5   APB 5 5   Tone 1+ 1+   Lower          Right Left   Hip Flexion 5 5   Knee Extension 5 5   Knee Flexion 5 5   Foot Dorsiflexion 5 5   Foot Plantar Flexion 5 5   EH 5 5   Toe Flexion 5 5   Tone 1+ 1+             Grade Description   0 No increase in muscle tone   1 Slight increase in muscle tone, manifested by a catch and release or by minimal resistance at the end of the range of motion when the affected part(s) is moved in flexion or extension   1+ Slight increase in muscle tone, manifested by a catch, followed by minimal resistance throughout the remainder (less than half) of the ROM   2 More marked increase in muscle tone through most of the ROM, but affected part(s) easily moved   3 Considerable increase in muscle tone, passive movement difficult   4 Affected part(s) rigid in flexion or extension               SENSORY:      Light touch:  Intact in all extremities     MOTOR/CEREBELLAR:     Right Left   RRM 0 Normal 0 Normal   MAGDA 0 Normal 0 Normal   FTN 0 Normal 0 Normal   RRM 0 Normal 0 Normal   HKS 0 Normal 0 Normal               GAIT: Gait is  narrow-based and steady and the patient is  able to walk on heels, toes and in  tandem without difficulty.    Romberg: Sways with eye(s)  closed      RESULTS:  Monitoring labs:    No visits with results within 6 Month(s) from this visit.  Latest known visit with results is:    Office Visit on 02/07/2018   Component Date Value Ref Range Status     WBC 02/07/2018 8.6  4.0 - 11.0 10e9/L Final     RBC Count 02/07/2018 4.47  3.8 - 5.2 10e12/L Final     Hemoglobin 02/07/2018 13.5  11.7 - 15.7 g/dL Final     Hematocrit 02/07/2018 40.0  35.0 - 47.0 % Final     MCV 02/07/2018 90  78 - 100 fl Final     MCH 02/07/2018 30.2  26.5 - 33.0 pg Final     MCHC 02/07/2018 33.8  31.5 - 36.5 g/dL Final     RDW 02/07/2018 11.9  10.0 - 15.0 % Final     Platelet Count 02/07/2018 257  150 - 450 10e9/L Final     Diff Method 02/07/2018 Automated Method   Final     % Neutrophils 02/07/2018 64.3  % Final     % Lymphocytes 02/07/2018 19.4  % Final     % Monocytes 02/07/2018 7.7  % Final     % Eosinophils 02/07/2018 7.7  % Final     % Basophils 02/07/2018 0.5  % Final     % Immature Granulocytes 02/07/2018 0.4  % Final     Nucleated RBCs 02/07/2018 0  0 /100 Final     Absolute Neutrophil 02/07/2018 5.5  1.6 - 8.3 10e9/L Final     Absolute Lymphocytes 02/07/2018 1.7  0.8 - 5.3 10e9/L Final     Absolute Monocytes 02/07/2018 0.7  0.0 - 1.3 10e9/L Final     Absolute Eosinophils 02/07/2018 0.7  0.0 - 0.7 10e9/L Final     Absolute Basophils 02/07/2018 0.0  0.0 - 0.2 10e9/L Final     Abs Immature Granulocytes 02/07/2018 0.0  0 - 0.4 10e9/L Final     Absolute Nucleated RBC 02/07/2018 0.0   Final     Sodium 02/07/2018 139  133 - 144 mmol/L Final     Potassium 02/07/2018 4.1  3.4 - 5.3 mmol/L Final     Chloride 02/07/2018 103  94 - 109 mmol/L Final     Carbon Dioxide 02/07/2018 30  20 - 32 mmol/L Final     Anion Gap 02/07/2018 5  3 - 14 mmol/L Final     Glucose 02/07/2018 93  70 - 99 mg/dL Final     Urea Nitrogen 02/07/2018 18  7 - 30 mg/dL Final     Creatinine 02/07/2018 0.86  0.52 - 1.04 mg/dL Final     GFR Estimate 02/07/2018 70   >60 mL/min/1.7m2 Final     GFR Estimate If Black 02/07/2018 85  >60 mL/min/1.7m2 Final     Calcium 02/07/2018 8.9  8.5 - 10.1 mg/dL Final     Bilirubin Total 02/07/2018 0.3  0.2 - 1.3 mg/dL Final     Albumin 02/07/2018 3.7  3.4 - 5.0 g/dL Final     Protein Total 02/07/2018 7.8  6.8 - 8.8 g/dL Final     Alkaline Phosphatase 02/07/2018 65  40 - 150 U/L Final     ALT 02/07/2018 20  0 - 50 U/L Final     AST 02/07/2018 15  0 - 45 U/L Final     Hepatitis B Core Caroline 02/07/2018 Nonreactive  NR^Nonreactive Final     Hepatitis B Surface Antibody 02/07/2018 0.71  <8.00 m[IU]/mL Final     Hep B Surface Agn 02/07/2018 Nonreactive  NR^Nonreactive Final     HCV RNA Quant IU/ml 02/07/2018 HCV RNA Not Detected  HCVND^HCV RNA Not Detected [IU]/mL Final     Log of HCV RNA Qt 02/07/2018 Not Calculated  <1.2 Log IU/mL Final     Lab Scanned Result 02/07/2018 VICKIE VIR AB INDEX REFLEX-Scanned*  Final     M Tuberculosis Result 02/07/2018 Negative  NEG^Negative Final     M Tuberculosis Antigen Value 02/07/2018 0.25  IU/mL Final     Varicella Zoster Virus Antibody IgG 02/07/2018 3.2* 0.0 - 0.8 AI Final     Vitamin D Deficiency screening 02/07/2018 109* 20 - 75 ug/L Final     TSH 02/07/2018 2.74  0.40 - 4.00 mU/L Final   ]      MRI brain:    MRI Dated 8/22/18:  Mild to moderate  T2 disease burden with 1 enhancion lesion(s).  compared to MRI on 2/6/18 there are 2 new T2 lesion(s).    ASSESSMENT/PLAN:  The patient is a 47-year-old woman with a past medical history relapsing remitting multiple sclerosis who is presenting today as a follow-up.  The patient has clear evidence of disease activity on MRI.  Given her size of enhancing lesion and symptoms are in her right legs, I will treat the patient with high-dose oral steroids for 3 days.  After which, I will have the patient be on a taper.  I encouraged the patient to take Pepcid while on the high dose.  I will check screening labs to determine what disease find therapy would be safe in her.   Tentatively, Ocrevus would be the best answer for her given her primary progressive course of her disease.  I will tentatively follow her up in 10 days to review her treatment options.  I instructed the patient to call me if she has any questions or concerns.  I spent some time reviewing her MRI and the potential risk and side effects of high-dose oral steroids.      Prednisone 1250mg for 3 days  After completing the high dose steroids, then take three 20mg tabs for 4 days, then take two 20mg tabs for 4 days, then take one 20mg tab for 4 days.  pepcid 40mg  CMP, CBC +diff, VZV IgG, JCV+index, remote hepatitis panel, TB quant, Vitamin D, Vitamin b12, folate, TSH, and copper   Follow up in 10 days        I spent 25 minutes in this visit, with >50% direct patient time spent counseling about prognosis, treatment options, and coordination of care.    Pietro Flores MD [facility]  Staff Neurologist   08/22/18       (Chart documentation was completed in part with Dragon voice-recognition software. Even though reviewed, some grammatical, spelling, and word errors may remain.)

## 2018-08-23 ENCOUNTER — TELEPHONE (OUTPATIENT)
Dept: NEUROLOGY | Facility: CLINIC | Age: 48
End: 2018-08-23

## 2018-08-23 LAB
M TB TUBERC IFN-G BLD QL: NEGATIVE
M TB TUBERC IFN-G/MITOGEN IGNF BLD: 0.13 IU/ML
VZV IGG SER QL IA: 3.5 AI (ref 0–0.8)

## 2018-08-23 NOTE — TELEPHONE ENCOUNTER
Famotidine 40mg dose tablets were sent to patient's pharmacy yesterday.    Polly Rodríguez MS RN Care Coordinator

## 2018-08-23 NOTE — TELEPHONE ENCOUNTER
[facility] Call Center    Phone Message    May a detailed message be left on voicemail: yes    Reason for Call: Other: Per call from PT the pharmacy is requesting approval for famotidine (PEPCID) 40 MG tablet for the higher dose.  PT can be reached at the above # with any questions.     Action Taken: Message routed to:  [facility] ([facility] Neurology

## 2018-08-24 LAB
HCV RNA SERPL NAA+PROBE-ACNC: NORMAL [IU]/ML
HCV RNA SERPL NAA+PROBE-LOG IU: NORMAL LOG IU/ML

## 2018-08-25 LAB — VIT B6 SERPL-MCNC: 20.7 NMOL/L (ref 20–125)

## 2018-08-29 LAB — LAB SCANNED RESULT: ABNORMAL

## 2018-09-05 ENCOUNTER — OFFICE VISIT (OUTPATIENT)
Dept: NEUROLOGY | Facility: CLINIC | Age: 48
End: 2018-09-05
Attending: PSYCHIATRY & NEUROLOGY
Payer: COMMERCIAL

## 2018-09-05 ENCOUNTER — MEDICAL CORRESPONDENCE (OUTPATIENT)
Dept: HEALTH INFORMATION MANAGEMENT | Facility: CLINIC | Age: 48
End: 2018-09-05

## 2018-09-05 VITALS
HEIGHT: 66 IN | DIASTOLIC BLOOD PRESSURE: 82 MMHG | RESPIRATION RATE: 18 BRPM | WEIGHT: 189.8 LBS | HEART RATE: 61 BPM | BODY MASS INDEX: 30.5 KG/M2 | OXYGEN SATURATION: 99 % | TEMPERATURE: 98 F | SYSTOLIC BLOOD PRESSURE: 131 MMHG

## 2018-09-05 DIAGNOSIS — G35 MS (MULTIPLE SCLEROSIS) (H): Primary | ICD-10-CM

## 2018-09-05 PROCEDURE — G0463 HOSPITAL OUTPT CLINIC VISIT: HCPCS | Mod: ZF

## 2018-09-05 RX ORDER — DIVALPROEX SODIUM 500 MG/1
TABLET, EXTENDED RELEASE ORAL
Qty: 60 TABLET | Refills: 3 | Status: SHIPPED | OUTPATIENT
Start: 2018-09-05 | End: 2019-01-08

## 2018-09-05 ASSESSMENT — PAIN SCALES - GENERAL: PAINLEVEL: EXTREME PAIN (8)

## 2018-09-05 NOTE — PATIENT INSTRUCTIONS
Start depakote     Take 1 tab at night for one week, then If tolerated take 2 tabs at bed time  Call me in one month to let me know how it goes    Will get monthly blood work while on depakote    Will started Ocrevus    Will follow up in 3 months      Patient Education    Divalproex Sodium Gastro-resistant tablet    Divalproex Sodium Oral capsule, gastro-resistant sprinkles    Divalproex Sodium Oral tablet, extended-release    Valproate Sodium Solution for injection    Valproic Acid Oral capsule    Valproic Acid Oral capsule, gastro-resistant    Valproic Acid Oral solution  Divalproex Sodium Gastro-resistant tablet  What is this medicine?  DIVALPROEX SODIUM (dye SKYLER pro ex SO barbara um) is used to prevent seizures caused by some forms of epilepsy. It is also used to treat bipolar lobito and to prevent migraine headaches.  This medicine may be used for other purposes; ask your health care provider or pharmacist if you have questions.  What should I tell my health care provider before I take this medicine?  They need to know if you have any of these conditions:    blood disease    brain damage or disease    kidney disease    liver disease    low blood proteins    mitochondrial disease    suicidal thoughts, plans, or attempt; a previous suicide attempt by you or a family member    urea cycle disorder (UCD)    an unusual or allergic reaction to divalproex sodium, other medicines, foods, dyes, or preservatives    pregnant or trying to get pregnant    breast-feeding  How should I use this medicine?  Take this medicine by mouth with a drink of water. Follow the directions on the prescription label. Do not crush or chew. If this medicine upsets your stomach, take it with food or milk. Take your medicine at regular intervals. Do not take it more often than directed.  Talk to your pediatrician regarding the use of this medicine in children. Special care may be needed.  Overdosage: If you think you have taken too much of this  medicine contact a poison control center or emergency room at once.  NOTE: This medicine is only for you. Do not share this medicine with others.  What if I miss a dose?  If you miss a dose, take it as soon as you can. If it is almost time for your next dose, take only that dose. Do not take double or extra doses.  What may interact with this medicine?    aspirin    barbiturates, like phenobarbital    diazepam    isoniazid    medicines for depression, anxiety, or psychotic disturbances    medicines that treat or prevent blood clots like warfarin    meropenem    other seizure medicines    rifampin    tolbutamide    zidovudine  This list may not describe all possible interactions. Give your health care provider a list of all the medicines, herbs, non-prescription drugs, or dietary supplements you use. Also tell them if you smoke, drink alcohol, or use illegal drugs. Some items may interact with your medicine.  What should I watch for while using this medicine?  Visit your doctor or health care professional for regular checks on your progress. If you are taking this medicine to treat epilepsy (seizures), do not stop taking it suddenly. This increases the risk of seizures. Wear a medical identification bracelet or chain to say you have epilepsy or seizures, and carry a card that lists all your medicines.  You may get drowsy, dizzy, or have blurred vision. Do not drive, use machinery, or do anything that needs mental alertness until you know how this medicine affects you. To reduce dizzy or fainting spells, do not sit or stand up quickly, especially if you are an older patient. Alcohol can increase drowsiness and dizziness. Avoid alcoholic drinks.  This medicine can cause blood problems. This can mean slow healing and a risk of infection. Problems can arise if you need dental work, and in the day to day care of your teeth. Try to avoid damage to your teeth and gums when you brush or floss your teeth.  This medicine can  make you more sensitive to the sun. Keep out of the sun. If you cannot avoid being in the sun, wear protective clothing and use sunscreen. Do not use sun lamps or tanning beds/booths.  The use of this medicine may increase the chance of suicidal thoughts or actions. Pay special attention to how you are responding while on this medicine. Any worsening of mood, or thoughts of suicide or dying should be reported to your health care professional right away.  Women who become pregnant while using this medicine may enroll in the [place] Antiepileptic Drug Pregnancy Registry by calling 1-814.910.9121. This registry collects information about the safety of antiepileptic drug use during pregnancy.  Contact your doctor or healthcare professional if you notice any part of your medicine in your stool. Your healthcare provider may want to check the amount of medicine in your blood if this happens.  What side effects may I notice from receiving this medicine?  Side effects that you should report to your doctor or health care professional as soon as possible:    allergic reactions like skin rash, itching or hives, swelling of the face, lips, or tongue    changes in the frequency or severity of seizures    double vision or uncontrollable eye movements    nausea and vomiting    redness, blistering, peeling or loosening of the skin, including inside the mouth    stomach pain or cramps    trembling of hands or arms    unusual bleeding or bruising or pinpoint red spots on the skin    unusual swelling of the arms or legs    unusually weak or tired    worsening of mood, thoughts or actions of suicide or dying    yellowing of skin or eyes  Side effects that usually do not require medical attention (report to your doctor or health care professional if they continue or are bothersome):    change in menstrual cycle    diarrhea or constipation    headache    loss of bladder control    loss of hair or unusual growth of hair    loss or  increase in appetite    weight gain or loss  This list may not describe all possible side effects. Call your doctor for medical advice about side effects. You may report side effects to FDA at 9-836-DPS-1764.  Where should I keep my medicine?  Keep out of reach of children.  Store at room temperature between 15 and 30 degrees C (59 and 86 degrees F). Keep container tightly closed. Throw away any unused medicine after the expiration date.  NOTE:This sheet is a summary. It may not cover all possible information. If you have questions about this medicine, talk to your doctor, pharmacist, or health care provider. Copyright  2016 Gold Standard      You saw a neurology provider today at the [facility].  You may have also met with the MS RN Care Coordinator.  In order to get a message to your [facility] Center provider, you should contact [phone] option 3 for the triage nurse line.    You should contact us via this protocol if you have any of the following symptoms:    New or worsening neurologic symptoms that persist for 24-48 hours, such as:  o New onset of pain or marked worsening of pain  o Difficulty with speech, swallowing, or breathing  o New onset of vertigo or dizziness  o Change in bowel or bladder function (incontinence, difficulty urinating)    Increasing difficulty in self care    Marked changes in vision (double vision, blurred vision, graying of vision)    Change in mobility    Change in cognitive function    Falling    Worsening numbness, tingling or pain with a change in function    Worsening fatigue lasting more than 2 weeks  If you had labs completed today, we will contact you with the results.  If you are active in [facility], they will be released to you there.  Otherwise, your results will be provided to you via mail or telephone call.  Some results take up to 2 weeks for completion.  If you haven t heard anything about your lab results within 2 weeks, you can call or  send a [employer] message to obtain your results.  If you have an MRI scheduled in the week or two prior to your next appointment, we will go over the results at your scheduled follow up appointment.  If you are not scheduled to see your [facility] Center provider within about 2 weeks after your MRI, please call or send a [employer] message to obtain your results if you haven t heard anything within 2 weeks.  Please be aware that it takes at least 5 business days after routine MRIs for your results to be reviewed by both the radiologist and your doctor.  MRIs completed at facilities outside of the [facility] system take about 2 weeks in order for the MRI disc to be mailed to our clinic and uploaded into your medical record.    Prescription refills should be faxed to us by your pharmacy.  Our fax number for prescription refills is [phone].  Please do your best to come to your appointments, and to arrive 15 minutes early to allow time for checking in.  [facility] reserves the right to terminate care of established patients if a patient misses three or more appointments in a clinic without providing notification within a 12-month period.    Developing Your Care Team  Individuals living with chronic illnesses like MS may be unaware that they are at risk for the same range of medical problems as everyone else.  This is why you must establish a relationship with other health care providers in addition to your Multiple Sclerosis doctor.  It can be difficult at times to figure out whether a health concern is related to your MS, or whether it is related to something else, such as hormonal changes, pseduoexacerbations, changes in your core body temperature, flu-like reactions to interferons, exercise, or infections.  Urinary tract infections (UTIs) are common culprits that can cause fatigue, weakness, or other  MS attack -like symptoms without classic symptoms of a UTI.  For this reason, if you call or come in to  discuss symptoms, you may be asked to get in touch with your primary provider or another specialist, so that you receive the comprehensive care you need.  What is Multiple Sclerosis (MS)?  MS is a disease in which the nerve tissues in the brain and/or spinal cord are attacked by immune cells in the body.  These immune cells are present in everyone, and their normal role is to fight off infections.  In people with MS, these cells change the way they function and cross into the nervous system.  Once there, they cause inflammation that damages the myelin (or the protective coating of a nerve cell, much like the plastic covering on an electrical cord) and parts of the nerve cell itself.  So far, a clear cause for this immune system dysfunction has not been found.  MS often starts out as the  relapsing-remitting  form.  This means there are episodes when you have symptoms, and other times when you recover to normal or near-normal.  Over time, if the damage to the nervous system continues, the disease can cause additional disability, such as difficulty walking.  If the relapses and nerve damage can be prevented with available medications, many patients with MS can go many years between relapses and have relatively little disability.  Remember: MS is a condition that changes and must be evaluated on an ongoing basis!  What is a Relapse? (Also called flare-ups, attacks, or exacerbations)  Relapses are due to the occurrence of inflammation in some part of the brain and/or spinal cord.  A relapse is new or recurrent symptoms which persist for at least 24 hours and sometimes worsen over 48 hours.  New symptoms need to be  by at least 1 month in order to be considered separate relapses. Most of the time, symptoms reach their maximal intensity within 2 weeks and then begin to slowly resolve.  At times, your symptoms may not recover fully for up to 6 months, depending on the severity of the episode.  The frequency of  relapses is generally higher early in the disease, but can vary greatly among individuals with MS.  Improvement of symptoms for an individual is unpredictable with each relapse.    It is important to remember that an increase in symptoms and changes in function may not necessarily be a relapse.  There are other factors that contribute to such changes, such as hot weather, increased body temperature, infection/illness, stress and sleep deprivation.  The worsening of symptoms may feel like a relapse when in reality it is not.  These episodes are referred to as pseudorelapses.  Once the underlying cause is addressed, symptoms usually fade away and you feel better.  If you experience a worsening of symptoms that lasts more than 48 hours and does not improve with cooling down, decreasing stress, or treatment of an infection, please call us and we can help to better determine whether you are having a pseudorelapse versus a relapse.

## 2018-09-05 NOTE — MR AVS SNAPSHOT
After Visit Summary   9/5/2018    Chayo Reid    MRN: [id number]           Patient Information     Date Of Birth          1970        Visit Information        Provider Department      9/5/2018 10:30 AM Pietro Flores MD [facility] Multiple Sclerosis        Today's Diagnoses     MS (multiple sclerosis) (H)    -  1      Care Instructions    Start depakote     Take 1 tab at night for one week, then If tolerated take 2 tabs at bed time  Call me in one month to let me know how it goes    Will get monthly blood work while on depakote    Will started Ocrevus    Will follow up in 3 months      Patient Education    Divalproex Sodium Gastro-resistant tablet    Divalproex Sodium Oral capsule, gastro-resistant sprinkles    Divalproex Sodium Oral tablet, extended-release    Valproate Sodium Solution for injection    Valproic Acid Oral capsule    Valproic Acid Oral capsule, gastro-resistant    Valproic Acid Oral solution  Divalproex Sodium Gastro-resistant tablet  What is this medicine?  DIVALPROEX SODIUM (dye SKYLER pro ex SO barbara um) is used to prevent seizures caused by some forms of epilepsy. It is also used to treat bipolar lobito and to prevent migraine headaches.  This medicine may be used for other purposes; ask your health care provider or pharmacist if you have questions.  What should I tell my health care provider before I take this medicine?  They need to know if you have any of these conditions:    blood disease    brain damage or disease    kidney disease    liver disease    low blood proteins    mitochondrial disease    suicidal thoughts, plans, or attempt; a previous suicide attempt by you or a family member    urea cycle disorder (UCD)    an unusual or allergic reaction to divalproex sodium, other medicines, foods, dyes, or preservatives    pregnant or trying to get pregnant    breast-feeding  How should I use this medicine?  Take this medicine by mouth with a drink of water. Follow the  directions on the prescription label. Do not crush or chew. If this medicine upsets your stomach, take it with food or milk. Take your medicine at regular intervals. Do not take it more often than directed.  Talk to your pediatrician regarding the use of this medicine in children. Special care may be needed.  Overdosage: If you think you have taken too much of this medicine contact a poison control center or emergency room at once.  NOTE: This medicine is only for you. Do not share this medicine with others.  What if I miss a dose?  If you miss a dose, take it as soon as you can. If it is almost time for your next dose, take only that dose. Do not take double or extra doses.  What may interact with this medicine?    aspirin    barbiturates, like phenobarbital    diazepam    isoniazid    medicines for depression, anxiety, or psychotic disturbances    medicines that treat or prevent blood clots like warfarin    meropenem    other seizure medicines    rifampin    tolbutamide    zidovudine  This list may not describe all possible interactions. Give your health care provider a list of all the medicines, herbs, non-prescription drugs, or dietary supplements you use. Also tell them if you smoke, drink alcohol, or use illegal drugs. Some items may interact with your medicine.  What should I watch for while using this medicine?  Visit your doctor or health care professional for regular checks on your progress. If you are taking this medicine to treat epilepsy (seizures), do not stop taking it suddenly. This increases the risk of seizures. Wear a medical identification bracelet or chain to say you have epilepsy or seizures, and carry a card that lists all your medicines.  You may get drowsy, dizzy, or have blurred vision. Do not drive, use machinery, or do anything that needs mental alertness until you know how this medicine affects you. To reduce dizzy or fainting spells, do not sit or stand up quickly, especially if you are  an older patient. Alcohol can increase drowsiness and dizziness. Avoid alcoholic drinks.  This medicine can cause blood problems. This can mean slow healing and a risk of infection. Problems can arise if you need dental work, and in the day to day care of your teeth. Try to avoid damage to your teeth and gums when you brush or floss your teeth.  This medicine can make you more sensitive to the sun. Keep out of the sun. If you cannot avoid being in the sun, wear protective clothing and use sunscreen. Do not use sun lamps or tanning beds/booths.  The use of this medicine may increase the chance of suicidal thoughts or actions. Pay special attention to how you are responding while on this medicine. Any worsening of mood, or thoughts of suicide or dying should be reported to your health care professional right away.  Women who become pregnant while using this medicine may enroll in the [place] Antiepileptic Drug Pregnancy Registry by calling 1-489.659.9218. This registry collects information about the safety of antiepileptic drug use during pregnancy.  Contact your doctor or healthcare professional if you notice any part of your medicine in your stool. Your healthcare provider may want to check the amount of medicine in your blood if this happens.  What side effects may I notice from receiving this medicine?  Side effects that you should report to your doctor or health care professional as soon as possible:    allergic reactions like skin rash, itching or hives, swelling of the face, lips, or tongue    changes in the frequency or severity of seizures    double vision or uncontrollable eye movements    nausea and vomiting    redness, blistering, peeling or loosening of the skin, including inside the mouth    stomach pain or cramps    trembling of hands or arms    unusual bleeding or bruising or pinpoint red spots on the skin    unusual swelling of the arms or legs    unusually weak or tired    worsening of mood,  thoughts or actions of suicide or dying    yellowing of skin or eyes  Side effects that usually do not require medical attention (report to your doctor or health care professional if they continue or are bothersome):    change in menstrual cycle    diarrhea or constipation    headache    loss of bladder control    loss of hair or unusual growth of hair    loss or increase in appetite    weight gain or loss  This list may not describe all possible side effects. Call your doctor for medical advice about side effects. You may report side effects to FDA at 0-657-MNQ-7686.  Where should I keep my medicine?  Keep out of reach of children.  Store at room temperature between 15 and 30 degrees C (59 and 86 degrees F). Keep container tightly closed. Throw away any unused medicine after the expiration date.  NOTE:This sheet is a summary. It may not cover all possible information. If you have questions about this medicine, talk to your doctor, pharmacist, or health care provider. Copyright  2016 Gold Standard      You saw a neurology provider today at the [facility].  You may have also met with the MS RN Care Coordinator.  In order to get a message to your MS Center provider, you should contact [phone] option 3 for the triage nurse line.    You should contact us via this protocol if you have any of the following symptoms:    New or worsening neurologic symptoms that persist for 24-48 hours, such as:  o New onset of pain or marked worsening of pain  o Difficulty with speech, swallowing, or breathing  o New onset of vertigo or dizziness  o Change in bowel or bladder function (incontinence, difficulty urinating)    Increasing difficulty in self care    Marked changes in vision (double vision, blurred vision, graying of vision)    Change in mobility    Change in cognitive function    Falling    Worsening numbness, tingling or pain with a change in function    Worsening fatigue lasting  more than 2 weeks  If you had labs completed today, we will contact you with the results.  If you are active in [employer] they will be released to you there.  Otherwise, your results will be provided to you via mail or telephone call.  Some results take up to 2 weeks for completion.  If you haven t heard anything about your lab results within 2 weeks, you can call or send a [employer] message to obtain your results.  If you have an MRI scheduled in the week or two prior to your next appointment, we will go over the results at your scheduled follow up appointment.  If you are not scheduled to see your MS Center provider within about 2 weeks after your MRI, please call or send a [employer] message to obtain your results if you haven t heard anything within 2 weeks.  Please be aware that it takes at least 5 business days after routine MRIs for your results to be reviewed by both the radiologist and your doctor.  MRIs completed at facilities outside of the [facility] system take about 2 weeks in order for the MRI disc to be mailed to our clinic and uploaded into your medical record.    Prescription refills should be faxed to us by your pharmacy.  Our fax number for prescription refills is [phone].  Please do your best to come to your appointments, and to arrive 15 minutes early to allow time for checking in.  [facility] reserves the right to terminate care of established patients if a patient misses three or more appointments in a clinic without providing notification within a 12-month period.    Developing Your Care Team  Individuals living with chronic illnesses like MS may be unaware that they are at risk for the same range of medical problems as everyone else.  This is why you must establish a relationship with other health care providers in addition to your Multiple Sclerosis doctor.  It can be difficult at times to figure out whether a health concern is related to your MS, or whether it is  related to something else, such as hormonal changes, pseduoexacerbations, changes in your core body temperature, flu-like reactions to interferons, exercise, or infections.  Urinary tract infections (UTIs) are common culprits that can cause fatigue, weakness, or other  MS attack -like symptoms without classic symptoms of a UTI.  For this reason, if you call or come in to discuss symptoms, you may be asked to get in touch with your primary provider or another specialist, so that you receive the comprehensive care you need.  What is Multiple Sclerosis (MS)?  MS is a disease in which the nerve tissues in the brain and/or spinal cord are attacked by immune cells in the body.  These immune cells are present in everyone, and their normal role is to fight off infections.  In people with MS, these cells change the way they function and cross into the nervous system.  Once there, they cause inflammation that damages the myelin (or the protective coating of a nerve cell, much like the plastic covering on an electrical cord) and parts of the nerve cell itself.  So far, a clear cause for this immune system dysfunction has not been found.  MS often starts out as the  relapsing-remitting  form.  This means there are episodes when you have symptoms, and other times when you recover to normal or near-normal.  Over time, if the damage to the nervous system continues, the disease can cause additional disability, such as difficulty walking.  If the relapses and nerve damage can be prevented with available medications, many patients with MS can go many years between relapses and have relatively little disability.  Remember: MS is a condition that changes and must be evaluated on an ongoing basis!  What is a Relapse? (Also called flare-ups, attacks, or exacerbations)  Relapses are due to the occurrence of inflammation in some part of the brain and/or spinal cord.  A relapse is new or recurrent symptoms which persist for at least 24  hours and sometimes worsen over 48 hours.  New symptoms need to be  by at least 1 month in order to be considered separate relapses. Most of the time, symptoms reach their maximal intensity within 2 weeks and then begin to slowly resolve.  At times, your symptoms may not recover fully for up to 6 months, depending on the severity of the episode.  The frequency of relapses is generally higher early in the disease, but can vary greatly among individuals with MS.  Improvement of symptoms for an individual is unpredictable with each relapse.    It is important to remember that an increase in symptoms and changes in function may not necessarily be a relapse.  There are other factors that contribute to such changes, such as hot weather, increased body temperature, infection/illness, stress and sleep deprivation.  The worsening of symptoms may feel like a relapse when in reality it is not.  These episodes are referred to as pseudorelapses.  Once the underlying cause is addressed, symptoms usually fade away and you feel better.  If you experience a worsening of symptoms that lasts more than 48 hours and does not improve with cooling down, decreasing stress, or treatment of an infection, please call us and we can help to better determine whether you are having a pseudorelapse versus a relapse.                Follow-ups after your visit        Follow-up notes from your care team     Return in about 3 months (around 12/5/2018).      Your next 10 appointments already scheduled     Dec 05, 2018 10:30 AM CST   (Arrive by 10:15 AM)   Return Multiple Sclerosis with Pietro Flores MD   [facility] Multiple Sclerosis ([facility])    [address] [place] [address]  [address] [place] [id number] [phone]              Future tests that were ordered for you today     Open Standing Orders        Priority Remaining Interval Expires Ordered    CBC with platelets differential Routine 12/12 monthly  "9/5/2019 9/5/2018    Comprehensive metabolic panel Routine 12/12 monthly 9/5/2019 9/5/2018            Who to contact     If you have questions or need follow up information about today's clinic visit or your schedule please contact [facility] directly at [phone].  Normal or non-critical lab and imaging results will be communicated to you by MyChart, letter or phone within 4 business days after the clinic has received the results. If you do not hear from us within 7 days, please contact the clinic through [employer] or phone. If you have a critical or abnormal lab result, we will notify you by phone as soon as possible.  Submit refill requests through [email or web address] or call your pharmacy and they will forward the refill request to us. Please allow 3 business days for your refill to be completed.          Additional Information About Your Visit        MyChart Information     [email or web address] gives you secure access to your electronic health record. If you see a primary care provider, you can also send messages to your care team and make appointments. If you have questions, please call your primary care clinic.  If you do not have a primary care provider, please call [phone] and they will assist you.        Care EveryWhere ID     This is your Care EveryWhere ID. This could be used by other organizations to access your [facility] medical records  [id number]        Your Vitals Were     Pulse Temperature Respirations Height Pulse Oximetry Breastfeeding?    61 98  F (36.7  C) (Oral) 18 1.676 m (5' 6\") 99% No    BMI (Body Mass Index)                   30.63 kg/m2            Blood Pressure from Last 3 Encounters:   09/05/18 131/82   08/22/18 124/85   02/07/18 132/86    Weight from Last 3 Encounters:   09/05/18 86.1 kg (189 lb 12.8 oz)   08/22/18 86 kg (189 lb 9.6 oz)   02/07/18 90.9 kg (200 lb 6.4 oz)                 Today's Medication Changes          These changes are accurate as of 9/5/18 11:20 AM.  If you have " any questions, ask your nurse or doctor.               Start taking these medicines.        Dose/Directions    divalproex sodium extended-release 500 MG 24 hr tablet   Commonly known as:  DEPAKOTE ER   Used for:  MS (multiple sclerosis) (H)   Started by:  Pietro Flores MD        Please take 1 tab at bedtime for one week, then take 2 tabs at bedtime   Quantity:  60 tablet   Refills:  3            Where to get your medicines      These medications were sent to [place] [facility] [address] [facility] - [place], [place] - [address]   [address] [facility] [place] [address]     Phone:  [phone]     divalproex sodium extended-release 500 MG 24 hr tablet                Primary Care Provider Fax #    Physician No Ref-Primary [phone]       No address on file        Equal Access to Services     LÓPEZ ZAVALA : Hadii aad ku hadasho Soolenaali, waaxda luqadaha, qaybta kaalmada adeegyada, waxay rikki willis . So [facility] [phone].    ATENCIÓN: Si habla español, tiene a hall disposición servicios gratuitos de asistencia lingüística. [facility] [phone].    We comply with applicable federal civil rights laws and [place] laws. We do not discriminate on the basis of race, color, national origin, age, disability, sex, sexual orientation, or gender identity.            Thank you!     Thank you for choosing [facility]  for your care. Our goal is always to provide you with excellent care. Hearing back from our patients is one way we can continue to improve our services. Please take a few minutes to complete the written survey that you may receive in the mail after your visit with us. Thank you!             Your Updated Medication List - Protect others around you: Learn how to safely use, store and throw away your medicines at www.disposemymeds.org.          This list is accurate as of 9/5/18 11:20 AM.  Always use your most recent med list.                   Brand Name Dispense Instructions for use  Diagnosis    amitriptyline 25 MG tablet    ELAVIL    90 tablet    Take 3 tablets (75 mg) by mouth At Bedtime    Myofascial pain syndrome, Sleep disorder       * amphetamine-dextroamphetamine 20 MG per 24 hr capsule    ADDERALL XR    60 capsule    Take 1 capsule (20 mg) by mouth 2 times daily    MS (multiple sclerosis) (H), Other fatigue       * amphetamine-dextroamphetamine 30 MG per 24 hr capsule    ADDERALL XR    30 capsule    Take 1 capsule (30 mg) by mouth daily    Other fatigue, MS (multiple sclerosis) (H)       baclofen 10 MG tablet    LIORESAL    60 tablet    Take 1 tablet (10 mg) by mouth 2 times daily    MS (multiple sclerosis) (H)       cyclobenzaprine 5 MG tablet    FLEXERIL    30 tablet    Take 1 tablet (5 mg) by mouth 2 times daily    Pain of right lower extremity       divalproex sodium extended-release 500 MG 24 hr tablet    DEPAKOTE ER    60 tablet    Please take 1 tab at bedtime for one week, then take 2 tabs at bedtime    MS (multiple sclerosis) (H)       DULoxetine 60 MG EC capsule    CYMBALTA    30 capsule    Take 1 capsule (60 mg) by mouth daily    Lumbar radiculopathy       famotidine 40 MG tablet    PEPCID    30 tablet    Take 1 tablet (40 mg) by mouth At Bedtime    MS (multiple sclerosis) (H)       ferrous sulfate 325 (65 Fe) MG tablet    IRON     Take 1 tablet by mouth daily        Glatiramer Acetate 40 MG/ML Sosy    COPAXONE    12 Syringe    Inject 40 mg Subcutaneous three times a week    MS (multiple sclerosis) (H)       medical cannabis (Patient's own supply.  Not a prescription)      See Admin Instructions (This is NOT a prescription, and does not certify that the patient has a qualifying medical condition for medical cannabis.  The purpose of this order is  to document that the patient reports taking medical cannabis.)        modafinil 200 MG tablet    PROVIGIL    30 tablet    Take 1 tablet (200 mg) by mouth daily    Fatigue       oxybutynin 10 MG 24 hr tablet    DITROPAN-XL    30  tablet    TAKE 1 TABLET BY MOUTH ONE HOUR PRIOR TO BED.    MS (multiple sclerosis) (H), Neurogenic bladder       * predniSONE 20 MG tablet    DELTASONE    24 tablet    Afterthe high dose steroids, then take three 20mg tabs for 4 days, then take two 20mg tabs for 4 days, then take one 20mg tab for 4 days.    MS (multiple sclerosis) (H)       * predniSONE 50 MG tablet    DELTASONE    75 tablet    Take 25 tablets (1,250 mg) by mouth daily    MS (multiple sclerosis) (H)       pregabalin 50 MG capsule    LYRICA    270 capsule    Take 1 capsule (50 mg) by mouth 3 times daily    MS (multiple sclerosis) (H)       vitamin D 1000 units capsule      Take 1 capsule by mouth daily        * Notice:  This list has 4 medication(s) that are the same as other medications prescribed for you. Read the directions carefully, and ask your doctor or other care provider to review them with you.

## 2018-09-05 NOTE — PROGRESS NOTES
MULTIPLE SCLEROSIS CLINIC AT THE [facility]  FOLLOWUP/ESTABLISHED PATIENT VISIT      PRINCIPAL NEUROLOGIC DIAGNOSIS: Multiple Sclerosis     Date of Onset: 2012  Date of Diagnosis: 2012  Initial Clinical Course: Progressive  Current Clinical Course: Progressive  Past Disease Modifying Therapy(ies): copaxone  Current Disease Modifying Therapy(ies): None  Most Recent MRI of the Brain: 18 last one   Most Recent MRI of the Cervical Cord   Most Recent MRI of the Thoracic Cord:   Most Recent Lumbar Puncture: Positive for OCB  Most Recent OCT: NA   Most Recent JCV: 18 positive  Most Recent Remote Hepatitis Panel: 18 negative  Most Recent VZV Ig18 positive  Most Recent TB Quant: 18 negative  Previous pain medication: Lyrica, Elavil, Cymbalta    CHIEF COMPLAINT: Review of diagnostic testing      INTERVAL HISTORY:    Overall, the patient's pain is not improved since she has last been seen.     Issues with current MS therapy: Tolerating DMT without issue    REVIEW OF SYSTEMS:    Mood: unchanged, her mood was better.   Spasticity:painful  Bladder: unchanged  Bowel: unchanged and urgency  Pain related to today's visit:reviewed on nursing intake documentation  Fatigue: unchanged  Sleep: interrupted  Memory/Concentration: unchanged      Otherwise 10 point ROS was neg other than the symptoms noted above.    PAST HISTORY was reviewed and updated:      MEDICATIONS and ALLERGIES were reviewed and updated.    SOCIAL HISTORY was reviewed and updated:      EXAM:    PHYSICAL EXAMINATION:   VITAL SIGNS:  B/P: 131/82, T: 98, P: 61, R: 18    GENERAL: The patient is a well-nourished  who presents to the evaluation with her .  NEUROLOGIC:   MENTAL STATUS: Alert,awake and  oriented times four.         GAIT: Gait is   narrow-based and steady and the patient is  able to walk on heels, toes and in tandem without difficulty.    Romberg: Stable with eye(s) closed    RESULTS:  Monitoring  labs:    Orders Only on 08/22/2018   Component Date Value Ref Range Status     CD19 B Cells 08/22/2018 15  6 - 27 % Final     Absolute CD19 08/22/2018 289  107 - 698 cells/uL Final   Office Visit on 08/22/2018   Component Date Value Ref Range Status     WBC 08/22/2018 8.4  4.0 - 11.0 10e9/L Final     RBC Count 08/22/2018 4.78  3.8 - 5.2 10e12/L Final     Hemoglobin 08/22/2018 14.3  11.7 - 15.7 g/dL Final     Hematocrit 08/22/2018 42.5  35.0 - 47.0 % Final     MCV 08/22/2018 89  78 - 100 fl Final     MCH 08/22/2018 29.9  26.5 - 33.0 pg Final     MCHC 08/22/2018 33.6  31.5 - 36.5 g/dL Final     RDW 08/22/2018 12.1  10.0 - 15.0 % Final     Platelet Count 08/22/2018 285  150 - 450 10e9/L Final     Diff Method 08/22/2018 Automated Method   Final     % Neutrophils 08/22/2018 64.7  % Final     % Lymphocytes 08/22/2018 22.6  % Final     % Monocytes 08/22/2018 6.9  % Final     % Eosinophils 08/22/2018 4.8  % Final     % Basophils 08/22/2018 0.8  % Final     % Immature Granulocytes 08/22/2018 0.2  % Final     Nucleated RBCs 08/22/2018 0  0 /100 Final     Absolute Neutrophil 08/22/2018 5.4  1.6 - 8.3 10e9/L Final     Absolute Lymphocytes 08/22/2018 1.9  0.8 - 5.3 10e9/L Final     Absolute Monocytes 08/22/2018 0.6  0.0 - 1.3 10e9/L Final     Absolute Eosinophils 08/22/2018 0.4  0.0 - 0.7 10e9/L Final     Absolute Basophils 08/22/2018 0.1  0.0 - 0.2 10e9/L Final     Abs Immature Granulocytes 08/22/2018 0.0  0 - 0.4 10e9/L Final     Absolute Nucleated RBC 08/22/2018 0.0   Final     Sodium 08/22/2018 138  133 - 144 mmol/L Final     Potassium 08/22/2018 3.6  3.4 - 5.3 mmol/L Final     Chloride 08/22/2018 104  94 - 109 mmol/L Final     Carbon Dioxide 08/22/2018 30  20 - 32 mmol/L Final     Anion Gap 08/22/2018 5  3 - 14 mmol/L Final     Glucose 08/22/2018 88  70 - 99 mg/dL Final     Urea Nitrogen 08/22/2018 15  7 - 30 mg/dL Final     Creatinine 08/22/2018 0.89  0.52 - 1.04 mg/dL Final     GFR Estimate 08/22/2018 68  >60 mL/min/1.7m2  Final     GFR Estimate If Black 08/22/2018 82  >60 mL/min/1.7m2 Final     Calcium 08/22/2018 8.5  8.5 - 10.1 mg/dL Final     Bilirubin Total 08/22/2018 0.5  0.2 - 1.3 mg/dL Final     Albumin 08/22/2018 3.6  3.4 - 5.0 g/dL Final     Protein Total 08/22/2018 7.6  6.8 - 8.8 g/dL Final     Alkaline Phosphatase 08/22/2018 67  40 - 150 U/L Final     ALT 08/22/2018 17  0 - 50 U/L Final     AST 08/22/2018 14  0 - 45 U/L Final     Hepatitis B Core Caroline 08/22/2018 Nonreactive  NR^Nonreactive Final     Hepatitis B Surface Antibody 08/22/2018 0.20  <8.00 m[IU]/mL Final     Hep B Surface Agn 08/22/2018 Nonreactive  NR^Nonreactive Final     HCV RNA Quant IU/ml 08/22/2018 HCV RNA Not Detected  HCVND^HCV RNA Not Detected [IU]/mL Final     Log of HCV RNA Qt 08/22/2018 Not Calculated  <1.2 Log IU/mL Final     HIV Antigen Antibody Combo 08/22/2018 Nonreactive  NR^Nonreactive     Final     Lab Scanned Result 08/22/2018 VICKIE VIR AB INDEX REFLEX-Scanned*  Final     M Tuberculosis Result 08/22/2018 Negative  NEG^Negative Final     M Tuberculosis Antigen Value 08/22/2018 0.13  IU/mL Final     Varicella Zoster Virus Antibody IgG 08/22/2018 3.5* 0.0 - 0.8 AI Final     TSH 08/22/2018 1.28  0.40 - 4.00 mU/L Final     Vitamin B12 08/22/2018 2444* 193 - 986 pg/mL Final     Vitamin B6 08/22/2018 20.7  20.0 - 125.0 nmol/L Final     Vitamin D Deficiency screening 08/22/2018 78* 20 - 75 ug/L Final   ]      MRI brain:    no new MRI to review    ASSESSMENT/PLAN:  The patient is a 48-year-old female with a past medical history of newly diagnosed presumed primary progressive multiple sclerosis who is presenting today to review her therapeutic options.  After reviewing her blood work, her history, her MRIs with the patient, her decision was to start her on Ocrevus.  I had her fill out the paperwork.  I once again reviewed the risk and benefits of this medication with the patient.  I plan to start her on this.  I will have the patient follow me at 3 months  after starting this medication.    In regards to her symptoms, I am disappointed that her pain was not improved by the high-dose IV steroids.  Admittedly, I am not surprised given that her enhancing lesion would not explain the pain that she was having.  Given that she is failed Lyrica, Elavil, and Cymbalta, I will try the patient on valproic acid.  I explained to the patient the risk and benefits of this therapy.  I explained to her that if she has side effects of critical that she seek immediate medical care.  While she is on this medication, I will have her get monthly blood draws.    Monthly CBC, CMP  Start depakote  Start Ocrevus  Follow up in 3 months        I spent 25 minutes in this visit, with >50% direct patient time spent counseling about prognosis, treatment options, and coordination of care.    Pietro Flores MD [facility]  Staff Neurologist   09/05/18       (Chart documentation was completed in part with Dragon voice-recognition software. Even though reviewed, some grammatical, spelling, and word errors may remain.)

## 2018-09-05 NOTE — NURSING NOTE
"Chief Complaint   Patient presents with     RECHECK     [facility] RETURN PATIENT VISIT FOR 2 WEEK F/U RELATED TO MS        /82  Pulse 61  Temp 98  F (36.7  C) (Oral)  Resp 18  Ht 1.676 m (5' 6\")  Wt 86.1 kg (189 lb 12.8 oz)  SpO2 99%  Breastfeeding? No  BMI 30.63 kg/m2    Astrid Fischer MA  "

## 2018-09-05 NOTE — LETTER
2018     RE: Chayo Reid  [address]  [place] [facility] [address]     Dear Colleague,    Thank you for referring your patient, Chayo Reid, to the [facility] MULTIPLE SCLEROSIS at [facility]. Please see a copy of my visit note below.    MULTIPLE SCLEROSIS CLINIC AT THE [facility]  FOLLOWUP/ESTABLISHED PATIENT VISIT      PRINCIPAL NEUROLOGIC DIAGNOSIS: Multiple Sclerosis     Date of Onset: 2012  Date of Diagnosis: 2012  Initial Clinical Course: Progressive  Current Clinical Course: Progressive  Past Disease Modifying Therapy(ies): copaxone  Current Disease Modifying Therapy(ies): None  Most Recent MRI of the Brain: 18 last one   Most Recent MRI of the Cervical Cord   Most Recent MRI of the Thoracic Cord:   Most Recent Lumbar Puncture: Positive for OCB  Most Recent OCT: NA   Most Recent JCV: 18 positive  Most Recent Remote Hepatitis Panel: 18 negative  Most Recent VZV Ig18 positive  Most Recent TB Quant: 18 negative  Previous pain medication: Lyrica, Elavil, Cymbalta    CHIEF COMPLAINT: Review of diagnostic testing      INTERVAL HISTORY:    Overall, the patient's pain is not improved since she has last been seen.     Issues with current MS therapy: Tolerating DMT without issue    REVIEW OF SYSTEMS:    Mood: unchanged, her mood was better.   Spasticity:painful  Bladder: unchanged  Bowel: unchanged and urgency  Pain related to today's visit:reviewed on nursing intake documentation  Fatigue: unchanged  Sleep: interrupted  Memory/Concentration: unchanged      Otherwise 10 point ROS was neg other than the symptoms noted above.    PAST HISTORY was reviewed and updated:      MEDICATIONS and ALLERGIES were reviewed and updated.    SOCIAL HISTORY was reviewed and updated:      EXAM:    PHYSICAL EXAMINATION:   VITAL SIGNS:  B/P: 131/82, T: 98, P: 61, R: 18    GENERAL: The patient is a well-nourished  who presents to the  evaluation with her .  NEUROLOGIC:   MENTAL STATUS: Alert,awake and  oriented times four.         GAIT: Gait is   narrow-based and steady and the patient is  able to walk on heels, toes and in tandem without difficulty.    Romberg: Stable with eye(s) closed    RESULTS:  Monitoring labs:    Orders Only on 08/22/2018   Component Date Value Ref Range Status     CD19 B Cells 08/22/2018 15  6 - 27 % Final     Absolute CD19 08/22/2018 289  107 - 698 cells/uL Final   Office Visit on 08/22/2018   Component Date Value Ref Range Status     WBC 08/22/2018 8.4  4.0 - 11.0 10e9/L Final     RBC Count 08/22/2018 4.78  3.8 - 5.2 10e12/L Final     Hemoglobin 08/22/2018 14.3  11.7 - 15.7 g/dL Final     Hematocrit 08/22/2018 42.5  35.0 - 47.0 % Final     MCV 08/22/2018 89  78 - 100 fl Final     MCH 08/22/2018 29.9  26.5 - 33.0 pg Final     MCHC 08/22/2018 33.6  31.5 - 36.5 g/dL Final     RDW 08/22/2018 12.1  10.0 - 15.0 % Final     Platelet Count 08/22/2018 285  150 - 450 10e9/L Final     Diff Method 08/22/2018 Automated Method   Final     % Neutrophils 08/22/2018 64.7  % Final     % Lymphocytes 08/22/2018 22.6  % Final     % Monocytes 08/22/2018 6.9  % Final     % Eosinophils 08/22/2018 4.8  % Final     % Basophils 08/22/2018 0.8  % Final     % Immature Granulocytes 08/22/2018 0.2  % Final     Nucleated RBCs 08/22/2018 0  0 /100 Final     Absolute Neutrophil 08/22/2018 5.4  1.6 - 8.3 10e9/L Final     Absolute Lymphocytes 08/22/2018 1.9  0.8 - 5.3 10e9/L Final     Absolute Monocytes 08/22/2018 0.6  0.0 - 1.3 10e9/L Final     Absolute Eosinophils 08/22/2018 0.4  0.0 - 0.7 10e9/L Final     Absolute Basophils 08/22/2018 0.1  0.0 - 0.2 10e9/L Final     Abs Immature Granulocytes 08/22/2018 0.0  0 - 0.4 10e9/L Final     Absolute Nucleated RBC 08/22/2018 0.0   Final     Sodium 08/22/2018 138  133 - 144 mmol/L Final     Potassium 08/22/2018 3.6  3.4 - 5.3 mmol/L Final     Chloride 08/22/2018 104  94 - 109 mmol/L Final     Carbon  Dioxide 08/22/2018 30  20 - 32 mmol/L Final     Anion Gap 08/22/2018 5  3 - 14 mmol/L Final     Glucose 08/22/2018 88  70 - 99 mg/dL Final     Urea Nitrogen 08/22/2018 15  7 - 30 mg/dL Final     Creatinine 08/22/2018 0.89  0.52 - 1.04 mg/dL Final     GFR Estimate 08/22/2018 68  >60 mL/min/1.7m2 Final     GFR Estimate If Black 08/22/2018 82  >60 mL/min/1.7m2 Final     Calcium 08/22/2018 8.5  8.5 - 10.1 mg/dL Final     Bilirubin Total 08/22/2018 0.5  0.2 - 1.3 mg/dL Final     Albumin 08/22/2018 3.6  3.4 - 5.0 g/dL Final     Protein Total 08/22/2018 7.6  6.8 - 8.8 g/dL Final     Alkaline Phosphatase 08/22/2018 67  40 - 150 U/L Final     ALT 08/22/2018 17  0 - 50 U/L Final     AST 08/22/2018 14  0 - 45 U/L Final     Hepatitis B Core Caroline 08/22/2018 Nonreactive  NR^Nonreactive Final     Hepatitis B Surface Antibody 08/22/2018 0.20  <8.00 m[IU]/mL Final     Hep B Surface Agn 08/22/2018 Nonreactive  NR^Nonreactive Final     HCV RNA Quant IU/ml 08/22/2018 HCV RNA Not Detected  HCVND^HCV RNA Not Detected [IU]/mL Final     Log of HCV RNA Qt 08/22/2018 Not Calculated  <1.2 Log IU/mL Final     HIV Antigen Antibody Combo 08/22/2018 Nonreactive  NR^Nonreactive     Final     Lab Scanned Result 08/22/2018 VICKIE VIR AB INDEX REFLEX-Scanned*  Final     M Tuberculosis Result 08/22/2018 Negative  NEG^Negative Final     M Tuberculosis Antigen Value 08/22/2018 0.13  IU/mL Final     Varicella Zoster Virus Antibody IgG 08/22/2018 3.5* 0.0 - 0.8 AI Final     TSH 08/22/2018 1.28  0.40 - 4.00 mU/L Final     Vitamin B12 08/22/2018 2444* 193 - 986 pg/mL Final     Vitamin B6 08/22/2018 20.7  20.0 - 125.0 nmol/L Final     Vitamin D Deficiency screening 08/22/2018 78* 20 - 75 ug/L Final   ]      MRI brain:    no new MRI to review    ASSESSMENT/PLAN:  The patient is a 48-year-old female with a past medical history of newly diagnosed presumed primary progressive multiple sclerosis who is presenting today to review her therapeutic options.  After  reviewing her blood work, her history, her MRIs with the patient, her decision was to start her on Ocrevus.  I had her fill out the paperwork.  I once again reviewed the risk and benefits of this medication with the patient.  I plan to start her on this.  I will have the patient follow me at 3 months after starting this medication.    In regards to her symptoms, I am disappointed that her pain was not improved by the high-dose IV steroids.  Admittedly, I am not surprised given that her enhancing lesion would not explain the pain that she was having.  Given that she is failed Lyrica, Elavil, and Cymbalta, I will try the patient on valproic acid.  I explained to the patient the risk and benefits of this therapy.  I explained to her that if she has side effects of critical that she seek immediate medical care.  While she is on this medication, I will have her get monthly blood draws.    Monthly CBC, CMP  Start depakote  Start Ocrevus  Follow up in 3 months        I spent 25 minutes in this visit, with >50% direct patient time spent counseling about prognosis, treatment options, and coordination of care.    Pietro Flores MD [facility]  Staff Neurologist   09/05/18     (Chart documentation was completed in part with Dragon voice-recognition software. Even though reviewed, some grammatical, spelling, and word errors may remain.)

## 2018-09-06 ENCOUNTER — TELEPHONE (OUTPATIENT)
Dept: NEUROLOGY | Facility: CLINIC | Age: 48
End: 2018-09-06

## 2018-09-06 RX ORDER — DIPHENHYDRAMINE HCL 25 MG
50 CAPSULE ORAL ONCE
Status: CANCELLED | OUTPATIENT
Start: 2018-09-06 | End: 2018-09-06

## 2018-09-06 RX ORDER — METHYLPREDNISOLONE SODIUM SUCCINATE 125 MG/2ML
125 INJECTION, POWDER, LYOPHILIZED, FOR SOLUTION INTRAMUSCULAR; INTRAVENOUS ONCE
Status: CANCELLED | OUTPATIENT
Start: 2018-09-06

## 2018-09-06 RX ORDER — ACETAMINOPHEN 325 MG/1
650 TABLET ORAL ONCE
Status: CANCELLED | OUTPATIENT
Start: 2018-09-06

## 2018-09-06 NOTE — TELEPHONE ENCOUNTER
[facility] Call Center    Phone Message    May a detailed message be left on voicemail: no    Reason for Call: Other: Pt returning call to [facility] for schedule. Please give call back.     Action Taken: Message routed to:  [facility] ([facility] Neurology Clinic

## 2018-09-06 NOTE — TELEPHONE ENCOUNTER
Patient was seen in clinic yesterday by Dr. Flores and the decision was made to start on Ocrevus. [facility] in [place] (phon: [phone]; fax: [phone]) would be a good option but it would require a MD within [facility] system to cosign the orders. I called and left a M for patient requesting a call back to discuss this.     In the meantime, Ocrevus therapy plan entered and routed to Dr. Flores for review and signature.

## 2018-09-07 ENCOUNTER — TELEPHONE (OUTPATIENT)
Dept: NEUROLOGY | Facility: CLINIC | Age: 48
End: 2018-09-07

## 2018-09-07 NOTE — TELEPHONE ENCOUNTER
Prior Authorization Infusion/Clinic Administered Request    Location: [facility]  Diagnosis and ICD: Primary Progressive Multiple Sclerosis G35  Drug/Therapy: Ocrevus 300 mg on day 1 and day 15 and then 600 mg every 6 months    Previously Tried and Failed Therapies: Copaxone    Date of provider note with supporting information: 9/5/18    Urgency (When is the patient scheduled?):     Would you like to include any research articles? na        If yes please call [phone] for further instructions about sending that information

## 2018-09-07 NOTE — TELEPHONE ENCOUNTER
No prior authorization required; on label and compliant with insurance policy.  is confident this will be covered and recommends moving forward with dosing.

## 2018-09-07 NOTE — TELEPHONE ENCOUNTER
Spoke with Chayo. She does not have a PCP at [facility], therefore no one to cosign infusion orders. Chayo would like to infuse at [facility]. Start form faxed to [employer]. PA submitted in separate encounter.

## 2018-09-10 NOTE — TELEPHONE ENCOUNTER
PA approved per infusion specialist. Called patient and gave her [facility] number to call and schedule.

## 2018-09-12 ENCOUNTER — RADIANT APPOINTMENT (OUTPATIENT)
Dept: ULTRASOUND IMAGING | Facility: CLINIC | Age: 48
End: 2018-09-12
Attending: OBSTETRICS & GYNECOLOGY
Payer: COMMERCIAL

## 2018-09-12 DIAGNOSIS — G35 MS (MULTIPLE SCLEROSIS) (H): ICD-10-CM

## 2018-09-12 DIAGNOSIS — R10.2 PELVIC PAIN IN FEMALE: ICD-10-CM

## 2018-09-12 LAB
ALBUMIN SERPL-MCNC: 3.5 G/DL (ref 3.4–5)
ALP SERPL-CCNC: 61 U/L (ref 40–150)
ALT SERPL W P-5'-P-CCNC: 21 U/L (ref 0–50)
ANION GAP SERPL CALCULATED.3IONS-SCNC: 6 MMOL/L (ref 3–14)
AST SERPL W P-5'-P-CCNC: 12 U/L (ref 0–45)
BASOPHILS # BLD AUTO: 0.1 10E9/L (ref 0–0.2)
BASOPHILS NFR BLD AUTO: 1.1 %
BILIRUB SERPL-MCNC: 0.7 MG/DL (ref 0.2–1.3)
BUN SERPL-MCNC: 12 MG/DL (ref 7–30)
CALCIUM SERPL-MCNC: 8.7 MG/DL (ref 8.5–10.1)
CHLORIDE SERPL-SCNC: 103 MMOL/L (ref 94–109)
CO2 SERPL-SCNC: 29 MMOL/L (ref 20–32)
CREAT SERPL-MCNC: 1.1 MG/DL (ref 0.52–1.04)
DIFFERENTIAL METHOD BLD: NORMAL
EOSINOPHIL # BLD AUTO: 0.4 10E9/L (ref 0–0.7)
EOSINOPHIL NFR BLD AUTO: 4.6 %
ERYTHROCYTE [DISTWIDTH] IN BLOOD BY AUTOMATED COUNT: 12.1 % (ref 10–15)
GFR SERPL CREATININE-BSD FRML MDRD: 53 ML/MIN/1.7M2
GLUCOSE SERPL-MCNC: 88 MG/DL (ref 70–99)
HCT VFR BLD AUTO: 43.2 % (ref 35–47)
HGB BLD-MCNC: 14.6 G/DL (ref 11.7–15.7)
IMM GRANULOCYTES # BLD: 0 10E9/L (ref 0–0.4)
IMM GRANULOCYTES NFR BLD: 0.2 %
LYMPHOCYTES # BLD AUTO: 2.3 10E9/L (ref 0.8–5.3)
LYMPHOCYTES NFR BLD AUTO: 27.5 %
MCH RBC QN AUTO: 30.3 PG (ref 26.5–33)
MCHC RBC AUTO-ENTMCNC: 33.8 G/DL (ref 31.5–36.5)
MCV RBC AUTO: 90 FL (ref 78–100)
MONOCYTES # BLD AUTO: 1 10E9/L (ref 0–1.3)
MONOCYTES NFR BLD AUTO: 11.4 %
NEUTROPHILS # BLD AUTO: 4.6 10E9/L (ref 1.6–8.3)
NEUTROPHILS NFR BLD AUTO: 55.2 %
NRBC # BLD AUTO: 0 10*3/UL
NRBC BLD AUTO-RTO: 0 /100
PLATELET # BLD AUTO: 226 10E9/L (ref 150–450)
POTASSIUM SERPL-SCNC: 3.3 MMOL/L (ref 3.4–5.3)
PROT SERPL-MCNC: 7.6 G/DL (ref 6.8–8.8)
RBC # BLD AUTO: 4.82 10E12/L (ref 3.8–5.2)
SODIUM SERPL-SCNC: 139 MMOL/L (ref 133–144)
WBC # BLD AUTO: 8.3 10E9/L (ref 4–11)

## 2018-09-14 ENCOUNTER — TELEPHONE (OUTPATIENT)
Dept: NEUROLOGY | Facility: CLINIC | Age: 48
End: 2018-09-14

## 2018-09-14 DIAGNOSIS — G35 MULTIPLE SCLEROSIS (H): ICD-10-CM

## 2018-09-14 PROCEDURE — 36415 COLL VENOUS BLD VENIPUNCTURE: CPT | Performed by: PSYCHIATRY & NEUROLOGY

## 2018-09-14 PROCEDURE — 80069 RENAL FUNCTION PANEL: CPT | Performed by: PSYCHIATRY & NEUROLOGY

## 2018-09-15 LAB
ALBUMIN SERPL-MCNC: 3.7 G/DL (ref 3.4–5)
ANION GAP SERPL CALCULATED.3IONS-SCNC: 7 MMOL/L (ref 3–14)
BUN SERPL-MCNC: 15 MG/DL (ref 7–30)
CALCIUM SERPL-MCNC: 8.9 MG/DL (ref 8.5–10.1)
CHLORIDE SERPL-SCNC: 105 MMOL/L (ref 94–109)
CO2 SERPL-SCNC: 28 MMOL/L (ref 20–32)
CREAT SERPL-MCNC: 0.98 MG/DL (ref 0.52–1.04)
GFR SERPL CREATININE-BSD FRML MDRD: 61 ML/MIN/1.7M2
GLUCOSE SERPL-MCNC: 105 MG/DL (ref 70–99)
PHOSPHATE SERPL-MCNC: 3.4 MG/DL (ref 2.5–4.5)
POTASSIUM SERPL-SCNC: 4 MMOL/L (ref 3.4–5.3)
SODIUM SERPL-SCNC: 140 MMOL/L (ref 133–144)

## 2018-09-20 ENCOUNTER — INFUSION THERAPY VISIT (OUTPATIENT)
Dept: INFUSION THERAPY | Facility: CLINIC | Age: 48
End: 2018-09-20
Attending: PSYCHIATRY & NEUROLOGY
Payer: COMMERCIAL

## 2018-09-20 VITALS
SYSTOLIC BLOOD PRESSURE: 121 MMHG | TEMPERATURE: 97.9 F | OXYGEN SATURATION: 95 % | HEART RATE: 89 BPM | DIASTOLIC BLOOD PRESSURE: 75 MMHG | RESPIRATION RATE: 18 BRPM | WEIGHT: 188.9 LBS | BODY MASS INDEX: 30.49 KG/M2

## 2018-09-20 DIAGNOSIS — G35 MS (MULTIPLE SCLEROSIS) (H): Primary | ICD-10-CM

## 2018-09-20 PROCEDURE — 96366 THER/PROPH/DIAG IV INF ADDON: CPT

## 2018-09-20 PROCEDURE — 96413 CHEMO IV INFUSION 1 HR: CPT

## 2018-09-20 PROCEDURE — 25000132 ZZH RX MED GY IP 250 OP 250 PS 637: Mod: ZF | Performed by: PSYCHIATRY & NEUROLOGY

## 2018-09-20 PROCEDURE — 25000128 H RX IP 250 OP 636: Mod: ZF | Performed by: PSYCHIATRY & NEUROLOGY

## 2018-09-20 PROCEDURE — 96415 CHEMO IV INFUSION ADDL HR: CPT

## 2018-09-20 PROCEDURE — 96365 THER/PROPH/DIAG IV INF INIT: CPT

## 2018-09-20 PROCEDURE — 96375 TX/PRO/DX INJ NEW DRUG ADDON: CPT

## 2018-09-20 RX ORDER — METHYLPREDNISOLONE SODIUM SUCCINATE 125 MG/2ML
125 INJECTION, POWDER, LYOPHILIZED, FOR SOLUTION INTRAMUSCULAR; INTRAVENOUS ONCE
Status: CANCELLED | OUTPATIENT
Start: 2018-09-20

## 2018-09-20 RX ORDER — ACETAMINOPHEN 325 MG/1
650 TABLET ORAL ONCE
Status: CANCELLED | OUTPATIENT
Start: 2018-09-20

## 2018-09-20 RX ORDER — ACETAMINOPHEN 325 MG/1
650 TABLET ORAL ONCE
Status: COMPLETED | OUTPATIENT
Start: 2018-09-20 | End: 2018-09-20

## 2018-09-20 RX ORDER — DIPHENHYDRAMINE HCL 25 MG
50 CAPSULE ORAL ONCE
Status: COMPLETED | OUTPATIENT
Start: 2018-09-20 | End: 2018-09-20

## 2018-09-20 RX ORDER — METHYLPREDNISOLONE SODIUM SUCCINATE 125 MG/2ML
125 INJECTION, POWDER, LYOPHILIZED, FOR SOLUTION INTRAMUSCULAR; INTRAVENOUS ONCE
Status: COMPLETED | OUTPATIENT
Start: 2018-09-20 | End: 2018-09-20

## 2018-09-20 RX ORDER — DIPHENHYDRAMINE HCL 25 MG
50 CAPSULE ORAL ONCE
Status: CANCELLED | OUTPATIENT
Start: 2018-09-20 | End: 2018-09-20

## 2018-09-20 RX ADMIN — DIPHENHYDRAMINE HYDROCHLORIDE 50 MG: 25 CAPSULE ORAL at 09:42

## 2018-09-20 RX ADMIN — ACETAMINOPHEN 650 MG: 325 TABLET ORAL at 09:42

## 2018-09-20 RX ADMIN — METHYLPREDNISOLONE SODIUM SUCCINATE 125 MG: 125 INJECTION, POWDER, FOR SOLUTION INTRAMUSCULAR; INTRAVENOUS at 09:42

## 2018-09-20 RX ADMIN — OCRELIZUMAB 300 MG: 300 INJECTION INTRAVENOUS at 10:12

## 2018-09-20 NOTE — MR AVS SNAPSHOT
After Visit Summary   9/20/2018    Chayo Reid    MRN: [id number]           Patient Information     Date Of Birth          1970        Visit Information        Provider Department      9/20/2018 9:00 AM UC 46 ATC; UC SPEC INFUSION [facility]        Today's Diagnoses     MS (multiple sclerosis) (H)    -  1      Care Instructions    Dear Chayo Reid    Thank you for choosing [facility] ([facility]) for your infusion.  The following information is a summary of our appointment as well as important reminders.      EDUCATION POST BIOLOGICAL/CHEMOTHERAPY INFUSION  Call the triage nurse at your clinic or seek medical attention if you have chills and/or temperature greater than or equal to 100.5, uncontrolled nausea/vomiting, diarrhea, constipation, dizziness, shortness of breath, chest pain, heart palpitations, weakness or any other new or concerning symptoms, questions or concerns.  You can not have any live virus vaccines prior to or during treatment or up to 6 months post infusion.  If you have an upcoming surgery, medical procedure or dental procedure during treatment, this should be discussed with your ordering physician and your surgeon/dentist.  If you are having any concerning symptom, if you are unsure if you should get your next infusion or wish to speak to a provider before your next infusion, please call your care coordinator or triage nurse at your clinic to notify them so we can adequately serve you.    Ocrelizumab injection  Brand Name: OCREVUS  What is this medicine?  OCRELIZUMAB (ok re THOMAS ue mab) treats multiple sclerosis. It helps to decrease the number of multiple sclerosis relapses. It is not a cure.  How should I use this medicine?  This medicine is for infusion into a vein. It is given by a health care professional in a hospital or clinic setting.  Talk to your  pediatrician regarding the use of this medicine in children. Special care may be needed.  What side effects may I notice from receiving this medicine?  Side effects that you should report to your doctor or health care professional as soon as possible:    allergic reactions like skin rash, itching or hives, swelling of the face, lips, or tongue    breathing problems    facial flushing    fast, irregular heartbeat    lump or soreness in the breast    signs and symptoms of herpes such as cold sore, shingles, or genital sores    signs and symptoms of infection like fever or chills, cough, sore throat, pain or trouble passing urine    signs and symptoms of low blood pressure like dizziness; feeling faint or lightheaded, falls; unusually weak or tired    signs and symptoms of progressive multifocal leukoencephalopathy (PML) like changes in vision; clumsiness; confusion; personality changes; weakness on one side of the body    swelling of the ankles, feet, hands  Side effects that usually do not require medical attention (report these to your doctor or health care professional if they continue or are bothersome):    back pain    depressed mood    diarrhea    pain, redness, or irritation at site where injected  What may interact with this medicine?      alemtuzumab    daclizumab    dimethyl fumarate    fingolimod    glatiramer    interferon beta    live virus vaccines    mitoxantrone    natalizumab    peginterferon beta    rituximab    steroid medicines like prednisone or cortisone    teriflunomide  What if I miss a dose?  Keep appointments for follow-up doses as directed. It is important not to miss your dose. Call your doctor or health care professional if you are unable to keep an appointment.  Where should I keep my medicine?  This drug is given in a hospital or clinic and will not be stored at home.  What should I tell my health care provider before I take this medicine?  They need to know if you have any of these  conditions:    cancer    hepatitis B infection    other infection (especially a virus infection such as chickenpox, cold sores, or herpes)    an unusual or allergic reaction to ocrelizumab, other medicines, foods, dyes or preservatives    pregnant or trying to get pregnant    breast-feeding  What should I watch for while using this medicine?  Tell your doctor or healthcare professional if your symptoms do not start to get better or if they get worse.  This medicine can cause serious allergic reactions. To reduce your risk you may need to take medicine before treatment with this medicine. Take your medicine as directed.  Women should inform their doctor if they wish to become pregnant or think they might be pregnant. There is a potential for serious side effects to an unborn child. Talk to your health care professional or pharmacist for more information. Female patients should use effective birth control methods while receiving this medicine and for 6 months after the last dose.  Call your doctor or health care professional for advice if you get a fever, chills or sore throat, or other symptoms of a cold or flu. Do not treat yourself. This drug decreases your body's ability to fight infections. Try to avoid being around people who are sick.  If you have a hepatitis B infection or a history of a hepatitis B infection, talk to your doctor. The symptoms of hepatitis B may get worse if you take this medicine.  In some patients, this medicine may cause a serious brain infection that may cause death. If you have any problems seeing, thinking, speaking, walking, or standing, tell your doctor right away. If you cannot reach your doctor, urgently seek other source of medical care.  This medicine can decrease the response to a vaccine. If you need to get vaccinated, tell your healthcare professional if you have received this medicine. Extra booster doses may be needed. Talk to your doctor to see if a different vaccination  schedule is needed.  Talk to your doctor about your risk of cancer. You may be more at risk for certain types of cancers if you take this medicine.  NOTE:This sheet is a summary. It may not cover all possible information. If you have questions about this medicine, talk to your doctor, pharmacist, or health care provider. Copyright  2018 [employer]        We look forward in seeing you on your next appointment here at [facility].  Please don t hesitate to call us at [phone] to reschedule any of your appointments or to speak with one of the [facility] registered nurses.  It was a pleasure taking care of you today.    Sincerely,    [facility]  [facility]  [address] [place]  [address]  Phone:  [phone]            Follow-ups after your visit        Your next 10 appointments already scheduled     Oct 04, 2018  9:00 AM CDT   Infusion 300 with UC SPEC INFUSION, UC 46 ATC   [facility] ([facility])    [address]  [address]  [facility] [address] [phone]            Dec 05, 2018 10:30 AM CST   (Arrive by 10:15 AM)   Return Multiple Sclerosis with Pietro Flores MD   [facility] Multiple Sclerosis ([facility])    [address] [place] [address]  [address]  [facility] [address] [phone]              Future tests that were ordered for you today     Open Standing Orders        Priority Remaining Interval Expires Ordered    Notify Physician Routine [id number] PRN  9/20/2018            Who to contact     If you have questions or need follow up information about today's clinic visit or your schedule please contact [facility] SPECIAL[facility] PROCED[facility] directly at [phone].  Normal or non-critical lab and imaging results will be communicated to you by MyChart, letter or phone within 4 business days after the clinic has  received the results. If you do not hear from us within 7 days, please contact the clinic through [employer] or phone. If you have a critical or abnormal lab result, we will notify you by phone as soon as possible.  Submit refill requests through [employer] or call your pharmacy and they will forward the refill request to us. Please allow 3 business days for your refill to be completed.          Additional Information About Your Visit        [employer] Information     [employer] gives you secure access to your electronic health record. If you see a primary care provider, you can also send messages to your care team and make appointments. If you have questions, please call your primary care clinic.  If you do not have a primary care provider, please call [phone] and they will assist you.        Care EveryWhere ID     This is your Care EveryWhere ID. This could be used by other organizations to access your [place] medical records  [id number]        Your Vitals Were     Pulse Temperature Respirations Pulse Oximetry BMI (Body Mass Index)       89 97.9  F (36.6  C) (Oral) 18 95% 30.49 kg/m2        Blood Pressure from Last 3 Encounters:   09/20/18 121/75   09/05/18 131/82   08/22/18 124/85    Weight from Last 3 Encounters:   09/20/18 85.7 kg (188 lb 14.4 oz)   09/05/18 86.1 kg (189 lb 12.8 oz)   08/22/18 86 kg (189 lb 9.6 oz)              Today, you had the following     No orders found for display       Primary Care Provider Fax #    Physician No Ref-Primary [phone]       No address on file        Equal Access to Services     LÓPEZ ZAVALA : Hadii aad ku hadasho Soomaali, waaxda luqadaha, qaybta kaalmada adeegyada, natalia willis . So [facility] [phone].    ATENCIÓN: Si habla español, tiene a hall disposición servicios gratuitos de asistencia lingüística. Llame al [phone].    We comply with applicable federal civil rights laws and [place] laws. We do not discriminate on the basis of race,  color, national origin, age, disability, sex, sexual orientation, or gender identity.            Thank you!     Thank you for choosing [facility] PROCED[facility]  for your care. Our goal is always to provide you with excellent care. Hearing back from our patients is one way we can continue to improve our services. Please take a few minutes to complete the written survey that you may receive in the mail after your visit with us. Thank you!             Your Updated Medication List - Protect others around you: Learn how to safely use, store and throw away your medicines at www.disposemymeds.org.          This list is accurate as of 9/20/18  2:17 PM.  Always use your most recent med list.                   Brand Name Dispense Instructions for use Diagnosis    amitriptyline 25 MG tablet    ELAVIL    90 tablet    Take 3 tablets (75 mg) by mouth At Bedtime    Myofascial pain syndrome, Sleep disorder       * amphetamine-dextroamphetamine 20 MG per 24 hr capsule    ADDERALL XR    60 capsule    Take 1 capsule (20 mg) by mouth 2 times daily    MS (multiple sclerosis) (H), Other fatigue       * amphetamine-dextroamphetamine 30 MG per 24 hr capsule    ADDERALL XR    30 capsule    Take 1 capsule (30 mg) by mouth daily    Other fatigue, MS (multiple sclerosis) (H)       baclofen 10 MG tablet    LIORESAL    60 tablet    Take 1 tablet (10 mg) by mouth 2 times daily    MS (multiple sclerosis) (H)       cyclobenzaprine 5 MG tablet    FLEXERIL    30 tablet    Take 1 tablet (5 mg) by mouth 2 times daily    Pain of right lower extremity       divalproex sodium extended-release 500 MG 24 hr tablet    DEPAKOTE ER    60 tablet    Please take 1 tab at bedtime for one week, then take 2 tabs at bedtime    MS (multiple sclerosis) (H)       DULoxetine 60 MG EC capsule    CYMBALTA    30 capsule    Take 1 capsule (60 mg) by mouth daily    Lumbar radiculopathy       famotidine 40 MG tablet    PEPCID    30 tablet     Take 1 tablet (40 mg) by mouth At Bedtime    MS (multiple sclerosis) (H)       ferrous sulfate 325 (65 Fe) MG tablet    IRON     Take 1 tablet by mouth daily        Glatiramer Acetate 40 MG/ML Sosy    COPAXONE    12 Syringe    Inject 40 mg Subcutaneous three times a week    MS (multiple sclerosis) (H)       medical cannabis (Patient's own supply.  Not a prescription)      See Admin Instructions (This is NOT a prescription, and does not certify that the patient has a qualifying medical condition for medical cannabis.  The purpose of this order is  to document that the patient reports taking medical cannabis.)        modafinil 200 MG tablet    PROVIGIL    30 tablet    Take 1 tablet (200 mg) by mouth daily    Fatigue       oxybutynin 10 MG 24 hr tablet    DITROPAN-XL    30 tablet    TAKE 1 TABLET BY MOUTH ONE HOUR PRIOR TO BED.    MS (multiple sclerosis) (H), Neurogenic bladder       * predniSONE 20 MG tablet    DELTASONE    24 tablet    Afterthe high dose steroids, then take three 20mg tabs for 4 days, then take two 20mg tabs for 4 days, then take one 20mg tab for 4 days.    MS (multiple sclerosis) (H)       * predniSONE 50 MG tablet    DELTASONE    75 tablet    Take 25 tablets (1,250 mg) by mouth daily    MS (multiple sclerosis) (H)       pregabalin 50 MG capsule    LYRICA    270 capsule    Take 1 capsule (50 mg) by mouth 3 times daily    MS (multiple sclerosis) (H)       vitamin D 1000 units capsule      Take 1 capsule by mouth daily        * Notice:  This list has 4 medication(s) that are the same as other medications prescribed for you. Read the directions carefully, and ask your doctor or other care provider to review them with you.

## 2018-09-20 NOTE — PROGRESS NOTES
Nursing Note  [facility]ayo Reid [facility] for:   Chief Complaint   Patient presents with     Infusion     Ocrevus     During today's [facility] appointment, orders from Dr. Pietro Flores were completed.  Frequency: today is dose 1 of 2, every 14 days for induction phase. Then Q 6 months. First dose    Progress note:  Patient identification verified by name and date of birth.  Assessment completed.  Vitals recorded in Doc Flowsheets.  Patient was provided with education regarding infusion and possible side effects.  Patient verbalized understanding.      needed: No  Premedications: administered per order. Waited 30 minutes after administration of pre-meds to proceed with Ocrevus infusion.   Infusion Rates: infusion starts at 30 ml/hr, then increased by 30 ml/hr every 30 minutes to final rate of 180 ml/hr.  Approximate Infusion length: 3 hours + 1 hour observation.  Labs: were not ordered for this appointment.  Vascular access: peripheral IV placed today.  Treatment Conditions: patient monitored for 1 hour after medication was infused.  ~~~ NOTE: If the patient answers yes to any of the questions below, hold the infusion and contact ordering provider or on-call provider.    1. Have you recently had an elevated temperature, fever, chills, productive cough, coughing for 3 weeks or longer or hemoptysis,  abnormal vital signs, night sweats,  chest pain or have you noticed a decrease in your appetite, unexplained weight loss or fatigue? No  2. Do you have any open wounds or new incisions? No  3. Do you have any recent or upcoming hospitalizations, surgeries or dental procedures? No  4. Do you currently have or recently have had any signs of illness or infection or are you on any antibiotics? No  5. Have you had any new, sudden or worsening abdominal pain? No  6. Have you or anyone in your household received a live vaccination in the past  4 weeks? Please note:  No live vaccines while on biologic/chemotherapy until 6 months after the last treatment.  Patient can receive the flu vaccine (shot only) and the pneumovax.  It is optimal for the patient to get these vaccines mid cycle, but they can be given at any time as long as it is not on the day of the infusion. No  7. Have you recently been diagnosed with any new nervous system diseases (ie. Multiple sclerosis, Guillain [place], seizures, neurological changes) or cancer diagnosis? Are you on any form of radiation or chemotherapy? No  8. Are you pregnant or breast feeding or do you have plans of pregnancy in the future? No  9. Have you been having any signs of worsening depression or suicidal ideations?  (benlysta only) No  10. Have there been any other new onset medical symptoms? No    Patient tolerated infusion: well.    POST-INFUSION OF BIOLOGICAL MEDICATION:  Reviewed with patient.  Given biologic medication or medication hand-out. Inform patient if any fever, chills or signs of infection, new symptoms, abdominal pain, heart palpitations, shortness of breath, reaction, weakness, neurological changes, seek medical attention immediately and should not receive infusions. No live virus vaccines prior to or during treatment or up to 6 months post infusion. If the patient has an upcoming procedure or surgery, this should be discussed with the rheumatologist and surgeon or provider.    Pt discharged by JOE Marquez RN    Discharge Plan:   AVS printed and given to patient.   Follow up plan of care with: ongoing infusions at [facility].  Discharge instructions were reviewed with patient.  Patient/representative verbalized understanding of discharge instructions and all questions answered.  Patient discharged from [facility] in stable condition.    Neeta Mensah RN    Administrations This Visit     acetaminophen (TYLENOL) tablet 650 mg     Admin Date  Action Dose Route Administered By             09/20/2018 Given 650 mg Oral Neeta Mensah RN                    diphenhydrAMINE (BENADRYL) capsule 50 mg     Admin Date Action Dose Route Administered By             09/20/2018 Given 50 mg Oral Neeta Mensah RN                    methylPREDNISolone sodium succinate (solu-MEDROL) injection 125 mg     Admin Date Action Dose Route Administered By             09/20/2018 Given 125 mg Intravenous Neeta Mensah RN                    ocrelizumab (OCREVUS) 300 mg in sodium chloride 0.9 % 250 mL infusion     Admin Date Action Dose Route Administered By             09/20/2018 New Bag 300 mg Intravenous Neeta Mensah RN                        /88 (BP Location: Left arm)  Pulse 76  Temp 97.9  F (36.6  C) (Oral)  Resp 18  Wt 85.7 kg (188 lb 14.4 oz)  SpO2 97%  BMI 30.49 kg/m2

## 2018-09-20 NOTE — PATIENT INSTRUCTIONS
Dear Chayo Reid    Thank you for choosing [facility] ([facility]) for your infusion.  The following information is a summary of our appointment as well as important reminders.      EDUCATION POST BIOLOGICAL/CHEMOTHERAPY INFUSION  Call the triage nurse at your clinic or seek medical attention if you have chills and/or temperature greater than or equal to 100.5, uncontrolled nausea/vomiting, diarrhea, constipation, dizziness, shortness of breath, chest pain, heart palpitations, weakness or any other new or concerning symptoms, questions or concerns.  You can not have any live virus vaccines prior to or during treatment or up to 6 months post infusion.  If you have an upcoming surgery, medical procedure or dental procedure during treatment, this should be discussed with your ordering physician and your surgeon/dentist.  If you are having any concerning symptom, if you are unsure if you should get your next infusion or wish to speak to a provider before your next infusion, please call your care coordinator or triage nurse at your clinic to notify them so we can adequately serve you.    Ocrelizumab injection  Brand Name: OCREVUS  What is this medicine?  OCRELIZUMAB (ok re THOMAS ue mab) treats multiple sclerosis. It helps to decrease the number of multiple sclerosis relapses. It is not a cure.  How should I use this medicine?  This medicine is for infusion into a vein. It is given by a health care professional in a hospital or clinic setting.  Talk to your pediatrician regarding the use of this medicine in children. Special care may be needed.  What side effects may I notice from receiving this medicine?  Side effects that you should report to your doctor or health care professional as soon as possible:    allergic reactions like skin rash, itching or hives, swelling of the face, lips, or tongue    breathing problems    facial flushing    fast, irregular  heartbeat    lump or soreness in the breast    signs and symptoms of herpes such as cold sore, shingles, or genital sores    signs and symptoms of infection like fever or chills, cough, sore throat, pain or trouble passing urine    signs and symptoms of low blood pressure like dizziness; feeling faint or lightheaded, falls; unusually weak or tired    signs and symptoms of progressive multifocal leukoencephalopathy (PML) like changes in vision; clumsiness; confusion; personality changes; weakness on one side of the body    swelling of the ankles, feet, hands  Side effects that usually do not require medical attention (report these to your doctor or health care professional if they continue or are bothersome):    back pain    depressed mood    diarrhea    pain, redness, or irritation at site where injected  What may interact with this medicine?      alemtuzumab    daclizumab    dimethyl fumarate    fingolimod    glatiramer    interferon beta    live virus vaccines    mitoxantrone    natalizumab    peginterferon beta    rituximab    steroid medicines like prednisone or cortisone    teriflunomide  What if I miss a dose?  Keep appointments for follow-up doses as directed. It is important not to miss your dose. Call your doctor or health care professional if you are unable to keep an appointment.  Where should I keep my medicine?  This drug is given in a hospital or clinic and will not be stored at home.  What should I tell my health care provider before I take this medicine?  They need to know if you have any of these conditions:    cancer    hepatitis B infection    other infection (especially a virus infection such as chickenpox, cold sores, or herpes)    an unusual or allergic reaction to ocrelizumab, other medicines, foods, dyes or preservatives    pregnant or trying to get pregnant    breast-feeding  What should I watch for while using this medicine?  Tell your doctor or healthcare professional if your symptoms do  not start to get better or if they get worse.  This medicine can cause serious allergic reactions. To reduce your risk you may need to take medicine before treatment with this medicine. Take your medicine as directed.  Women should inform their doctor if they wish to become pregnant or think they might be pregnant. There is a potential for serious side effects to an unborn child. Talk to your health care professional or pharmacist for more information. Female patients should use effective birth control methods while receiving this medicine and for 6 months after the last dose.  Call your doctor or health care professional for advice if you get a fever, chills or sore throat, or other symptoms of a cold or flu. Do not treat yourself. This drug decreases your body's ability to fight infections. Try to avoid being around people who are sick.  If you have a hepatitis B infection or a history of a hepatitis B infection, talk to your doctor. The symptoms of hepatitis B may get worse if you take this medicine.  In some patients, this medicine may cause a serious brain infection that may cause death. If you have any problems seeing, thinking, speaking, walking, or standing, tell your doctor right away. If you cannot reach your doctor, urgently seek other source of medical care.  This medicine can decrease the response to a vaccine. If you need to get vaccinated, tell your healthcare professional if you have received this medicine. Extra booster doses may be needed. Talk to your doctor to see if a different vaccination schedule is needed.  Talk to your doctor about your risk of cancer. You may be more at risk for certain types of cancers if you take this medicine.  NOTE:This sheet is a summary. It may not cover all possible information. If you have questions about this medicine, talk to your doctor, pharmacist, or health care provider. Copyright  2018 [employer]        We look forward in seeing you on your next appointment  here at [facility].  Please don t hesitate to call us at [phone] to reschedule any of your appointments or to speak with one of the [facility] registered nurses.  It was a pleasure taking care of you today.    Sincerely,    [facility]  [address] [place] [address]  [id number]  Phone:  [phone]

## 2018-10-04 ENCOUNTER — INFUSION THERAPY VISIT (OUTPATIENT)
Dept: INFUSION THERAPY | Facility: CLINIC | Age: 48
End: 2018-10-04
Attending: PSYCHIATRY & NEUROLOGY
Payer: COMMERCIAL

## 2018-10-04 VITALS
HEART RATE: 81 BPM | OXYGEN SATURATION: 97 % | SYSTOLIC BLOOD PRESSURE: 105 MMHG | RESPIRATION RATE: 16 BRPM | DIASTOLIC BLOOD PRESSURE: 69 MMHG | WEIGHT: 191.1 LBS | TEMPERATURE: 98 F | BODY MASS INDEX: 30.84 KG/M2

## 2018-10-04 DIAGNOSIS — G35 MS (MULTIPLE SCLEROSIS) (H): Primary | ICD-10-CM

## 2018-10-04 DIAGNOSIS — G35 MULTIPLE SCLEROSIS (H): ICD-10-CM

## 2018-10-04 LAB
ALBUMIN SERPL-MCNC: 3.2 G/DL (ref 3.4–5)
ALP SERPL-CCNC: 62 U/L (ref 40–150)
ALT SERPL W P-5'-P-CCNC: 18 U/L (ref 0–50)
ANION GAP SERPL CALCULATED.3IONS-SCNC: 8 MMOL/L (ref 3–14)
AST SERPL W P-5'-P-CCNC: 12 U/L (ref 0–45)
BASOPHILS # BLD AUTO: 0.1 10E9/L (ref 0–0.2)
BASOPHILS NFR BLD AUTO: 0.9 %
BILIRUB SERPL-MCNC: 0.4 MG/DL (ref 0.2–1.3)
BUN SERPL-MCNC: 16 MG/DL (ref 7–30)
CALCIUM SERPL-MCNC: 8.3 MG/DL (ref 8.5–10.1)
CHLORIDE SERPL-SCNC: 105 MMOL/L (ref 94–109)
CO2 SERPL-SCNC: 28 MMOL/L (ref 20–32)
CREAT SERPL-MCNC: 0.9 MG/DL (ref 0.52–1.04)
DIFFERENTIAL METHOD BLD: NORMAL
EOSINOPHIL # BLD AUTO: 0.3 10E9/L (ref 0–0.7)
EOSINOPHIL NFR BLD AUTO: 3.1 %
ERYTHROCYTE [DISTWIDTH] IN BLOOD BY AUTOMATED COUNT: 12 % (ref 10–15)
GFR SERPL CREATININE-BSD FRML MDRD: 67 ML/MIN/1.7M2
GLUCOSE SERPL-MCNC: 73 MG/DL (ref 70–99)
HCT VFR BLD AUTO: 42 % (ref 35–47)
HGB BLD-MCNC: 13.9 G/DL (ref 11.7–15.7)
IMM GRANULOCYTES # BLD: 0 10E9/L (ref 0–0.4)
IMM GRANULOCYTES NFR BLD: 0.3 %
LYMPHOCYTES # BLD AUTO: 1.6 10E9/L (ref 0.8–5.3)
LYMPHOCYTES NFR BLD AUTO: 16.5 %
MCH RBC QN AUTO: 29.4 PG (ref 26.5–33)
MCHC RBC AUTO-ENTMCNC: 33.1 G/DL (ref 31.5–36.5)
MCV RBC AUTO: 89 FL (ref 78–100)
MONOCYTES # BLD AUTO: 0.8 10E9/L (ref 0–1.3)
MONOCYTES NFR BLD AUTO: 8.4 %
NEUTROPHILS # BLD AUTO: 6.7 10E9/L (ref 1.6–8.3)
NEUTROPHILS NFR BLD AUTO: 70.8 %
NRBC # BLD AUTO: 0 10*3/UL
NRBC BLD AUTO-RTO: 0 /100
PHOSPHATE SERPL-MCNC: 3.5 MG/DL (ref 2.5–4.5)
PLATELET # BLD AUTO: 284 10E9/L (ref 150–450)
POTASSIUM SERPL-SCNC: 3.9 MMOL/L (ref 3.4–5.3)
PROT SERPL-MCNC: 7 G/DL (ref 6.8–8.8)
RBC # BLD AUTO: 4.73 10E12/L (ref 3.8–5.2)
SODIUM SERPL-SCNC: 141 MMOL/L (ref 133–144)
WBC # BLD AUTO: 9.4 10E9/L (ref 4–11)

## 2018-10-04 PROCEDURE — 96375 TX/PRO/DX INJ NEW DRUG ADDON: CPT

## 2018-10-04 PROCEDURE — 96366 THER/PROPH/DIAG IV INF ADDON: CPT

## 2018-10-04 PROCEDURE — 80053 COMPREHEN METABOLIC PANEL: CPT | Performed by: PSYCHIATRY & NEUROLOGY

## 2018-10-04 PROCEDURE — 96365 THER/PROPH/DIAG IV INF INIT: CPT

## 2018-10-04 PROCEDURE — 25000132 ZZH RX MED GY IP 250 OP 250 PS 637: Mod: ZF | Performed by: PSYCHIATRY & NEUROLOGY

## 2018-10-04 PROCEDURE — 25000128 H RX IP 250 OP 636: Mod: ZF | Performed by: PSYCHIATRY & NEUROLOGY

## 2018-10-04 PROCEDURE — 84100 ASSAY OF PHOSPHORUS: CPT | Performed by: PSYCHIATRY & NEUROLOGY

## 2018-10-04 PROCEDURE — 85025 COMPLETE CBC W/AUTO DIFF WBC: CPT | Performed by: PSYCHIATRY & NEUROLOGY

## 2018-10-04 RX ORDER — ACETAMINOPHEN 325 MG/1
650 TABLET ORAL ONCE
Status: COMPLETED | OUTPATIENT
Start: 2018-10-04 | End: 2018-10-04

## 2018-10-04 RX ORDER — METHYLPREDNISOLONE SODIUM SUCCINATE 125 MG/2ML
125 INJECTION, POWDER, LYOPHILIZED, FOR SOLUTION INTRAMUSCULAR; INTRAVENOUS ONCE
Status: CANCELLED | OUTPATIENT
Start: 2018-10-04

## 2018-10-04 RX ORDER — ACETAMINOPHEN 325 MG/1
650 TABLET ORAL ONCE
Status: CANCELLED | OUTPATIENT
Start: 2018-10-04

## 2018-10-04 RX ORDER — METHYLPREDNISOLONE SODIUM SUCCINATE 125 MG/2ML
125 INJECTION, POWDER, LYOPHILIZED, FOR SOLUTION INTRAMUSCULAR; INTRAVENOUS ONCE
Status: COMPLETED | OUTPATIENT
Start: 2018-10-04 | End: 2018-10-04

## 2018-10-04 RX ORDER — DIPHENHYDRAMINE HCL 25 MG
50 CAPSULE ORAL ONCE
Status: CANCELLED | OUTPATIENT
Start: 2018-10-04 | End: 2018-10-04

## 2018-10-04 RX ORDER — DIPHENHYDRAMINE HCL 25 MG
50 CAPSULE ORAL ONCE
Status: COMPLETED | OUTPATIENT
Start: 2018-10-04 | End: 2018-10-04

## 2018-10-04 RX ADMIN — ACETAMINOPHEN 650 MG: 325 TABLET ORAL at 09:11

## 2018-10-04 RX ADMIN — METHYLPREDNISOLONE SODIUM SUCCINATE 125 MG: 125 INJECTION, POWDER, FOR SOLUTION INTRAMUSCULAR; INTRAVENOUS at 09:20

## 2018-10-04 RX ADMIN — DIPHENHYDRAMINE HYDROCHLORIDE 50 MG: 25 CAPSULE ORAL at 09:11

## 2018-10-04 RX ADMIN — OCRELIZUMAB 300 MG: 300 INJECTION INTRAVENOUS at 09:49

## 2018-10-04 NOTE — PROGRESS NOTES
Nursing Note  Chayo Reid [facility] for:   Chief Complaint   Patient presents with     Infusion     Ocrevus     During [facility]'s [facility] appointment, orders from Dr. Pietro Flores were completed.  Frequency: today is dose 2 of 2  for induction phase. Then Q 6 months. Second dose    Progress note:  Patient identification verified by name and date of birth.  Assessment completed.  Vitals recorded in Doc Flowsheets.  Patient was provided with education regarding infusion and possible side effects.  Patient verbalized understanding.      needed: No  Premedications: administered per order. Waited 30 minutes after administration of pre-meds to proceed with Ocrevus infusion.   Infusion Rates: infusion starts at 30 ml/hr, then increased by 30 ml/hr every 30 minutes to final rate of 180 ml/hr.  Approximate Infusion length: 3 hours + 1 hour observation.  Labs: were ordered for this appointment.  Vascular access: peripheral IV placed today.  Treatment Conditions: patient monitored for 1 hour after medication was infused.  ~~~ NOTE: If the patient answers yes to any of the questions below, hold the infusion and contact ordering provider or on-call provider.    1. Have you recently had an elevated temperature, fever, chills, productive cough, coughing for 3 weeks or longer or hemoptysis,  abnormal vital signs, night sweats,  chest pain or have you noticed a decrease in your appetite, unexplained weight loss or fatigue? No  2. Do you have any open wounds or new incisions? No  3. Do you have any recent or upcoming hospitalizations, surgeries or dental procedures? No  4. Do you currently have or recently have had any signs of illness or infection or are you on any antibiotics? No  5. Have you had any new, sudden or worsening abdominal pain? No  6. Have you or anyone in your household received a live vaccination in the past 4 weeks? Please  note:  No live vaccines while on biologic/chemotherapy until 6 months after the last treatment.  Patient can receive the flu vaccine (shot only) and the pneumovax.  It is optimal for the patient to get these vaccines mid cycle, but they can be given at any time as long as it is not on the day of the infusion. No  7. Have you recently been diagnosed with any new nervous system diseases (ie. Multiple sclerosis, Guillain Bally, seizures, neurological changes) or cancer diagnosis? Are you on any form of radiation or chemotherapy? No  8. Are you pregnant or breast feeding or do you have plans of pregnancy in the future? No  9. Have you been having any signs of worsening depression or suicidal ideations?  (benlysta only) No  10. Have there been any other new onset medical symptoms? No    Patient tolerated infusion: well.    Administrations This Visit     acetaminophen (TYLENOL) tablet 650 mg     Admin Date Action Dose Route Administered By             10/04/2018 Given 650 mg Oral Gloria Antonoi RN                    diphenhydrAMINE (BENADRYL) capsule 50 mg     Admin Date Action Dose Route Administered By             10/04/2018 Given 50 mg Oral Gloria Antonio RN                    methylPREDNISolone sodium succinate (solu-MEDROL) injection 125 mg     Admin Date Action Dose Route Administered By             10/04/2018 Given 125 mg Intravenous Gloria Antonio RN                    ocrelizumab (OCREVUS) 300 mg in sodium chloride 0.9 % 250 mL infusion     Admin Date Action Dose Route Administered By             10/04/2018 New Bag 300 mg Intravenous Gloria Antonio RN                            POST-INFUSION OF BIOLOGICAL MEDICATION:  Reviewed with patient.  Given biologic medication or medication hand-out. Inform patient if any fever, chills or signs of infection, new symptoms, abdominal pain, heart palpitations, shortness of breath, reaction, weakness, neurological changes, seek medical attention immediately and  should not receive infusions. No live virus vaccines prior to or during treatment or up to 6 months post infusion. If the patient has an upcoming procedure or surgery, this should be discussed with the rheumatologist and surgeon or provider.        Discharge Plan:   AVS printed and given to patient.   Follow up plan of care with: ongoing infusions at [facility].  Discharge instructions were reviewed with patient.  Patient/representative verbalized understanding of discharge instructions and all questions answered.  Patient discharged from [facility] in stable condition.    Gloria Car RN      /75  Pulse 81  Temp 98  F (36.7  C) (Oral)  Resp 16  Wt 86.7 kg (191 lb 1.6 oz)  SpO2 97%  BMI 30.84 kg/m2

## 2018-10-04 NOTE — MR AVS SNAPSHOT
After Visit Summary   10/4/2018    Chayo Reid    MRN: [id number]           Patient Information     Date Of Birth          1970        Visit Information        Provider Department      10/4/2018 9:00 AM UC [facility] ATC; [facility] INF[facility]        Today's Diagnoses     MS (multiple sclerosis) (H)    -  1    Multiple sclerosis (H)           Follow-ups after your visit        Your next 10 appointments already scheduled     Dec 05, 2018 10:30 AM CST   (Arrive by 10:15 AM)   Return Multiple Sclerosis with Pietro Flores MD   [facility] Multiple Sclerosis ([facility])    [address] [place] [address]  [address] [place] [id number] [phone]              Future tests that were ordered for you today     Open Standing Orders        Priority Remaining Interval Expires Ordered    Notify Physician Routine [id number] PRN  10/4/2018            Who to contact     If you have questions or need follow up information about today's clinic visit or your schedule please contact [facility] directly at [phone].  Normal or non-critical lab and imaging results will be communicated to you by [employer] Dynam[employer], letter or phone within 4 business days after the clinic has received the results. If you do not hear from us within 7 days, please contact the clinic through [employer] or phone. If you have a critical or abnormal lab result, we will notify you by phone as soon as possible.  Submit refill requests through [email or web address] or call your pharmacy and they will forward the refill request to us. Please allow 3 business days for your refill to be completed.          Additional Information About Your Visit        [employer] Information     [email or web address] gives you secure access to your electronic health record. If you see a primary care provider, you can also send messages to your care team and make appointments.  If you have questions, please call your primary care clinic.  If you do not have a primary care provider, please call [phone] and they will assist you.        Care EveryWhere ID     This is your Care EveryWhere ID. This could be used by other organizations to access your [facility] medical records  [id number]        Your Vitals Were     Pulse Temperature Respirations Pulse Oximetry BMI (Body Mass Index)       81 98  F (36.7  C) (Oral) 16 97% 30.84 kg/m2        Blood Pressure from Last 3 Encounters:   10/04/18 105/69   09/20/18 121/75   09/05/18 131/82    Weight from Last 3 Encounters:   10/04/18 86.7 kg (191 lb 1.6 oz)   09/20/18 85.7 kg (188 lb 14.4 oz)   09/05/18 86.1 kg (189 lb 12.8 oz)              We Performed the Following     CBC with platelets differential     Comprehensive metabolic panel     Phosphorus        Primary Care Provider Fax #    Physician No Ref-Primary [phone]       No address on file        Equal Access to Services     FAN ZAVALA : Hadii brianna vazo [place], waaxda luqadaha, qaybta kaalmada adeegyaisauro, natalia willis . So [facility] [phone].    ATENCIÓN: Si habla español, tiene a hall disposición servicios gratuitos de asistencia lingüística. Ll[facility] [phone].    We comply with applicable federal civil rights laws and [place] laws. We do not discriminate on the basis of race, color, national origin, age, disability, sex, sexual orientation, or gender identity.            Thank you!     Thank you for choosing [facility]  for your care. Our goal is always to provide you with excellent care. Hearing back from our patients is one way we can continue to improve our services. Please take a few minutes to complete the written survey that you may receive in the mail after your visit with us. Thank you!             Your Updated Medication List - Protect others around you: Learn how to safely use, store and  throw away your medicines at www.disposemymeds.org.          This list is accurate as of 10/4/18  5:51 PM.  Always use your most recent med list.                   Brand Name Dispense Instructions for use Diagnosis    amitriptyline 25 MG tablet    ELAVIL    90 tablet    Take 3 tablets (75 mg) by mouth At Bedtime    Myofascial pain syndrome, Sleep disorder       * amphetamine-dextroamphetamine 20 MG per 24 hr capsule    ADDERALL XR    60 capsule    Take 1 capsule (20 mg) by mouth 2 times daily    MS (multiple sclerosis) (H), Other fatigue       * amphetamine-dextroamphetamine 30 MG per 24 hr capsule    ADDERALL XR    30 capsule    Take 1 capsule (30 mg) by mouth daily    Other fatigue, MS (multiple sclerosis) (H)       baclofen 10 MG tablet    LIORESAL    60 tablet    Take 1 tablet (10 mg) by mouth 2 times daily    MS (multiple sclerosis) (H)       cyclobenzaprine 5 MG tablet    FLEXERIL    30 tablet    Take 1 tablet (5 mg) by mouth 2 times daily    Pain of right lower extremity       divalproex sodium extended-release 500 MG 24 hr tablet    DEPAKOTE ER    60 tablet    Please take 1 tab at bedtime for one week, then take 2 tabs at bedtime    MS (multiple sclerosis) (H)       DULoxetine 60 MG EC capsule    CYMBALTA    30 capsule    Take 1 capsule (60 mg) by mouth daily    Lumbar radiculopathy       famotidine 40 MG tablet    PEPCID    30 tablet    Take 1 tablet (40 mg) by mouth At Bedtime    MS (multiple sclerosis) (H)       ferrous sulfate 325 (65 Fe) MG tablet    IRON     Take 1 tablet by mouth daily        Glatiramer Acetate 40 MG/ML Sosy    COPAXONE    12 Syringe    Inject 40 mg Subcutaneous three times a week    MS (multiple sclerosis) (H)       medical cannabis (Patient's own supply.  Not a prescription)      See Admin Instructions (This is NOT a prescription, and does not certify that the patient has a qualifying medical condition for medical cannabis.  The purpose of this order is  to document that the patient  reports taking medical cannabis.)        modafinil 200 MG tablet    PROVIGIL    30 tablet    Take 1 tablet (200 mg) by mouth daily    Fatigue       oxybutynin 10 MG 24 hr tablet    DITROPAN-XL    30 tablet    TAKE 1 TABLET BY MOUTH ONE HOUR PRIOR TO BED.    MS (multiple sclerosis) (H), Neurogenic bladder       * predniSONE 20 MG tablet    DELTASONE    24 tablet    Afterthe high dose steroids, then take three 20mg tabs for 4 days, then take two 20mg tabs for 4 days, then take one 20mg tab for 4 days.    MS (multiple sclerosis) (H)       * predniSONE 50 MG tablet    DELTASONE    75 tablet    Take 25 tablets (1,250 mg) by mouth daily    MS (multiple sclerosis) (H)       pregabalin 50 MG capsule    LYRICA    270 capsule    Take 1 capsule (50 mg) by mouth 3 times daily    MS (multiple sclerosis) (H)       vitamin D 1000 units capsule      Take 1 capsule by mouth daily        * Notice:  This list has 4 medication(s) that are the same as other medications prescribed for you. Read the directions carefully, and ask your doctor or other care provider to review them with you.

## 2018-10-25 ENCOUNTER — TELEPHONE (OUTPATIENT)
Dept: NEUROLOGY | Facility: CLINIC | Age: 48
End: 2018-10-25

## 2018-10-25 NOTE — TELEPHONE ENCOUNTER
Hi    Is she still on steroids. I only really fill it if some one is on steroids. If she isn't then no.    Pietro Flores MD [facility]  Staff Neurologist   10/25/18

## 2018-10-25 NOTE — TELEPHONE ENCOUNTER
Chayo Rogel states that she is no longer on the steroids; Do you want me to advise her that she does not need to continue taking the famotidine? Thank you.    Polly Rodríguez MS RN Care Coordinator

## 2018-10-25 NOTE — TELEPHONE ENCOUNTER
Received refill request for famotidine; Dr. Flores, this is not on our refill protocol; If this is something you are willing to continue, please send prescription; Thank you.    Polly Rodríguez, MS RN Care Coordinator

## 2018-11-08 DIAGNOSIS — G35 MS (MULTIPLE SCLEROSIS) (H): ICD-10-CM

## 2018-11-08 LAB
ALBUMIN SERPL-MCNC: 3.4 G/DL (ref 3.4–5)
ALP SERPL-CCNC: 54 U/L (ref 40–150)
ALT SERPL W P-5'-P-CCNC: 20 U/L (ref 0–50)
ANION GAP SERPL CALCULATED.3IONS-SCNC: 6 MMOL/L (ref 3–14)
AST SERPL W P-5'-P-CCNC: 14 U/L (ref 0–45)
BASOPHILS # BLD AUTO: 0.1 10E9/L (ref 0–0.2)
BASOPHILS NFR BLD AUTO: 0.9 %
BILIRUB SERPL-MCNC: 0.5 MG/DL (ref 0.2–1.3)
BUN SERPL-MCNC: 15 MG/DL (ref 7–30)
CALCIUM SERPL-MCNC: 8.7 MG/DL (ref 8.5–10.1)
CHLORIDE SERPL-SCNC: 105 MMOL/L (ref 94–109)
CO2 SERPL-SCNC: 28 MMOL/L (ref 20–32)
CREAT SERPL-MCNC: 0.98 MG/DL (ref 0.52–1.04)
DIFFERENTIAL METHOD BLD: NORMAL
EOSINOPHIL # BLD AUTO: 0.4 10E9/L (ref 0–0.7)
EOSINOPHIL NFR BLD AUTO: 5 %
ERYTHROCYTE [DISTWIDTH] IN BLOOD BY AUTOMATED COUNT: 12.2 % (ref 10–15)
GFR SERPL CREATININE-BSD FRML MDRD: 60 ML/MIN/1.7M2
GLUCOSE SERPL-MCNC: 102 MG/DL (ref 70–99)
HCT VFR BLD AUTO: 42.6 % (ref 35–47)
HGB BLD-MCNC: 14 G/DL (ref 11.7–15.7)
IMM GRANULOCYTES # BLD: 0 10E9/L (ref 0–0.4)
IMM GRANULOCYTES NFR BLD: 0.3 %
LYMPHOCYTES # BLD AUTO: 1.6 10E9/L (ref 0.8–5.3)
LYMPHOCYTES NFR BLD AUTO: 20.8 %
MCH RBC QN AUTO: 29.4 PG (ref 26.5–33)
MCHC RBC AUTO-ENTMCNC: 32.9 G/DL (ref 31.5–36.5)
MCV RBC AUTO: 89 FL (ref 78–100)
MONOCYTES # BLD AUTO: 0.8 10E9/L (ref 0–1.3)
MONOCYTES NFR BLD AUTO: 9.8 %
NEUTROPHILS # BLD AUTO: 4.8 10E9/L (ref 1.6–8.3)
NEUTROPHILS NFR BLD AUTO: 63.2 %
NRBC # BLD AUTO: 0 10*3/UL
NRBC BLD AUTO-RTO: 0 /100
PLATELET # BLD AUTO: 249 10E9/L (ref 150–450)
POTASSIUM SERPL-SCNC: 4.1 MMOL/L (ref 3.4–5.3)
PROT SERPL-MCNC: 7.2 G/DL (ref 6.8–8.8)
RBC # BLD AUTO: 4.77 10E12/L (ref 3.8–5.2)
SODIUM SERPL-SCNC: 138 MMOL/L (ref 133–144)
WBC # BLD AUTO: 7.6 10E9/L (ref 4–11)

## 2018-12-03 ENCOUNTER — MYC REFILL (OUTPATIENT)
Dept: NEUROLOGY | Facility: CLINIC | Age: 48
End: 2018-12-03

## 2018-12-03 DIAGNOSIS — G35 MS (MULTIPLE SCLEROSIS) (H): ICD-10-CM

## 2018-12-03 DIAGNOSIS — R53.83 OTHER FATIGUE: ICD-10-CM

## 2018-12-03 NOTE — TELEPHONE ENCOUNTER
Message from [employer]:  Original authorizing provider: Pietro Flores MD    Chayo Reid would like a refill of the following medications:  amphetamine-dextroamphetamine (ADDERALL XR) 20 MG per 24 hr capsule [Pietro Flores MD]  amphetamine-dextroamphetamine (ADDERALL XR) 30 MG per 24 hr capsule [Pietro Flores MD]    Preferred pharmacy: [facility] [id number]    Comment:

## 2018-12-04 RX ORDER — DEXTROAMPHETAMINE SACCHARATE, AMPHETAMINE ASPARTATE MONOHYDRATE, DEXTROAMPHETAMINE SULFATE AND AMPHETAMINE SULFATE 5; 5; 5; 5 MG/1; MG/1; MG/1; MG/1
20 CAPSULE, EXTENDED RELEASE ORAL 2 TIMES DAILY
Qty: 60 CAPSULE | Refills: 0 | Status: SHIPPED | OUTPATIENT
Start: 2018-12-04 | End: 2019-01-08

## 2018-12-04 RX ORDER — DEXTROAMPHETAMINE SACCHARATE, AMPHETAMINE ASPARTATE MONOHYDRATE, DEXTROAMPHETAMINE SULFATE AND AMPHETAMINE SULFATE 7.5; 7.5; 7.5; 7.5 MG/1; MG/1; MG/1; MG/1
30 CAPSULE, EXTENDED RELEASE ORAL DAILY
Qty: 30 CAPSULE | Refills: 0 | Status: SHIPPED | OUTPATIENT
Start: 2018-12-04 | End: 2019-01-08

## 2018-12-04 NOTE — TELEPHONE ENCOUNTER
Patient sent [employer] message requesting refill of their Adderall prescriptions; Patient was last seen in September and has follow up appointment in January with Dr. Flores; Pended rx to Dr. Flores for signature and will mail to the pharmacy once signed.    Polly Rodríguez MS RN Care Coordinator

## 2018-12-04 NOTE — TELEPHONE ENCOUNTER
Prescriptions put in the mail to the patient's pharmacy; [employer] message sent to patient letting her know this.    Polly Rodríguez MS RN Care Coordinator

## 2019-01-08 ENCOUNTER — OFFICE VISIT (OUTPATIENT)
Dept: NEUROLOGY | Facility: CLINIC | Age: 49
End: 2019-01-08
Attending: PSYCHIATRY & NEUROLOGY
Payer: COMMERCIAL

## 2019-01-08 VITALS
WEIGHT: 190.3 LBS | SYSTOLIC BLOOD PRESSURE: 126 MMHG | HEIGHT: 66 IN | DIASTOLIC BLOOD PRESSURE: 85 MMHG | HEART RATE: 73 BPM | BODY MASS INDEX: 30.58 KG/M2

## 2019-01-08 DIAGNOSIS — G35 MS (MULTIPLE SCLEROSIS) (H): ICD-10-CM

## 2019-01-08 DIAGNOSIS — R53.83 OTHER FATIGUE: ICD-10-CM

## 2019-01-08 LAB
ALBUMIN SERPL-MCNC: 3.6 G/DL (ref 3.4–5)
ALP SERPL-CCNC: 66 U/L (ref 40–150)
ALT SERPL W P-5'-P-CCNC: 16 U/L (ref 0–50)
ANION GAP SERPL CALCULATED.3IONS-SCNC: 6 MMOL/L (ref 3–14)
AST SERPL W P-5'-P-CCNC: 14 U/L (ref 0–45)
BASOPHILS # BLD AUTO: 0.1 10E9/L (ref 0–0.2)
BASOPHILS NFR BLD AUTO: 0.8 %
BILIRUB SERPL-MCNC: 0.4 MG/DL (ref 0.2–1.3)
BUN SERPL-MCNC: 20 MG/DL (ref 7–30)
CALCIUM SERPL-MCNC: 8.7 MG/DL (ref 8.5–10.1)
CHLORIDE SERPL-SCNC: 104 MMOL/L (ref 94–109)
CO2 SERPL-SCNC: 28 MMOL/L (ref 20–32)
CREAT SERPL-MCNC: 0.81 MG/DL (ref 0.52–1.04)
DIFFERENTIAL METHOD BLD: NORMAL
EOSINOPHIL # BLD AUTO: 0.4 10E9/L (ref 0–0.7)
EOSINOPHIL NFR BLD AUTO: 3.9 %
ERYTHROCYTE [DISTWIDTH] IN BLOOD BY AUTOMATED COUNT: 11.9 % (ref 10–15)
GFR SERPL CREATININE-BSD FRML MDRD: 85 ML/MIN/{1.73_M2}
GLUCOSE SERPL-MCNC: 94 MG/DL (ref 70–99)
HCT VFR BLD AUTO: 44.9 % (ref 35–47)
HGB BLD-MCNC: 14.9 G/DL (ref 11.7–15.7)
IMM GRANULOCYTES # BLD: 0 10E9/L (ref 0–0.4)
IMM GRANULOCYTES NFR BLD: 0.4 %
LYMPHOCYTES # BLD AUTO: 1.1 10E9/L (ref 0.8–5.3)
LYMPHOCYTES NFR BLD AUTO: 10.6 %
MCH RBC QN AUTO: 29.9 PG (ref 26.5–33)
MCHC RBC AUTO-ENTMCNC: 33.2 G/DL (ref 31.5–36.5)
MCV RBC AUTO: 90 FL (ref 78–100)
MONOCYTES # BLD AUTO: 0.7 10E9/L (ref 0–1.3)
MONOCYTES NFR BLD AUTO: 7.1 %
NEUTROPHILS # BLD AUTO: 8.1 10E9/L (ref 1.6–8.3)
NEUTROPHILS NFR BLD AUTO: 77.2 %
NRBC # BLD AUTO: 0 10*3/UL
NRBC BLD AUTO-RTO: 0 /100
PLATELET # BLD AUTO: 340 10E9/L (ref 150–450)
POTASSIUM SERPL-SCNC: 3.9 MMOL/L (ref 3.4–5.3)
PROT SERPL-MCNC: 7.6 G/DL (ref 6.8–8.8)
RBC # BLD AUTO: 4.99 10E12/L (ref 3.8–5.2)
SODIUM SERPL-SCNC: 138 MMOL/L (ref 133–144)
WBC # BLD AUTO: 10.5 10E9/L (ref 4–11)

## 2019-01-08 PROCEDURE — G0463 HOSPITAL OUTPT CLINIC VISIT: HCPCS | Mod: ZF

## 2019-01-08 RX ORDER — DEXTROAMPHETAMINE SACCHARATE, AMPHETAMINE ASPARTATE MONOHYDRATE, DEXTROAMPHETAMINE SULFATE AND AMPHETAMINE SULFATE 7.5; 7.5; 7.5; 7.5 MG/1; MG/1; MG/1; MG/1
30 CAPSULE, EXTENDED RELEASE ORAL DAILY
Qty: 30 CAPSULE | Refills: 0 | Status: SHIPPED | OUTPATIENT
Start: 2019-01-08 | End: 2019-05-09

## 2019-01-08 RX ORDER — BACLOFEN 10 MG/1
TABLET ORAL
Qty: 120 TABLET | Refills: 0 | Status: SHIPPED | OUTPATIENT
Start: 2019-01-08 | End: 2019-04-16

## 2019-01-08 RX ORDER — DEXTROAMPHETAMINE SACCHARATE, AMPHETAMINE ASPARTATE MONOHYDRATE, DEXTROAMPHETAMINE SULFATE AND AMPHETAMINE SULFATE 5; 5; 5; 5 MG/1; MG/1; MG/1; MG/1
20 CAPSULE, EXTENDED RELEASE ORAL 2 TIMES DAILY
Qty: 60 CAPSULE | Refills: 0 | Status: SHIPPED | OUTPATIENT
Start: 2019-01-08 | End: 2019-05-09

## 2019-01-08 ASSESSMENT — MIFFLIN-ST. JEOR: SCORE: 1504.95

## 2019-01-08 ASSESSMENT — PAIN SCALES - GENERAL: PAINLEVEL: MODERATE PAIN (5)

## 2019-01-08 NOTE — PATIENT INSTRUCTIONS
Take two tablets of baclofen in the evening for one weeks, if you tolerate this you can increase the dose up to three tabs in the evening for one week, then you can increase the dose to 4 tabs every night.    Call my office to let me know how this goes in one month    Will follow up in 3 months with a MRI    Starting next month get a blood test every month until your next infusion    You saw a neurology provider, Pietro Flores, today at the [facility].  You may have also met with the MS RN Care Coordinator.  In order to get a message to your MS Center provider, you should contact [phone]. You should contact us via this protocol if you have any of the following symptoms:    New or worsening neurologic symptoms that persist for 24-48 hours, such as:  o New onset of pain or marked worsening of pain  o Difficulty with speech, swallowing, or breathing  o New onset of vertigo or dizziness  o Change in bowel or bladder function (incontinence, difficulty urinating)    Increasing difficulty in self care    Marked changes in vision (double vision, blurred vision, graying of vision)    Change in mobility    Change in cognitive function    Falling    Worsening numbness, tingling or pain with a change in function    Worsening fatigue lasting more than 2 weeks  If you had labs completed today, we will contact you with the results.  If you are active in [employer], they will be released to you there.  Otherwise, your results will be provided to you via mail or telephone call.  Some results take up to 2 weeks for completion.  If you haven t heard anything about your lab results within 2 weeks, you can call or send a [employer] message to obtain your results.  If you have an MRI scheduled in the week or two prior to your next appointment, we will go over the results at your scheduled follow up appointment.  If you are not scheduled to see your [facility] Center provider within about 2 weeks after your  MRI, please call or send a [employer] message to obtain your results if you haven t heard anything within 2 weeks.  Please be aware that it takes at least 5 business days after routine MRIs for your results to be reviewed by both the radiologist and your doctor.  MRIs completed at facilities outside of the [facility] system take about 2 weeks in order for the MRI disc to be mailed to our clinic and uploaded into your medical record.    Please contact your pharmacy to request refills of your medications.   Please do your best to come to your appointments, and to arrive 15 minutes early to allow time for checking in.  [facility] reserves the right to terminate care of established patients if a patient misses three or more appointments in a clinic without providing notification within a 12-month period.    Developing Your Care Team  Individuals living with chronic illnesses like MS may be unaware that they are at risk for the same range of medical problems as everyone else.  This is why you must establish a relationship with other health care providers in addition to your Multiple Sclerosis doctor.  It can be difficult at times to figure out whether a health concern is related to your MS, or whether it is related to something else, such as hormonal changes, pseduoexacerbations, changes in your core body temperature, flu-like reactions to interferons, exercise, or infections.  Urinary tract infections (UTIs) are common culprits that can cause fatigue, weakness, or other  MS attack -like symptoms without classic symptoms of a UTI.  For this reason, if you call or come in to discuss symptoms, you may be asked to get in touch with your primary provider or another specialist, so that you receive the comprehensive care you need.  What is Multiple Sclerosis (MS)?  MS is a disease in which the nerve tissues in the brain and/or spinal cord are attacked by immune cells in the body.  These immune cells are  present in everyone, and their normal role is to fight off infections.  In people with MS, these cells change the way they function and cross into the nervous system.  Once there, they cause inflammation that damages the myelin (or the protective coating of a nerve cell, much like the plastic covering on an electrical cord) and parts of the nerve cell itself.  So far, a clear cause for this immune system dysfunction has not been found.  MS often starts out as the  relapsing-remitting  form.  This means there are episodes when you have symptoms, and other times when you recover to normal or near-normal.  Over time, if the damage to the nervous system continues, the disease can cause additional disability, such as difficulty walking.  If the relapses and nerve damage can be prevented with available medications, many patients with MS can go many years between relapses and have relatively little disability.  Remember: MS is a condition that changes and must be evaluated on an ongoing basis!  What is a Relapse? (Also called flare-ups, attacks, or exacerbations)  Relapses are due to the occurrence of inflammation in some part of the brain and/or spinal cord.  A relapse is new or recurrent symptoms which persist for at least 24 hours and sometimes worsen over 48 hours.  New symptoms need to be  by at least 1 month in order to be considered separate relapses. Most of the time, symptoms reach their maximal intensity within 2 weeks and then begin to slowly resolve.  At times, your symptoms may not recover fully for up to 6 months, depending on the severity of the episode.  The frequency of relapses is generally higher early in the disease, but can vary greatly among individuals with MS.  Improvement of symptoms for an individual is unpredictable with each relapse.    It is important to remember that an increase in symptoms and changes in function may not necessarily be a relapse.  There are other factors that  contribute to such changes, such as hot weather, increased body temperature, infection/illness, stress and sleep deprivation.  The worsening of symptoms may feel like a relapse when in reality it is not.  These episodes are referred to as pseudorelapses.  Once the underlying cause is addressed, symptoms usually fade away and you feel better.  If you experience a worsening of symptoms that lasts more than 48 hours and does not improve with cooling down, decreasing stress, or treatment of an infection, please call us and we can help to better determine whether you are having a pseudorelapse versus a relapse.        Patient Education     Baclofen tablets  Brand Names: ED Baclofen, Lioresal  What is this medicine?  BACLOFEN (CINTIA mahin fen) helps relieve spasms and cramping of muscles. It may be used to treat symptoms of multiple sclerosis or spinal cord injury.  How should I use this medicine?  Take this medicine by mouth. Swallow it with a drink of water. Follow the directions on the prescription label. Do not take more medicine than you are told to take.  Talk to your pediatrician regarding the use of this medicine in children. Special care may be needed.  What side effects may I notice from receiving this medicine?  Side effects that you should report to your doctor or health care professional as soon as possible:    allergic reactions like skin rash, itching or hives, swelling of the face, lips, or tongue    breathing problems    changes in emotions or moods    changes in vision    chest pain    fast, irregular heartbeat    feeling faint or lightheaded, falls    hallucinations    loss of balance or coordination    ringing of the ears    seizures    trouble passing urine or change in the amount of urine    trouble walking    unusually weak or tired  Side effects that usually do not require medical attention (report to your doctor or health care professional if they continue or are bothersome):    changes in  taste    confusion    constipation    diarrhea    dry mouth    headache    muscle weakness    nausea, vomiting    trouble sleeping  What may interact with this medicine?  Do not take this medication with any of the following medicines:    narcotic medicines for cough  This medicine may also interact with the following medications:    alcohol    antihistamines for allergy, cough and cold    certain medicines for anxiety or sleep    certain medicines for depression like amitriptyline, fluoxetine, sertraline    certain medicines for seizures like phenobarbital, primidone    general anesthetics like halothane, isoflurane, methoxyflurane, propofol    local anesthetics like lidocaine, pramoxine, tetracaine    medicines that relax muscles for surgery    narcotic medicines for pain    phenothiazines like chlorpromazine, mesoridazine, prochlorperazine, thioridazine  What if I miss a dose?  If you miss a dose, take it as soon as you can. If it is almost time for your next dose, take only that dose. Do not take double or extra doses.  Where should I keep my medicine?  Keep out of the reach of children.  Store at room temperature between 15 and 30 degrees C (59 and 86 degrees F). Keep container tightly closed. Throw away any unused medicine after the expiration date.  What should I tell my health care provider before I take this medicine?  They need to know if you have any of these conditions:    kidney disease    seizures    stroke    an unusual or allergic reaction to baclofen, other medicines, foods, dyes, or preservatives    pregnant or trying to get pregnant    breast-feeding  What should I watch for while using this medicine?  Tell your doctor or health care professional if your symptoms do not start to get better or if they get worse.  Do not suddenly stop taking your medicine. If you do, you may develop a severe reaction. If your doctor wants you to stop the medicine, the dose will be slowly lowered over time to avoid  any side effects. Follow the advice of your doctor.  You may get drowsy or dizzy. Do not drive, use machinery, or do anything that needs mental alertness until you know how this medicine affects you. Do not stand or sit up quickly, especially if you are an older patient. This reduces the risk of dizzy or fainting spells. Alcohol may interfere with the effect of this medicine. Avoid alcoholic drinks.  If you are taking another medicine that also causes drowsiness, you may have more side effects. Give your health care provider a list of all medicines you use. Your doctor will tell you how much medicine to take. Do not take more medicine than directed. Call emergency for help if you have problems breathing or unusual sleepiness.  NOTE:This sheet is a summary. It may not cover all possible information. If you have questions about this medicine, talk to your doctor, pharmacist, or health care provider. Copyright  2018 [employer]

## 2019-01-08 NOTE — PROGRESS NOTES
MULTIPLE SCLEROSIS CLINIC AT THE [facility]  FOLLOWUP/ESTABLISHED PATIENT VISIT      PRINCIPAL NEUROLOGIC DIAGNOSIS: Multiple Sclerosis     Date of Onset: 2012  Date of Diagnosis: 2012  Initial Clinical Course: Progressive  Current Clinical Course: Progressive  Past Disease Modifying Therapy(ies): copaxone  Current Disease Modifying Therapy(ies): None  Most Recent MRI of the Brain: 18 last one   Most Recent MRI of the Cervical Cord   Most Recent MRI of the Thoracic Cord:   Most Recent Lumbar Puncture: Positive for OCB  Most Recent OCT: NA   Most Recent JCV: 18 positive  Most Recent Remote Hepatitis Panel: 18 negative  Most Recent VZV Ig18 positive  Most Recent TB Quant: 18 negative  Previous pain medication: Lyrica, Elavil, Cymbalta, valproic acid  Medical cannibis: Has positive benefit of decrease [id number] without side effect.          INTERVAL HISTORY:    Overall the patient reports that she is fatigue all the time. The patient reports that she is also having issues with  Her hip. This was evaluated and it was felt to be related to a torn labrium uin her hip. She also reports that she is catching a cold all the time. The patient reports that she is not actually getting sick though.     Issues with current MS therapy: Tolerating DMT with  has fatigue for 24 hours after the infussioon.     REVIEW OF SYSTEMS:    Mood: unchanged and anxious  Spasticity:painful and she is having muscles spasms at night. The patient reports that she gets twitches and it hurts. Nothing makes it better or worse.   Bladder: none  Bowel: unchanged  Pain related to today's visit:reviewed on nursing intake documentation  Fatigue: worse and she is tired all of the time.  Sleep: insomnia , interrupted and secondary to pain  Memory/Concentration: unchanged      Otherwise 10 point ROS was neg other than the symptoms noted above.    PAST HISTORY was reviewed and updated:      MEDICATIONS  and ALLERGIES were reviewed and updated.    SOCIAL HISTORY was reviewed and updated:        EXAM:    PHYSICAL EXAMINATION:   VITAL SIGNS:  B/P: 126/85, T: Data Unavailable, P: 73, R: Data Unavailable    GENERAL: The patient is a well-nourished  who presents to the evaluation alone.  NEUROLOGIC:   MENTAL STATUS: Alert,awake and  oriented times four.   CRANIAL NERVES: Visual fields are full to confrontation. The pupils are  round and react to light and there is no Antonio Arlene pupil.. Extraocular movements are  intact with no  internuclear ophthalmoplegia. No nystagmus. Facial strength and sensation are  normal. Hearing is  normal. Palate elevation and tongue protrusion are    normal.   POWER:     Motor    Upper      Right Left   Shoulder Abduction 5 5   Elbow Flexion 5 5   Elbow Extension 5 5   Wrist Extension 5 5   Digit Extension 5 5   Digit Flexion 5 5   APB 5 5   Tone 0 0   Lower       Right Left   Hip Flexion 5 5   Knee Extension 5 5   Knee Flexion 5 5   Foot Dorsiflexion 5 5   Foot Plantar Flexion 5 5   EH 5 5   Toe Flexion 5 5   Tone 0 0           Grade Description   0 No increase in muscle tone   1 Slight increase in muscle tone, manifested by a catch and release or by minimal resistance at the end of the range of motion when the affected part(s) is moved in flexion or extension   1+ Slight increase in muscle tone, manifested by a catch, followed by minimal resistance throughout the remainder (less than half) of the ROM   2 More marked increase in muscle tone through most of the ROM, but affected part(s) easily moved   3 Considerable increase in muscle tone, passive movement difficult   4 Affected part(s) rigid in flexion or extension           SENSORY:     Light touch:  Intact in all extremities        MOTOR/CEREBELLAR:    Right Left   RRM 0 Normal 0 Normal   MAGDA 0 Normal 0 Normal   FTN 0 Normal 0 Normal   RRM 0 Normal 0 Normal   HKS 0 Normal 0 Normal           GAIT: Gait is  narrow-based and steady and the  patient is  able to walk on heels, toes and in tandem without difficulty.    Romberg: Stable with eye(s) closed    RESULTS:  Monitoring labs:    Orders Only on 01/08/2019   Component Date Value Ref Range Status     WBC 01/08/2019 10.5  4.0 - 11.0 10e9/L Final     RBC Count 01/08/2019 4.99  3.8 - 5.2 10e12/L Final     Hemoglobin 01/08/2019 14.9  11.7 - 15.7 g/dL Final     Hematocrit 01/08/2019 44.9  35.0 - 47.0 % Final     MCV 01/08/2019 90  78 - 100 fl Final     MCH 01/08/2019 29.9  26.5 - 33.0 pg Final     MCHC 01/08/2019 33.2  31.5 - 36.5 g/dL Final     RDW 01/08/2019 11.9  10.0 - 15.0 % Final     Platelet Count 01/08/2019 340  150 - 450 10e9/L Final     Diff Method 01/08/2019 Automated Method   Final     % Neutrophils 01/08/2019 77.2  % Final     % Lymphocytes 01/08/2019 10.6  % Final     % Monocytes 01/08/2019 7.1  % Final     % Eosinophils 01/08/2019 3.9  % Final     % Basophils 01/08/2019 0.8  % Final     % Immature Granulocytes 01/08/2019 0.4  % Final     Nucleated RBCs 01/08/2019 0  0 /100 Final     Absolute Neutrophil 01/08/2019 8.1  1.6 - 8.3 10e9/L Final     Absolute Lymphocytes 01/08/2019 1.1  0.8 - 5.3 10e9/L Final     Absolute Monocytes 01/08/2019 0.7  0.0 - 1.3 10e9/L Final     Absolute Eosinophils 01/08/2019 0.4  0.0 - 0.7 10e9/L Final     Absolute Basophils 01/08/2019 0.1  0.0 - 0.2 10e9/L Final     Abs Immature Granulocytes 01/08/2019 0.0  0 - 0.4 10e9/L Final     Absolute Nucleated RBC 01/08/2019 0.0   Final     Sodium 01/08/2019 138  133 - 144 mmol/L Final     Potassium 01/08/2019 3.9  3.4 - 5.3 mmol/L Final     Chloride 01/08/2019 104  94 - 109 mmol/L Final     Carbon Dioxide 01/08/2019 28  20 - 32 mmol/L Final     Anion Gap 01/08/2019 6  3 - 14 mmol/L Final     Glucose 01/08/2019 94  70 - 99 mg/dL Final     Urea Nitrogen 01/08/2019 20  7 - 30 mg/dL Final     Creatinine 01/08/2019 0.81  0.52 - 1.04 mg/dL Final     GFR Estimate 01/08/2019 85  >60 mL/min/[1.73_m2] Final     GFR Estimate If Black  01/08/2019 >90  >60 mL/min/[1.73_m2] Final     Calcium 01/08/2019 8.7  8.5 - 10.1 mg/dL Final     Bilirubin Total 01/08/2019 0.4  0.2 - 1.3 mg/dL Final     Albumin 01/08/2019 3.6  3.4 - 5.0 g/dL Final     Protein Total 01/08/2019 7.6  6.8 - 8.8 g/dL Final     Alkaline Phosphatase 01/08/2019 66  40 - 150 U/L Final     ALT 01/08/2019 16  0 - 50 U/L Final     AST 01/08/2019 14  0 - 45 U/L Final   Orders Only on 11/08/2018   Component Date Value Ref Range Status     WBC 11/08/2018 7.6  4.0 - 11.0 10e9/L Final     RBC Count 11/08/2018 4.77  3.8 - 5.2 10e12/L Final     Hemoglobin 11/08/2018 14.0  11.7 - 15.7 g/dL Final     Hematocrit 11/08/2018 42.6  35.0 - 47.0 % Final     MCV 11/08/2018 89  78 - 100 fl Final     MCH 11/08/2018 29.4  26.5 - 33.0 pg Final     MCHC 11/08/2018 32.9  31.5 - 36.5 g/dL Final     RDW 11/08/2018 12.2  10.0 - 15.0 % Final     Platelet Count 11/08/2018 249  150 - 450 10e9/L Final     Diff Method 11/08/2018 Automated Method   Final     % Neutrophils 11/08/2018 63.2  % Final     % Lymphocytes 11/08/2018 20.8  % Final     % Monocytes 11/08/2018 9.8  % Final     % Eosinophils 11/08/2018 5.0  % Final     % Basophils 11/08/2018 0.9  % Final     % Immature Granulocytes 11/08/2018 0.3  % Final     Nucleated RBCs 11/08/2018 0  0 /100 Final     Absolute Neutrophil 11/08/2018 4.8  1.6 - 8.3 10e9/L Final     Absolute Lymphocytes 11/08/2018 1.6  0.8 - 5.3 10e9/L Final     Absolute Monocytes 11/08/2018 0.8  0.0 - 1.3 10e9/L Final     Absolute Eosinophils 11/08/2018 0.4  0.0 - 0.7 10e9/L Final     Absolute Basophils 11/08/2018 0.1  0.0 - 0.2 10e9/L Final     Abs Immature Granulocytes 11/08/2018 0.0  0 - 0.4 10e9/L Final     Absolute Nucleated RBC 11/08/2018 0.0   Final     Sodium 11/08/2018 138  133 - 144 mmol/L Final     Potassium 11/08/2018 4.1  3.4 - 5.3 mmol/L Final     Chloride 11/08/2018 105  94 - 109 mmol/L Final     Carbon Dioxide 11/08/2018 28  20 - 32 mmol/L Final     Anion Gap 11/08/2018 6  3 - 14  mmol/L Final     Glucose 11/08/2018 102* 70 - 99 mg/dL Final     Urea Nitrogen 11/08/2018 15  7 - 30 mg/dL Final     Creatinine 11/08/2018 0.98  0.52 - 1.04 mg/dL Final     GFR Estimate 11/08/2018 60* >60 mL/min/1.7m2 Final     GFR Estimate If Black 11/08/2018 73  >60 mL/min/1.7m2 Final     Calcium 11/08/2018 8.7  8.5 - 10.1 mg/dL Final     Bilirubin Total 11/08/2018 0.5  0.2 - 1.3 mg/dL Final     Albumin 11/08/2018 3.4  3.4 - 5.0 g/dL Final     Protein Total 11/08/2018 7.2  6.8 - 8.8 g/dL Final     Alkaline Phosphatase 11/08/2018 54  40 - 150 U/L Final     ALT 11/08/2018 20  0 - 50 U/L Final     AST 11/08/2018 14  0 - 45 U/L Final   Infusion Therapy Visit on 10/04/2018   Component Date Value Ref Range Status     WBC 10/04/2018 9.4  4.0 - 11.0 10e9/L Final     RBC Count 10/04/2018 4.73  3.8 - 5.2 10e12/L Final     Hemoglobin 10/04/2018 13.9  11.7 - 15.7 g/dL Final     Hematocrit 10/04/2018 42.0  35.0 - 47.0 % Final     MCV 10/04/2018 89  78 - 100 fl Final     MCH 10/04/2018 29.4  26.5 - 33.0 pg Final     MCHC 10/04/2018 33.1  31.5 - 36.5 g/dL Final     RDW 10/04/2018 12.0  10.0 - 15.0 % Final     Platelet Count 10/04/2018 284  150 - 450 10e9/L Final     Diff Method 10/04/2018 Automated Method   Final     % Neutrophils 10/04/2018 70.8  % Final     % Lymphocytes 10/04/2018 16.5  % Final     % Monocytes 10/04/2018 8.4  % Final     % Eosinophils 10/04/2018 3.1  % Final     % Basophils 10/04/2018 0.9  % Final     % Immature Granulocytes 10/04/2018 0.3  % Final     Nucleated RBCs 10/04/2018 0  0 /100 Final     Absolute Neutrophil 10/04/2018 6.7  1.6 - 8.3 10e9/L Final     Absolute Lymphocytes 10/04/2018 1.6  0.8 - 5.3 10e9/L Final     Absolute Monocytes 10/04/2018 0.8  0.0 - 1.3 10e9/L Final     Absolute Eosinophils 10/04/2018 0.3  0.0 - 0.7 10e9/L Final     Absolute Basophils 10/04/2018 0.1  0.0 - 0.2 10e9/L Final     Abs Immature Granulocytes 10/04/2018 0.0  0 - 0.4 10e9/L Final     Absolute Nucleated RBC 10/04/2018 0.0    Final     Sodium 10/04/2018 141  133 - 144 mmol/L Final     Potassium 10/04/2018 3.9  3.4 - 5.3 mmol/L Final     Chloride 10/04/2018 105  94 - 109 mmol/L Final     Carbon Dioxide 10/04/2018 28  20 - 32 mmol/L Final     Anion Gap 10/04/2018 8  3 - 14 mmol/L Final     Glucose 10/04/2018 73  70 - 99 mg/dL Final     Urea Nitrogen 10/04/2018 16  7 - 30 mg/dL Final     Creatinine 10/04/2018 0.90  0.52 - 1.04 mg/dL Final     GFR Estimate 10/04/2018 67  >60 mL/min/1.7m2 Final     GFR Estimate If Black 10/04/2018 81  >60 mL/min/1.7m2 Final     Calcium 10/04/2018 8.3* 8.5 - 10.1 mg/dL Final     Bilirubin Total 10/04/2018 0.4  0.2 - 1.3 mg/dL Final     Albumin 10/04/2018 3.2* 3.4 - 5.0 g/dL Final     Protein Total 10/04/2018 7.0  6.8 - 8.8 g/dL Final     Alkaline Phosphatase 10/04/2018 62  40 - 150 U/L Final     ALT 10/04/2018 18  0 - 50 U/L Final     AST 10/04/2018 12  0 - 45 U/L Final     Phosphorus 10/04/2018 3.5  2.5 - 4.5 mg/dL Final   Orders Only on 09/14/2018   Component Date Value Ref Range Status     Sodium 09/14/2018 140  133 - 144 mmol/L Final     Potassium 09/14/2018 4.0  3.4 - 5.3 mmol/L Final     Chloride 09/14/2018 105  94 - 109 mmol/L Final     Carbon Dioxide 09/14/2018 28  20 - 32 mmol/L Final     Anion Gap 09/14/2018 7  3 - 14 mmol/L Final     Glucose 09/14/2018 105* 70 - 99 mg/dL Final     Urea Nitrogen 09/14/2018 15  7 - 30 mg/dL Final     Creatinine 09/14/2018 0.98  0.52 - 1.04 mg/dL Final     GFR Estimate 09/14/2018 61  >60 mL/min/1.7m2 Final     GFR Estimate If Black 09/14/2018 73  >60 mL/min/1.7m2 Final     Calcium 09/14/2018 8.9  8.5 - 10.1 mg/dL Final     Phosphorus 09/14/2018 3.4  2.5 - 4.5 mg/dL Final     Albumin 09/14/2018 3.7  3.4 - 5.0 g/dL Final   Orders Only on 09/12/2018   Component Date Value Ref Range Status     WBC 09/12/2018 8.3  4.0 - 11.0 10e9/L Final     RBC Count 09/12/2018 4.82  3.8 - 5.2 10e12/L Final     Hemoglobin 09/12/2018 14.6  11.7 - 15.7 g/dL Final     Hematocrit 09/12/2018  43.2  35.0 - 47.0 % Final     MCV 09/12/2018 90  78 - 100 fl Final     MCH 09/12/2018 30.3  26.5 - 33.0 pg Final     MCHC 09/12/2018 33.8  31.5 - 36.5 g/dL Final     RDW 09/12/2018 12.1  10.0 - 15.0 % Final     Platelet Count 09/12/2018 226  150 - 450 10e9/L Final     Diff Method 09/12/2018 Automated Method   Final     % Neutrophils 09/12/2018 55.2  % Final     % Lymphocytes 09/12/2018 27.5  % Final     % Monocytes 09/12/2018 11.4  % Final     % Eosinophils 09/12/2018 4.6  % Final     % Basophils 09/12/2018 1.1  % Final     % Immature Granulocytes 09/12/2018 0.2  % Final     Nucleated RBCs 09/12/2018 0  0 /100 Final     Absolute Neutrophil 09/12/2018 4.6  1.6 - 8.3 10e9/L Final     Absolute Lymphocytes 09/12/2018 2.3  0.8 - 5.3 10e9/L Final     Absolute Monocytes 09/12/2018 1.0  0.0 - 1.3 10e9/L Final     Absolute Eosinophils 09/12/2018 0.4  0.0 - 0.7 10e9/L Final     Absolute Basophils 09/12/2018 0.1  0.0 - 0.2 10e9/L Final     Abs Immature Granulocytes 09/12/2018 0.0  0 - 0.4 10e9/L Final     Absolute Nucleated RBC 09/12/2018 0.0   Final     Sodium 09/12/2018 139  133 - 144 mmol/L Final     Potassium 09/12/2018 3.3* 3.4 - 5.3 mmol/L Final     Chloride 09/12/2018 103  94 - 109 mmol/L Final     Carbon Dioxide 09/12/2018 29  20 - 32 mmol/L Final     Anion Gap 09/12/2018 6  3 - 14 mmol/L Final     Glucose 09/12/2018 88  70 - 99 mg/dL Final     Urea Nitrogen 09/12/2018 12  7 - 30 mg/dL Final     Creatinine 09/12/2018 1.10* 0.52 - 1.04 mg/dL Final     GFR Estimate 09/12/2018 53* >60 mL/min/1.7m2 Final     GFR Estimate If Black 09/12/2018 64  >60 mL/min/1.7m2 Final     Calcium 09/12/2018 8.7  8.5 - 10.1 mg/dL Final     Bilirubin Total 09/12/2018 0.7  0.2 - 1.3 mg/dL Final     Albumin 09/12/2018 3.5  3.4 - 5.0 g/dL Final     Protein Total 09/12/2018 7.6  6.8 - 8.8 g/dL Final     Alkaline Phosphatase 09/12/2018 61  40 - 150 U/L Final     ALT 09/12/2018 21  0 - 50 U/L Final     AST 09/12/2018 12  0 - 45 U/L Final   Orders  Only on 08/22/2018   Component Date Value Ref Range Status     CD19 B Cells 08/22/2018 15  6 - 27 % Final     Absolute CD19 08/22/2018 289  107 - 698 cells/uL Final   Office Visit on 08/22/2018   Component Date Value Ref Range Status     WBC 08/22/2018 8.4  4.0 - 11.0 10e9/L Final     RBC Count 08/22/2018 4.78  3.8 - 5.2 10e12/L Final     Hemoglobin 08/22/2018 14.3  11.7 - 15.7 g/dL Final     Hematocrit 08/22/2018 42.5  35.0 - 47.0 % Final     MCV 08/22/2018 89  78 - 100 fl Final     MCH 08/22/2018 29.9  26.5 - 33.0 pg Final     MCHC 08/22/2018 33.6  31.5 - 36.5 g/dL Final     RDW 08/22/2018 12.1  10.0 - 15.0 % Final     Platelet Count 08/22/2018 285  150 - 450 10e9/L Final     Diff Method 08/22/2018 Automated Method   Final     % Neutrophils 08/22/2018 64.7  % Final     % Lymphocytes 08/22/2018 22.6  % Final     % Monocytes 08/22/2018 6.9  % Final     % Eosinophils 08/22/2018 4.8  % Final     % Basophils 08/22/2018 0.8  % Final     % Immature Granulocytes 08/22/2018 0.2  % Final     Nucleated RBCs 08/22/2018 0  0 /100 Final     Absolute Neutrophil 08/22/2018 5.4  1.6 - 8.3 10e9/L Final     Absolute Lymphocytes 08/22/2018 1.9  0.8 - 5.3 10e9/L Final     Absolute Monocytes 08/22/2018 0.6  0.0 - 1.3 10e9/L Final     Absolute Eosinophils 08/22/2018 0.4  0.0 - 0.7 10e9/L Final     Absolute Basophils 08/22/2018 0.1  0.0 - 0.2 10e9/L Final     Abs Immature Granulocytes 08/22/2018 0.0  0 - 0.4 10e9/L Final     Absolute Nucleated RBC 08/22/2018 0.0   Final     Sodium 08/22/2018 138  133 - 144 mmol/L Final     Potassium 08/22/2018 3.6  3.4 - 5.3 mmol/L Final     Chloride 08/22/2018 104  94 - 109 mmol/L Final     Carbon Dioxide 08/22/2018 30  20 - 32 mmol/L Final     Anion Gap 08/22/2018 5  3 - 14 mmol/L Final     Glucose 08/22/2018 88  70 - 99 mg/dL Final     Urea Nitrogen 08/22/2018 15  7 - 30 mg/dL Final     Creatinine 08/22/2018 0.89  0.52 - 1.04 mg/dL Final     GFR Estimate 08/22/2018 68  >60 mL/min/1.7m2 Final     GFR  Estimate If Black 08/22/2018 82  >60 mL/min/1.7m2 Final     Calcium 08/22/2018 8.5  8.5 - 10.1 mg/dL Final     Bilirubin Total 08/22/2018 0.5  0.2 - 1.3 mg/dL Final     Albumin 08/22/2018 3.6  3.4 - 5.0 g/dL Final     Protein Total 08/22/2018 7.6  6.8 - 8.8 g/dL Final     Alkaline Phosphatase 08/22/2018 67  40 - 150 U/L Final     ALT 08/22/2018 17  0 - 50 U/L Final     AST 08/22/2018 14  0 - 45 U/L Final     Hepatitis B Core Caroline 08/22/2018 Nonreactive  NR^Nonreactive Final     Hepatitis B Surface Antibody 08/22/2018 0.20  <8.00 m[IU]/mL Final     Hep B Surface Agn 08/22/2018 Nonreactive  NR^Nonreactive Final     HCV RNA Quant IU/ml 08/22/2018 HCV RNA Not Detected  HCVND^HCV RNA Not Detected [IU]/mL Final     Log of HCV RNA Qt 08/22/2018 Not Calculated  <1.2 Log IU/mL Final     HIV Antigen Antibody Combo 08/22/2018 Nonreactive  NR^Nonreactive     Final     Lab Scanned Result 08/22/2018 VICKIE VIR AB INDEX REFLEX-Scanned*  Final     M Tuberculosis Result 08/22/2018 Negative  NEG^Negative Final     M Tuberculosis Antigen Value 08/22/2018 0.13  IU/mL Final     Varicella Zoster Virus Antibody IgG 08/22/2018 3.5* 0.0 - 0.8 AI Final     TSH 08/22/2018 1.28  0.40 - 4.00 mU/L Final     Vitamin B12 08/22/2018 2,444* 193 - 986 pg/mL Final     Vitamin B6 08/22/2018 20.7  20.0 - 125.0 nmol/L Final     Vitamin D Deficiency screening 08/22/2018 78* 20 - 75 ug/L Final   ]      MRI brain:    no new MRI to review    ASSESSMENT/PLAN:  The patient is a 48-year-old female with a past medical history of relapsing remitting multiple sclerosis who is presenting today as a follow-up.  Overall, the patient is clinically unchanged since starting ocrelizumab.  He tolerated the infusion well.  In order to monitor for early B-cell repletion, I will have the patient start checking her CD19 count every month starting next month.  In regards to her her fatigue and her muscle spasms, I will try to restart the patient on baclofen.  I once again reviewed  the risk and side effects of baclofen. patient's symptoms are mainly affecting her at night and keeping her from sleeping.  I will try to have her just take baclofen before going to bed with the hopes that it helps her sleep and decrease her spasms.  My hope is that by her sleeping better at night, that her fatigue interim will also improve.  I will have her up titrate her dose by 10 mg every week until she reaches a dose of 40 mg every night.  I instructed to call my office in a month let me know how she does or sooner if she has side effects.  I will temporally follow the patient up in 3 months with an MRI of the brain at that time.  I instructed to call my office with any questions, concerns, issues, or problems.    Start baclofen 20mg at night with the plan to increase her dose to weekly until she gets to 40 mg  MRI of the brain on follow-up 3 months make sure        I spent 25 minutes in this visit, with >50% direct patient time spent counseling about prognosis, treatment options, and coordination of care.    Pietro Flores MD [facility]  Staff Neurologist   01/08/19       (Chart documentation was completed in part with Dragon voice-recognition software. Even though reviewed, some grammatical, spelling, and word errors may remain.)

## 2019-01-08 NOTE — LETTER
2019       RE: Chayo Reid  [address]  [facility] [address]     Dear Colleague,    Thank you for referring your patient, Chayo Reid, to the [facility] MULTIPLE SCLEROSIS at [facility]. Please see a copy of my visit note below.        MULTIPLE SCLEROSIS CLINIC AT THE [facility]  FOLLOWUP/ESTABLISHED PATIENT VISIT      PRINCIPAL NEUROLOGIC DIAGNOSIS: Multiple Sclerosis    Date of Onset: 2012  Date of Diagnosis: 2012  Initial Clinical Course: Progressive  Current Clinical Course: Progressive  Past Disease Modifying Therapy(ies): copaxone  Current Disease Modifying Therapy(ies): None  Most Recent MRI of the Brain: 18 last one   Most Recent MRI of the Cervical Cord   Most Recent MRI of the Thoracic Cord:   Most Recent Lumbar Puncture: Positive for OCB  Most Recent OCT: NA   Most Recent JCV: 18 positive  Most Recent Remote Hepatitis Panel: 18 negative  Most Recent VZV Ig18 positive  Most Recent TB Quant: 18 negative  Previous pain medication: Lyrica, Elavil, Cymbalta, valproic acid  Medical cannibis: Has positive benefit of decrease [id number] without side effect.     INTERVAL HISTORY:  Overall the patient reports that she is fatigue all the time. The patient reports that she is also having issues with  Her hip. This was evaluated and it was felt to be related to a torn labrium uin her hip. She also reports that she is catching a cold all the time. The patient reports that she is not actually getting sick though.     Issues with current MS therapy: Tolerating DMT with  has fatigue for 24 hours after the infussioon.     REVIEW OF SYSTEMS:  Mood: unchanged and anxious  Spasticity:painful and she is having muscles spasms at night. The patient reports that she gets twitches and it hurts. Nothing makes it better or worse.   Bladder: none  Bowel: unchanged  Pain related to today's visit:reviewed on nursing intake  documentation  Fatigue: worse and she is tired all of the time.  Sleep: insomnia , interrupted and secondary to pain  Memory/Concentration: unchanged    Otherwise 10 point ROS was neg other than the symptoms noted above.    PAST HISTORY was reviewed and updated:    MEDICATIONS and ALLERGIES were reviewed and updated.    SOCIAL HISTORY was reviewed and updated:    EXAM:    PHYSICAL EXAMINATION:   VITAL SIGNS:  B/P: 126/85, T: Data Unavailable, P: 73, R: Data Unavailable    GENERAL: The patient is a well-nourished  who presents to the evaluation alone.  NEUROLOGIC:   MENTAL STATUS: Alert,awake and  oriented times four.   CRANIAL NERVES: Visual fields are full to confrontation. The pupils are  round and react to light and there is no Antonio Arlene pupil.. Extraocular movements are  intact with no  internuclear ophthalmoplegia. No nystagmus. Facial strength and sensation are  normal. Hearing is  normal. Palate elevation and tongue protrusion are    normal.   POWER:     Motor    Upper      Right Left   Shoulder Abduction 5 5   Elbow Flexion 5 5   Elbow Extension 5 5   Wrist Extension 5 5   Digit Extension 5 5   Digit Flexion 5 5   APB 5 5   Tone 0 0   Lower       Right Left   Hip Flexion 5 5   Knee Extension 5 5   Knee Flexion 5 5   Foot Dorsiflexion 5 5   Foot Plantar Flexion 5 5   EH 5 5   Toe Flexion 5 5   Tone 0 0           Grade Description   0 No increase in muscle tone   1 Slight increase in muscle tone, manifested by a catch and release or by minimal resistance at the end of the range of motion when the affected part(s) is moved in flexion or extension   1+ Slight increase in muscle tone, manifested by a catch, followed by minimal resistance throughout the remainder (less than half) of the ROM   2 More marked increase in muscle tone through most of the ROM, but affected part(s) easily moved   3 Considerable increase in muscle tone, passive movement difficult   4 Affected part(s) rigid in flexion or extension      SENSORY:   Light touch:  Intact in all extremities    MOTOR/CEREBELLAR:    Right Left   RRM 0 Normal 0 Normal   MAGDA 0 Normal 0 Normal   FTN 0 Normal 0 Normal   RRM 0 Normal 0 Normal   HKS 0 Normal 0 Normal     GAIT: Gait is  narrow-based and steady and the patient is  able to walk on heels, toes and in tandem without difficulty.    Romberg: Stable with eye(s) closed    RESULTS:  Monitoring labs:    Orders Only on 01/08/2019   Component Date Value Ref Range Status     WBC 01/08/2019 10.5  4.0 - 11.0 10e9/L Final     RBC Count 01/08/2019 4.99  3.8 - 5.2 10e12/L Final     Hemoglobin 01/08/2019 14.9  11.7 - 15.7 g/dL Final     Hematocrit 01/08/2019 44.9  35.0 - 47.0 % Final     MCV 01/08/2019 90  78 - 100 fl Final     MCH 01/08/2019 29.9  26.5 - 33.0 pg Final     MCHC 01/08/2019 33.2  31.5 - 36.5 g/dL Final     RDW 01/08/2019 11.9  10.0 - 15.0 % Final     Platelet Count 01/08/2019 340  150 - 450 10e9/L Final     Diff Method 01/08/2019 Automated Method   Final     % Neutrophils 01/08/2019 77.2  % Final     % Lymphocytes 01/08/2019 10.6  % Final     % Monocytes 01/08/2019 7.1  % Final     % Eosinophils 01/08/2019 3.9  % Final     % Basophils 01/08/2019 0.8  % Final     % Immature Granulocytes 01/08/2019 0.4  % Final     Nucleated RBCs 01/08/2019 0  0 /100 Final     Absolute Neutrophil 01/08/2019 8.1  1.6 - 8.3 10e9/L Final     Absolute Lymphocytes 01/08/2019 1.1  0.8 - 5.3 10e9/L Final     Absolute Monocytes 01/08/2019 0.7  0.0 - 1.3 10e9/L Final     Absolute Eosinophils 01/08/2019 0.4  0.0 - 0.7 10e9/L Final     Absolute Basophils 01/08/2019 0.1  0.0 - 0.2 10e9/L Final     Abs Immature Granulocytes 01/08/2019 0.0  0 - 0.4 10e9/L Final     Absolute Nucleated RBC 01/08/2019 0.0   Final     Sodium 01/08/2019 138  133 - 144 mmol/L Final     Potassium 01/08/2019 3.9  3.4 - 5.3 mmol/L Final     Chloride 01/08/2019 104  94 - 109 mmol/L Final     Carbon Dioxide 01/08/2019 28  20 - 32 mmol/L Final     Anion Gap 01/08/2019 6   3 - 14 mmol/L Final     Glucose 01/08/2019 94  70 - 99 mg/dL Final     Urea Nitrogen 01/08/2019 20  7 - 30 mg/dL Final     Creatinine 01/08/2019 0.81  0.52 - 1.04 mg/dL Final     GFR Estimate 01/08/2019 85  >60 mL/min/[1.73_m2] Final     GFR Estimate If Black 01/08/2019 >90  >60 mL/min/[1.73_m2] Final     Calcium 01/08/2019 8.7  8.5 - 10.1 mg/dL Final     Bilirubin Total 01/08/2019 0.4  0.2 - 1.3 mg/dL Final     Albumin 01/08/2019 3.6  3.4 - 5.0 g/dL Final     Protein Total 01/08/2019 7.6  6.8 - 8.8 g/dL Final     Alkaline Phosphatase 01/08/2019 66  40 - 150 U/L Final     ALT 01/08/2019 16  0 - 50 U/L Final     AST 01/08/2019 14  0 - 45 U/L Final   Orders Only on 11/08/2018   Component Date Value Ref Range Status     WBC 11/08/2018 7.6  4.0 - 11.0 10e9/L Final     RBC Count 11/08/2018 4.77  3.8 - 5.2 10e12/L Final     Hemoglobin 11/08/2018 14.0  11.7 - 15.7 g/dL Final     Hematocrit 11/08/2018 42.6  35.0 - 47.0 % Final     MCV 11/08/2018 89  78 - 100 fl Final     MCH 11/08/2018 29.4  26.5 - 33.0 pg Final     MCHC 11/08/2018 32.9  31.5 - 36.5 g/dL Final     RDW 11/08/2018 12.2  10.0 - 15.0 % Final     Platelet Count 11/08/2018 249  150 - 450 10e9/L Final     Diff Method 11/08/2018 Automated Method   Final     % Neutrophils 11/08/2018 63.2  % Final     % Lymphocytes 11/08/2018 20.8  % Final     % Monocytes 11/08/2018 9.8  % Final     % Eosinophils 11/08/2018 5.0  % Final     % Basophils 11/08/2018 0.9  % Final     % Immature Granulocytes 11/08/2018 0.3  % Final     Nucleated RBCs 11/08/2018 0  0 /100 Final     Absolute Neutrophil 11/08/2018 4.8  1.6 - 8.3 10e9/L Final     Absolute Lymphocytes 11/08/2018 1.6  0.8 - 5.3 10e9/L Final     Absolute Monocytes 11/08/2018 0.8  0.0 - 1.3 10e9/L Final     Absolute Eosinophils 11/08/2018 0.4  0.0 - 0.7 10e9/L Final     Absolute Basophils 11/08/2018 0.1  0.0 - 0.2 10e9/L Final     Abs Immature Granulocytes 11/08/2018 0.0  0 - 0.4 10e9/L Final     Absolute Nucleated RBC 11/08/2018  0.0   Final     Sodium 11/08/2018 138  133 - 144 mmol/L Final     Potassium 11/08/2018 4.1  3.4 - 5.3 mmol/L Final     Chloride 11/08/2018 105  94 - 109 mmol/L Final     Carbon Dioxide 11/08/2018 28  20 - 32 mmol/L Final     Anion Gap 11/08/2018 6  3 - 14 mmol/L Final     Glucose 11/08/2018 102* 70 - 99 mg/dL Final     Urea Nitrogen 11/08/2018 15  7 - 30 mg/dL Final     Creatinine 11/08/2018 0.98  0.52 - 1.04 mg/dL Final     GFR Estimate 11/08/2018 60* >60 mL/min/1.7m2 Final     GFR Estimate If Black 11/08/2018 73  >60 mL/min/1.7m2 Final     Calcium 11/08/2018 8.7  8.5 - 10.1 mg/dL Final     Bilirubin Total 11/08/2018 0.5  0.2 - 1.3 mg/dL Final     Albumin 11/08/2018 3.4  3.4 - 5.0 g/dL Final     Protein Total 11/08/2018 7.2  6.8 - 8.8 g/dL Final     Alkaline Phosphatase 11/08/2018 54  40 - 150 U/L Final     ALT 11/08/2018 20  0 - 50 U/L Final     AST 11/08/2018 14  0 - 45 U/L Final   Infusion Therapy Visit on 10/04/2018   Component Date Value Ref Range Status     WBC 10/04/2018 9.4  4.0 - 11.0 10e9/L Final     RBC Count 10/04/2018 4.73  3.8 - 5.2 10e12/L Final     Hemoglobin 10/04/2018 13.9  11.7 - 15.7 g/dL Final     Hematocrit 10/04/2018 42.0  35.0 - 47.0 % Final     MCV 10/04/2018 89  78 - 100 fl Final     MCH 10/04/2018 29.4  26.5 - 33.0 pg Final     MCHC 10/04/2018 33.1  31.5 - 36.5 g/dL Final     RDW 10/04/2018 12.0  10.0 - 15.0 % Final     Platelet Count 10/04/2018 284  150 - 450 10e9/L Final     Diff Method 10/04/2018 Automated Method   Final     % Neutrophils 10/04/2018 70.8  % Final     % Lymphocytes 10/04/2018 16.5  % Final     % Monocytes 10/04/2018 8.4  % Final     % Eosinophils 10/04/2018 3.1  % Final     % Basophils 10/04/2018 0.9  % Final     % Immature Granulocytes 10/04/2018 0.3  % Final     Nucleated RBCs 10/04/2018 0  0 /100 Final     Absolute Neutrophil 10/04/2018 6.7  1.6 - 8.3 10e9/L Final     Absolute Lymphocytes 10/04/2018 1.6  0.8 - 5.3 10e9/L Final     Absolute Monocytes 10/04/2018 0.8   0.0 - 1.3 10e9/L Final     Absolute Eosinophils 10/04/2018 0.3  0.0 - 0.7 10e9/L Final     Absolute Basophils 10/04/2018 0.1  0.0 - 0.2 10e9/L Final     Abs Immature Granulocytes 10/04/2018 0.0  0 - 0.4 10e9/L Final     Absolute Nucleated RBC 10/04/2018 0.0   Final     Sodium 10/04/2018 141  133 - 144 mmol/L Final     Potassium 10/04/2018 3.9  3.4 - 5.3 mmol/L Final     Chloride 10/04/2018 105  94 - 109 mmol/L Final     Carbon Dioxide 10/04/2018 28  20 - 32 mmol/L Final     Anion Gap 10/04/2018 8  3 - 14 mmol/L Final     Glucose 10/04/2018 73  70 - 99 mg/dL Final     Urea Nitrogen 10/04/2018 16  7 - 30 mg/dL Final     Creatinine 10/04/2018 0.90  0.52 - 1.04 mg/dL Final     GFR Estimate 10/04/2018 67  >60 mL/min/1.7m2 Final     GFR Estimate If Black 10/04/2018 81  >60 mL/min/1.7m2 Final     Calcium 10/04/2018 8.3* 8.5 - 10.1 mg/dL Final     Bilirubin Total 10/04/2018 0.4  0.2 - 1.3 mg/dL Final     Albumin 10/04/2018 3.2* 3.4 - 5.0 g/dL Final     Protein Total 10/04/2018 7.0  6.8 - 8.8 g/dL Final     Alkaline Phosphatase 10/04/2018 62  40 - 150 U/L Final     ALT 10/04/2018 18  0 - 50 U/L Final     AST 10/04/2018 12  0 - 45 U/L Final     Phosphorus 10/04/2018 3.5  2.5 - 4.5 mg/dL Final   Orders Only on 09/14/2018   Component Date Value Ref Range Status     Sodium 09/14/2018 140  133 - 144 mmol/L Final     Potassium 09/14/2018 4.0  3.4 - 5.3 mmol/L Final     Chloride 09/14/2018 105  94 - 109 mmol/L Final     Carbon Dioxide 09/14/2018 28  20 - 32 mmol/L Final     Anion Gap 09/14/2018 7  3 - 14 mmol/L Final     Glucose 09/14/2018 105* 70 - 99 mg/dL Final     Urea Nitrogen 09/14/2018 15  7 - 30 mg/dL Final     Creatinine 09/14/2018 0.98  0.52 - 1.04 mg/dL Final     GFR Estimate 09/14/2018 61  >60 mL/min/1.7m2 Final     GFR Estimate If Black 09/14/2018 73  >60 mL/min/1.7m2 Final     Calcium 09/14/2018 8.9  8.5 - 10.1 mg/dL Final     Phosphorus 09/14/2018 3.4  2.5 - 4.5 mg/dL Final     Albumin 09/14/2018 3.7  3.4 - 5.0 g/dL  Final   Orders Only on 09/12/2018   Component Date Value Ref Range Status     WBC 09/12/2018 8.3  4.0 - 11.0 10e9/L Final     RBC Count 09/12/2018 4.82  3.8 - 5.2 10e12/L Final     Hemoglobin 09/12/2018 14.6  11.7 - 15.7 g/dL Final     Hematocrit 09/12/2018 43.2  35.0 - 47.0 % Final     MCV 09/12/2018 90  78 - 100 fl Final     MCH 09/12/2018 30.3  26.5 - 33.0 pg Final     MCHC 09/12/2018 33.8  31.5 - 36.5 g/dL Final     RDW 09/12/2018 12.1  10.0 - 15.0 % Final     Platelet Count 09/12/2018 226  150 - 450 10e9/L Final     Diff Method 09/12/2018 Automated Method   Final     % Neutrophils 09/12/2018 55.2  % Final     % Lymphocytes 09/12/2018 27.5  % Final     % Monocytes 09/12/2018 11.4  % Final     % Eosinophils 09/12/2018 4.6  % Final     % Basophils 09/12/2018 1.1  % Final     % Immature Granulocytes 09/12/2018 0.2  % Final     Nucleated RBCs 09/12/2018 0  0 /100 Final     Absolute Neutrophil 09/12/2018 4.6  1.6 - 8.3 10e9/L Final     Absolute Lymphocytes 09/12/2018 2.3  0.8 - 5.3 10e9/L Final     Absolute Monocytes 09/12/2018 1.0  0.0 - 1.3 10e9/L Final     Absolute Eosinophils 09/12/2018 0.4  0.0 - 0.7 10e9/L Final     Absolute Basophils 09/12/2018 0.1  0.0 - 0.2 10e9/L Final     Abs Immature Granulocytes 09/12/2018 0.0  0 - 0.4 10e9/L Final     Absolute Nucleated RBC 09/12/2018 0.0   Final     Sodium 09/12/2018 139  133 - 144 mmol/L Final     Potassium 09/12/2018 3.3* 3.4 - 5.3 mmol/L Final     Chloride 09/12/2018 103  94 - 109 mmol/L Final     Carbon Dioxide 09/12/2018 29  20 - 32 mmol/L Final     Anion Gap 09/12/2018 6  3 - 14 mmol/L Final     Glucose 09/12/2018 88  70 - 99 mg/dL Final     Urea Nitrogen 09/12/2018 12  7 - 30 mg/dL Final     Creatinine 09/12/2018 1.10* 0.52 - 1.04 mg/dL Final     GFR Estimate 09/12/2018 53* >60 mL/min/1.7m2 Final     GFR Estimate If Black 09/12/2018 64  >60 mL/min/1.7m2 Final     Calcium 09/12/2018 8.7  8.5 - 10.1 mg/dL Final     Bilirubin Total 09/12/2018 0.7  0.2 - 1.3 mg/dL  Final     Albumin 09/12/2018 3.5  3.4 - 5.0 g/dL Final     Protein Total 09/12/2018 7.6  6.8 - 8.8 g/dL Final     Alkaline Phosphatase 09/12/2018 61  40 - 150 U/L Final     ALT 09/12/2018 21  0 - 50 U/L Final     AST 09/12/2018 12  0 - 45 U/L Final   Orders Only on 08/22/2018   Component Date Value Ref Range Status     CD19 B Cells 08/22/2018 15  6 - 27 % Final     Absolute CD19 08/22/2018 289  107 - 698 cells/uL Final   Office Visit on 08/22/2018   Component Date Value Ref Range Status     WBC 08/22/2018 8.4  4.0 - 11.0 10e9/L Final     RBC Count 08/22/2018 4.78  3.8 - 5.2 10e12/L Final     Hemoglobin 08/22/2018 14.3  11.7 - 15.7 g/dL Final     Hematocrit 08/22/2018 42.5  35.0 - 47.0 % Final     MCV 08/22/2018 89  78 - 100 fl Final     MCH 08/22/2018 29.9  26.5 - 33.0 pg Final     MCHC 08/22/2018 33.6  31.5 - 36.5 g/dL Final     RDW 08/22/2018 12.1  10.0 - 15.0 % Final     Platelet Count 08/22/2018 285  150 - 450 10e9/L Final     Diff Method 08/22/2018 Automated Method   Final     % Neutrophils 08/22/2018 64.7  % Final     % Lymphocytes 08/22/2018 22.6  % Final     % Monocytes 08/22/2018 6.9  % Final     % Eosinophils 08/22/2018 4.8  % Final     % Basophils 08/22/2018 0.8  % Final     % Immature Granulocytes 08/22/2018 0.2  % Final     Nucleated RBCs 08/22/2018 0  0 /100 Final     Absolute Neutrophil 08/22/2018 5.4  1.6 - 8.3 10e9/L Final     Absolute Lymphocytes 08/22/2018 1.9  0.8 - 5.3 10e9/L Final     Absolute Monocytes 08/22/2018 0.6  0.0 - 1.3 10e9/L Final     Absolute Eosinophils 08/22/2018 0.4  0.0 - 0.7 10e9/L Final     Absolute Basophils 08/22/2018 0.1  0.0 - 0.2 10e9/L Final     Abs Immature Granulocytes 08/22/2018 0.0  0 - 0.4 10e9/L Final     Absolute Nucleated RBC 08/22/2018 0.0   Final     Sodium 08/22/2018 138  133 - 144 mmol/L Final     Potassium 08/22/2018 3.6  3.4 - 5.3 mmol/L Final     Chloride 08/22/2018 104  94 - 109 mmol/L Final     Carbon Dioxide 08/22/2018 30  20 - 32 mmol/L Final     Anion  Gap 08/22/2018 5  3 - 14 mmol/L Final     Glucose 08/22/2018 88  70 - 99 mg/dL Final     Urea Nitrogen 08/22/2018 15  7 - 30 mg/dL Final     Creatinine 08/22/2018 0.89  0.52 - 1.04 mg/dL Final     GFR Estimate 08/22/2018 68  >60 mL/min/1.7m2 Final     GFR Estimate If Black 08/22/2018 82  >60 mL/min/1.7m2 Final     Calcium 08/22/2018 8.5  8.5 - 10.1 mg/dL Final     Bilirubin Total 08/22/2018 0.5  0.2 - 1.3 mg/dL Final     Albumin 08/22/2018 3.6  3.4 - 5.0 g/dL Final     Protein Total 08/22/2018 7.6  6.8 - 8.8 g/dL Final     Alkaline Phosphatase 08/22/2018 67  40 - 150 U/L Final     ALT 08/22/2018 17  0 - 50 U/L Final     AST 08/22/2018 14  0 - 45 U/L Final     Hepatitis B Core Caroline 08/22/2018 Nonreactive  NR^Nonreactive Final     Hepatitis B Surface Antibody 08/22/2018 0.20  <8.00 m[IU]/mL Final     Hep B Surface Agn 08/22/2018 Nonreactive  NR^Nonreactive Final     HCV RNA Quant IU/ml 08/22/2018 HCV RNA Not Detected  HCVND^HCV RNA Not Detected [IU]/mL Final     Log of HCV RNA Qt 08/22/2018 Not Calculated  <1.2 Log IU/mL Final     HIV Antigen Antibody Combo 08/22/2018 Nonreactive  NR^Nonreactive     Final     Lab Scanned Result 08/22/2018 VICKIE VIR AB INDEX REFLEX-Scanned*  Final     M Tuberculosis Result 08/22/2018 Negative  NEG^Negative Final     M Tuberculosis Antigen Value 08/22/2018 0.13  IU/mL Final     Varicella Zoster Virus Antibody IgG 08/22/2018 3.5* 0.0 - 0.8 AI Final     TSH 08/22/2018 1.28  0.40 - 4.00 mU/L Final     Vitamin B12 08/22/2018 2,444* 193 - 986 pg/mL Final     Vitamin B6 08/22/2018 20.7  20.0 - 125.0 nmol/L Final     Vitamin D Deficiency screening 08/22/2018 78* 20 - 75 ug/L Final     MRI brain:    no new MRI to review    ASSESSMENT/PLAN:  The patient is a 48-year-old female with a past medical history of relapsing remitting multiple sclerosis who is presenting today as a follow-up.  Overall, the patient is clinically unchanged since starting ocrelizumab.  He tolerated the infusion well.  In order  to monitor for early B-cell repletion, I will have the patient start checking her CD19 count every month starting next month.  In regards to her her fatigue and her muscle spasms, I will try to restart the patient on baclofen.  I once again reviewed the risk and side effects of baclofen. patient's symptoms are mainly affecting her at night and keeping her from sleeping.  I will try to have her just take baclofen before going to bed with the hopes that it helps her sleep and decrease her spasms.  My hope is that by her sleeping better at night, that her fatigue interim will also improve.  I will have her up titrate her dose by 10 mg every week until she reaches a dose of 40 mg every night.  I instructed to call my office in a month let me know how she does or sooner if she has side effects.  I will temporally follow the patient up in 3 months with an MRI of the brain at that time.  I instructed to call my office with any questions, concerns, issues, or problems.    Start baclofen 20mg at night with the plan to increase her dose to weekly until she gets to 40 mg  MRI of the brain on follow-up 3 months make sure    I spent 25 minutes in this visit, with >50% direct patient time spent counseling about prognosis, treatment options, and coordination of care.    Pietro Flores MD [facility]  Staff Neurologist   01/08/19     (Chart documentation was completed in part with Dragon voice-recognition software. Even though reviewed, some grammatical, spelling, and word errors may remain.)

## 2019-01-28 ENCOUNTER — MYC MEDICAL ADVICE (OUTPATIENT)
Dept: NEUROLOGY | Facility: CLINIC | Age: 49
End: 2019-01-28

## 2019-01-28 DIAGNOSIS — J01.90 ACUTE SINUSITIS, RECURRENCE NOT SPECIFIED, UNSPECIFIED LOCATION: Primary | ICD-10-CM

## 2019-01-28 RX ORDER — AZITHROMYCIN 250 MG/1
TABLET, FILM COATED ORAL
Qty: 6 TABLET | Refills: 0 | Status: SHIPPED | OUTPATIENT
Start: 2019-01-28 | End: 2019-04-16

## 2019-01-28 RX ORDER — AZITHROMYCIN 250 MG/1
TABLET, FILM COATED ORAL
Qty: 6 TABLET | Refills: 0 | Status: SHIPPED | OUTPATIENT
Start: 2019-01-28 | End: 2019-01-28

## 2019-01-28 NOTE — TELEPHONE ENCOUNTER
[facility] send her a z stefania  If this does not work, then she will need to be seen in an urgent care.    Sincerely  Pietro Flores MD [facility]  Staff Neurologist   01/28/19

## 2019-01-28 NOTE — TELEPHONE ENCOUNTER
Patient send [employer] message requesting her Z-stefania rx be sent to  Pharmacy in [place]. This has been done.

## 2019-01-28 NOTE — TELEPHONE ENCOUNTER
Dr. Flores, please see Chayo's MyChart message below.  Is there anything you are willing to prescribe for the patient?

## 2019-03-06 ENCOUNTER — MYC MEDICAL ADVICE (OUTPATIENT)
Dept: NEUROLOGY | Facility: CLINIC | Age: 49
End: 2019-03-06

## 2019-03-06 DIAGNOSIS — G35 MULTIPLE SCLEROSIS (H): Primary | ICD-10-CM

## 2019-03-06 NOTE — TELEPHONE ENCOUNTER
I am going to refer the patient to the sports medicine clinic. She can decrease her total daily dose of baclofen by 10mg every week until she is off. I think she needs to go to another provider. I have tried her on a bunch of things and nothing has worked.    If this is really bone pain, she may benefit from joint injections.

## 2019-03-06 NOTE — TELEPHONE ENCOUNTER
I sent a reply to the patient.    Pietro Flores MD [facility]  Staff Neurologist   03/06/19

## 2019-03-06 NOTE — TELEPHONE ENCOUNTER
Dr. Flores, please see the Fnbox message below. You restarted Chayo on baclofen in January and she reports little improvement in her sx. She also says Adderall is not working. What do you recommend?

## 2019-03-19 ENCOUNTER — INFUSION THERAPY VISIT (OUTPATIENT)
Dept: INFUSION THERAPY | Facility: CLINIC | Age: 49
End: 2019-03-19
Attending: PSYCHIATRY & NEUROLOGY
Payer: COMMERCIAL

## 2019-03-19 VITALS
BODY MASS INDEX: 31.3 KG/M2 | OXYGEN SATURATION: 98 % | RESPIRATION RATE: 18 BRPM | WEIGHT: 193.9 LBS | DIASTOLIC BLOOD PRESSURE: 88 MMHG | TEMPERATURE: 98 F | HEART RATE: 94 BPM | SYSTOLIC BLOOD PRESSURE: 133 MMHG

## 2019-03-19 DIAGNOSIS — G35 MS (MULTIPLE SCLEROSIS) (H): Primary | ICD-10-CM

## 2019-03-19 DIAGNOSIS — R53.83 OTHER FATIGUE: ICD-10-CM

## 2019-03-19 DIAGNOSIS — G35 MS (MULTIPLE SCLEROSIS) (H): ICD-10-CM

## 2019-03-19 LAB
ALBUMIN SERPL-MCNC: 3.6 G/DL (ref 3.4–5)
ALP SERPL-CCNC: 74 U/L (ref 40–150)
ALT SERPL W P-5'-P-CCNC: 25 U/L (ref 0–50)
ANION GAP SERPL CALCULATED.3IONS-SCNC: 6 MMOL/L (ref 3–14)
AST SERPL W P-5'-P-CCNC: 16 U/L (ref 0–45)
BASOPHILS # BLD AUTO: 0.1 10E9/L (ref 0–0.2)
BASOPHILS NFR BLD AUTO: 0.9 %
BILIRUB SERPL-MCNC: 0.5 MG/DL (ref 0.2–1.3)
BUN SERPL-MCNC: 18 MG/DL (ref 7–30)
CALCIUM SERPL-MCNC: 8.7 MG/DL (ref 8.5–10.1)
CD19 CELLS # BLD: 4 CELLS/UL (ref 107–698)
CD19 CELLS NFR BLD: <1 % (ref 6–27)
CHLORIDE SERPL-SCNC: 106 MMOL/L (ref 94–109)
CO2 SERPL-SCNC: 28 MMOL/L (ref 20–32)
CREAT SERPL-MCNC: 1.02 MG/DL (ref 0.52–1.04)
DIFFERENTIAL METHOD BLD: NORMAL
EOSINOPHIL # BLD AUTO: 0.4 10E9/L (ref 0–0.7)
EOSINOPHIL NFR BLD AUTO: 4.9 %
ERYTHROCYTE [DISTWIDTH] IN BLOOD BY AUTOMATED COUNT: 11.9 % (ref 10–15)
GFR SERPL CREATININE-BSD FRML MDRD: 64 ML/MIN/{1.73_M2}
GLUCOSE SERPL-MCNC: 76 MG/DL (ref 70–99)
HCT VFR BLD AUTO: 42.9 % (ref 35–47)
HGB BLD-MCNC: 14.2 G/DL (ref 11.7–15.7)
IMM GRANULOCYTES # BLD: 0 10E9/L (ref 0–0.4)
IMM GRANULOCYTES NFR BLD: 0.1 %
LYMPHOCYTES # BLD AUTO: 1.6 10E9/L (ref 0.8–5.3)
LYMPHOCYTES NFR BLD AUTO: 19.8 %
MCH RBC QN AUTO: 29.4 PG (ref 26.5–33)
MCHC RBC AUTO-ENTMCNC: 33.1 G/DL (ref 31.5–36.5)
MCV RBC AUTO: 89 FL (ref 78–100)
MONOCYTES # BLD AUTO: 0.8 10E9/L (ref 0–1.3)
MONOCYTES NFR BLD AUTO: 10.2 %
NEUTROPHILS # BLD AUTO: 5.1 10E9/L (ref 1.6–8.3)
NEUTROPHILS NFR BLD AUTO: 64.1 %
NRBC # BLD AUTO: 0 10*3/UL
NRBC BLD AUTO-RTO: 0 /100
PLATELET # BLD AUTO: 333 10E9/L (ref 150–450)
POTASSIUM SERPL-SCNC: 3.6 MMOL/L (ref 3.4–5.3)
PROT SERPL-MCNC: 7.6 G/DL (ref 6.8–8.8)
RBC # BLD AUTO: 4.83 10E12/L (ref 3.8–5.2)
SODIUM SERPL-SCNC: 140 MMOL/L (ref 133–144)
WBC # BLD AUTO: 7.9 10E9/L (ref 4–11)

## 2019-03-19 PROCEDURE — 96366 THER/PROPH/DIAG IV INF ADDON: CPT

## 2019-03-19 PROCEDURE — 86355 B CELLS TOTAL COUNT: CPT | Performed by: PSYCHIATRY & NEUROLOGY

## 2019-03-19 PROCEDURE — 25000128 H RX IP 250 OP 636: Mod: ZF | Performed by: PSYCHIATRY & NEUROLOGY

## 2019-03-19 PROCEDURE — 25000132 ZZH RX MED GY IP 250 OP 250 PS 637: Mod: ZF | Performed by: PSYCHIATRY & NEUROLOGY

## 2019-03-19 PROCEDURE — 80053 COMPREHEN METABOLIC PANEL: CPT | Performed by: PSYCHIATRY & NEUROLOGY

## 2019-03-19 PROCEDURE — 25800030 ZZH RX IP 258 OP 636: Mod: ZF | Performed by: PSYCHIATRY & NEUROLOGY

## 2019-03-19 PROCEDURE — 96365 THER/PROPH/DIAG IV INF INIT: CPT

## 2019-03-19 PROCEDURE — 85025 COMPLETE CBC W/AUTO DIFF WBC: CPT | Performed by: PSYCHIATRY & NEUROLOGY

## 2019-03-19 PROCEDURE — 96375 TX/PRO/DX INJ NEW DRUG ADDON: CPT

## 2019-03-19 RX ORDER — ACETAMINOPHEN 325 MG/1
650 TABLET ORAL ONCE
Status: COMPLETED | OUTPATIENT
Start: 2019-03-19 | End: 2019-03-19

## 2019-03-19 RX ORDER — METHYLPREDNISOLONE SODIUM SUCCINATE 125 MG/2ML
125 INJECTION, POWDER, LYOPHILIZED, FOR SOLUTION INTRAMUSCULAR; INTRAVENOUS ONCE
Status: COMPLETED | OUTPATIENT
Start: 2019-03-19 | End: 2019-03-19

## 2019-03-19 RX ORDER — ACETAMINOPHEN 325 MG/1
650 TABLET ORAL ONCE
Status: CANCELLED | OUTPATIENT
Start: 2019-03-19

## 2019-03-19 RX ORDER — METHYLPREDNISOLONE SODIUM SUCCINATE 125 MG/2ML
125 INJECTION, POWDER, LYOPHILIZED, FOR SOLUTION INTRAMUSCULAR; INTRAVENOUS ONCE
Status: CANCELLED | OUTPATIENT
Start: 2019-03-19

## 2019-03-19 RX ORDER — DIPHENHYDRAMINE HCL 25 MG
50 CAPSULE ORAL ONCE
Status: CANCELLED | OUTPATIENT
Start: 2019-03-19 | End: 2019-03-19

## 2019-03-19 RX ORDER — DIPHENHYDRAMINE HCL 25 MG
50 CAPSULE ORAL ONCE
Status: COMPLETED | OUTPATIENT
Start: 2019-03-19 | End: 2019-03-19

## 2019-03-19 RX ADMIN — METHYLPREDNISOLONE SODIUM SUCCINATE 125 MG: 125 INJECTION, POWDER, FOR SOLUTION INTRAMUSCULAR; INTRAVENOUS at 09:13

## 2019-03-19 RX ADMIN — ACETAMINOPHEN 650 MG: 325 TABLET ORAL at 09:11

## 2019-03-19 RX ADMIN — OCRELIZUMAB 600 MG: 300 INJECTION INTRAVENOUS at 09:34

## 2019-03-19 RX ADMIN — DIPHENHYDRAMINE HYDROCHLORIDE 50 MG: 25 CAPSULE ORAL at 09:11

## 2019-03-19 NOTE — PROGRESS NOTES
Nursing Note  Chayo Reid [facility]:   Chief Complaint   Patient presents with     Infusion     ocrevus for MS     During today's [facility] appointment, orders from Dr. Pietro Flores were completed.  Frequency: every 6 months.     Progress note:  Patient identification verified by name and date of birth.  Assessment completed.  Vitals recorded in Doc Flowsheets.  Patient was provided with education regarding infusion and possible side effects.  Patient verbalized understanding.      needed: No  Premedications: administered per order. Waited 30 minutes after administration of pre-meds to proceed with Ocrevus infusion.   Infusion 200 Infusion length: 3.5 hours + 1 hour observation.  Labs: were drawn as ordered.   Vascular access: peripheral IV placed today.  Treatment Conditions: patient monitored for 1 hour after medication was infused.  ~~~ NOTE: If the patient answers yes to any of the questions below, hold the infusion and contact ordering provider or on-call provider.    1. Have you recently had an elevated temperature, fever, chills, productive cough, coughing for 3 weeks or longer or hemoptysis,  abnormal vital signs, night sweats,  chest pain or have you noticed a decrease in your appetite, unexplained weight loss or fatigue? No  2. Do you have any open wounds or new incisions? No  3. Do you have any recent or upcoming hospitalizations, surgeries or dental procedures? No  4. Do you currently have or recently have had any signs of illness or infection or are you on any antibiotics? No  5. Have you had any new, sudden or worsening abdominal pain? No  6. Have you or anyone in your household received a live vaccination in the past 4 weeks? Please note:  No live vaccines while on biologic/chemotherapy until 6 months after the last treatment.  Patient can receive the flu vaccine (shot only) and the pneumovax.  It is  optimal for the patient to get these vaccines mid cycle, but they can be given at any time as long as it is not on the day of the infusion. No  7. Have you recently been diagnosed with any new nervous system diseases (ie. Multiple sclerosis, Guillain [place], seizures, neurological changes) or cancer diagnosis? Are you on any form of radiation or chemotherapy? No  8. Are you pregnant or breast feeding or do you have plans of pregnancy in the future? No  9. Have you been having any signs of worsening depression or suicidal ideations?  (benlysta only) No  10. Have there been any other new onset medical symptoms? No    Patient tolerated infusion: well.    Administrations This Visit     acetaminophen (TYLENOL) tablet 650 mg     Admin Date  03/19/2019 Action  Given Dose  650 mg Route  Oral Administered By  Carissa Salguero RN          diphenhydrAMINE (BENADRYL) capsule 50 mg     Admin Date  03/19/2019 Action  Given Dose  50 mg Route  Oral Administered By  Carissa Salguero RN          methylPREDNISolone sodium succinate (solu-MEDROL) injection 125 mg     Admin Date  03/19/2019 Action  Given Dose  125 mg Route  Intravenous Administered By  Carissa Salguero RN                  POST-INFUSION OF BIOLOGICAL MEDICATION:  Reviewed with patient.  Given biologic medication or medication hand-out. Inform patient if any fever, chills or signs of infection, new symptoms, abdominal pain, heart palpitations, shortness of breath, reaction, weakness, neurological changes, seek medical attention immediately and should not receive infusions. No live virus vaccines prior to or during treatment or up to 6 months post infusion. If the patient has an upcoming procedure or surgery, this should be discussed with the rheumatologist and surgeon or provider.        Discharge Plan:   AVS printed and given to patient.   Follow up plan of care with: ongoing infusions at [facility].  Discharge instructions were reviewed with  patient.  Patient/representative verbalized understanding of discharge instructions and all questions answered.  Patient discharged from [facility] in stable condition.    Carissa Salguero RN      Administrations This Visit     acetaminophen (TYLENOL) tablet 650 mg     Admin Date  03/19/2019 Action  Given Dose  650 mg Route  Oral Administered By  Carissa Salguero RN          diphenhydrAMINE (BENADRYL) capsule 50 mg     Admin Date  03/19/2019 Action  Given Dose  50 mg Route  Oral Administered By  Carissa Salguero RN          methylPREDNISolone sodium succinate (solu-MEDROL) injection 125 mg     Admin Date  03/19/2019 Action  Given Dose  125 mg Route  Intravenous Administered By  Carissa Salguero RN            /89   Pulse 94   Temp 98  F (36.7  C) (Oral)   Resp 18   Wt 88 kg (193 lb 14.4 oz)   SpO2 98%   BMI 31.30 kg/m

## 2019-03-19 NOTE — PATIENT INSTRUCTIONS
Dear Chayo Reid    Thank you for choosing [facility] ([facility]) for your infusion.  The following information is a summary of our appointment as well as important reminders.      EDUCATION POST BIOLOGICAL/CHEMOTHERAPY INFUSION  Call the triage nurse at your clinic or seek medical attention if you have chills and/or temperature greater than or equal to 100.5, uncontrolled nausea/vomiting, diarrhea, constipation, dizziness, shortness of breath, chest pain, heart palpitations, weakness or any other new or concerning symptoms, questions or concerns.  You can not have any live virus vaccines prior to or during treatment or up to 6 months post infusion.  If you have an upcoming surgery, medical procedure or dental procedure during treatment, this should be discussed with your ordering physician and your surgeon/dentist.  If you are having any concerning symptom, if you are unsure if you should get your next infusion or wish to speak to a provider before your next infusion, please call your care coordinator or triage nurse at your clinic to notify them so we can adequately serve you.    Ocrelizumab injection  Brand Name: OCREVUS  What is this medicine?  OCRELIZUMAB (ok re THOMAS ue mab) treats multiple sclerosis. It helps to decrease the number of multiple sclerosis relapses. It is not a cure.  How should I use this medicine?  This medicine is for infusion into a vein. It is given by a health care professional in a hospital or clinic setting.  Talk to your pediatrician regarding the use of this medicine in children. Special care may be needed.  What side effects may I notice from receiving this medicine?  Side effects that you should report to your doctor or health care professional as soon as possible:    allergic reactions like skin rash, itching or hives, swelling of the face, lips, or tongue    breathing problems    facial flushing    fast, irregular  heartbeat    lump or soreness in the breast    signs and symptoms of herpes such as cold sore, shingles, or genital sores    signs and symptoms of infection like fever or chills, cough, sore throat, pain or trouble passing urine    signs and symptoms of low blood pressure like dizziness; feeling faint or lightheaded, falls; unusually weak or tired    signs and symptoms of progressive multifocal leukoencephalopathy (PML) like changes in vision; clumsiness; confusion; personality changes; weakness on one side of the body    swelling of the ankles, feet, hands  Side effects that usually do not require medical attention (report these to your doctor or health care professional if they continue or are bothersome):    back pain    depressed mood    diarrhea    pain, redness, or irritation at site where injected  What may interact with this medicine?      alemtuzumab    daclizumab    dimethyl fumarate    fingolimod    glatiramer    interferon beta    live virus vaccines    mitoxantrone    natalizumab    peginterferon beta    rituximab    steroid medicines like prednisone or cortisone    teriflunomide  What if I miss a dose?  Keep appointments for follow-up doses as directed. It is important not to miss your dose. Call your doctor or health care professional if you are unable to keep an appointment.  Where should I keep my medicine?  This drug is given in a hospital or clinic and will not be stored at home.  What should I tell my health care provider before I take this medicine?  They need to know if you have any of these conditions:    cancer    hepatitis B infection    other infection (especially a virus infection such as chickenpox, cold sores, or herpes)    an unusual or allergic reaction to ocrelizumab, other medicines, foods, dyes or preservatives    pregnant or trying to get pregnant    breast-feeding  What should I watch for while using this medicine?  Tell your doctor or healthcare professional if your symptoms do  not start to get better or if they get worse.  This medicine can cause serious allergic reactions. To reduce your risk you may need to take medicine before treatment with this medicine. Take your medicine as directed.  Women should inform their doctor if they wish to become pregnant or think they might be pregnant. There is a potential for serious side effects to an unborn child. Talk to your health care professional or pharmacist for more information. Female patients should use effective birth control methods while receiving this medicine and for 6 months after the last dose.  Call your doctor or health care professional for advice if you get a fever, chills or sore throat, or other symptoms of a cold or flu. Do not treat yourself. This drug decreases your body's ability to fight infections. Try to avoid being around people who are sick.  If you have a hepatitis B infection or a history of a hepatitis B infection, talk to your doctor. The symptoms of hepatitis B may get worse if you take this medicine.  In some patients, this medicine may cause a serious brain infection that may cause death. If you have any problems seeing, thinking, speaking, walking, or standing, tell your doctor right away. If you cannot reach your doctor, urgently seek other source of medical care.  This medicine can decrease the response to a vaccine. If you need to get vaccinated, tell your healthcare professional if you have received this medicine. Extra booster doses may be needed. Talk to your doctor to see if a different vaccination schedule is needed.  Talk to your doctor about your risk of cancer. You may be more at risk for certain types of cancers if you take this medicine.  NOTE:This sheet is a summary. It may not cover all possible information. If you have questions about this medicine, talk to your doctor, pharmacist, or health care provider. Copyright  2018 [employer]        We look forward in seeing you on your next appointment  here at [facility].  Please don t hesitate to call us at [phone] to reschedule any of your appointments or to speak with one of the [facility] registered nurses.  It was a pleasure taking care of you today.    Sincerely,    [facility]  [facility]  [address] [place] [address]  [id number]  Phone:  [phone]

## 2019-04-16 ENCOUNTER — ANCILLARY PROCEDURE (OUTPATIENT)
Dept: MRI IMAGING | Facility: CLINIC | Age: 49
End: 2019-04-16
Payer: COMMERCIAL

## 2019-04-16 ENCOUNTER — OFFICE VISIT (OUTPATIENT)
Dept: NEUROLOGY | Facility: CLINIC | Age: 49
End: 2019-04-16
Attending: PSYCHIATRY & NEUROLOGY
Payer: COMMERCIAL

## 2019-04-16 VITALS
DIASTOLIC BLOOD PRESSURE: 88 MMHG | WEIGHT: 201.7 LBS | BODY MASS INDEX: 32.42 KG/M2 | SYSTOLIC BLOOD PRESSURE: 131 MMHG | HEIGHT: 66 IN | HEART RATE: 98 BPM

## 2019-04-16 DIAGNOSIS — R53.83 OTHER FATIGUE: ICD-10-CM

## 2019-04-16 DIAGNOSIS — G35 MS (MULTIPLE SCLEROSIS) (H): ICD-10-CM

## 2019-04-16 DIAGNOSIS — G35 MULTIPLE SCLEROSIS (H): Primary | ICD-10-CM

## 2019-04-16 PROCEDURE — G0463 HOSPITAL OUTPT CLINIC VISIT: HCPCS | Mod: ZF

## 2019-04-16 RX ORDER — CYCLOBENZAPRINE HCL 5 MG
5 TABLET ORAL 3 TIMES DAILY PRN
Qty: 90 TABLET | Refills: 3 | Status: SHIPPED | OUTPATIENT
Start: 2019-04-16 | End: 2019-11-05

## 2019-04-16 RX ORDER — GADOBUTROL 604.72 MG/ML
7.5 INJECTION INTRAVENOUS ONCE
Status: COMPLETED | OUTPATIENT
Start: 2019-04-16 | End: 2019-04-16

## 2019-04-16 RX ADMIN — GADOBUTROL 7.5 ML: 604.72 INJECTION INTRAVENOUS at 12:01

## 2019-04-16 ASSESSMENT — MIFFLIN-ST. JEOR: SCORE: 1556.66

## 2019-04-16 ASSESSMENT — PAIN SCALES - GENERAL: PAINLEVEL: EXTREME PAIN (9)

## 2019-04-16 NOTE — PATIENT INSTRUCTIONS
Will follow up in 3 months    Will refer to the headache for consideration of the occipital nerve block for your ice pick headache    Will start flexeril    Will refer to the pain clinic.    Will refer to pseech    You saw a neurology provider today at the [facility].  You may have also met with the MS RN Care Coordinator.  In order to get a message to your MS Center provider, you should contact [phone] option 3 for the triage nurse line.    You should contact us via this protocol if you have any of the following symptoms:    New or worsening neurologic symptoms that persist for 24-48 hours, such as:  o New onset of pain or marked worsening of pain  o Difficulty with speech, swallowing, or breathing  o New onset of vertigo or dizziness  o Change in bowel or bladder function (incontinence, difficulty urinating)    Increasing difficulty in self care    Marked changes in vision (double vision, blurred vision, graying of vision)    Change in mobility    Change in cognitive function    Falling    Worsening numbness, tingling or pain with a change in function    Worsening fatigue lasting more than 2 weeks  If you had labs completed today, we will contact you with the results.  If you are active in [email or web address], they will be released to you there.  Otherwise, your results will be provided to you via mail or telephone call.  Some results take up to 2 weeks for completion.  If you haven t heard anything about your lab results within 2 weeks, you can call or send a [email or web address] message to obtain your results.  If you have an MRI scheduled in the week or two prior to your next appointment, we will go over the results at your scheduled follow up appointment.  If you are not scheduled to see your MS Center provider within about 2 weeks after your MRI, please call or send a [email or web address] message to obtain your results if you haven t heard anything within 2 weeks.  Please be aware that it takes at least  5 business days after routine MRIs for your results to be reviewed by both the radiologist and your doctor.  MRIs completed at facilities outside of the [facility] system take about 2 weeks in order for the MRI disc to be mailed to our clinic and uploaded into your medical record.    Prescription refills should be faxed to us by your pharmacy.  Our fax number for prescription refills is [phone].  Please do your best to come to your appointments, and to arrive 15 minutes early to allow time for checking in.  [facility] reserves the right to terminate care of established patients if a patient misses three or more appointments in a clinic without providing notification within a 12-month period.    Developing Your Care Team  Individuals living with chronic illnesses like MS may be unaware that they are at risk for the same range of medical problems as everyone else.  This is why you must establish a relationship with other health care providers in addition to your Multiple Sclerosis doctor.  It can be difficult at times to figure out whether a health concern is related to your MS, or whether it is related to something else, such as hormonal changes, pseduoexacerbations, changes in your core body temperature, flu-like reactions to interferons, exercise, or infections.  Urinary tract infections (UTIs) are common culprits that can cause fatigue, weakness, or other  MS attack -like symptoms without classic symptoms of a UTI.  For this reason, if you call or come in to discuss symptoms, you may be asked to get in touch with your primary provider or another specialist, so that you receive the comprehensive care you need.  What is Multiple Sclerosis (MS)?  MS is a disease in which the nerve tissues in the brain and/or spinal cord are attacked by immune cells in the body.  These immune cells are present in everyone, and their normal role is to fight off infections.  In people with MS, these cells  change the way they function and cross into the nervous system.  Once there, they cause inflammation that damages the myelin (or the protective coating of a nerve cell, much like the plastic covering on an electrical cord) and parts of the nerve cell itself.  So far, a clear cause for this immune system dysfunction has not been found.  MS often starts out as the  relapsing-remitting  form.  This means there are episodes when you have symptoms, and other times when you recover to normal or near-normal.  Over time, if the damage to the nervous system continues, the disease can cause additional disability, such as difficulty walking.  If the relapses and nerve damage can be prevented with available medications, many patients with MS can go many years between relapses and have relatively little disability.  Remember: MS is a condition that changes and must be evaluated on an ongoing basis!  What is a Relapse? (Also called flare-ups, attacks, or exacerbations)  Relapses are due to the occurrence of inflammation in some part of the brain and/or spinal cord.  A relapse is new or recurrent symptoms which persist for at least 24 hours and sometimes worsen over 48 hours.  New symptoms need to be  by at least 1 month in order to be considered separate relapses. Most of the time, symptoms reach their maximal intensity within 2 weeks and then begin to slowly resolve.  At times, your symptoms may not recover fully for up to 6 months, depending on the severity of the episode.  The frequency of relapses is generally higher early in the disease, but can vary greatly among individuals with MS.  Improvement of symptoms for an individual is unpredictable with each relapse.    It is important to remember that an increase in symptoms and changes in function may not necessarily be a relapse.  There are other factors that contribute to such changes, such as hot weather, increased body temperature, infection/illness, stress and  sleep deprivation.  The worsening of symptoms may feel like a relapse when in reality it is not.  These episodes are referred to as pseudorelapses.  Once the underlying cause is addressed, symptoms usually fade away and you feel better.  If you experience a worsening of symptoms that lasts more than 48 hours and does not improve with cooling down, decreasing stress, or treatment of an infection, please call us and we can help to better determine whether you are having a pseudorelapse versus a relapse.         Patient Education     Cyclobenzaprine tablets  Brand Names: Fexmid, Flexeril  What is this medicine?  CYCLOBENZAPRINE (sye kloe ТАТЬЯНА angus preen) is a muscle relaxer. It is used to treat muscle pain, spasms, and stiffness.  How should I use this medicine?  Take this medicine by mouth with a glass of water. Follow the directions on the prescription label. If this medicine upsets your stomach, take it with food or milk. Take your medicine at regular intervals. Do not take it more often than directed.  Talk to your pediatrician regarding the use of this medicine in children. Special care may be needed.  What side effects may I notice from receiving this medicine?  Side effects that you should report to your doctor or health care professional as soon as possible:    allergic reactions like skin rash, itching or hives, swelling of the face, lips, or tongue    breathing problems    chest pain    fast, irregular heartbeat    hallucinations    seizures    unusually weak or tired  Side effects that usually do not require medical attention (report to your doctor or health care professional if they continue or are bothersome):    headache    nausea, vomiting  What may interact with this medicine?  Do not take this medicine with any of the following medications:    certain medicines for fungal infections like fluconazole, itraconazole, ketoconazole, posaconazole,  voriconazole    cisapride    dofetilide    dronedarone    halofantrine    levomethadyl    MAOIs like Carbex, Eldepryl, Marplan, Nardil, and Parnate    narcotic medicines for cough    pimozide    thioridazine    ziprasidone  This medicine may also interact with the following medications:    alcohol    antihistamines for allergy, cough and cold    certain medicines for anxiety or sleep    certain medicines for cancer    certain medicines for depression like amitriptyline, fluoxetine, sertraline    certain medicines for infection like alfuzosin, chloroquine, clarithromycin, levofloxacin, mefloquine, pentamidine, troleandomycin    certain medicines for irregular heart beat    certain medicines for seizures like phenobarbital, primidone    contrast dyes    general anesthetics like halothane, isoflurane, methoxyflurane, propofol    local anesthetics like lidocaine, pramoxine, tetracaine    medicines that relax muscles for surgery    narcotic medicines for pain    other medicines that prolong the QT interval (cause an abnormal heart rhythm)    phenothiazines like chlorpromazine, mesoridazine, prochlorperazine  What if I miss a dose?  If you miss a dose, take it as soon as you can. If it is almost time for your next dose, take only that dose. Do not take double or extra doses.  Where should I keep my medicine?  Keep out of the reach of children.  Store at room temperature between 15 and 30 degrees C (59 and 86 degrees F). Keep container tightly closed. Throw away any unused medicine after the expiration date.  What should I tell my health care provider before I take this medicine?  They need to know if you have any of these conditions:    heart disease, irregular heartbeat, or previous heart attack    liver disease    thyroid problem    an unusual or allergic reaction to cyclobenzaprine, tricyclic antidepressants, lactose, other medicines, foods, dyes, or preservatives    pregnant or trying to get  pregnant    breast-feeding  What should I watch for while using this medicine?  Tell your doctor or health care professional if your symptoms do not start to get better or if they get worse.  You may get drowsy or dizzy. Do not drive, use machinery, or do anything that needs mental alertness until you know how this medicine affects you. Do not stand or sit up quickly, especially if you are an older patient. This reduces the risk of dizzy or fainting spells. Alcohol may interfere with the effect of this medicine. Avoid alcoholic drinks.  If you are taking another medicine that also causes drowsiness, you may have more side effects. Give your health care provider a list of all medicines you use. Your doctor will tell you how much medicine to take. Do not take more medicine than directed. Call emergency for help if you have problems breathing or unusual sleepiness.  Your mouth may get dry. Chewing sugarless gum or sucking hard candy, and drinking plenty of water may help. Contact your doctor if the problem does not go away or is severe.  NOTE:This sheet is a summary. It may not cover all possible information. If you have questions about this medicine, talk to your doctor, pharmacist, or health care provider. Copyright  2018 [employer]

## 2019-04-16 NOTE — DISCHARGE INSTRUCTIONS
MRI Contrast Discharge Instructions    The IV contrast you received today will pass out of your body in your  urine. This will happen in the next 24 hours. You will not feel this process.  Your urine will not change color.    Drink at least 4 extra glasses of water or juice today (unless your doctor  has restricted your fluids). This reduces the stress on your kidneys.  You may take your regular medicines.    If you are on dialysis: It is best to have dialysis today.    If you have a reaction: Most reactions happen right away. If you have  any new symptoms after leaving the hospital (such as hives or swelling),  call your hospital at the correct number below. Or call your family doctor.  If you have breathing distress or wheezing, call 911.    Special instructions: ***    I have read and understand the above information.    Signature:______________________________________ Date:___________    Staff:__________________________________________ Date:___________     Time:__________    [facility] Departments:    ___[place]: [phone]  ___[facility]: [phone]  ___[place]: [phone] ___[facility]: [phone]  ___[facility]: [phone]  ___[facility]: [phone]  ___[phone]  ___[facility]: [phone]  ___[phone]

## 2019-04-16 NOTE — LETTER
2019       RE: Chayo Reid  [address] [facility] [address]     Dear Colleague,    Thank you for referring your patient, Chayo Reid, to the [facility] MULTIPLE SCLEROSIS at [facility]. Please see a copy of my visit note below.    MULTIPLE SCLEROSIS CLINIC AT THE [facility]  FOLLOWUP/ESTABLISHED PATIENT VISIT      PRINCIPAL NEUROLOGIC DIAGNOSIS: : Multiple Sclerosis     Date of Onset: 2012  Date of Diagnosis: 2012  Initial Clinical Course: Progressive  Current Clinical Course: Progressive  Past Disease Modifying Therapy(ies): copaxone  Current Disease Modifying Therapy(ies): Ocrevus  Most Recent MRI of the Brain: 19 last one 18  Most Recent MRI of the Cervical Cord   Most Recent MRI of the Thoracic Cord:   Most Recent Lumbar Puncture: Positive for OCB  Most Recent OCT: NA   Most Recent JCV: 18 positive  Most Recent Remote Hepatitis Panel: 18 negative  Most Recent VZV Ig18 positive  Most Recent TB Quant: 18 negative  Previous pain medication: Lyrica, Elavil, Cymbalta, valproic acid, baclofen  Medical cannibis: Has positive benefit of decrease [id number] without side effect.     CHIEF COMPLAINT: Follow up on DMT    INTERVAL HISTORY:    The patient has been having a headache once per week. She reports that she will get an ice pick headache in one spot in the back of the head. Other times it will be over head. The headache will generally be once per day. The pain is a 10/10 when she get it. Nothing make it better or worse. The patient reports that she had been having a headache once per month. In the past salt and stress would trigger them. The patient denies any nausea, vomiting, or light sensitivity with them. The patient reports that they had been occurring once per month.     The patient reports that she is takes advil or alleve every day. The patient reports that she is having all over pain. She is  having hurting pain all over the body. Its an achy pain all over.     Issues with current MS therapy: Tolerating DMT without issue    REVIEW OF SYSTEMS:    Mood: unchanged and anxious she had been having more agitation secondary to the appontment  Spasticity:worse at night and painful. She is doing physical therapy and it appears to be helping.  Bladder: none  Bowel: unchanged  Pain related to today's visit:reviewed on nursing intake documentation  Fatigue: unchanged and she wants to take naps but can't sleep. she will get up and have breakfast and then lay in bed till 11am.   Sleep: insomnia , sleeps 3 hours per night and unchanged secondary to pain  Memory/Concentration: unchanged    Reports that she has nasal congestion, she reports difficult swallowing, she reports that she is also bloated. She reports that she has pain when her food gets stuck in the back of throat. She has problems with anything thicker than clear liquids.      Otherwise 10 point ROS was neg other than the symptoms noted above.    PAST HISTORY was reviewed and updated:    MEDICATIONS and ALLERGIES were reviewed and updated.    SOCIAL HISTORY was reviewed and updated:    EXAM:    PHYSICAL EXAMINATION:   VITAL SIGNS:  B/P: 131/88, T: Data Unavailable, P: 98, R: Data Unavailable    GENERAL: The patient is a well-nourished  who presents to the evaluation with her [facility].  NEUROLOGIC:   MENTAL STATUS: Alert,awake and  oriented times four.   MENTAL STATUS: Alert,awake and  oriented times four.   CRANIAL NERVES:  Visual fields are full to confrontation. The pupils are  round and react to light and there is no Antonio Arlene pupil.  Extraocular movements are  intact with no  internuclear ophthalmoplegia. No nystagmus. Facial strength and sensation are  normal. Hearing is  normal. Palate elevation and tongue protrusion are  normal.   POWER:      Motor     Upper         Right Left   Shoulder Abduction 5 5   Elbow Flexion 5 5   Elbow Extension 5 5   Wrist  Extension 5 5   Digit Extension 5 5   Digit Flexion 5 5   APB 5 5   Tone 1 1   Lower          Right Left   Hip Flexion 5 5   Knee Extension 5 5   Knee Flexion 5 5   Foot Dorsiflexion 5 5   Foot Plantar Flexion 5 5   EH 5 5   Toe Flexion 5 5   Tone 1 1             Grade Description   0 No increase in muscle tone   1 Slight increase in muscle tone, manifested by a catch and release or by minimal resistance at the end of the range of motion when the affected part(s) is moved in flexion or extension   1+ Slight increase in muscle tone, manifested by a catch, followed by minimal resistance throughout the remainder (less than half) of the ROM   2 More marked increase in muscle tone through most of the ROM, but affected part(s) easily moved   3 Considerable increase in muscle tone, passive movement difficult   4 Affected part(s) rigid in flexion or extension         SENSORY:      Light touch:  Intact in all extremities     MOTOR/CEREBELLAR:     Right Left   RRM 0 Normal 0 Normal   MAGDA 0 Normal 0 Normal   FTN 0 Normal 0 Normal   RRM 0 Normal 0 Normal   HKS 0 Normal 0 Normal        GAIT: Gait is  narrow-based and steady and the patient is  able to walk on heels, toes and in tandem without difficulty.    Romberg: Sways with eye(s)  closed    RESULTS:  Monitoring labs:    Infusion Therapy Visit on 03/19/2019   Component Date Value Ref Range Status     CD19 B Cells 03/19/2019 <1* 6 - 27 % Final     Absolute CD19 03/19/2019 4* 107 - 698 cells/uL Final     Sodium 03/19/2019 140  133 - 144 mmol/L Final     Potassium 03/19/2019 3.6  3.4 - 5.3 mmol/L Final     Chloride 03/19/2019 106  94 - 109 mmol/L Final     Carbon Dioxide 03/19/2019 28  20 - 32 mmol/L Final     Anion Gap 03/19/2019 6  3 - 14 mmol/L Final     Glucose 03/19/2019 76  70 - 99 mg/dL Final     Urea Nitrogen 03/19/2019 18  7 - 30 mg/dL Final     Creatinine 03/19/2019 1.02  0.52 - 1.04 mg/dL Final     GFR Estimate 03/19/2019 64  >60 mL/min/[1.73_m2] Final     GFR  Estimate If Black 03/19/2019 75  >60 mL/min/[1.73_m2] Final     Calcium 03/19/2019 8.7  8.5 - 10.1 mg/dL Final     Bilirubin Total 03/19/2019 0.5  0.2 - 1.3 mg/dL Final     Albumin 03/19/2019 3.6  3.4 - 5.0 g/dL Final     Protein Total 03/19/2019 7.6  6.8 - 8.8 g/dL Final     Alkaline Phosphatase 03/19/2019 74  40 - 150 U/L Final     ALT 03/19/2019 25  0 - 50 U/L Final     AST 03/19/2019 16  0 - 45 U/L Final     WBC 03/19/2019 7.9  4.0 - 11.0 10e9/L Final     RBC Count 03/19/2019 4.83  3.8 - 5.2 10e12/L Final     Hemoglobin 03/19/2019 14.2  11.7 - 15.7 g/dL Final     Hematocrit 03/19/2019 42.9  35.0 - 47.0 % Final     MCV 03/19/2019 89  78 - 100 fl Final     MCH 03/19/2019 29.4  26.5 - 33.0 pg Final     MCHC 03/19/2019 33.1  31.5 - 36.5 g/dL Final     RDW 03/19/2019 11.9  10.0 - 15.0 % Final     Platelet Count 03/19/2019 333  150 - 450 10e9/L Final     Diff Method 03/19/2019 Automated Method   Final     % Neutrophils 03/19/2019 64.1  % Final     % Lymphocytes 03/19/2019 19.8  % Final     % Monocytes 03/19/2019 10.2  % Final     % Eosinophils 03/19/2019 4.9  % Final     % Basophils 03/19/2019 0.9  % Final     % Immature Granulocytes 03/19/2019 0.1  % Final     Nucleated RBCs 03/19/2019 0  0 /100 Final     Absolute Neutrophil 03/19/2019 5.1  1.6 - 8.3 10e9/L Final     Absolute Lymphocytes 03/19/2019 1.6  0.8 - 5.3 10e9/L Final     Absolute Monocytes 03/19/2019 0.8  0.0 - 1.3 10e9/L Final     Absolute Eosinophils 03/19/2019 0.4  0.0 - 0.7 10e9/L Final     Absolute Basophils 03/19/2019 0.1  0.0 - 0.2 10e9/L Final     Abs Immature Granulocytes 03/19/2019 0.0  0 - 0.4 10e9/L Final     Absolute Nucleated RBC 03/19/2019 0.0   Final   Orders Only on 01/08/2019   Component Date Value Ref Range Status     WBC 01/08/2019 10.5  4.0 - 11.0 10e9/L Final     RBC Count 01/08/2019 4.99  3.8 - 5.2 10e12/L Final     Hemoglobin 01/08/2019 14.9  11.7 - 15.7 g/dL Final     Hematocrit 01/08/2019 44.9  35.0 - 47.0 % Final     MCV 01/08/2019  90  78 - 100 fl Final     MCH 01/08/2019 29.9  26.5 - 33.0 pg Final     MCHC 01/08/2019 33.2  31.5 - 36.5 g/dL Final     RDW 01/08/2019 11.9  10.0 - 15.0 % Final     Platelet Count 01/08/2019 340  150 - 450 10e9/L Final     Diff Method 01/08/2019 Automated Method   Final     % Neutrophils 01/08/2019 77.2  % Final     % Lymphocytes 01/08/2019 10.6  % Final     % Monocytes 01/08/2019 7.1  % Final     % Eosinophils 01/08/2019 3.9  % Final     % Basophils 01/08/2019 0.8  % Final     % Immature Granulocytes 01/08/2019 0.4  % Final     Nucleated RBCs 01/08/2019 0  0 /100 Final     Absolute Neutrophil 01/08/2019 8.1  1.6 - 8.3 10e9/L Final     Absolute Lymphocytes 01/08/2019 1.1  0.8 - 5.3 10e9/L Final     Absolute Monocytes 01/08/2019 0.7  0.0 - 1.3 10e9/L Final     Absolute Eosinophils 01/08/2019 0.4  0.0 - 0.7 10e9/L Final     Absolute Basophils 01/08/2019 0.1  0.0 - 0.2 10e9/L Final     Abs Immature Granulocytes 01/08/2019 0.0  0 - 0.4 10e9/L Final     Absolute Nucleated RBC 01/08/2019 0.0   Final     Sodium 01/08/2019 138  133 - 144 mmol/L Final     Potassium 01/08/2019 3.9  3.4 - 5.3 mmol/L Final     Chloride 01/08/2019 104  94 - 109 mmol/L Final     Carbon Dioxide 01/08/2019 28  20 - 32 mmol/L Final     Anion Gap 01/08/2019 6  3 - 14 mmol/L Final     Glucose 01/08/2019 94  70 - 99 mg/dL Final     Urea Nitrogen 01/08/2019 20  7 - 30 mg/dL Final     Creatinine 01/08/2019 0.81  0.52 - 1.04 mg/dL Final     GFR Estimate 01/08/2019 85  >60 mL/min/[1.73_m2] Final     GFR Estimate If Black 01/08/2019 >90  >60 mL/min/[1.73_m2] Final     Calcium 01/08/2019 8.7  8.5 - 10.1 mg/dL Final     Bilirubin Total 01/08/2019 0.4  0.2 - 1.3 mg/dL Final     Albumin 01/08/2019 3.6  3.4 - 5.0 g/dL Final     Protein Total 01/08/2019 7.6  6.8 - 8.8 g/dL Final     Alkaline Phosphatase 01/08/2019 66  40 - 150 U/L Final     ALT 01/08/2019 16  0 - 50 U/L Final     AST 01/08/2019 14  0 - 45 U/L Final   Orders Only on 11/08/2018   Component Date  Value Ref Range Status     WBC 11/08/2018 7.6  4.0 - 11.0 10e9/L Final     RBC Count 11/08/2018 4.77  3.8 - 5.2 10e12/L Final     Hemoglobin 11/08/2018 14.0  11.7 - 15.7 g/dL Final     Hematocrit 11/08/2018 42.6  35.0 - 47.0 % Final     MCV 11/08/2018 89  78 - 100 fl Final     MCH 11/08/2018 29.4  26.5 - 33.0 pg Final     MCHC 11/08/2018 32.9  31.5 - 36.5 g/dL Final     RDW 11/08/2018 12.2  10.0 - 15.0 % Final     Platelet Count 11/08/2018 249  150 - 450 10e9/L Final     Diff Method 11/08/2018 Automated Method   Final     % Neutrophils 11/08/2018 63.2  % Final     % Lymphocytes 11/08/2018 20.8  % Final     % Monocytes 11/08/2018 9.8  % Final     % Eosinophils 11/08/2018 5.0  % Final     % Basophils 11/08/2018 0.9  % Final     % Immature Granulocytes 11/08/2018 0.3  % Final     Nucleated RBCs 11/08/2018 0  0 /100 Final     Absolute Neutrophil 11/08/2018 4.8  1.6 - 8.3 10e9/L Final     Absolute Lymphocytes 11/08/2018 1.6  0.8 - 5.3 10e9/L Final     Absolute Monocytes 11/08/2018 0.8  0.0 - 1.3 10e9/L Final     Absolute Eosinophils 11/08/2018 0.4  0.0 - 0.7 10e9/L Final     Absolute Basophils 11/08/2018 0.1  0.0 - 0.2 10e9/L Final     Abs Immature Granulocytes 11/08/2018 0.0  0 - 0.4 10e9/L Final     Absolute Nucleated RBC 11/08/2018 0.0   Final     Sodium 11/08/2018 138  133 - 144 mmol/L Final     Potassium 11/08/2018 4.1  3.4 - 5.3 mmol/L Final     Chloride 11/08/2018 105  94 - 109 mmol/L Final     Carbon Dioxide 11/08/2018 28  20 - 32 mmol/L Final     Anion Gap 11/08/2018 6  3 - 14 mmol/L Final     Glucose 11/08/2018 102* 70 - 99 mg/dL Final     Urea Nitrogen 11/08/2018 15  7 - 30 mg/dL Final     Creatinine 11/08/2018 0.98  0.52 - 1.04 mg/dL Final     GFR Estimate 11/08/2018 60* >60 mL/min/1.7m2 Final     GFR Estimate If Black 11/08/2018 73  >60 mL/min/1.7m2 Final     Calcium 11/08/2018 8.7  8.5 - 10.1 mg/dL Final     Bilirubin Total 11/08/2018 0.5  0.2 - 1.3 mg/dL Final     Albumin 11/08/2018 3.4  3.4 - 5.0 g/dL  Final     Protein Total 11/08/2018 7.2  6.8 - 8.8 g/dL Final     Alkaline Phosphatase 11/08/2018 54  40 - 150 U/L Final     ALT 11/08/2018 20  0 - 50 U/L Final     AST 11/08/2018 14  0 - 45 U/L Final       MRI brain:    MRI Dated 4/16/19:  Moderate T2 disease burden with  0 enhancion lesion(s).  compared to MRI on 8/21/18 there are   ?2 new T2 lesion(s).    ASSESSMENT/PLAN:  The patient is 48 year old woman with a past medical history of relapsing remitting multiple sclerosis who is presenting today as a follow-up. Her MRI of the brain demonstrate multiple shrinking lesions after starting the ocrelizumab.  There may be 2 lesions that are new, but for the most part they are equivocal and more probably than not artifact.  I spent a prolonged period of time reviewing the MRI with the patient.   Overall, the patient is largely clinically and radiographically improved since last time I seen her.  The one notable exception is that the patient is having significant pain.  This is been a long-term issue.  Patient's pain syndrome seems to be a combination of things.  I feel that the more likely explanation of her pain is multiple sclerosis mixed with fibromyalgia.  I think her headaches are potentially a combination of tension type headaches and potentially occipital neuralgia.  Admittedly of the occipital neuralgia is not a perfect fit based on the description of the pain.  However, the patient does have significant tenderness over her right occipital protuberance.  I will refer her to her headache group to see if an occipital nerve block potentially decrease her total body pain.  However, admittedly I am unsure if this was truly work.  I will also try Flexeril for the patient's total body pain.  I warned her of the side effects of this.  I also informed her that I have tried multiple medications to try to treat her pain and discomfort.  So far, none of them have worked.  I strongly encouraged the patient to go to the pain  clinic.  I informed her that they typically can use combinations of medication I cannot.  Patient seemed to agree with this at this time.  However, the patient has agreed in the past and I am not gone.  I will also refer the patient to speech therapy again given that she is complaining of dysphasia.  The patient did have an evaluation in the past that was relatively unremarkable, but did recommend an esophageal cramp.  I will refer to speech therapy first to determine if there is any aspiration.  If this is in fact not occurring, now referred to GI for more diagnostic testing.  Given that I am actively managing multiple things for the patient, I will follow her up in 3 months.   I requested that she call me in 1 month to Let me know how the Flexeril is going.  I also instructed the patient to call my office with any questions, concerns, issues or problems.    Will start Flexeril 5 mg 3 times daily   Will refer to pain clinic,   referred to speech therapy  Will refer to headache clinic    I spent 45 minutes in this visit, with >50% direct patient time spent counseling about prognosis, treatment options, and coordination of care.    Pietro Flores MD [facility]  Staff Neurologist   04/16/19     (Chart documentation was completed in part with Dragon voice-recognition software. Even though reviewed, some grammatical, spelling, and word errors may remain.)

## 2019-04-16 NOTE — PROGRESS NOTES
MULTIPLE SCLEROSIS CLINIC AT THE [facility]  FOLLOWUP/ESTABLISHED PATIENT VISIT      PRINCIPAL NEUROLOGIC DIAGNOSIS: : Multiple Sclerosis     Date of Onset: 2012  Date of Diagnosis: 2012  Initial Clinical Course: Progressive  Current Clinical Course: Progressive  Past Disease Modifying Therapy(ies): copaxone  Current Disease Modifying Therapy(ies): Ocrevus  Most Recent MRI of the Brain: 19 last one 18  Most Recent MRI of the Cervical Cord   Most Recent MRI of the Thoracic Cord:   Most Recent Lumbar Puncture: Positive for OCB  Most Recent OCT: NA   Most Recent JCV: 18 positive  Most Recent Remote Hepatitis Panel: 18 negative  Most Recent VZV Ig18 positive  Most Recent TB Quant: 18 negative  Previous pain medication: Lyrica, Elavil, Cymbalta, valproic acid, baclofen  Medical cannibis: Has positive benefit of decrease [id number] without side effect.        CHIEF COMPLAINT: Follow up on DMT      INTERVAL HISTORY:    The patient has been having a headache once per week. She reports that she will get an ice pick headache in one spot in the back of the head. Other times it will be over head. The headache will generally be once per day. The pain is a 10/10 when she get it. Nothing make it better or worse. The patient reports that she had been having a headache once per month. In the past salt and stress would trigger them. The patient denies any nausea, vomiting, or light sensitivity with them. The patient reports that they had been occurring once per month.     The patient reports that she is takes advil or alleve every day. The patient reports that she is having all over pain. She is having hurting pain all over the body. Its an achy pain all over.     Issues with current MS therapy: Tolerating DMT without issue    REVIEW OF SYSTEMS:    Mood: unchanged and anxious she had been having more agitation secondary to the appontment  Spasticity:worse at night and painful.  She is doing physical therapy and it appears to be helping.  Bladder: none  Bowel: unchanged  Pain related to today's visit:reviewed on nursing intake documentation  Fatigue: unchanged and she wants to take naps but can't sleep. she will get up and have breakfast and then lay in bed till 11am.   Sleep: insomnia , sleeps 3 hours per night and unchanged secondary to pain  Memory/Concentration: unchanged    Reports that she has nasal congestion, she reports difficult swallowing, she reports that she is also bloated. She reports that she has pain when her food gets stuck in the back of throat. She has problems with anything thicker than clear liquids.      Otherwise 10 point ROS was neg other than the symptoms noted above.    PAST HISTORY was reviewed and updated:      MEDICATIONS and ALLERGIES were reviewed and updated.    SOCIAL HISTORY was reviewed and updated:        EXAM:    PHYSICAL EXAMINATION:   VITAL SIGNS:  B/P: 131/88, T: Data Unavailable, P: 98, R: Data Unavailable    GENERAL: The patient is a well-nourished  who presents to the evaluation with her [facility].  NEUROLOGIC:   MENTAL STATUS: Alert,awake and  oriented times four.   MENTAL STATUS: Alert,awake and  oriented times four.   CRANIAL NERVES:  Visual fields are full to confrontation. The pupils are  round and react to light and there is no Antonio Arlene pupil.  Extraocular movements are  intact with no  internuclear ophthalmoplegia. No nystagmus. Facial strength and sensation are  normal. Hearing is  normal. Palate elevation and tongue protrusion are  normal.   POWER:      Motor     Upper         Right Left   Shoulder Abduction 5 5   Elbow Flexion 5 5   Elbow Extension 5 5   Wrist Extension 5 5   Digit Extension 5 5   Digit Flexion 5 5   APB 5 5   Tone 1 1   Lower          Right Left   Hip Flexion 5 5   Knee Extension 5 5   Knee Flexion 5 5   Foot Dorsiflexion 5 5   Foot Plantar Flexion 5 5   EH 5 5   Toe Flexion 5 5   Tone 1 1             Grade Description    0 No increase in muscle tone   1 Slight increase in muscle tone, manifested by a catch and release or by minimal resistance at the end of the range of motion when the affected part(s) is moved in flexion or extension   1+ Slight increase in muscle tone, manifested by a catch, followed by minimal resistance throughout the remainder (less than half) of the ROM   2 More marked increase in muscle tone through most of the ROM, but affected part(s) easily moved   3 Considerable increase in muscle tone, passive movement difficult   4 Affected part(s) rigid in flexion or extension               SENSORY:      Light touch:  Intact in all extremities     MOTOR/CEREBELLAR:     Right Left   RRM 0 Normal 0 Normal   MAGDA 0 Normal 0 Normal   FTN 0 Normal 0 Normal   RRM 0 Normal 0 Normal   HKS 0 Normal 0 Normal               GAIT: Gait is  narrow-based and steady and the patient is  able to walk on heels, toes and in tandem without difficulty.    Romberg: Sways with eye(s)  closed                    RESULTS:  Monitoring labs:    Infusion Therapy Visit on 03/19/2019   Component Date Value Ref Range Status     CD19 B Cells 03/19/2019 <1* 6 - 27 % Final     Absolute CD19 03/19/2019 4* 107 - 698 cells/uL Final     Sodium 03/19/2019 140  133 - 144 mmol/L Final     Potassium 03/19/2019 3.6  3.4 - 5.3 mmol/L Final     Chloride 03/19/2019 106  94 - 109 mmol/L Final     Carbon Dioxide 03/19/2019 28  20 - 32 mmol/L Final     Anion Gap 03/19/2019 6  3 - 14 mmol/L Final     Glucose 03/19/2019 76  70 - 99 mg/dL Final     Urea Nitrogen 03/19/2019 18  7 - 30 mg/dL Final     Creatinine 03/19/2019 1.02  0.52 - 1.04 mg/dL Final     GFR Estimate 03/19/2019 64  >60 mL/min/[1.73_m2] Final     GFR Estimate If Black 03/19/2019 75  >60 mL/min/[1.73_m2] Final     Calcium 03/19/2019 8.7  8.5 - 10.1 mg/dL Final     Bilirubin Total 03/19/2019 0.5  0.2 - 1.3 mg/dL Final     Albumin 03/19/2019 3.6  3.4 - 5.0 g/dL Final     Protein Total 03/19/2019 7.6  6.8 -  8.8 g/dL Final     Alkaline Phosphatase 03/19/2019 74  40 - 150 U/L Final     ALT 03/19/2019 25  0 - 50 U/L Final     AST 03/19/2019 16  0 - 45 U/L Final     WBC 03/19/2019 7.9  4.0 - 11.0 10e9/L Final     RBC Count 03/19/2019 4.83  3.8 - 5.2 10e12/L Final     Hemoglobin 03/19/2019 14.2  11.7 - 15.7 g/dL Final     Hematocrit 03/19/2019 42.9  35.0 - 47.0 % Final     MCV 03/19/2019 89  78 - 100 fl Final     MCH 03/19/2019 29.4  26.5 - 33.0 pg Final     MCHC 03/19/2019 33.1  31.5 - 36.5 g/dL Final     RDW 03/19/2019 11.9  10.0 - 15.0 % Final     Platelet Count 03/19/2019 333  150 - 450 10e9/L Final     Diff Method 03/19/2019 Automated Method   Final     % Neutrophils 03/19/2019 64.1  % Final     % Lymphocytes 03/19/2019 19.8  % Final     % Monocytes 03/19/2019 10.2  % Final     % Eosinophils 03/19/2019 4.9  % Final     % Basophils 03/19/2019 0.9  % Final     % Immature Granulocytes 03/19/2019 0.1  % Final     Nucleated RBCs 03/19/2019 0  0 /100 Final     Absolute Neutrophil 03/19/2019 5.1  1.6 - 8.3 10e9/L Final     Absolute Lymphocytes 03/19/2019 1.6  0.8 - 5.3 10e9/L Final     Absolute Monocytes 03/19/2019 0.8  0.0 - 1.3 10e9/L Final     Absolute Eosinophils 03/19/2019 0.4  0.0 - 0.7 10e9/L Final     Absolute Basophils 03/19/2019 0.1  0.0 - 0.2 10e9/L Final     Abs Immature Granulocytes 03/19/2019 0.0  0 - 0.4 10e9/L Final     Absolute Nucleated RBC 03/19/2019 0.0   Final   Orders Only on 01/08/2019   Component Date Value Ref Range Status     WBC 01/08/2019 10.5  4.0 - 11.0 10e9/L Final     RBC Count 01/08/2019 4.99  3.8 - 5.2 10e12/L Final     Hemoglobin 01/08/2019 14.9  11.7 - 15.7 g/dL Final     Hematocrit 01/08/2019 44.9  35.0 - 47.0 % Final     MCV 01/08/2019 90  78 - 100 fl Final     MCH 01/08/2019 29.9  26.5 - 33.0 pg Final     MCHC 01/08/2019 33.2  31.5 - 36.5 g/dL Final     RDW 01/08/2019 11.9  10.0 - 15.0 % Final     Platelet Count 01/08/2019 340  150 - 450 10e9/L Final     Diff Method 01/08/2019 Automated  Method   Final     % Neutrophils 01/08/2019 77.2  % Final     % Lymphocytes 01/08/2019 10.6  % Final     % Monocytes 01/08/2019 7.1  % Final     % Eosinophils 01/08/2019 3.9  % Final     % Basophils 01/08/2019 0.8  % Final     % Immature Granulocytes 01/08/2019 0.4  % Final     Nucleated RBCs 01/08/2019 0  0 /100 Final     Absolute Neutrophil 01/08/2019 8.1  1.6 - 8.3 10e9/L Final     Absolute Lymphocytes 01/08/2019 1.1  0.8 - 5.3 10e9/L Final     Absolute Monocytes 01/08/2019 0.7  0.0 - 1.3 10e9/L Final     Absolute Eosinophils 01/08/2019 0.4  0.0 - 0.7 10e9/L Final     Absolute Basophils 01/08/2019 0.1  0.0 - 0.2 10e9/L Final     Abs Immature Granulocytes 01/08/2019 0.0  0 - 0.4 10e9/L Final     Absolute Nucleated RBC 01/08/2019 0.0   Final     Sodium 01/08/2019 138  133 - 144 mmol/L Final     Potassium 01/08/2019 3.9  3.4 - 5.3 mmol/L Final     Chloride 01/08/2019 104  94 - 109 mmol/L Final     Carbon Dioxide 01/08/2019 28  20 - 32 mmol/L Final     Anion Gap 01/08/2019 6  3 - 14 mmol/L Final     Glucose 01/08/2019 94  70 - 99 mg/dL Final     Urea Nitrogen 01/08/2019 20  7 - 30 mg/dL Final     Creatinine 01/08/2019 0.81  0.52 - 1.04 mg/dL Final     GFR Estimate 01/08/2019 85  >60 mL/min/[1.73_m2] Final     GFR Estimate If Black 01/08/2019 >90  >60 mL/min/[1.73_m2] Final     Calcium 01/08/2019 8.7  8.5 - 10.1 mg/dL Final     Bilirubin Total 01/08/2019 0.4  0.2 - 1.3 mg/dL Final     Albumin 01/08/2019 3.6  3.4 - 5.0 g/dL Final     Protein Total 01/08/2019 7.6  6.8 - 8.8 g/dL Final     Alkaline Phosphatase 01/08/2019 66  40 - 150 U/L Final     ALT 01/08/2019 16  0 - 50 U/L Final     AST 01/08/2019 14  0 - 45 U/L Final   Orders Only on 11/08/2018   Component Date Value Ref Range Status     WBC 11/08/2018 7.6  4.0 - 11.0 10e9/L Final     RBC Count 11/08/2018 4.77  3.8 - 5.2 10e12/L Final     Hemoglobin 11/08/2018 14.0  11.7 - 15.7 g/dL Final     Hematocrit 11/08/2018 42.6  35.0 - 47.0 % Final     MCV 11/08/2018 89  78  - 100 fl Final     MCH 11/08/2018 29.4  26.5 - 33.0 pg Final     MCHC 11/08/2018 32.9  31.5 - 36.5 g/dL Final     RDW 11/08/2018 12.2  10.0 - 15.0 % Final     Platelet Count 11/08/2018 249  150 - 450 10e9/L Final     Diff Method 11/08/2018 Automated Method   Final     % Neutrophils 11/08/2018 63.2  % Final     % Lymphocytes 11/08/2018 20.8  % Final     % Monocytes 11/08/2018 9.8  % Final     % Eosinophils 11/08/2018 5.0  % Final     % Basophils 11/08/2018 0.9  % Final     % Immature Granulocytes 11/08/2018 0.3  % Final     Nucleated RBCs 11/08/2018 0  0 /100 Final     Absolute Neutrophil 11/08/2018 4.8  1.6 - 8.3 10e9/L Final     Absolute Lymphocytes 11/08/2018 1.6  0.8 - 5.3 10e9/L Final     Absolute Monocytes 11/08/2018 0.8  0.0 - 1.3 10e9/L Final     Absolute Eosinophils 11/08/2018 0.4  0.0 - 0.7 10e9/L Final     Absolute Basophils 11/08/2018 0.1  0.0 - 0.2 10e9/L Final     Abs Immature Granulocytes 11/08/2018 0.0  0 - 0.4 10e9/L Final     Absolute Nucleated RBC 11/08/2018 0.0   Final     Sodium 11/08/2018 138  133 - 144 mmol/L Final     Potassium 11/08/2018 4.1  3.4 - 5.3 mmol/L Final     Chloride 11/08/2018 105  94 - 109 mmol/L Final     Carbon Dioxide 11/08/2018 28  20 - 32 mmol/L Final     Anion Gap 11/08/2018 6  3 - 14 mmol/L Final     Glucose 11/08/2018 102* 70 - 99 mg/dL Final     Urea Nitrogen 11/08/2018 15  7 - 30 mg/dL Final     Creatinine 11/08/2018 0.98  0.52 - 1.04 mg/dL Final     GFR Estimate 11/08/2018 60* >60 mL/min/1.7m2 Final     GFR Estimate If Black 11/08/2018 73  >60 mL/min/1.7m2 Final     Calcium 11/08/2018 8.7  8.5 - 10.1 mg/dL Final     Bilirubin Total 11/08/2018 0.5  0.2 - 1.3 mg/dL Final     Albumin 11/08/2018 3.4  3.4 - 5.0 g/dL Final     Protein Total 11/08/2018 7.2  6.8 - 8.8 g/dL Final     Alkaline Phosphatase 11/08/2018 54  40 - 150 U/L Final     ALT 11/08/2018 20  0 - 50 U/L Final     AST 11/08/2018 14  0 - 45 U/L Final   ]      MRI brain:    MRI Dated 4/16/19:  Moderate T2 disease  burden with  0 enhancion lesion(s).  compared to MRI on 8/21/18 there are   ?2 new T2 lesion(s).    ASSESSMENT/PLAN:  The patient is 48 year old woman with a past medical history of relapsing remitting multiple sclerosis who is presenting today as a follow-up. Her MRI of the brain demonstrate multiple shrinking lesions after starting the ocrelizumab.  There may be 2 lesions that are new, but for the most part they are equivocal and more probably than not artifact.  I spent a prolonged period of time reviewing the MRI with the patient.   Overall, the patient is largely clinically and radiographically improved since last time I seen her.  The one notable exception is that the patient is having significant pain.  This is been a long-term issue.  Patient's pain syndrome seems to be a combination of things.  I feel that the more likely explanation of her pain is multiple sclerosis mixed with fibromyalgia.  I think her headaches are potentially a combination of tension type headaches and potentially occipital neuralgia.  Admittedly of the occipital neuralgia is not a perfect fit based on the description of the pain.  However, the patient does have significant tenderness over her right occipital protuberance.  I will refer her to her headache group to see if an occipital nerve block potentially decrease her total body pain.  However, admittedly I am unsure if this was truly work.  I will also try Flexeril for the patient's total body pain.  I warned her of the side effects of this.  I also informed her that I have tried multiple medications to try to treat her pain and discomfort.  So far, none of them have worked.  I strongly encouraged the patient to go to the pain clinic.  I informed her that they typically can use combinations of medication I cannot.  Patient seemed to agree with this at this time.  However, the patient has agreed in the past and I am not gone.  I will also refer the patient to speech therapy again  given that she is complaining of dysphasia.  The patient did have an evaluation in the past that was relatively unremarkable, but did recommend an esophageal cramp.  I will refer to speech therapy first to determine if there is any aspiration.  If this is in fact not occurring, now referred to GI for more diagnostic testing.  Given that I am actively managing multiple things for the patient, I will follow her up in 3 months.   I requested that she call me in 1 month to Let me know how the Flexeril is going.  I also instructed the patient to call my office with any questions, concerns, issues or problems.    Will start Flexeril 5 mg 3 times daily   Will refer to pain clinic,   referred to speech therapy  Will refer to headache clinic      I spent 45 minutes in this visit, with >50% direct patient time spent counseling about prognosis, treatment options, and coordination of care.    Pietro Flores MD [facility]  Staff Neurologist   04/16/19       (Chart documentation was completed in part with Dragon voice-recognition software. Even though reviewed, some grammatical, spelling, and word errors may remain.)

## 2019-04-16 NOTE — LETTER
2019    INSURER: Payor: [employer] / Plan: [employer] / Product Type: HMO /     Re: Prior Authorization Request  Patient: Chayo Reid  Policy ID#:  [id number]  : 1970      To Whom it May Concern:    I am writing to formally request a prior authorization of coverage for my patient,  Chayo Ried, for treatment using Ocrevus.  I am requesting authorization for applicable provider professional and facility services associated with this therapy.    The therapy involves infusion of cell lytic compound that can cause significant infusion reaction and anaphylaxis.    I have treated Chayo Reid since 16 and I have determined that it is medically appropriate for  this patient to receive be treated with  Ocrevus  for the reason(s) stated below:      I strongly recommend that the patient get the infusion of ocrevus in the setting of an infusion center. This medication is known to cause potentially severe and life threatening infusion and or allergic reaction to this medication. I would be greatly concerned if the patient had a reaction while getting the infusion at home, due to limited capacity of a single infusion nurse to be able to adequately respond to an significant infusion reaction    I have included medical records pertaining to the patient s medical history, current condition and treatment plan.  In addition, the following billing codes will be used for therapy and follow-up: [INSERT ICD-10 DIAGNOSIS and CPT CODES].      Attached are [LIST ANY APPLICABLE ATTACHMENTS EG, ARTICLES, INSERTS, PROTOCOLS etc. ] for your reference.    I firmly believe that this therapy is clinically appropriate and that Chayo Reid would benefit from improved [clinical outcomes, quality of life] if allowed the opportunity to receive this treatment.  Please contact me at Dept: [phone] if you require additional information to ensure the prompt approval for coverage.    Please  send your written decision to me at this address:  [facility]  [address]  [facility] [address] [phone]  Dept: [phone]      Sincerely,    Pietro Flores MD  [facility]  [address] [facility],  [address]  [place] [address]  Phone [phone]  Fax [phone]          [facility]

## 2019-04-21 ENCOUNTER — MYC MEDICAL ADVICE (OUTPATIENT)
Dept: NEUROLOGY | Facility: CLINIC | Age: 49
End: 2019-04-21

## 2019-04-21 DIAGNOSIS — G35 MULTIPLE SCLEROSIS (H): Primary | ICD-10-CM

## 2019-04-22 NOTE — TELEPHONE ENCOUNTER
Dr. Flores, please see patient's [employer] message; Please specify (if able) as to which spot has gone down significantly for the patient, and she also wants to know if it has gone down a lot looking at the last three MRIs; Thank you.    Polly Rodríguez, MS RN Care Coordinator

## 2019-04-22 NOTE — TELEPHONE ENCOUNTER
[employer] message sent to patient with Dr. Flores's input and feedback.    Polly Rodríguez, MS RN Care Coordinator

## 2019-04-22 NOTE — TELEPHONE ENCOUNTER
Chayo Rogel would like to know where the spots are located; Please let me know your input; Thank you.    Polly Rodríguez MS RN Care Coordinator

## 2019-04-22 NOTE — TELEPHONE ENCOUNTER
Dr. Flores, patient sent [employer] message asking for clarification regarding the spots on her MRI(s), as well as wanting to know where they are located; Please let me know your input; Thank you.    Polly Rodríguez, MS RN Care Coordinator

## 2019-04-22 NOTE — TELEPHONE ENCOUNTER
Hi      The right side of the head by the ear aka right temporal lobe.    Sincerely  Pietro Flores MD [facility]  Staff Neurologist   04/22/19

## 2019-04-22 NOTE — TELEPHONE ENCOUNTER
Hi    There were to spots on her MRI that I thought where not shown well on her MR from August of 2018. However, when I compare to the MRI on Feb of 2018, I feel that those spots are there. The radiologist did not look at the feb 2018 one. Therefore, I did not think that she truly had new lesions. However, even if there truly were two unenhancing lesions, the MRI was taken before we started the infusion and could have started before that. Therefore, I would not consider this a treatment failure at this point. We should get the MRI again in 6 months to be on the safe side.    Sincerely  Pietro Flores MD [facility]  Staff Neurologist   04/22/19

## 2019-04-22 NOTE — TELEPHONE ENCOUNTER
[employer] message sent to patient with Dr. Flores's input.    Polly Rodríguez, MS RN Care Coordinator

## 2019-04-22 NOTE — TELEPHONE ENCOUNTER
Hi    The spot that I said shrank did shrink. It was new on the MRI before the last one, so it was it not on the other ones before that and it was why we restarted medications last fall.    Sincerely  Pietro Flores MD [facility]  Staff Neurologist   04/22/19

## 2019-04-23 NOTE — TELEPHONE ENCOUNTER
[employer] message sent to patient with Dr. Flores's input.    Polly Rodríguez, MS RN Care Coordinator

## 2019-04-23 NOTE — TELEPHONE ENCOUNTER
Dr. Flores, please see patient's last MyChart message; Please let me know your input; Thank you.    Polly Rodríguez, MS RN Care Coordinator

## 2019-04-24 NOTE — TELEPHONE ENCOUNTER
Hi    It should be okay. People with MS do not seem to have issues with blood products.    Sincerely   Pietro Flores MD [facility]  Staff Neurologist   04/24/19

## 2019-04-25 NOTE — TELEPHONE ENCOUNTER
[employer] message sent to patient with Dr. Flores's input.    Polly Rodríguez, MS RN Care Coordinator

## 2019-05-09 ENCOUNTER — MYC REFILL (OUTPATIENT)
Dept: NEUROLOGY | Facility: CLINIC | Age: 49
End: 2019-05-09

## 2019-05-09 DIAGNOSIS — R53.83 OTHER FATIGUE: ICD-10-CM

## 2019-05-09 DIAGNOSIS — G35 MS (MULTIPLE SCLEROSIS) (H): ICD-10-CM

## 2019-05-09 RX ORDER — DEXTROAMPHETAMINE SACCHARATE, AMPHETAMINE ASPARTATE MONOHYDRATE, DEXTROAMPHETAMINE SULFATE AND AMPHETAMINE SULFATE 5; 5; 5; 5 MG/1; MG/1; MG/1; MG/1
20 CAPSULE, EXTENDED RELEASE ORAL 2 TIMES DAILY
Qty: 60 CAPSULE | Refills: 0 | Status: SHIPPED | OUTPATIENT
Start: 2019-05-09 | End: 2019-09-09

## 2019-05-09 RX ORDER — DEXTROAMPHETAMINE SACCHARATE, AMPHETAMINE ASPARTATE MONOHYDRATE, DEXTROAMPHETAMINE SULFATE AND AMPHETAMINE SULFATE 7.5; 7.5; 7.5; 7.5 MG/1; MG/1; MG/1; MG/1
30 CAPSULE, EXTENDED RELEASE ORAL DAILY
Qty: 30 CAPSULE | Refills: 0 | Status: SHIPPED | OUTPATIENT
Start: 2019-05-09 | End: 2019-09-09

## 2019-05-09 NOTE — TELEPHONE ENCOUNTER
Prescriptions addressed to the pt's preferred pharmacy and placed in the mail.  Priscila Ruth RN

## 2019-05-09 NOTE — TELEPHONE ENCOUNTER
Patient sent [employer] message requesting refill of their Adderall prescription; Patient was last seen in April and has no follow up appointment with Dr. Flores; Pended rx to Dr. Flores for signature and will mail to the pharmacy once signed.    Polly Rodríguez MS RN Care Coordinator

## 2019-05-09 NOTE — TELEPHONE ENCOUNTER
The three month follow up was a check up on her symptoms. If she is doing okay, then I can see her in 6 months with the MRI.    Pietro Flores MD [facility]  Staff Neurologist   05/09/19

## 2019-05-09 NOTE — TELEPHONE ENCOUNTER
Dr. Flores, please see patient's [employer] message; Please place what you want for MRI orders; Please clarify when you would like to see the patient back; Thank you.    Polly Rodríguez, MS RN Care Coordinator

## 2019-05-13 NOTE — TELEPHONE ENCOUNTER
Hi    The order is placed.    Sincerely  Pietro Flores MD [facility]  Staff Neurologist   05/13/19

## 2019-05-13 NOTE — TELEPHONE ENCOUNTER
Florence message sent to patient letting her know there is an order in her chart; I will ask a clinic coordinator to call her to arrange the appointment.    Polly Rodríguez, MS RN Care Coordinator

## 2019-05-13 NOTE — TELEPHONE ENCOUNTER
Dr. Flores, the patient would prefer to just come for a 6 month appointment and MRI; Please order what MRI(s) you would like her to have; Thank you.    Polly Rodríguez, MS RN Care Coordinator

## 2019-05-16 ENCOUNTER — TELEPHONE (OUTPATIENT)
Dept: NEUROLOGY | Facility: CLINIC | Age: 49
End: 2019-05-16

## 2019-05-16 NOTE — TELEPHONE ENCOUNTER
Prior Authorization Retail Medication Request    Medication/Dose: Addearll XR 20mg - twice daily dosing - quantity override  ICD code (if different than what is on RX):  Chronic fatigue and multiple sclerosis, R53.82 and G35  Previously Tried and Failed:  Adderall once daily dosing  Rationale:  Increase in medication dosing to help with fatigue secondary to multiple sclerosis.    Insurance Name:  [employer]  Insurance ID:  [id number]  Insurance Phone: [phone]    Pharmacy Information (if different than what is on RX)  Name:  [facility]  Phone:  [phone]  Fax: [phone]

## 2019-05-20 NOTE — TELEPHONE ENCOUNTER
Central Prior Authorization Team   [phone]    PA Initiation    Medication: Addearll XR 20mg  Insurance Company: [employer] ([facility]) - Phone [phone] Fax [phone]  Pharmacy Filling the Rx: [id number] [place] - [address] [place] [address]  Filling Pharmacy Phone: [phone]  Filling Pharmacy Fax: [phone]  Start Date: 5/20/2019

## 2019-05-20 NOTE — TELEPHONE ENCOUNTER
Prior Authorization Approval    Authorization Effective Date: 5/20/2019  Authorization Expiration Date: 5/20/2020  Medication: Addearll XR 20mg-PA APPROVED   Approved Dose/Quantity:   Reference #: [id number]   Insurance Company: [employer] ([facility]) - Phone [phone] Fax [phone]  Expected CoPay:       CoPay Card Available:      Foundation Assistance Needed:    Which Pharmacy is filling the prescription (Not needed for infusion/clinic administered): [facility] [id number] [facility]  Pharmacy Notified: Yes  Patient Notified: Yes

## 2019-06-04 ENCOUNTER — THERAPY VISIT (OUTPATIENT)
Dept: SPEECH THERAPY | Facility: CLINIC | Age: 49
End: 2019-06-04
Payer: COMMERCIAL

## 2019-06-04 DIAGNOSIS — R13.10 DYSPHAGIA, UNSPECIFIED TYPE: Primary | ICD-10-CM

## 2019-06-10 NOTE — PROGRESS NOTES
06/04/19 1100   General Information   Type Of Visit Initial   Start Of Care Date 06/04/19   Referring Physician Dr. Pietro Flores   Orders Evaluate And Treat   Orders Comment Clinical Swallow Eval   Medical Diagnosis Dysphagia, aspiration   Onset Of Illness/injury Or Date Of Surgery 04/10/19   Precautions/limitations No Known Precautions/limitations   Hearing WFL in quiet setting   Pertinent History of Current Problem/OT: Additional Occupational Profile Info Chayo Zamudio is a 48 year old woman who reports feeling food getting caught in her chest. She also reports a very tight feeling in her chest which happens at least once per day. She does not report reflux but occasionally will take OTC prilosec. This sensation of food becoming stuck has been happening for the past 4 years and feels like it is getting worse. She takes small bites, alternates viscosities and eats slowly to compensate.    Respiratory Status Room air   Prior Level Of Function Swallowing   Prior Level Of Function Comment Regular solids and thin liquids   Patient Role/employment History Employed   Living Environment House/[facility]   Home/community Accessibility Comments (flowsheet Row) Independent   General Observations Pt highlypleasant and cooperative throughout evaluation   Patient/family Goals Pt would like to remediate the feeling of food sticking in her chest.    Clinical Swallow Evaluation   Oral Musculature generally intact   Dentition present and adequate   Mucosal Quality good   Mandibular Strength and Mobility intact   Oral Labial Strength and Mobility WFL   Lingual Strength and Mobility WFL   Velar Elevation intact   Buccal Strength and Mobility intact   Laryngeal Function Throat clear;Swallow;Voicing initiated   Clinical Swallow Eval: Thin Liquid Texture Trial   Mode of Presentation, Thin Liquids cup;self-fed   Volume of Liquid or Food Presented 4 oz water   Oral Phase of Swallow WFL   Pharyngeal Phase of Swallow wet vocal quality  after swallow   Diagnostic Statement No overt clinical s/sx of aspiration/penetration noted on thin liquid trials.    Clinical Swallow Eval: Puree Solid Texture Trial   Mode of Presentation, Puree self-fed;spoon   Volume of Puree Presented 2 oz pudding   Oral Phase, Puree WFL   Pharyngeal Phase, Puree intact   Diagnostic Statement No overt clinical s/sx of aspiration/penetration noted on puree consistency trials.    Clinical Swallow Eval: Solid Food Texture Trial   Mode of Presentation, Solid self-fed   Volume of Solid Food Presented dina cracker   Oral Phase, Solid WFL   Pharyngeal Phase, Solid intact   Diagnostic Statement No overt clinical s/sx of aspiration/penetration noted on solid consistency trials.    Swallow Compensations   Swallow Compensations No compensations were used   Educational Assessment   Barriers to Learning No barriers   Esophageal Phase of Swallow   Patient reports or presents with symptoms of esophageal dysphagia Yes   Esophageal comments Pt's dysphagia is likely esophgeal in nature and would benefit from further assessment by GI.    Swallow Eval: Clinical Impressions   Skilled Criteria for Therapy Intervention No problems identified which require skilled intervention   Functional Assessment Scale (FAS) 7   Dysphagia Outcome Severity Scale (LARISA) Level 7 - LARISA   Diet texture recommendations Regular diet;Thin liquids   Recommended Feeding/Eating Techniques alternate between small bites and sips of food/liquid;maintain upright posture during/after eating for 30 mins;small sips/bites   Rehab Potential good, to achieve stated therapy goals   Predicted Duration of Therapy Intervention (days/wks) Evaluationonly   Risks and Benefits of Treatment have been explained. Yes   Patient, family and/or staff in agreement with Plan of Care Yes   Clinical Impression Comments Chayo Zamudio demonstrates safe functional oropharyngeal swallow at this time. Adequate ROM and strength of oral mechanism. No  overt clinical s/sx of aspiration/penetration noted on any consistencies presented. Clear vocal quality with adequate volitional throat clear strength. Pt's symptoms more closely align with esophageal dysphagia and would benefit from referral to GI for further work up. Recommend regular consistency solids and thin liquids. Continue small bites/sips and slow rate. Alternate bites/sips. GI consult to further assess esophageal dysphagia.    Total Session Time   SLP Eval: oral/pharyngeal swallow function, clinical minutes (38883) 10   Total Evaluation Time 10     Thank you for the referral of Chayo Reid. If you have any questions about this report, please contact me using the information below.      Larisa Aiken MS, CCC-SLP  Speech-Language Pathology  [facility]  Departement of Otolaryngology/D&T - 4th floor  Pager: [phone]  Phone: [phone]  Email: [email or web address]

## 2019-07-24 ENCOUNTER — PRE VISIT (OUTPATIENT)
Dept: OPHTHALMOLOGY | Facility: CLINIC | Age: 49
End: 2019-07-24

## 2019-07-24 NOTE — TELEPHONE ENCOUNTER
FUTURE VISIT INFORMATION      FUTURE VISIT INFORMATION:    Date: 8/20/19    Time: 10:20am    Location: [facility]  REFERRAL INFORMATION:    Referring provider:  Self    Referring providers clinic:  N/A    Reason for visit/diagnosis  Contact services    RECORDS REQUESTED FROM:       Clinic name Comments Records Status Imaging Status   [facility] Notes 11/25/13 EPIC

## 2019-07-30 ENCOUNTER — TELEPHONE (OUTPATIENT)
Dept: OPHTHALMOLOGY | Facility: CLINIC | Age: 49
End: 2019-07-30

## 2019-07-31 ENCOUNTER — TELEPHONE (OUTPATIENT)
Dept: OPHTHALMOLOGY | Facility: CLINIC | Age: 49
End: 2019-07-31

## 2019-08-05 ENCOUNTER — TELEPHONE (OUTPATIENT)
Dept: OPHTHALMOLOGY | Facility: CLINIC | Age: 49
End: 2019-08-05

## 2019-08-05 ENCOUNTER — PRE VISIT (OUTPATIENT)
Dept: OPHTHALMOLOGY | Facility: CLINIC | Age: 49
End: 2019-08-05

## 2019-08-05 NOTE — TELEPHONE ENCOUNTER
FUTURE VISIT INFORMATION      FUTURE VISIT INFORMATION:    Date: 8/13/19    Time: 9:40am    Location: [facility]  REFERRAL INFORMATION:    Referring provider:  Self    Referring providers clinic:  N/A    Reason for visit/diagnosis  Contact lens sevices    RECORDS REQUESTED FROM:       Clinic name Comments Records Status Imaging Status   [facility] OV/notes 11/25/13-7/31/19 EPIC

## 2019-08-27 ENCOUNTER — OFFICE VISIT (OUTPATIENT)
Dept: OPHTHALMOLOGY | Facility: CLINIC | Age: 49
End: 2019-08-27
Payer: COMMERCIAL

## 2019-08-27 DIAGNOSIS — H52.13 MYOPIA OF BOTH EYES: Primary | ICD-10-CM

## 2019-08-27 ASSESSMENT — REFRACTION_CURRENTRX
OD_BRAND: 1 DAY ACUVUE MOIST
OD_BASECURVE: 8.5
OD_DIAMETER: 14.2
OD_SPHERE: -3.00

## 2019-08-27 ASSESSMENT — VISUAL ACUITY
OS_CC: 20/20
OD_CC+: -2
METHOD: SNELLEN - LINEAR
OD_CC: 20/30
CORRECTION_TYPE: CONTACTS
OS_CC+: -2

## 2019-08-27 ASSESSMENT — CUP TO DISC RATIO
OS_RATIO: 0.1
OD_RATIO: 0.1

## 2019-08-27 ASSESSMENT — REFRACTION_MANIFEST
OD_AXIS: 149
OD_ADD: +1.75
OS_SPHERE: -2.00
OD_CYLINDER: +0.75
OS_CYLINDER: +0.25
OD_SPHERE: -3.25
OS_ADD: +1.75
OS_AXIS: 029

## 2019-08-27 ASSESSMENT — TONOMETRY
OD_IOP_MMHG: 19
OS_IOP_MMHG: 21
OS_IOP_MMHG: 26
OD_IOP_MMHG: 20
IOP_METHOD: APPLANATION
IOP_METHOD: ICARE

## 2019-08-27 ASSESSMENT — REFRACTION_WEARINGRX
OS_CYLINDER: +0.50
SPECS_TYPE: SVL
OD_AXIS: 143
OD_CYLINDER: +1.25
OS_SPHERE: -2.25
OD_SPHERE: -2.75
OS_AXIS: 035

## 2019-08-27 ASSESSMENT — EXTERNAL EXAM - RIGHT EYE: OD_EXAM: NORMAL

## 2019-08-27 ASSESSMENT — CONF VISUAL FIELD
OS_NORMAL: 1
OD_NORMAL: 1
METHOD: COUNTING FINGERS

## 2019-08-27 ASSESSMENT — EXTERNAL EXAM - LEFT EYE: OS_EXAM: NORMAL

## 2019-08-27 ASSESSMENT — SLIT LAMP EXAM - LIDS
COMMENTS: NORMAL
COMMENTS: NORMAL

## 2019-08-27 NOTE — PROGRESS NOTES
History  HPI     Annual Eye Exam     In both eyes.  This started years ago.  Occurring constantly.  Since onset it is stable.  Treatments tried include no treatments.  Pain was noted as 0/10.              Comments     CLs having to wear readers over CLs. In glasses having to remove glasses to read at near. No eye pain or irritation. Seeing well at distance with glasses. Patient denies eye pain or irritation. Does not use any eye drops.    Pt has MS    Gabriella Estradaana maría COT 12:30 PM August 27, 2019             Last edited by Gabriella Saleh on 8/27/2019 12:30 PM. (History)          Assessment/Plan  (H52.13) Myopia of both eyes  (primary encounter diagnosis)  Comment: Myopia both eyes, decreased near vision in multifocal contact lens, right eye dominant  Plan: REFRACTION, C CONTACT LENS FITTING COSMETIC LVL 2 - ADULT         Educated patient on condition and clinical findings. Dispensed spectacle prescription for full time wear. Educated patient on possibility of adaptation period, if symptoms do not improve return to clinic for further testing.   Dispensed trial lenses and finalized contact lens prescription for 2 years. Fitted in monovision prescription, distance right eye, uncorrected (near) left eye.    Return to clinic in 1 year for comprehensive eye exam,    Contact Lens Billing  V-Code:  - Soft spherical  Final Contact Lens Rx       Brand Base Curve Diameter Sphere    Right 1 Day Acuvue Moist 8.5 14.2 -3.00    Left        Expiration Date:  8/27/2021    Replacement:  Daily    Wearing Schedule:  Daily wear         CL Fitting Fee: $100  (New monovision)    These are for cosmetic contact lenses.    Encounter Diagnosis   Name Primary?     Myopia of both eyes Yes     Complete documentation of historical and exam elements from today's encounter can  be found in the full encounter summary report (not reduplicated in this progress  note). I personally obtained the chief complaint(s) and history of present illness.  I  confirmed and edited as necessary the review of systems, past medical/surgical  history, family history, social history, and examination findings as documented by  others; and I examined the patient myself. I personally reviewed the relevant tests,  images, and reports as documented above. I formulated and edited as necessary the  assessment and plan and discussed the findings and management plan with the  patient and family.    Pietro Hayes OD, FAAO

## 2019-09-05 ENCOUNTER — TELEPHONE (OUTPATIENT)
Dept: NEUROLOGY | Facility: CLINIC | Age: 49
End: 2019-09-05

## 2019-09-05 DIAGNOSIS — G35 MS (MULTIPLE SCLEROSIS) (H): Primary | ICD-10-CM

## 2019-09-05 NOTE — TELEPHONE ENCOUNTER
I was contacted by the infusion center stating the patient needs updated Ocrevus infusion orders for her infusion this coming Tuesday; New Ocrevus therapy plan orders placed and routed to Dr. Flores for review and signature.    Polly Rodríguez, MS RN Care Coordinator

## 2019-09-09 ENCOUNTER — MYC MEDICAL ADVICE (OUTPATIENT)
Dept: NEUROLOGY | Facility: CLINIC | Age: 49
End: 2019-09-09

## 2019-09-09 ENCOUNTER — DOCUMENTATION ONLY (OUTPATIENT)
Dept: CARE COORDINATION | Facility: CLINIC | Age: 49
End: 2019-09-09

## 2019-09-09 ENCOUNTER — HOME INFUSION (PRE-WILLOW HOME INFUSION) (OUTPATIENT)
Dept: PHARMACY | Facility: CLINIC | Age: 49
End: 2019-09-09

## 2019-09-09 RX ORDER — METHYLPREDNISOLONE SODIUM SUCCINATE 125 MG/2ML
125 INJECTION, POWDER, LYOPHILIZED, FOR SOLUTION INTRAMUSCULAR; INTRAVENOUS ONCE
Status: CANCELLED | OUTPATIENT
Start: 2019-09-09

## 2019-09-09 RX ORDER — ACETAMINOPHEN 325 MG/1
650 TABLET ORAL ONCE
Status: CANCELLED | OUTPATIENT
Start: 2019-09-09

## 2019-09-09 RX ORDER — HEPARIN SODIUM (PORCINE) LOCK FLUSH IV SOLN 100 UNIT/ML 100 UNIT/ML
5 SOLUTION INTRAVENOUS
Status: CANCELLED | OUTPATIENT
Start: 2019-09-09

## 2019-09-09 RX ORDER — HEPARIN SODIUM,PORCINE 10 UNIT/ML
5 VIAL (ML) INTRAVENOUS
Status: CANCELLED | OUTPATIENT
Start: 2019-09-09

## 2019-09-09 RX ORDER — DIPHENHYDRAMINE HCL 25 MG
50 CAPSULE ORAL ONCE
Status: CANCELLED | OUTPATIENT
Start: 2019-09-09

## 2019-09-09 NOTE — PROGRESS NOTES
Therapy:OCREVUS  Insurance:[employer] (Benefit Year Jan 01-Dec 31)   Ded: $3000  Met: $3000    Co-Insurance:10%  Max Out of Pocket: $6650  Met: $3598    Once OOP Max met, pt will be covered at 100% for the remainder of the calendar year    PA team notified to begin the process of acquiring authorization for coverage.  Please contact Intake with any questions, [phone] or In Basket pool, [facility] ([phone]).    [facility]:In reference to referral made on 09/09/2019 to check on Ocrevus coverage

## 2019-09-09 NOTE — TELEPHONE ENCOUNTER
Therapy plan orders signed by Loren Lennon on behalf of Dr. Flores.    Polly Rodríguez, MS RN Care Coordinator

## 2019-09-09 NOTE — TELEPHONE ENCOUNTER
It is unlikely that she would have developed significant vulnerability to respiratory infection in the setting of one year of treatment with ocrelizumab. One could consider checking baseline antibody levels at this point, but I defer that decision to Dr. Flores.    Unless she is febrile or has abnormal vital signs, I do not think that ocrelizumab treatment needs to be held tomorrow. I advise that she follow up with primary care regarding her respiratory symptoms.

## 2019-09-09 NOTE — TELEPHONE ENCOUNTER
Dr. Flores is apparently not able to sign infusion orders, [place]; Our Nurse Practitioner, Loren Lennon, is agreeable to signing these orders and they have been re-pended to her for signature.    Polly Rodríguez, MS RN Care Coordinator

## 2019-09-09 NOTE — TELEPHONE ENCOUNTER
[employer] message sent to patient with Dr. Trotter's input; Will forward to Dr. Flores for input on whether or not other lab work should be performed.    Polly Rodríguez MS RN Care Coordinator

## 2019-09-09 NOTE — TELEPHONE ENCOUNTER
Dr. Trotter, please see patient's [employer] message on behalf of Dr. Flores; Patient is on Ocrevus and scheduled for her next dose tomorrow; Please let me know what you recommend; Thank you.    Polly Rodríguez, MS RN Care Coordinator

## 2019-09-10 ENCOUNTER — HOME INFUSION (PRE-WILLOW HOME INFUSION) (OUTPATIENT)
Dept: PHARMACY | Facility: CLINIC | Age: 49
End: 2019-09-10

## 2019-09-11 NOTE — PROGRESS NOTES
This is a recent snapshot of the patient's [facility] medical record.  For current drug dose and complete information and questions, call [phone] or In Basket pool, [facility] ([id number])  CSN Number:  [id number]

## 2019-09-17 ENCOUNTER — HOME INFUSION (PRE-WILLOW HOME INFUSION) (OUTPATIENT)
Dept: PHARMACY | Facility: CLINIC | Age: 49
End: 2019-09-17

## 2019-09-18 NOTE — PROGRESS NOTES
This is a recent snapshot of the patient's [facility] medical record.  For current drug dose and complete information and questions, call [phone] or In [facility] pool, [facility] ([id number])  CSN Number:  [id number]

## 2019-09-30 ENCOUNTER — HOME INFUSION (PRE-WILLOW HOME INFUSION) (OUTPATIENT)
Dept: PHARMACY | Facility: CLINIC | Age: 49
End: 2019-09-30

## 2019-09-30 NOTE — PROGRESS NOTES
Skilled Nurse visit in the [facility] to administer Ocrevus 600mg IV.  No recent elevated temperature, fever, chills, productive cough, coughing for 3 weeks or longer or hemoptysis, abnormal vital signs, night sweats, chest pain. No  decrease in your appetite, unexplained weight loss or fatigue.  No other new onset medical symptoms.  Current weight 206.  PIV placed right AC, 1 attempt.  Pre medicated with acetaminophen 650mg po, diphenhydramine 50mg po, and methylprednisolone 125mg IVP. Infusion completed with/without complication or reaction. Pt reports therapy is effective in managing symptoms related to therapy.

## 2019-10-01 ENCOUNTER — APPOINTMENT (OUTPATIENT)
Dept: LAB | Facility: CLINIC | Age: 49
End: 2019-10-01
Attending: NURSE PRACTITIONER
Payer: COMMERCIAL

## 2019-10-01 NOTE — PROGRESS NOTES
This is a recent snapshot of the patient's [facility] medical record.  For current drug dose and complete information and questions, call [phone] or In Basket pool, [facility] ([id number])  CSN Number:  [id number]

## 2019-10-03 ENCOUNTER — MEDICAL CORRESPONDENCE (OUTPATIENT)
Dept: HEALTH INFORMATION MANAGEMENT | Facility: CLINIC | Age: 49
End: 2019-10-03

## 2019-10-14 ENCOUNTER — TELEPHONE (OUTPATIENT)
Dept: GASTROENTEROLOGY | Facility: CLINIC | Age: 49
End: 2019-10-14

## 2019-10-14 NOTE — TELEPHONE ENCOUNTER
Called cecilio M with call back information to reschedule appointment with Julio Cesar Suero to 1130 same day. Letter sent with new appointment time

## 2019-10-16 NOTE — TELEPHONE ENCOUNTER
RECORDS RECEIVED FROM: Internal    DATE RECEIVED: 1/9/20   NOTES STATUS DETAILS   OFFICE NOTE from referring provider Internal Referral from Dr. Flores    OFFICE NOTE from other specialist N/A    DISCHARGE SUMMARY from hospital N/A    OPERATIVE REPORT N/A    MEDICATION LIST N/A         ENDOSCOPY  N/A    COLONOSCOPY N/A    ERCP N/A    EUS N/A    STOOL TESTING N/A    PERTINENT LABS Internal    PATHOLOGY REPORTS (RELATED) N/A    IMAGING (CT, MRI, EGD) N/A      REFERRAL INFORMATION    Date referral was placed: 1/9/20   Date all records received: N/A   Date records were scanned into Epic: N/A   Date records were sent to Provider to review: N/A   Date and recommendation received from provider:  LETTER SENT  SCHEDULE APPOINTMENT   Date patient was contacted to schedule: 8/28/19

## 2019-10-18 ENCOUNTER — MEDICAL CORRESPONDENCE (OUTPATIENT)
Dept: HEALTH INFORMATION MANAGEMENT | Facility: CLINIC | Age: 49
End: 2019-10-18

## 2019-10-26 ENCOUNTER — MYC REFILL (OUTPATIENT)
Dept: NEUROLOGY | Facility: CLINIC | Age: 49
End: 2019-10-26

## 2019-10-26 DIAGNOSIS — G35 MS (MULTIPLE SCLEROSIS) (H): ICD-10-CM

## 2019-10-26 DIAGNOSIS — R53.83 OTHER FATIGUE: ICD-10-CM

## 2019-10-28 RX ORDER — DEXTROAMPHETAMINE SACCHARATE, AMPHETAMINE ASPARTATE MONOHYDRATE, DEXTROAMPHETAMINE SULFATE AND AMPHETAMINE SULFATE 7.5; 7.5; 7.5; 7.5 MG/1; MG/1; MG/1; MG/1
30 CAPSULE, EXTENDED RELEASE ORAL DAILY
Qty: 30 CAPSULE | Refills: 0 | Status: SHIPPED | OUTPATIENT
Start: 2019-10-28 | End: 2019-12-03

## 2019-10-28 RX ORDER — DEXTROAMPHETAMINE SACCHARATE, AMPHETAMINE ASPARTATE MONOHYDRATE, DEXTROAMPHETAMINE SULFATE AND AMPHETAMINE SULFATE 5; 5; 5; 5 MG/1; MG/1; MG/1; MG/1
20 CAPSULE, EXTENDED RELEASE ORAL 2 TIMES DAILY
Qty: 60 CAPSULE | Refills: 0 | Status: SHIPPED | OUTPATIENT
Start: 2019-10-28 | End: 2019-12-03

## 2019-10-28 NOTE — TELEPHONE ENCOUNTER
Patient sent [facility] message requesting refill of their Adderall XR; Patient was last seen in April and has follow up appointment in November with Dr. Flores; Pended rx to Dr. Flores for signature and will send electronically to the pharmacy once signed.    Ploly Rodríguez, MS RN Care Coordinator

## 2019-11-01 ENCOUNTER — APPOINTMENT (OUTPATIENT)
Dept: LAB | Facility: CLINIC | Age: 49
End: 2019-11-01
Attending: NURSE PRACTITIONER
Payer: COMMERCIAL

## 2019-11-05 ENCOUNTER — ANCILLARY PROCEDURE (OUTPATIENT)
Dept: MRI IMAGING | Facility: CLINIC | Age: 49
End: 2019-11-05
Attending: PSYCHIATRY & NEUROLOGY
Payer: COMMERCIAL

## 2019-11-05 ENCOUNTER — OFFICE VISIT (OUTPATIENT)
Dept: NEUROLOGY | Facility: CLINIC | Age: 49
End: 2019-11-05
Attending: PSYCHIATRY & NEUROLOGY
Payer: COMMERCIAL

## 2019-11-05 VITALS
DIASTOLIC BLOOD PRESSURE: 84 MMHG | WEIGHT: 202.7 LBS | HEART RATE: 56 BPM | BODY MASS INDEX: 32.58 KG/M2 | SYSTOLIC BLOOD PRESSURE: 135 MMHG | HEIGHT: 66 IN

## 2019-11-05 DIAGNOSIS — G35 MULTIPLE SCLEROSIS (H): ICD-10-CM

## 2019-11-05 DIAGNOSIS — R53.83 OTHER FATIGUE: ICD-10-CM

## 2019-11-05 DIAGNOSIS — G35 MS (MULTIPLE SCLEROSIS) (H): ICD-10-CM

## 2019-11-05 DIAGNOSIS — G35 MS (MULTIPLE SCLEROSIS) (H): Primary | ICD-10-CM

## 2019-11-05 LAB
ALBUMIN SERPL-MCNC: 3.9 G/DL (ref 3.4–5)
ALP SERPL-CCNC: 83 U/L (ref 40–150)
ALT SERPL W P-5'-P-CCNC: 62 U/L (ref 0–50)
ANION GAP SERPL CALCULATED.3IONS-SCNC: 5 MMOL/L (ref 3–14)
AST SERPL W P-5'-P-CCNC: 31 U/L (ref 0–45)
BASOPHILS # BLD AUTO: 0.1 10E9/L (ref 0–0.2)
BASOPHILS NFR BLD AUTO: 0.8 %
BILIRUB SERPL-MCNC: 0.3 MG/DL (ref 0.2–1.3)
BUN SERPL-MCNC: 20 MG/DL (ref 7–30)
CALCIUM SERPL-MCNC: 8.9 MG/DL (ref 8.5–10.1)
CHLORIDE SERPL-SCNC: 105 MMOL/L (ref 94–109)
CO2 SERPL-SCNC: 30 MMOL/L (ref 20–32)
CREAT SERPL-MCNC: 0.93 MG/DL (ref 0.52–1.04)
DIFFERENTIAL METHOD BLD: NORMAL
EOSINOPHIL # BLD AUTO: 0.4 10E9/L (ref 0–0.7)
EOSINOPHIL NFR BLD AUTO: 4 %
ERYTHROCYTE [DISTWIDTH] IN BLOOD BY AUTOMATED COUNT: 12.1 % (ref 10–15)
GFR SERPL CREATININE-BSD FRML MDRD: 72 ML/MIN/{1.73_M2}
GLUCOSE SERPL-MCNC: 95 MG/DL (ref 70–99)
HCT VFR BLD AUTO: 41.5 % (ref 35–47)
HGB BLD-MCNC: 14 G/DL (ref 11.7–15.7)
IMM GRANULOCYTES # BLD: 0 10E9/L (ref 0–0.4)
IMM GRANULOCYTES NFR BLD: 0.3 %
LYMPHOCYTES # BLD AUTO: 1.8 10E9/L (ref 0.8–5.3)
LYMPHOCYTES NFR BLD AUTO: 17.6 %
MCH RBC QN AUTO: 29.9 PG (ref 26.5–33)
MCHC RBC AUTO-ENTMCNC: 33.7 G/DL (ref 31.5–36.5)
MCV RBC AUTO: 89 FL (ref 78–100)
MONOCYTES # BLD AUTO: 0.9 10E9/L (ref 0–1.3)
MONOCYTES NFR BLD AUTO: 8.5 %
NEUTROPHILS # BLD AUTO: 7.2 10E9/L (ref 1.6–8.3)
NEUTROPHILS NFR BLD AUTO: 68.8 %
NRBC # BLD AUTO: 0 10*3/UL
NRBC BLD AUTO-RTO: 0 /100
PLATELET # BLD AUTO: 288 10E9/L (ref 150–450)
POTASSIUM SERPL-SCNC: 4.2 MMOL/L (ref 3.4–5.3)
PROT SERPL-MCNC: 7.8 G/DL (ref 6.8–8.8)
RBC # BLD AUTO: 4.69 10E12/L (ref 3.8–5.2)
SODIUM SERPL-SCNC: 139 MMOL/L (ref 133–144)
WBC # BLD AUTO: 10.4 10E9/L (ref 4–11)

## 2019-11-05 PROCEDURE — 82784 ASSAY IGA/IGD/IGG/IGM EACH: CPT | Performed by: PSYCHIATRY & NEUROLOGY

## 2019-11-05 PROCEDURE — 86355 B CELLS TOTAL COUNT: CPT | Performed by: PSYCHIATRY & NEUROLOGY

## 2019-11-05 PROCEDURE — 80053 COMPREHEN METABOLIC PANEL: CPT | Performed by: PSYCHIATRY & NEUROLOGY

## 2019-11-05 PROCEDURE — 82306 VITAMIN D 25 HYDROXY: CPT | Performed by: PSYCHIATRY & NEUROLOGY

## 2019-11-05 PROCEDURE — 36415 COLL VENOUS BLD VENIPUNCTURE: CPT | Performed by: PSYCHIATRY & NEUROLOGY

## 2019-11-05 PROCEDURE — 85025 COMPLETE CBC W/AUTO DIFF WBC: CPT | Performed by: PSYCHIATRY & NEUROLOGY

## 2019-11-05 RX ORDER — GADOBUTROL 604.72 MG/ML
10 INJECTION INTRAVENOUS ONCE
Status: COMPLETED | OUTPATIENT
Start: 2019-11-05 | End: 2019-11-05

## 2019-11-05 RX ADMIN — GADOBUTROL 9 ML: 604.72 INJECTION INTRAVENOUS at 15:19

## 2019-11-05 ASSESSMENT — PAIN SCALES - GENERAL: PAINLEVEL: SEVERE PAIN (6)

## 2019-11-05 ASSESSMENT — MIFFLIN-ST. JEOR: SCORE: 1556.19

## 2019-11-05 NOTE — LETTER
2019       RE: Chayo Reid  [address]  [place] [address]     Dear Colleague,    Thank you for referring your patient, Chayo Reid, to the [facility] MULTIPLE SCLEROSIS at [facility]. Please see a copy of my visit note below.    MULTIPLE SCLEROSIS CLINIC AT THE [facility]  FOLLOWUP/ESTABLISHED PATIENT VISIT    PRINCIPAL NEUROLOGIC DIAGNOSIS: : Multiple Sclerosis     Date of Onset: 2012  Date of Diagnosis: 2012  Initial Clinical Course: Progressive  Current Clinical Course: Progressive  Past Disease Modifying Therapy(ies): copaxone  Current Disease Modifying Therapy(ies): Ocrevus  Most Recent MRI of the Brain:  19  Most Recent MRI of the Cervical Cord   Most Recent MRI of the Thoracic Cord:   Most Recent Lumbar Puncture: Positive for OCB  Most Recent OCT: NA   Most Recent JCV: 18 positive  Most Recent Remote Hepatitis Panel: 18 negative  Most Recent VZV Ig18 positive  Most Recent TB Quant: 18 negative  Previous pain medication: Lyrica, Elavil, Cymbalta, valproic acid, baclofen  Medical cannibis: Has positive benefit of decrease stiffness 5 without side effect 1.        INTERVAL HISTORY:    She reports that she is still bother by the pain and witching in her legs at night. The patient reports that the twitches does not keep her up. Currently her pain is a 6/10. The pain is sharp pain in her legs.  A quick hot bath makes it better. The pain varries in location and can be anywhere in her leg. The patient reports that this is unchanged.    Issues with current MS therapy: Tolerating DMT without issue    REVIEW OF SYSTEMS:    Mood: unchanged, it goes up and done. She has a lot of anxiety  Spasticity:as above  Bladder: none  Bowel: unchanged  Pain related to today's visit:reviewed on nursing intake documentation  Fatigue: worse. She report that her energy is still poor. She does not need to take naps. She will  "take rest breaks  Sleep: insomnia  and interrupted. She reports that either she gets upn to go to the bathroom, or dog wakes her. Rarely snores  Memory/Concentration: unchanged. She forgets things, but her  does not think it is abnormal.    Rhinorrhea, mild sore throat yesterday,     Otherwise 10 point ROS was neg other than the symptoms noted above.    PAST HISTORY was reviewed and updated:      MEDICATIONS and ALLERGIES were reviewed and updated.    SOCIAL HISTORY was reviewed and updated:        EXAM:    PHYSICAL EXAMINATION:   VITAL SIGNS:  Vital signs:      BP: 135/84 Pulse: 56           Height: 167.6 cm (5' 6\") Weight: 91.9 kg (202 lb 11.2 oz)  Estimated body mass index is 32.72 kg/m  as calculated from the following:    Height as of this encounter: 1.676 m (5' 6\").    Weight as of this encounter: 91.9 kg (202 lb 11.2 oz).      GENERAL: The patient is a well-nourished  who presents to the evaluation alone.  NEUROLOGIC:   MENTAL STATUS: Alert,awake and  oriented times four.   CRANIAL NERVES:  Visual fields are full to confrontation. The pupils are  round and react to light and there is no Antonio Arlene pupil.  Extraocular movements are  intact with no  internuclear ophthalmoplegia. No nystagmus. Facial strength and sensation are  normal. Hearing is  normal. Palate elevation and tongue protrusion are  normal.   POWER:      Motor     Upper         Right Left   Shoulder Abduction 5 5   Elbow Flexion 5 5   Elbow Extension 5 5   Wrist Extension 5 5   Digit Extension 5 5   Digit Flexion 5 5   APB 5 5   Tone 1 1   Lower          Right Left   Hip Flexion 5 5   Knee Extension 5 5   Knee Flexion 5 5   Foot Dorsiflexion 5 5   Foot Plantar Flexion 5 5   EH 5 5   Toe Flexion 5 5   Tone 1 1             Grade Description   0 No increase in muscle tone   1 Slight increase in muscle tone, manifested by a catch and release or by minimal resistance at the end of the range of motion when the affected part(s) is moved in " flexion or extension   1+ Slight increase in muscle tone, manifested by a catch, followed by minimal resistance throughout the remainder (less than half) of the ROM   2 More marked increase in muscle tone through most of the ROM, but affected part(s) easily moved   3 Considerable increase in muscle tone, passive movement difficult   4 Affected part(s) rigid in flexion or extension         SENSORY:      Light touch:  Intact in all extremities     MOTOR/CEREBELLAR:     Right Left   RRM 0 Normal 0 Normal   MAGDA 0 Normal 0 Normal   FTN 0 Normal 0 Normal   RRM 0 Normal 0 Normal   HKS 0 Normal 0 Normal               GAIT: Gait is  narrow-based and steady and the patient is  able to walk on heels, toes and in tandem without difficulty.    Romberg: Sways with eye(s)  closed       RESULTS:  Monitoring labs:    No visits with results within 6 Month(s) from this visit.   Latest known visit with results is:   Infusion Therapy Visit on 03/19/2019   Component Date Value Ref Range Status     CD19 B Cells 03/19/2019 <1* 6 - 27 % Final     Absolute CD19 03/19/2019 4* 107 - 698 cells/uL Final     Sodium 03/19/2019 140  133 - 144 mmol/L Final     Potassium 03/19/2019 3.6  3.4 - 5.3 mmol/L Final     Chloride 03/19/2019 106  94 - 109 mmol/L Final     Carbon Dioxide 03/19/2019 28  20 - 32 mmol/L Final     Anion Gap 03/19/2019 6  3 - 14 mmol/L Final     Glucose 03/19/2019 76  70 - 99 mg/dL Final     Urea Nitrogen 03/19/2019 18  7 - 30 mg/dL Final     Creatinine 03/19/2019 1.02  0.52 - 1.04 mg/dL Final     GFR Estimate 03/19/2019 64  >60 mL/min/[1.73_m2] Final     GFR Estimate If Black 03/19/2019 75  >60 mL/min/[1.73_m2] Final     Calcium 03/19/2019 8.7  8.5 - 10.1 mg/dL Final     Bilirubin Total 03/19/2019 0.5  0.2 - 1.3 mg/dL Final     Albumin 03/19/2019 3.6  3.4 - 5.0 g/dL Final     Protein Total 03/19/2019 7.6  6.8 - 8.8 g/dL Final     Alkaline Phosphatase 03/19/2019 74  40 - 150 U/L Final     ALT 03/19/2019 25  0 - 50 U/L Final     AST  03/19/2019 16  0 - 45 U/L Final     WBC 03/19/2019 7.9  4.0 - 11.0 10e9/L Final     RBC Count 03/19/2019 4.83  3.8 - 5.2 10e12/L Final     Hemoglobin 03/19/2019 14.2  11.7 - 15.7 g/dL Final     Hematocrit 03/19/2019 42.9  35.0 - 47.0 % Final     MCV 03/19/2019 89  78 - 100 fl Final     MCH 03/19/2019 29.4  26.5 - 33.0 pg Final     MCHC 03/19/2019 33.1  31.5 - 36.5 g/dL Final     RDW 03/19/2019 11.9  10.0 - 15.0 % Final     Platelet Count 03/19/2019 333  150 - 450 10e9/L Final     Diff Method 03/19/2019 Automated Method   Final     % Neutrophils 03/19/2019 64.1  % Final     % Lymphocytes 03/19/2019 19.8  % Final     % Monocytes 03/19/2019 10.2  % Final     % Eosinophils 03/19/2019 4.9  % Final     % Basophils 03/19/2019 0.9  % Final     % Immature Granulocytes 03/19/2019 0.1  % Final     Nucleated RBCs 03/19/2019 0  0 /100 Final     Absolute Neutrophil 03/19/2019 5.1  1.6 - 8.3 10e9/L Final     Absolute Lymphocytes 03/19/2019 1.6  0.8 - 5.3 10e9/L Final     Absolute Monocytes 03/19/2019 0.8  0.0 - 1.3 10e9/L Final     Absolute Eosinophils 03/19/2019 0.4  0.0 - 0.7 10e9/L Final     Absolute Basophils 03/19/2019 0.1  0.0 - 0.2 10e9/L Final     Abs Immature Granulocytes 03/19/2019 0.0  0 - 0.4 10e9/L Final     Absolute Nucleated RBC 03/19/2019 0.0   Final       MRI brain:    MRI Dated 11/5/19:  Mild to moderate  T2 disease burden with  0 enhancion lesion(s).  compared to MRI on 4/16/19 there are   0 new T2 lesion(s).    ASSESSMENT/PLAN:  The patient is a 49-year-old female with a past medical history of relapsing remitting multiple sclerosis who is presenting today as a follow-up.  Overall, the patient is radiographically and clinically stable.  Consequently, I will keep the patient on ocrelizumab.  I will check screening blood work today to determine the optimal time the dose would be.  Regards to the pain, I encouraged the patient to go to a pain clinic.  I advised her that I have tried several agents that either did  not work or produced on intolerable side effects.  I will have the patient follow back up in 6 months.  I instructed the patient to call my office any questions, concerns, issues or problems.    Cbc, cmp, cd19, IgG  Follow up in 6 months    I spent 25 minutes in this visit, with >50% direct patient time spent counseling about prognosis, treatment options, and coordination of care.    Pietro Flores MD, MD [facility]  Staff Neurologist   11/05/19     (Chart documentation was completed in part with Dragon voice-recognition software. Even though reviewed, some grammatical, spelling, and word errors may remain.)

## 2019-11-05 NOTE — PATIENT INSTRUCTIONS
Will get blood work today    Will follow up in 6 months.    Make an appointment with the pain.    You saw a neurology provider today at the [facility].  You may have also met with the MS RN Care Coordinator.  In order to get a message to your MS Center provider, you should contact [phone] option 3 for the triage nurse line.    You should contact us via this protocol if you have any of the following symptoms:    New or worsening neurologic symptoms that persist for 24-48 hours, such as:  o New onset of pain or marked worsening of pain  o Difficulty with speech, swallowing, or breathing  o New onset of vertigo or dizziness  o Change in bowel or bladder function (incontinence, difficulty urinating)    Increasing difficulty in self care    Marked changes in vision (double vision, blurred vision, graying of vision)    Change in mobility    Change in cognitive function    Falling    Worsening numbness, tingling or pain with a change in function    Worsening fatigue lasting more than 2 weeks  If you had labs completed today, we will contact you with the results.  If you are active in [employer], they will be released to you there.  Otherwise, your results will be provided to you via mail or telephone call.  Some results take up to 2 weeks for completion.  If you haven t heard anything about your lab results within 2 weeks, you can call or send a [employer] message to obtain your results.  If you have an MRI scheduled in the week or two prior to your next appointment, we will go over the results at your scheduled follow up appointment.  If you are not scheduled to see your MS Center provider within about 2 weeks after your MRI, please call or send a [employer] message to obtain your results if you haven t heard anything within 2 weeks.  Please be aware that it takes at least 5 business days after routine MRIs for your results to be reviewed by both the radiologist and your doctor.  MRIs  completed at facilities outside of the [place] [facility] system take about 2 weeks in order for the MRI disc to be mailed to our clinic and uploaded into your medical record.    Prescription refills should be faxed to us by your pharmacy.  Our fax number for prescription refills is [phone].  Please do your best to come to your appointments, and to arrive 15 minutes early to allow time for checking in.  [facility] reserves the right to terminate care of established patients if a patient misses three or more appointments in a clinic without providing notification within a 12-month period.    Developing Your Care Team  Individuals living with chronic illnesses like MS may be unaware that they are at risk for the same range of medical problems as everyone else.  This is why you must establish a relationship with other health care providers in addition to your Multiple Sclerosis doctor.  It can be difficult at times to figure out whether a health concern is related to your MS, or whether it is related to something else, such as hormonal changes, pseduoexacerbations, changes in your core body temperature, flu-like reactions to interferons, exercise, or infections.  Urinary tract infections (UTIs) are common culprits that can cause fatigue, weakness, or other  MS attack -like symptoms without classic symptoms of a UTI.  For this reason, if you call or come in to discuss symptoms, you may be asked to get in touch with your primary provider or another specialist, so that you receive the comprehensive care you need.  What is Multiple Sclerosis (MS)?  MS is a disease in which the nerve tissues in the brain and/or spinal cord are attacked by immune cells in the body.  These immune cells are present in everyone, and their normal role is to fight off infections.  In people with MS, these cells change the way they function and cross into the nervous system.  Once there, they cause inflammation that damages the  myelin (or the protective coating of a nerve cell, much like the plastic covering on an electrical cord) and parts of the nerve cell itself.  So far, a clear cause for this immune system dysfunction has not been found.  MS often starts out as the  relapsing-remitting  form.  This means there are episodes when you have symptoms, and other times when you recover to normal or near-normal.  Over time, if the damage to the nervous system continues, the disease can cause additional disability, such as difficulty walking.  If the relapses and nerve damage can be prevented with available medications, many patients with MS can go many years between relapses and have relatively little disability.  Remember: MS is a condition that changes and must be evaluated on an ongoing basis!  What is a Relapse? (Also called flare-ups, attacks, or exacerbations)  Relapses are due to the occurrence of inflammation in some part of the brain and/or spinal cord.  A relapse is new or recurrent symptoms which persist for at least 24 hours and sometimes worsen over 48 hours.  New symptoms need to be  by at least 1 month in order to be considered separate relapses. Most of the time, symptoms reach their maximal intensity within 2 weeks and then begin to slowly resolve.  At times, your symptoms may not recover fully for up to 6 months, depending on the severity of the episode.  The frequency of relapses is generally higher early in the disease, but can vary greatly among individuals with MS.  Improvement of symptoms for an individual is unpredictable with each relapse.    It is important to remember that an increase in symptoms and changes in function may not necessarily be a relapse.  There are other factors that contribute to such changes, such as hot weather, increased body temperature, infection/illness, stress and sleep deprivation.  The worsening of symptoms may feel like a relapse when in reality it is not.  These episodes are  referred to as pseudorelapses.  Once the underlying cause is addressed, symptoms usually fade away and you feel better.  If you experience a worsening of symptoms that lasts more than 48 hours and does not improve with cooling down, decreasing stress, or treatment of an infection, please call us and we can help to better determine whether you are having a pseudorelapse versus a relapse.

## 2019-11-05 NOTE — PROGRESS NOTES
MULTIPLE SCLEROSIS CLINIC AT THE [facility]  FOLLOWUP/ESTABLISHED PATIENT VISIT         PRINCIPAL NEUROLOGIC DIAGNOSIS: : Multiple Sclerosis     Date of Onset: 2012  Date of Diagnosis: 2012  Initial Clinical Course: Progressive  Current Clinical Course: Progressive  Past Disease Modifying Therapy(ies): copaxone  Current Disease Modifying Therapy(ies): Ocrevus  Most Recent MRI of the Brain: 19  Most Recent MRI of the Cervical Cord   Most Recent MRI of the Thoracic Cord:   Most Recent Lumbar Puncture: Positive for OCB  Most Recent OCT: NA   Most Recent JCV: 18 positive  Most Recent Remote Hepatitis Panel: 18 negative  Most Recent VZV Ig18 positive  Most Recent TB Quant: 18 negative  Previous pain medication: Lyrica, Elavil, Cymbalta, valproic acid, baclofen  Medical cannibis: Has positive benefit of decrease stiffness 5 without side effect 1.          INTERVAL HISTORY:    She reports that she is still bother by the pain and witching in her legs at night. The patient reports that the twitches does not keep her up. Currently her pain is a 6/10. The pain is sharp pain in her legs.  A quick hot bath makes it better. The pain varries in location and can be anywhere in her leg. The patient reports that this is unchanged.    Issues with current MS therapy: Tolerating DMT without issue    REVIEW OF SYSTEMS:    Mood: unchanged, it goes up and done. She has a lot of anxiety  Spasticity:as above  Bladder: none  Bowel: unchanged  Pain related to today's visit:reviewed on nursing intake documentation  Fatigue: worse. She report that her energy is still poor. She does not need to take naps. She will take rest breaks  Sleep: insomnia  and interrupted. She reports that either she gets upn to go to the bathroom, or dog wakes her. Rarely snores  Memory/Concentration: unchanged. She forgets things, but her  does not think it is abnormal.    Rhinorrhea, mild sore throat  "yesterday,     Otherwise 10 point ROS was neg other than the symptoms noted above.    PAST HISTORY was reviewed and updated:      MEDICATIONS and ALLERGIES were reviewed and updated.    SOCIAL HISTORY was reviewed and updated:        EXAM:    PHYSICAL EXAMINATION:   VITAL SIGNS:  Vital signs:      BP: 135/84 Pulse: 56           Height: 167.6 cm (5' 6\") Weight: 91.9 kg (202 lb 11.2 oz)  Estimated body mass index is 32.72 kg/m  as calculated from the following:    Height as of this encounter: 1.676 m (5' 6\").    Weight as of this encounter: 91.9 kg (202 lb 11.2 oz).            GENERAL: The patient is a well-nourished  who presents to the evaluation alone.  NEUROLOGIC:   MENTAL STATUS: Alert,awake and  oriented times four.   CRANIAL NERVES:  Visual fields are full to confrontation. The pupils are  round and react to light and there is no Antonio Arlene pupil.  Extraocular movements are  intact with no  internuclear ophthalmoplegia. No nystagmus. Facial strength and sensation are  normal. Hearing is  normal. Palate elevation and tongue protrusion are  normal.   POWER:      Motor     Upper         Right Left   Shoulder Abduction 5 5   Elbow Flexion 5 5   Elbow Extension 5 5   Wrist Extension 5 5   Digit Extension 5 5   Digit Flexion 5 5   APB 5 5   Tone 1 1   Lower          Right Left   Hip Flexion 5 5   Knee Extension 5 5   Knee Flexion 5 5   Foot Dorsiflexion 5 5   Foot Plantar Flexion 5 5   EH 5 5   Toe Flexion 5 5   Tone 1 1             Grade Description   0 No increase in muscle tone   1 Slight increase in muscle tone, manifested by a catch and release or by minimal resistance at the end of the range of motion when the affected part(s) is moved in flexion or extension   1+ Slight increase in muscle tone, manifested by a catch, followed by minimal resistance throughout the remainder (less than half) of the ROM   2 More marked increase in muscle tone through most of the ROM, but affected part(s) easily moved   3 " Considerable increase in muscle tone, passive movement difficult   4 Affected part(s) rigid in flexion or extension               SENSORY:      Light touch:  Intact in all extremities     MOTOR/CEREBELLAR:     Right Left   RRM 0 Normal 0 Normal   MAGDA 0 Normal 0 Normal   FTN 0 Normal 0 Normal   RRM 0 Normal 0 Normal   HKS 0 Normal 0 Normal               GAIT: Gait is  narrow-based and steady and the patient is  able to walk on heels, toes and in tandem without difficulty.    Romberg: Sways with eye(s)  closed       RESULTS:  Monitoring labs:    No visits with results within 6 Month(s) from this visit.   Latest known visit with results is:   Infusion Therapy Visit on 03/19/2019   Component Date Value Ref Range Status     CD19 B Cells 03/19/2019 <1* 6 - 27 % Final     Absolute CD19 03/19/2019 4* 107 - 698 cells/uL Final     Sodium 03/19/2019 140  133 - 144 mmol/L Final     Potassium 03/19/2019 3.6  3.4 - 5.3 mmol/L Final     Chloride 03/19/2019 106  94 - 109 mmol/L Final     Carbon Dioxide 03/19/2019 28  20 - 32 mmol/L Final     Anion Gap 03/19/2019 6  3 - 14 mmol/L Final     Glucose 03/19/2019 76  70 - 99 mg/dL Final     Urea Nitrogen 03/19/2019 18  7 - 30 mg/dL Final     Creatinine 03/19/2019 1.02  0.52 - 1.04 mg/dL Final     GFR Estimate 03/19/2019 64  >60 mL/min/[1.73_m2] Final     GFR Estimate If Black 03/19/2019 75  >60 mL/min/[1.73_m2] Final     Calcium 03/19/2019 8.7  8.5 - 10.1 mg/dL Final     Bilirubin Total 03/19/2019 0.5  0.2 - 1.3 mg/dL Final     Albumin 03/19/2019 3.6  3.4 - 5.0 g/dL Final     Protein Total 03/19/2019 7.6  6.8 - 8.8 g/dL Final     Alkaline Phosphatase 03/19/2019 74  40 - 150 U/L Final     ALT 03/19/2019 25  0 - 50 U/L Final     AST 03/19/2019 16  0 - 45 U/L Final     WBC 03/19/2019 7.9  4.0 - 11.0 10e9/L Final     RBC Count 03/19/2019 4.83  3.8 - 5.2 10e12/L Final     Hemoglobin 03/19/2019 14.2  11.7 - 15.7 g/dL Final     Hematocrit 03/19/2019 42.9  35.0 - 47.0 % Final     MCV 03/19/2019  89  78 - 100 fl Final     MCH 03/19/2019 29.4  26.5 - 33.0 pg Final     MCHC 03/19/2019 33.1  31.5 - 36.5 g/dL Final     RDW 03/19/2019 11.9  10.0 - 15.0 % Final     Platelet Count 03/19/2019 333  150 - 450 10e9/L Final     Diff Method 03/19/2019 Automated Method   Final     % Neutrophils 03/19/2019 64.1  % Final     % Lymphocytes 03/19/2019 19.8  % Final     % Monocytes 03/19/2019 10.2  % Final     % Eosinophils 03/19/2019 4.9  % Final     % Basophils 03/19/2019 0.9  % Final     % Immature Granulocytes 03/19/2019 0.1  % Final     Nucleated RBCs 03/19/2019 0  0 /100 Final     Absolute Neutrophil 03/19/2019 5.1  1.6 - 8.3 10e9/L Final     Absolute Lymphocytes 03/19/2019 1.6  0.8 - 5.3 10e9/L Final     Absolute Monocytes 03/19/2019 0.8  0.0 - 1.3 10e9/L Final     Absolute Eosinophils 03/19/2019 0.4  0.0 - 0.7 10e9/L Final     Absolute Basophils 03/19/2019 0.1  0.0 - 0.2 10e9/L Final     Abs Immature Granulocytes 03/19/2019 0.0  0 - 0.4 10e9/L Final     Absolute Nucleated RBC 03/19/2019 0.0   Final   ]      MRI brain:    MRI Dated 11/5/19:  Mild to moderate  T2 disease burden with  0 enhancion lesion(s).  compared to MRI on 4/16/19 there are   0 new T2 lesion(s).    ASSESSMENT/PLAN:  The patient is a 49-year-old female with a past medical history of relapsing remitting multiple sclerosis who is presenting today as a follow-up.  Overall, the patient is radiographically and clinically stable.  Consequently, I will keep the patient on ocrelizumab.  I will check screening blood work today to determine the optimal time the dose would be.  Regards to the pain, I encouraged the patient to go to a pain clinic.  I advised her that I have tried several agents that either did not work or produced on intolerable side effects.  I will have the patient follow back up in 6 months.  I instructed the patient to call my office any questions, concerns, issues or problems.    Cbc, cmp, cd19, IgG  Follow up in 6 months    I spent 25  minutes in this visit, with >50% direct patient time spent counseling about prognosis, treatment options, and coordination of care.    Pietro Flores MD, MD [facility]  Staff Neurologist   11/05/19       (Chart documentation was completed in part with Dragon voice-recognition software. Even though reviewed, some grammatical, spelling, and word errors may remain.)

## 2019-11-06 LAB
CD19 CELLS # BLD: <1 CELLS/UL (ref 107–698)
CD19 CELLS NFR BLD: <1 % (ref 6–27)
DEPRECATED CALCIDIOL+CALCIFEROL SERPL-MC: 77 UG/L (ref 20–75)
IGA SERPL-MCNC: 207 MG/DL (ref 70–380)
IGG SERPL-MCNC: 1330 MG/DL (ref 695–1620)
IGM SERPL-MCNC: 103 MG/DL (ref 60–265)

## 2019-12-01 ENCOUNTER — APPOINTMENT (OUTPATIENT)
Dept: LAB | Facility: CLINIC | Age: 49
End: 2019-12-01
Attending: NURSE PRACTITIONER
Payer: COMMERCIAL

## 2019-12-03 ENCOUNTER — MYC REFILL (OUTPATIENT)
Dept: NEUROLOGY | Facility: CLINIC | Age: 49
End: 2019-12-03

## 2019-12-03 DIAGNOSIS — G35 MS (MULTIPLE SCLEROSIS) (H): ICD-10-CM

## 2019-12-03 DIAGNOSIS — R53.83 OTHER FATIGUE: ICD-10-CM

## 2019-12-03 NOTE — TELEPHONE ENCOUNTER
Requesting refill of their Adderall; Patient was last seen on 11/05/2019 and has follow up appointment on 5/20/2020 with Dr Hudson Pended rx to Dr Hudson for signature and will mail to the [facility] pharmacy once signed.    Will pend Rx to Loren for approval- patient needs ASAP    Trini Sena MA

## 2019-12-05 RX ORDER — DEXTROAMPHETAMINE SACCHARATE, AMPHETAMINE ASPARTATE MONOHYDRATE, DEXTROAMPHETAMINE SULFATE AND AMPHETAMINE SULFATE 7.5; 7.5; 7.5; 7.5 MG/1; MG/1; MG/1; MG/1
30 CAPSULE, EXTENDED RELEASE ORAL DAILY
Qty: 30 CAPSULE | Refills: 0 | Status: SHIPPED | OUTPATIENT
Start: 2019-12-05 | End: 2020-02-28

## 2019-12-05 RX ORDER — DEXTROAMPHETAMINE SACCHARATE, AMPHETAMINE ASPARTATE MONOHYDRATE, DEXTROAMPHETAMINE SULFATE AND AMPHETAMINE SULFATE 5; 5; 5; 5 MG/1; MG/1; MG/1; MG/1
20 CAPSULE, EXTENDED RELEASE ORAL 2 TIMES DAILY
Qty: 60 CAPSULE | Refills: 0 | Status: SHIPPED | OUTPATIENT
Start: 2019-12-05 | End: 2020-02-28

## 2019-12-06 ENCOUNTER — MYC MEDICAL ADVICE (OUTPATIENT)
Dept: NEUROLOGY | Facility: CLINIC | Age: 49
End: 2019-12-06

## 2019-12-06 DIAGNOSIS — G35 MS (MULTIPLE SCLEROSIS) (H): ICD-10-CM

## 2019-12-06 DIAGNOSIS — R53.83 OTHER FATIGUE: ICD-10-CM

## 2019-12-06 NOTE — TELEPHONE ENCOUNTER
Dr. Hudson, please see below. This patient is requesting that you review her last 3 MRIs and let her know if there have been any changes. Please take a look when you have a moment and report.   Patient establishes care with you in May.

## 2019-12-09 NOTE — TELEPHONE ENCOUNTER
Per Dr. Hudson: I can't review MRI's without learning more about the patient, I will be happy to review them with her in May or see her sooner. F     [employer] message sent to patient.

## 2019-12-17 ENCOUNTER — MYC MEDICAL ADVICE (OUTPATIENT)
Dept: NEUROLOGY | Facility: CLINIC | Age: 49
End: 2019-12-17

## 2019-12-17 DIAGNOSIS — R53.83 OTHER FATIGUE: ICD-10-CM

## 2019-12-17 DIAGNOSIS — G35 MS (MULTIPLE SCLEROSIS) (H): ICD-10-CM

## 2019-12-17 RX ORDER — DEXTROAMPHETAMINE SACCHARATE, AMPHETAMINE ASPARTATE MONOHYDRATE, DEXTROAMPHETAMINE SULFATE AND AMPHETAMINE SULFATE 7.5; 7.5; 7.5; 7.5 MG/1; MG/1; MG/1; MG/1
30 CAPSULE, EXTENDED RELEASE ORAL DAILY
Qty: 30 CAPSULE | Refills: 0 | Status: CANCELLED | OUTPATIENT
Start: 2019-12-17

## 2019-12-17 RX ORDER — DEXTROAMPHETAMINE SACCHARATE, AMPHETAMINE ASPARTATE MONOHYDRATE, DEXTROAMPHETAMINE SULFATE AND AMPHETAMINE SULFATE 5; 5; 5; 5 MG/1; MG/1; MG/1; MG/1
20 CAPSULE, EXTENDED RELEASE ORAL 2 TIMES DAILY
Qty: 60 CAPSULE | Refills: 0 | Status: CANCELLED | OUTPATIENT
Start: 2019-12-17

## 2019-12-18 NOTE — TELEPHONE ENCOUNTER
Patient was called and advise to read her [employer] (P) message about her needing to get scheduled with a different provider other than Dr Hudson. Instructed to call the call center to get scheduled with someone else    Trini Sena MA

## 2020-01-01 ENCOUNTER — APPOINTMENT (OUTPATIENT)
Dept: LAB | Facility: CLINIC | Age: 50
End: 2020-01-01
Attending: NURSE PRACTITIONER
Payer: COMMERCIAL

## 2020-01-08 NOTE — TELEPHONE ENCOUNTER
Chayo would like to see Dr Flores in August; I have put her on the list; [employer] message sent to patient letting her know this.    Polly Rodríguez, MS RN Care Coordinator     Yes...

## 2020-01-09 ENCOUNTER — PRE VISIT (OUTPATIENT)
Dept: GASTROENTEROLOGY | Facility: CLINIC | Age: 50
End: 2020-01-09

## 2020-02-01 ENCOUNTER — APPOINTMENT (OUTPATIENT)
Dept: LAB | Facility: CLINIC | Age: 50
End: 2020-02-01
Attending: INTERNAL MEDICINE
Payer: COMMERCIAL

## 2020-02-11 NOTE — TELEPHONE ENCOUNTER
[facility]  Loren Lennon APRN CNP Mon Jul 3, 2017 11:21 AM        ----- Message -----   From: Sofia Garcia RN   Sent: 7/3/2017 10:58 AM   To: BETH Parada CNP  Subject: FW: Medication Renewal Request         ----- Message -----   From: Chayo Reid   Sent: 6/21/2017 12:04 PM   To: Neurology [facility]/Lpn-  Subject: Medication Renewal Request     Original authorizing provider: BETH Parada CNP would like a refill of the following medications:  amphetamine-dextroamphetamine (ADDERALL XR) 30 MG per 24 hr capsule [BETH Parada CNP]    [facility] [id number]    Comment:  My Adderall always seems to get lost in the mail. I think the last two times you have had to send it twice to [facility]. Thank you Chayo  
Never smoker

## 2020-02-24 ENCOUNTER — HOME INFUSION (PRE-WILLOW HOME INFUSION) (OUTPATIENT)
Dept: PHARMACY | Facility: CLINIC | Age: 50
End: 2020-02-24

## 2020-02-25 NOTE — PROGRESS NOTES
This is a recent snapshot of the patient's [facility] medical record.  For current drug dose and complete information and questions, call [phone] or In [facility] pool, [facility] home infusion ([id number])  CSN Number:  [id number]

## 2020-02-28 ENCOUNTER — MYC REFILL (OUTPATIENT)
Dept: NEUROLOGY | Facility: CLINIC | Age: 50
End: 2020-02-28

## 2020-02-28 DIAGNOSIS — R53.83 OTHER FATIGUE: ICD-10-CM

## 2020-02-28 DIAGNOSIS — G35 MS (MULTIPLE SCLEROSIS) (H): ICD-10-CM

## 2020-02-28 NOTE — TELEPHONE ENCOUNTER
Dr. Stuart, patient is scheduled to establish care with you 3/10. Please approve pended Adderall RXs. Thanks.

## 2020-03-01 ENCOUNTER — APPOINTMENT (OUTPATIENT)
Dept: LAB | Facility: CLINIC | Age: 50
End: 2020-03-01
Attending: NURSE PRACTITIONER
Payer: COMMERCIAL

## 2020-03-02 ENCOUNTER — HEALTH MAINTENANCE LETTER (OUTPATIENT)
Age: 50
End: 2020-03-02

## 2020-03-02 RX ORDER — DEXTROAMPHETAMINE SACCHARATE, AMPHETAMINE ASPARTATE MONOHYDRATE, DEXTROAMPHETAMINE SULFATE AND AMPHETAMINE SULFATE 7.5; 7.5; 7.5; 7.5 MG/1; MG/1; MG/1; MG/1
30 CAPSULE, EXTENDED RELEASE ORAL DAILY
Qty: 30 CAPSULE | Refills: 0 | Status: SHIPPED | OUTPATIENT
Start: 2020-03-02 | End: 2020-04-17

## 2020-03-02 RX ORDER — DEXTROAMPHETAMINE SACCHARATE, AMPHETAMINE ASPARTATE MONOHYDRATE, DEXTROAMPHETAMINE SULFATE AND AMPHETAMINE SULFATE 5; 5; 5; 5 MG/1; MG/1; MG/1; MG/1
20 CAPSULE, EXTENDED RELEASE ORAL 2 TIMES DAILY
Qty: 60 CAPSULE | Refills: 0 | Status: SHIPPED | OUTPATIENT
Start: 2020-03-02 | End: 2020-09-26

## 2020-03-09 ASSESSMENT — ENCOUNTER SYMPTOMS
TINGLING: 1
NUMBNESS: 0
NIGHT SWEATS: 0
HALLUCINATIONS: 0
DISTURBANCES IN COORDINATION: 0
BLOATING: 1
PARALYSIS: 0
POLYPHAGIA: 0
TREMORS: 0
WEIGHT LOSS: 0
ARTHRALGIAS: 0
MUSCLE WEAKNESS: 0
VOMITING: 0
MYALGIAS: 1
BOWEL INCONTINENCE: 0
NECK PAIN: 0
SEIZURES: 0
DOUBLE VISION: 0
DIZZINESS: 0
ALTERED TEMPERATURE REGULATION: 0
INCREASED ENERGY: 0
EYE REDNESS: 0
EYE WATERING: 0
DIARRHEA: 0
MEMORY LOSS: 0
CONSTIPATION: 0
BLOOD IN STOOL: 0
JAUNDICE: 0
HEARTBURN: 1
POLYDIPSIA: 0
HEADACHES: 1
FATIGUE: 1
ABDOMINAL PAIN: 0
JOINT SWELLING: 0
SPEECH CHANGE: 0
LOSS OF CONSCIOUSNESS: 0
EYE IRRITATION: 0
STIFFNESS: 0
BACK PAIN: 0
NAUSEA: 0
WEIGHT GAIN: 1
WEAKNESS: 0
MUSCLE CRAMPS: 0
FEVER: 0
RECTAL PAIN: 0
EYE PAIN: 1
CHILLS: 0
DECREASED APPETITE: 1

## 2020-03-10 ENCOUNTER — OFFICE VISIT (OUTPATIENT)
Dept: NEUROLOGY | Facility: CLINIC | Age: 50
End: 2020-03-10
Attending: PSYCHIATRY & NEUROLOGY
Payer: COMMERCIAL

## 2020-03-10 VITALS
SYSTOLIC BLOOD PRESSURE: 142 MMHG | HEIGHT: 66 IN | HEART RATE: 85 BPM | DIASTOLIC BLOOD PRESSURE: 91 MMHG | WEIGHT: 206.3 LBS | BODY MASS INDEX: 33.16 KG/M2

## 2020-03-10 DIAGNOSIS — M79.2 NEUROPATHIC PAIN: ICD-10-CM

## 2020-03-10 DIAGNOSIS — G35 MS (MULTIPLE SCLEROSIS) (H): ICD-10-CM

## 2020-03-10 DIAGNOSIS — G35 MS (MULTIPLE SCLEROSIS) (H): Primary | ICD-10-CM

## 2020-03-10 DIAGNOSIS — G47.10 EXCESSIVE SLEEPINESS: ICD-10-CM

## 2020-03-10 LAB
CD19 CELLS # BLD: 4 CELLS/UL (ref 107–698)
CD19 CELLS NFR BLD: <1 % (ref 6–27)

## 2020-03-10 PROCEDURE — 86355 B CELLS TOTAL COUNT: CPT | Performed by: PSYCHIATRY & NEUROLOGY

## 2020-03-10 PROCEDURE — 36415 COLL VENOUS BLD VENIPUNCTURE: CPT | Performed by: PSYCHIATRY & NEUROLOGY

## 2020-03-10 PROCEDURE — G0463 HOSPITAL OUTPT CLINIC VISIT: HCPCS

## 2020-03-10 RX ORDER — TRAMADOL HYDROCHLORIDE 50 MG/1
TABLET ORAL
Qty: 180 TABLET | Refills: 5 | Status: SHIPPED | OUTPATIENT
Start: 2020-03-10 | End: 2020-10-28

## 2020-03-10 ASSESSMENT — MIFFLIN-ST. JEOR: SCORE: 1572.52

## 2020-03-10 ASSESSMENT — PAIN SCALES - GENERAL: PAINLEVEL: SEVERE PAIN (7)

## 2020-03-10 NOTE — PROGRESS NOTES
Date of encounter: 3/10/2020    Referring provider: Dr. Flores    Chief complaint: Multiple sclerosis    History of present illness: Chayo Reid is a 49 year old female with history of multiple sclerosis who presents for routine follow up. In 2005 she had an MRI for migraine with associated right body paresthesias, and white matter signal abnormalities were noted. CSF was unremarkable. Then, a repeat MRI in 2012 demonstrated increased lesion burden, VEP showed prolonged abduction in the right anterior field, and CSF was notable for 6 OCBs and a normal IgG index. She was seen by Dr. Perez in 2013 and diagnosed with radiographic isolated syndrome and given provigil for fatigue. Later that year, repeat MRI demonstrated new FLAIR lesions and one subtle enhancing lesion so she was started on copaxone. There were increased lesions on MRI, so she was changed from copaxone to ocrelizumab in Fall 2018. Since starting this medication she feels generally bloated and has gained 20 pounds even though she is not eating as much as she used to. Food doesn't taste good, and she feels it gets stuck, and like she is choking when she tries to swallow. She did have a splotchy red rash last time, and she feels tired from the pre-medications. She also reports sinus congestion since her last infusion, or the feeling that she cannot clear a cold. Her next infusion is in a few weeks through an outside infusion center.     Numbness on right side- leg, arm, hands ever since 2005, can last days to weeks at a time, always came on with migraines, not sure when it started.     Pain in groin, R worse than L, had a platelet-rich-plasma injection into right hip. There is apparently a crack there, this was done through [facility]. L sided pain started about a month prior. Uses a massager which makes the pain somewhat better, enough that she can sleep. It is a similar type of pain on the other side, but less bad. She previously took  amitriptyline and duloxetine, but each stopped working and she does not currently take any neuropathic pain medication.    She says she aches all over, there is no place that is worse than others, and the intensity is about the same every day. Massage helps some, but this has been affecting her for 5-6 years, and does seem to be getting worse over time.    Fatigue- has trouble getting up in the morning, only sleeps 4 hours every night due to pain and does not get into a deep sleep, can only nap about 20 minutes at a time. Modafinil helped in the beginning, now takes adderall which doesn't fully treat it now either. Medical marijuana helps with the pain and gets her tired enough to actually sleep, but then she feels really groggy when she wakes up. Had a sleep study a long time ago (20 years) and is not sure what the outcome was.     No bowel or bladder symptoms.    Walking- right leg or ankle will give out, which has been happening for about 6 years. She fell last week, and does fall multiple times yearly, worse now than when it started. No balance trouble that she is aware of. She does not use a gait aid.    She has been having a mild headache, affecting her whole head for the past month and a half. She last had Botox for migraine 2-3 years ago, not here, and has not had it again because she was doing fine. Migraines are not as frequent any longer, once every 2-3 months now. She never had low grade headache in the past. This headache is not associated with light or sound sensitivity, nausea, or vomiting, or right body sensory disturbance. It is present when she wakes up, goes away for a little in the day and comes back before she goes to sleep. She takes advil or aleve, and uses medical marijuana, but it doesn't go away. Takes full dosing per the bottle label 3 days per week.    She has had new sexual issues over the past year- intercourse is exhausting, and her whole body pain is exacerbated, she also has trouble  with arousal and coming to orgasm.    Mood is poor- she is depressed, tearful, anxious. Has history of panic attacks, not recently. Does not see a Psychiatrist and is not currently on any medication for her mood. She is worried about exacerbating weight gain and fatigue by taking a medication.    PMH:   None    PSH:   Partial hysterectomy 2015  C section x2   Varicose vein removal right leg    Family history:   Maternal grandmother MS  Mom fibromyalgia    Social history:   Never smoker  Rare alcohol (less than monthly)  No drug use except medicinal marijuana    PHYSICAL EXAMINATION  There were no vitals taken for this visit.  General: Adult female patient, seated on chair, NAD, accompanied by .  Neuro:  Mental status: Awake, alert, attentive, oriented to self, time, place, and circumstance. Language is fluent and coherent.  Cranial nerves: VFF, pupils equal 4 mm, briskly reactive to direct and consensual light, conjugate gaze, EOMI, no nystagmus, face symmetric, sensation intact, tongue and uvula midline, no dysarthria, shoulder shrug strong.  Motor: Normal bulk and tone, no abnormal movements. 5/5 strength BUEs and BLEs, proximally and distally.  Sensory: Intact to light touch, vibration, and pin in 4/4 extremities.  Reflexes: 2+ and symmetric biceps, brachioradialis, triceps, patellae, achilles. No clonus, toes down-going.  Coordination: FNF and HS without ataxia or dysmetria.  Gait: Normal stance and casual gait. Intact tandem.    Investigations:   MRI brain with and without contrast 11/5/2019 reviewed, multiple periventricular, subcortical, and pericallosal white matter T2/FLAIR signal abnormalities, no new lesions when compared to study from 4/16/2019.    Assessment and plan:  Chayo Reid is a 49 year old female with multiple sclerosis who presents for routine follow up. She has had no clinical or radiographic progression since starting ocrelizumab. She has several symptoms which may be in part  caused by or at least influenced by her demyelinating disease.    1. Multiple sclerosis  -we will recheck CD19 B cell count today and contact her about either proceeding with her scheduled infusion, or cancelling, and checking every 2 months or so until the absolute count is in the  range.  -continue ocrelizumab per the above planned schedule, does not need to be strictly every 6 months  -RTC in 6 months    2. Pain  -continue medicinal marijuana  -trial of tramadol 50 mg 1-2 tabs TID PRN breakthrough pain  -consider Pain Clinic consultation if this is not sufficient, referral in place already, will wait to schedule until this is tried    3. Gait instability  Will continue to monitor, consider PT evaluation for gait aid if symptoms progress/worsen.    4. Sexual dysfunction  Discussed B&O supprettes for pelvic pain and use of tramadol for diffuse pain. Will wait to see if the above plan with tramadol improves, but could consider these in the future.     5. Mood disorder  Does not currently see a Psychiatrist or take an antidepressant. Could consider this.    6. Sleep disturbance- excessive daytime sleepiness and difficulty sleeping overnight, may be a result of use of stimulants for fatigue and fatigue is likely worsened by sleep disturbance. Discussed coming off of stimulant at least a few days of the week to combat physiologic tolerance of stimulant, or coming off entirely to see if insomnia improves. Will keep things the same for now and see what Sleep Medicine has to say.  -Sleep Medicine consultation    The patient was seen and discussed with the attending, Dr. Stuart.    Alycia Randolph MD  Neurology PGY-4  [phone]    Answers for HPI/ROS submitted by the patient on 3/9/2020   General Symptoms: Yes  Skin Symptoms: No  HENT Symptoms: No  EYE SYMPTOMS: Yes  HEART SYMPTOMS: No  LUNG SYMPTOMS: No  INTESTINAL SYMPTOMS: Yes  URINARY SYMPTOMS: No  GYNECOLOGIC SYMPTOMS: No  BREAST SYMPTOMS: No  SKELETAL  SYMPTOMS: Yes  BLOOD SYMPTOMS: No  NERVOUS SYSTEM SYMPTOMS: Yes  MENTAL HEALTH SYMPTOMS: No  Fever: No  Loss of appetite: Yes  Weight loss: No  Weight gain: Yes  Fatigue: Yes  Night sweats: No  Chills: No  Increased stress: No  Excessive hunger: No  Excessive thirst: No  Feeling hot or cold when others believe the temperature is normal: No  Loss of height: No  Post-operative complications: No  Surgical site pain: No  Hallucinations: No  Change in or Loss of Energy: No  Hyperactivity: No  Confusion: No  Eye pain: Yes  Vision loss: No  Dry eyes: No  Watery eyes: No  Eye bulging: No  Double vision: No  Flashing of lights: No  Spots: No  Floaters: No  Redness: No  Crossed eyes: No  Tunnel Vision: No  Yellowing of eyes: No  Eye irritation: No  Heart burn or indigestion: Yes  Nausea: No  Vomiting: No  Abdominal pain: No  Bloating: Yes  Constipation: No  Diarrhea: No  Blood in stool: No  Black stools: No  Rectal or Anal pain: No  Fecal incontinence: No  Yellowing of skin or eyes: No  Vomit with blood: No  Change in stools: No  Back pain: No  Muscle aches: Yes  Neck pain: No  Swollen joints: No  Joint pain: No  Bone pain: No  Muscle cramps: No  Muscle weakness: No  Joint stiffness: No  Bone fracture: No  Trouble with coordination: No  Dizziness or trouble with balance: No  Fainting or black-out spells: No  Memory loss: No  Headache: Yes  Seizures: No  Speech problems: No  Tingling: Yes  Tremor: No  Weakness: No  Difficulty walking: No  Paralysis: No  Numbness: No     I saw and examined this patient, and have read and edited the resident's note.  I agree with the findings, assessment, and plan.  I spent 42 minutes with the patient, greater than 50% in counseling and coordination of care and reviewing her most recent MRI scans.  Chayo does not have a typical history of MS relapses, but her MRI scans show evidence of accumulating lesions typical of MS.  She had new enhancing lesions in 2018 that led to treatment chance to  ocrelizumab.  She had new lesions described on 04/2019 MRI report, but I do not see any.  C spine MRI from 02/2017 shows a small T 2 lesion on the right hemicord at C2.  T spine MRI was normal at that time.  Her main complaints are of chronic pain and poor sleep, and we discussed those in detail. I doubt her chronic pain is due to MS, but cannot determine that unequivocally.   Dr. Flores had tried several medications for neuropathic pain, as above, but none were effective and well-tolerated.  Will try tramadol for breakthrough neuropathic pain.  Sleep medicine referral for her chronic poor sleep/daytime sleepiness.    Umer Stuart MD

## 2020-03-11 ENCOUNTER — HOME INFUSION (PRE-WILLOW HOME INFUSION) (OUTPATIENT)
Dept: PHARMACY | Facility: CLINIC | Age: 50
End: 2020-03-11

## 2020-03-12 NOTE — PROGRESS NOTES
This is a recent snapshot of the patient's [facility] medical record.  For current drug dose and complete information and questions, call [phone] or In Basket pool, fv home infusion ([id number])  CSN Number:  [id number]

## 2020-03-26 NOTE — ADDENDUM NOTE
Encounter addended by: Alpers, Allison E, SLP on: 3/26/2020 9:05 AM   Actions taken: Episode resolved

## 2020-04-01 ENCOUNTER — APPOINTMENT (OUTPATIENT)
Dept: LAB | Facility: CLINIC | Age: 50
End: 2020-04-01
Attending: NURSE PRACTITIONER
Payer: COMMERCIAL

## 2020-04-17 ENCOUNTER — MYC REFILL (OUTPATIENT)
Dept: NEUROLOGY | Facility: CLINIC | Age: 50
End: 2020-04-17

## 2020-04-17 DIAGNOSIS — G35 MS (MULTIPLE SCLEROSIS) (H): ICD-10-CM

## 2020-04-17 DIAGNOSIS — R53.83 OTHER FATIGUE: ICD-10-CM

## 2020-04-17 DIAGNOSIS — M79.2 NEUROPATHIC PAIN: ICD-10-CM

## 2020-04-17 RX ORDER — TRAMADOL HYDROCHLORIDE 50 MG/1
TABLET ORAL
Qty: 180 TABLET | Refills: 5 | OUTPATIENT
Start: 2020-04-17

## 2020-04-21 RX ORDER — DEXTROAMPHETAMINE SACCHARATE, AMPHETAMINE ASPARTATE MONOHYDRATE, DEXTROAMPHETAMINE SULFATE AND AMPHETAMINE SULFATE 7.5; 7.5; 7.5; 7.5 MG/1; MG/1; MG/1; MG/1
30 CAPSULE, EXTENDED RELEASE ORAL DAILY
Qty: 30 CAPSULE | Refills: 0 | Status: SHIPPED | OUTPATIENT
Start: 2020-04-21 | End: 2020-09-26

## 2020-04-29 ENCOUNTER — HOME INFUSION (PRE-WILLOW HOME INFUSION) (OUTPATIENT)
Dept: PHARMACY | Facility: CLINIC | Age: 50
End: 2020-04-29

## 2020-04-30 NOTE — PROGRESS NOTES
This is a recent snapshot of the patient's [facility] medical record.  For current drug dose and complete information and questions, call [phone] or In Basket pool, fv home infusion ([id number])  CSN Number:  [id number]

## 2020-05-01 ENCOUNTER — APPOINTMENT (OUTPATIENT)
Dept: LAB | Facility: CLINIC | Age: 50
End: 2020-05-01
Attending: NURSE PRACTITIONER
Payer: COMMERCIAL

## 2020-05-11 DIAGNOSIS — G35 MS (MULTIPLE SCLEROSIS) (H): Primary | ICD-10-CM

## 2020-06-24 ENCOUNTER — HOME INFUSION (PRE-WILLOW HOME INFUSION) (OUTPATIENT)
Dept: PHARMACY | Facility: CLINIC | Age: 50
End: 2020-06-24

## 2020-06-25 NOTE — PROGRESS NOTES
This is a recent snapshot of the patient's [facility] medical record.  For current drug dose and complete information and questions, call [phone] or In [facility] pool, fv home infusion ([id number])  CSN Number:  [id number]

## 2020-07-08 ENCOUNTER — HOME INFUSION (PRE-WILLOW HOME INFUSION) (OUTPATIENT)
Dept: PHARMACY | Facility: CLINIC | Age: 50
End: 2020-07-08

## 2020-07-09 NOTE — PROGRESS NOTES
This is a recent snapshot of the patient's [facility] medical record.  For current drug dose and complete information and questions, call [phone] or In Basket pool, fv home infusion ([id number])  CSN Number:  [id number]

## 2020-07-22 ENCOUNTER — HOME INFUSION (PRE-WILLOW HOME INFUSION) (OUTPATIENT)
Dept: PHARMACY | Facility: CLINIC | Age: 50
End: 2020-07-22

## 2020-07-23 NOTE — PROGRESS NOTES
This is a recent snapshot of the patient's [facility] medical record.  For current drug dose and complete information and questions, call [phone] or In Basket pool, fv home infusion ([id number])  CSN Number:  [id number]

## 2020-07-29 ENCOUNTER — HOME INFUSION (PRE-WILLOW HOME INFUSION) (OUTPATIENT)
Dept: PHARMACY | Facility: CLINIC | Age: 50
End: 2020-07-29

## 2020-07-30 NOTE — PROGRESS NOTES
This is a recent snapshot of the patient's [facility] medical record.  For current drug dose and complete information and questions, call [phone] or In Basket pool, fv home infusion ([id number])  CSN Number:  [id number]

## 2020-07-31 ENCOUNTER — HOME INFUSION (PRE-WILLOW HOME INFUSION) (OUTPATIENT)
Dept: PHARMACY | Facility: CLINIC | Age: 50
End: 2020-07-31

## 2020-08-01 ENCOUNTER — APPOINTMENT (OUTPATIENT)
Dept: LAB | Facility: CLINIC | Age: 50
End: 2020-08-01
Attending: NURSE PRACTITIONER
Payer: COMMERCIAL

## 2020-08-03 NOTE — PROGRESS NOTES
This is a recent snapshot of the patient's [facility] medical record.  For current drug dose and complete information and questions, call [phone] or In Basket pool, fv home infusion ([id number])  CSN Number:  [id number]

## 2020-08-13 ENCOUNTER — HOME INFUSION (PRE-WILLOW HOME INFUSION) (OUTPATIENT)
Dept: PHARMACY | Facility: CLINIC | Age: 50
End: 2020-08-13

## 2020-08-14 NOTE — PROGRESS NOTES
This is a recent snapshot of the patient's [facility] medical record.  For current drug dose and complete information and questions, call [phone] or In Basket pool, fv home infusion ([id number])  CSN Number:  [id number]

## 2020-08-17 ENCOUNTER — TELEPHONE (OUTPATIENT)
Dept: PHARMACY | Facility: CLINIC | Age: 50
End: 2020-08-17

## 2020-08-17 NOTE — TELEPHONE ENCOUNTER
[facility] PRIOR AUTHORIZATION REQUEST     Drug including DOSE:OCREVUS 600MG Q6MOS  J Code:   NDC: [address]-22  ICD 10 code: G35    Date(s) of Service: 9/10/20-9/10/21    Insurance Name: [facility]  Insurance ID: [id number]    Provider: KAYCEE MARTIN  Provider NPI: [id number]      [facility]  NPI: [id number]

## 2020-08-19 ENCOUNTER — TRANSFERRED RECORDS (OUTPATIENT)
Dept: HEALTH INFORMATION MANAGEMENT | Facility: CLINIC | Age: 50
End: 2020-08-19

## 2020-08-20 NOTE — TELEPHONE ENCOUNTER
Prior Authorization Approval    Authorization Effective Date: 8/20/2020  Authorization Expiration Date: 8/20/2021  Medication: OCREVUS  Approved Dose/Quantity: 600mg q 6 months  Reference #: [id number]   Insurance Company: [employer] ([facility]) - Phone [phone] Fax [phone]  Which Pharmacy is filling the prescription (Not needed for infusion/clinic administered): [facility]  Pharmacy Notified: Yes

## 2020-08-20 NOTE — TELEPHONE ENCOUNTER
PA Initiation    Medication: OCREVUS  Insurance Company: [employer] ([facility]) - Phone [phone] Fax [phone]  Pharmacy Filling the Rx: [facility]  Filling Pharmacy Phone: [phone]  Filling Pharmacy Fax:    Start Date: 8/20/2020    [facility] Prior Authorization Team   Phone: [phone]

## 2020-08-24 ENCOUNTER — TELEPHONE (OUTPATIENT)
Dept: NEUROLOGY | Facility: CLINIC | Age: 50
End: 2020-08-24

## 2020-08-24 NOTE — TELEPHONE ENCOUNTER
Patient's most recent CD19 count shows B cell repopulation; Patient is due for her next Ocrevus infusion per Dr. Stuart; New Ocrevus therapy plan orders placed and routed to Dr. Stuart for review and signature; I will call [facility] to start referral process, once the orders have been signed.    Polly Rodríguez, MS RN Care Coordinator

## 2020-09-01 ENCOUNTER — APPOINTMENT (OUTPATIENT)
Dept: LAB | Facility: CLINIC | Age: 50
End: 2020-09-01
Attending: NURSE PRACTITIONER
Payer: COMMERCIAL

## 2020-09-01 NOTE — TELEPHONE ENCOUNTER
Patient is scheduled at [facility] for 9/11/20.    Polly Rodríguez, MS RN Care Coordinator

## 2020-09-09 ENCOUNTER — HOME INFUSION (PRE-WILLOW HOME INFUSION) (OUTPATIENT)
Dept: PHARMACY | Facility: CLINIC | Age: 50
End: 2020-09-09

## 2020-09-10 ENCOUNTER — HOME INFUSION (PRE-WILLOW HOME INFUSION) (OUTPATIENT)
Dept: PHARMACY | Facility: CLINIC | Age: 50
End: 2020-09-10

## 2020-09-10 NOTE — PROGRESS NOTES
This is a recent snapshot of the patient's [facility] medical record.  For current drug dose and complete information and questions, call [phone] or In Basket pool, fv home infusion ([id number])  CSN Number:  [id number]

## 2020-09-11 ENCOUNTER — HOME INFUSION (PRE-WILLOW HOME INFUSION) (OUTPATIENT)
Dept: PHARMACY | Facility: CLINIC | Age: 50
End: 2020-09-11

## 2020-09-11 NOTE — PROGRESS NOTES
This is a recent snapshot of the patient's [facility] medical record.  For current drug dose and complete information and questions, call [phone] or In Basket pool, fv home infusion ([id number])  CSN Number:  [id number]

## 2020-09-14 ENCOUNTER — HOSPITAL ENCOUNTER (OUTPATIENT)
Dept: MRI IMAGING | Facility: CLINIC | Age: 50
Discharge: HOME | End: 2020-09-14
Attending: PSYCHIATRY & NEUROLOGY | Admitting: PSYCHIATRY & NEUROLOGY
Payer: COMMERCIAL

## 2020-09-14 DIAGNOSIS — G35 MS (MULTIPLE SCLEROSIS) (H): ICD-10-CM

## 2020-09-14 PROCEDURE — 70551 MRI BRAIN STEM W/O DYE: CPT

## 2020-09-14 NOTE — PROGRESS NOTES
This is a recent snapshot of the patient's [facility] medical record.  For current drug dose and complete information and questions, call [phone] or In Basket pool, fv home infusion ([id number])  CSN Number:  [id number]

## 2020-09-15 ENCOUNTER — OFFICE VISIT (OUTPATIENT)
Dept: NEUROLOGY | Facility: CLINIC | Age: 50
End: 2020-09-15
Attending: PSYCHIATRY & NEUROLOGY
Payer: COMMERCIAL

## 2020-09-15 ENCOUNTER — MEDICAL CORRESPONDENCE (OUTPATIENT)
Dept: HEALTH INFORMATION MANAGEMENT | Facility: CLINIC | Age: 50
End: 2020-09-15

## 2020-09-15 VITALS
DIASTOLIC BLOOD PRESSURE: 100 MMHG | HEIGHT: 66 IN | HEART RATE: 84 BPM | BODY MASS INDEX: 31.79 KG/M2 | SYSTOLIC BLOOD PRESSURE: 144 MMHG | OXYGEN SATURATION: 98 % | WEIGHT: 197.8 LBS

## 2020-09-15 DIAGNOSIS — G89.29 OTHER CHRONIC PAIN: ICD-10-CM

## 2020-09-15 DIAGNOSIS — G35 MS (MULTIPLE SCLEROSIS) (H): Primary | ICD-10-CM

## 2020-09-15 PROCEDURE — G0463 HOSPITAL OUTPT CLINIC VISIT: HCPCS | Mod: ZF

## 2020-09-15 ASSESSMENT — MIFFLIN-ST. JEOR: SCORE: 1528.96

## 2020-09-15 ASSESSMENT — PAIN SCALES - GENERAL: PAINLEVEL: NO PAIN (0)

## 2020-09-15 NOTE — Clinical Note
9/15/2020       RE: Chayo Reid  [address] [facility] [address]     Dear Colleague,    Thank you for referring your patient, Chayo Reid, to the [facility] MULTIPLE SCLEROSIS at [facility]. Please see a copy of my visit note below.    Service Date: 09/15/2020      REASON FOR VISIT:  Chayo Reid is a 50-year-old woman whom I follow for multiple sclerosis.  She returns for routine followup.  I last saw her in March of this year.      HISTORY OF PRESENT ILLNESS:  Chayo has her next ocrelizumab infusion scheduled for 10/01.  Her previous one was last fall, she thinks probably in September.  CD19 count on 08/20 was 148, reflecting that it was time for the next infusion at that point.  In March, they were still depleted with an absolute count of 4.  This suggests that probably about every 10 months or so would be the proper infusion schedule.  In terms of symptoms, she mainly still complains of chronic pain.  She notes her arms hurt when she has to lift something, and she really just has a diffuse myofascial pain.  I tried adding some tramadol at last visit, and she says that helps somewhat.  She uses medical cannabis, which also is somewhat helpful.  She tried previously tricyclics, duloxetine and multiple other medications under Dr. Flores's care, all of which were only temporarily effective, not effective or not tolerated.  Dr. Flores had encouraged her to establish with a pain specialty program, but she has not done so, although she does feel that she is ready for that now.  I doubt that most of this pain is related to multiple sclerosis, as it is not really neuropathic in character.  Perhaps modalities like biofeedback, acupuncture, etc., would be helpful, and we will make a Pain Clinic referral for her.      She has not had any symptoms suggestive of MS relapse or MS disease progression.  Her gait is doing fine as is bladder function, motor function in  the arms, etc.  No new vision loss, diplopia, vertigo, numbness or tingling.  She does have some daily fatigue.      PHYSICAL EXAMINATION:  Blood pressure 144/100.  Pulse 84.  Weight 197 pounds.  She is alert and oriented.  Affect is bright, and language functions are normal.  Cranial nerves are unremarkable.  Muscle bulk, tone, strength and dexterity are normal in the arms and legs.  Finger tapping, toe tapping and finger-nose-finger are unremarkable.  Reflexes are normal and symmetric, and her gait is narrow based and stable.      We reviewed together her MRI of the brain done yesterday and compared it to the prior exam from 2019.  Again seen is a low burden of T2 hyperintense lesions in the cerebral white matter consistent with multiple sclerosis.  There are periventricular lesions as well as a few juxtacortical lesions.  There were no new lesions compared to the prior exam.  No contrast was given.      IMPRESSION:   1.  Multiple sclerosis, likely relapsing remitting, clinically and radiologically stable on ocrelizumab.   2.  Chronic pain.      I spent 32 minutes with Tho, greater than 50% of which was spent in counseling and coordination of care and reviewing her MRI.  We discussed the chronic pain and how at this point I am basically out of options for new things to try and the advisability of a specialty pain clinic.  We discussed the timing of ocrelizumab infusions as well as COVID-19 in the context of MS and B-cell depletion.  Data available so far do not suggest a drastically increased risk on that medication, but I emphasized that she should still take all possible measures to avoid infection.      PLAN:   1.  Recheck CD19 count next July-August.   2.  Pain Clinic referral.   3.  Follow up in 6 months with Loren Lennon.         SAI FONTENOT MD             D: 2020   T: 2020   MT: rebecca      Name:     HTO BENITEZ   MRN:      [id number]        Account:      [id number]   :       1970           Service Date: 09/15/2020      Document: [id number]       Again, thank you for allowing me to participate in the care of your patient.      Sincerely,    Umer Stuart MD

## 2020-09-15 NOTE — LETTER
9/15/2020       RE: Chayo Reid  [address] [place] [facility] [address]     Dear Colleague,    Thank you for referring your patient, Chayo Reid, to the [facility] MULTIPLE SCLEROSIS at [facility]. Please see a copy of my visit note below.    Service Date: 09/15/2020      REASON FOR VISIT:  Chayo Reid is a 50-year-old woman whom I follow for multiple sclerosis.  She returns for routine followup.  I last saw her in March of this year.      HISTORY OF PRESENT ILLNESS:  Chayo has her next ocrelizumab infusion scheduled for 10/01.  Her previous one was last fall, she thinks probably in September.  CD19 count on 08/20 was 148, reflecting that it was time for the next infusion at that point.  In March, they were still depleted with an absolute count of 4.  This suggests that probably about every 10 months or so would be the proper infusion schedule.  In terms of symptoms, she mainly still complains of chronic pain.  She notes her arms hurt when she has to lift something, and she really just has a diffuse myofascial pain.  I tried adding some tramadol at last visit, and she says that helps somewhat.  She uses medical cannabis, which also is somewhat helpful.  She tried previously tricyclics, duloxetine and multiple other medications under Dr. Flores's care, all of which were only temporarily effective, not effective or not tolerated.  Dr. Flores had encouraged her to establish with a pain specialty program, but she has not done so, although she does feel that she is ready for that now.  I doubt that most of this pain is related to multiple sclerosis, as it is not really neuropathic in character.  Perhaps modalities like biofeedback, acupuncture, etc., would be helpful, and we will make a Pain Clinic referral for her.      She has not had any symptoms suggestive of MS relapse or MS disease progression.  Her gait is doing fine as is bladder function, motor function in  the arms, etc.  No new vision loss, diplopia, vertigo, numbness or tingling.  She does have some daily fatigue.      PHYSICAL EXAMINATION:  Blood pressure 144/100.  Pulse 84.  Weight 197 pounds.  She is alert and oriented.  Affect is bright, and language functions are normal.  Cranial nerves are unremarkable.  Muscle bulk, tone, strength and dexterity are normal in the arms and legs.  Finger tapping, toe tapping and finger-nose-finger are unremarkable.  Reflexes are normal and symmetric, and her gait is narrow based and stable.      We reviewed together her MRI of the brain done yesterday and compared it to the prior exam from 11/2019.  Again seen is a low burden of T2 hyperintense lesions in the cerebral white matter consistent with multiple sclerosis.  There are periventricular lesions as well as a few juxtacortical lesions.  There were no new lesions compared to the prior exam.  No contrast was given.      IMPRESSION:   1.  Multiple sclerosis, likely relapsing remitting, clinically and radiologically stable on ocrelizumab.   2.  Chronic pain.      I spent 32 minutes with Chayo, greater than 50% of which was spent in counseling and coordination of care and reviewing her MRI.  We discussed the chronic pain and how at this point I am basically out of options for new things to try and the advisability of a specialty pain clinic.  We discussed the timing of ocrelizumab infusions as well as COVID-19 in the context of MS and B-cell depletion.  Data available so far do not suggest a drastically increased risk on that medication, but I emphasized that she should still take all possible measures to avoid infection.      PLAN:   1.  Recheck CD19 count next July-August.   2.  Pain Clinic referral.   3.  Follow up in 6 months with Loren Lennon.     Again, thank you for allowing me to participate in the care of your patient.  Sincerely,        SAI FONTENOT MD  D: 09/16/2020   T: 09/16/2020   MT: rebecca      Name:      EMMANUELTHO   MRN:      [id number]        Account:      [id number]   :      1970           Service Date: 09/15/2020      Document: [id number]

## 2020-09-16 NOTE — PROGRESS NOTES
Service Date: 09/15/2020      REASON FOR VISIT:  Chayo Reid is a 50-year-old woman whom I follow for multiple sclerosis.  She returns for routine followup.  I last saw her in March of this year.      HISTORY OF PRESENT ILLNESS:  Chayo has her next ocrelizumab infusion scheduled for 10/01.  Her previous one was last fall, she thinks probably in September.  CD19 count on 08/20 was 148, reflecting that it was time for the next infusion at that point.  In March, they were still depleted with an absolute count of 4.  This suggests that probably about every 10 months or so would be the proper infusion schedule.  In terms of symptoms, she mainly still complains of chronic pain.  She notes her arms hurt when she has to lift something, and she really just has a diffuse myofascial pain.  I tried adding some tramadol at last visit, and she says that helps somewhat.  She uses medical cannabis, which also is somewhat helpful.  She tried previously tricyclics, duloxetine and multiple other medications under Dr. Flores's care, all of which were only temporarily effective, not effective or not tolerated.  Dr. Flores had encouraged her to establish with a pain specialty program, but she has not done so, although she does feel that she is ready for that now.  I doubt that most of this pain is related to multiple sclerosis, as it is not really neuropathic in character.  Perhaps modalities like biofeedback, acupuncture, etc., would be helpful, and we will make a Pain Clinic referral for her.      She has not had any symptoms suggestive of MS relapse or MS disease progression.  Her gait is doing fine as is bladder function, motor function in the arms, etc.  No new vision loss, diplopia, vertigo, numbness or tingling.  She does have some daily fatigue.      PHYSICAL EXAMINATION:  Blood pressure 144/100.  Pulse 84.  Weight 197 pounds.  She is alert and oriented.  Affect is bright, and language functions are normal.  Cranial nerves are  unremarkable.  Muscle bulk, tone, strength and dexterity are normal in the arms and legs.  Finger tapping, toe tapping and finger-nose-finger are unremarkable.  Reflexes are normal and symmetric, and her gait is narrow based and stable.      We reviewed together her MRI of the brain done yesterday and compared it to the prior exam from 2019.  Again seen is a low burden of T2 hyperintense lesions in the cerebral white matter consistent with multiple sclerosis.  There are periventricular lesions as well as a few juxtacortical lesions.  There were no new lesions compared to the prior exam.  No contrast was given.      IMPRESSION:   1.  Multiple sclerosis, likely relapsing remitting, clinically and radiologically stable on ocrelizumab.   2.  Chronic pain.      I spent 32 minutes with Tho, greater than 50% of which was spent in counseling and coordination of care and reviewing her MRI.  We discussed the chronic pain and how at this point I am basically out of options for new things to try and the advisability of a specialty pain clinic.  We discussed the timing of ocrelizumab infusions as well as COVID-19 in the context of MS and B-cell depletion.  Data available so far do not suggest a drastically increased risk on that medication, but I emphasized that she should still take all possible measures to avoid infection.      PLAN:   1.  Recheck CD19 count next July-August.   2.  Pain Clinic referral.   3.  Follow up in 6 months with Loren Lennon.         SAI FONTENOT MD             D: 2020   T: 2020   MT: rebecca      Name:     THO BENITEZ   MRN:      [id number]        Account:      [id number]   :      1970           Service Date: 09/15/2020      Document: [id number]

## 2020-09-22 ENCOUNTER — MEDICAL CORRESPONDENCE (OUTPATIENT)
Dept: HEALTH INFORMATION MANAGEMENT | Facility: CLINIC | Age: 50
End: 2020-09-22

## 2020-09-26 ENCOUNTER — MYC REFILL (OUTPATIENT)
Dept: NEUROLOGY | Facility: CLINIC | Age: 50
End: 2020-09-26

## 2020-09-26 DIAGNOSIS — G35 MS (MULTIPLE SCLEROSIS) (H): ICD-10-CM

## 2020-09-26 DIAGNOSIS — R53.83 OTHER FATIGUE: ICD-10-CM

## 2020-09-28 RX ORDER — DEXTROAMPHETAMINE SACCHARATE, AMPHETAMINE ASPARTATE MONOHYDRATE, DEXTROAMPHETAMINE SULFATE AND AMPHETAMINE SULFATE 7.5; 7.5; 7.5; 7.5 MG/1; MG/1; MG/1; MG/1
30 CAPSULE, EXTENDED RELEASE ORAL DAILY
Qty: 30 CAPSULE | Refills: 0 | Status: SHIPPED | OUTPATIENT
Start: 2020-09-28 | End: 2020-10-26 | Stop reason: ALTCHOICE

## 2020-09-28 RX ORDER — DEXTROAMPHETAMINE SACCHARATE, AMPHETAMINE ASPARTATE MONOHYDRATE, DEXTROAMPHETAMINE SULFATE AND AMPHETAMINE SULFATE 5; 5; 5; 5 MG/1; MG/1; MG/1; MG/1
20 CAPSULE, EXTENDED RELEASE ORAL 2 TIMES DAILY
Qty: 60 CAPSULE | Refills: 0 | Status: SHIPPED | OUTPATIENT
Start: 2020-09-28 | End: 2020-09-30

## 2020-09-28 NOTE — TELEPHONE ENCOUNTER
Patient sent [employer] message requesting refill of their Adderall; Patient was last seen in September and has follow up appointment in March with Dr. Stuart; Pended rx to Dr. Stuart for signature and will send electronically to the pharmacy once signed.    Polly Rodríguez, MS RN Care Coordinator

## 2020-09-29 ENCOUNTER — TELEPHONE (OUTPATIENT)
Dept: NEUROLOGY | Facility: CLINIC | Age: 50
End: 2020-09-29

## 2020-09-29 DIAGNOSIS — R53.83 OTHER FATIGUE: ICD-10-CM

## 2020-09-29 DIAGNOSIS — G35 MS (MULTIPLE SCLEROSIS) (H): ICD-10-CM

## 2020-09-29 RX ORDER — DEXTROAMPHETAMINE SACCHARATE, AMPHETAMINE ASPARTATE MONOHYDRATE, DEXTROAMPHETAMINE SULFATE AND AMPHETAMINE SULFATE 5; 5; 5; 5 MG/1; MG/1; MG/1; MG/1
20 CAPSULE, EXTENDED RELEASE ORAL 2 TIMES DAILY
Qty: 60 CAPSULE | Refills: 0 | Status: CANCELLED | OUTPATIENT
Start: 2020-09-29

## 2020-09-29 NOTE — TELEPHONE ENCOUNTER
Insurance doesn't allow #60 for the patient's Adderall XR 20mg capsules; Dr. Stuart, please send alternate prescription; Thank you.    Polly Rodríguez, MS RN Care Coordinator

## 2020-09-29 NOTE — TELEPHONE ENCOUNTER
Requesting refill of their Adderall; Patient was last seen on 9/15/2020 and has follow up appointment on 3/15?2021 with Loren. Pended rx to Narciso for signature and will send electronically to the pharmacy once signed.    FYI: NOTE FROM PHARMACY: Patient requests a new RX: Insurance doesn't allow more than 1 cap of adderall XR 20mg per day    Trini Sena MA

## 2020-09-30 RX ORDER — DEXTROAMPHETAMINE SACCHARATE, AMPHETAMINE ASPARTATE MONOHYDRATE, DEXTROAMPHETAMINE SULFATE AND AMPHETAMINE SULFATE 5; 5; 5; 5 MG/1; MG/1; MG/1; MG/1
20 CAPSULE, EXTENDED RELEASE ORAL DAILY
Qty: 60 CAPSULE | Refills: 0 | Status: SHIPPED | OUTPATIENT
Start: 2020-09-30 | End: 2020-10-26 | Stop reason: ALTCHOICE

## 2020-10-01 ENCOUNTER — APPOINTMENT (OUTPATIENT)
Dept: LAB | Facility: CLINIC | Age: 50
End: 2020-10-01
Attending: PSYCHIATRY & NEUROLOGY
Payer: COMMERCIAL

## 2020-10-01 ENCOUNTER — HOME INFUSION (PRE-WILLOW HOME INFUSION) (OUTPATIENT)
Dept: PHARMACY | Facility: CLINIC | Age: 50
End: 2020-10-01

## 2020-10-06 NOTE — PROGRESS NOTES
This is a recent snapshot of the patient's [facility] medical record.  For current drug dose and complete information and questions, call [phone] or In Basket pool, fv home infusion ([id number])  CSN Number:  [id number]

## 2020-10-26 DIAGNOSIS — M79.2 NEUROPATHIC PAIN: ICD-10-CM

## 2020-10-26 NOTE — TELEPHONE ENCOUNTER
Refill request for patient's Tramadol; Patient was last seen in September and has a follow up appointment in March with Loren Lennon Pended rx to Dr. Stuart for signature and will send electronically to the pharmacy once signed.    Marlene Cartagena RN

## 2020-10-27 ENCOUNTER — TELEPHONE (OUTPATIENT)
Dept: NEUROLOGY | Facility: CLINIC | Age: 50
End: 2020-10-27

## 2020-10-27 NOTE — TELEPHONE ENCOUNTER
Central Prior Authorization Team   [phone]    PA Initiation    Medication: modafinil (PROVIGIL) 200 MG tablet  Insurance Company: [employer] ([facility]) - Phone [phone] Fax [phone]  Pharmacy Filling the Rx: [id number] [place] - [address]   Filling Pharmacy Phone: [phone]  Filling Pharmacy Fax: [phone]  Start Date: 10/27/2020

## 2020-10-27 NOTE — TELEPHONE ENCOUNTER
Prior Authorization Approval    Authorization Effective Date: 10/27/2020  Authorization Expiration Date: 10/27/2021  Medication: modafinil (PROVIGIL) 200 MG tablet-PA APPROVED   Approved Dose/Quantity:   Reference #:     Insurance Company: [employer] ([facility]) - Phone [phone] Fax [phone]  Expected CoPay:       CoPay Card Available:      Foundation Assistance Needed:    Which Pharmacy is filling the prescription (Not needed for infusion/clinic administered): [facility] [id number] [place] [address]  Pharmacy Notified: Yes- **Instructed pharmacy to notify patient when script is ready to /ship.**  Patient Notified: Yes

## 2020-10-28 RX ORDER — TRAMADOL HYDROCHLORIDE 50 MG/1
TABLET ORAL
Qty: 180 TABLET | Refills: 3 | Status: SHIPPED | OUTPATIENT
Start: 2020-10-28 | End: 2021-06-03

## 2020-12-01 ENCOUNTER — APPOINTMENT (OUTPATIENT)
Dept: LAB | Facility: CLINIC | Age: 50
End: 2020-12-01
Attending: PSYCHIATRY & NEUROLOGY
Payer: COMMERCIAL

## 2020-12-20 ENCOUNTER — HEALTH MAINTENANCE LETTER (OUTPATIENT)
Age: 50
End: 2020-12-20

## 2021-01-01 ENCOUNTER — APPOINTMENT (OUTPATIENT)
Dept: LAB | Facility: CLINIC | Age: 51
End: 2021-01-01
Attending: PSYCHIATRY & NEUROLOGY
Payer: COMMERCIAL

## 2021-01-05 DIAGNOSIS — R09.81 NASAL CONGESTION: ICD-10-CM

## 2021-01-06 RX ORDER — CETIRIZINE HCL 10 MG
TABLET ORAL
Qty: 24 TABLET | Refills: 1 | Status: SHIPPED | OUTPATIENT
Start: 2021-01-06 | End: 2021-01-06

## 2021-01-06 NOTE — TELEPHONE ENCOUNTER
Received refill request for cetirizine from [facility]; Patient was last seen in September and has follow up appointment in March with Loren Lennon. Pended to Dr. Stuart and will send electronically to pharmacy once signed.    Marlene Cartagena RN

## 2021-01-07 ENCOUNTER — TELEPHONE (OUTPATIENT)
Dept: NEUROLOGY | Facility: CLINIC | Age: 51
End: 2021-01-07

## 2021-01-07 NOTE — TELEPHONE ENCOUNTER
Prior Authorization Approval    Authorization Effective Date: 1/7/2021  Authorization Expiration Date: 1/7/2022  Medication: modafinil (PROVIGIL) 200 MG tablet-PA APPROVED   Approved Dose/Quantity:   Reference #:     Insurance Company: [employer] ([facility]) - Phone [phone] Fax [phone]  Expected CoPay:       CoPay Card Available:      Foundation Assistance Needed:    Which Pharmacy is filling the prescription (Not needed for infusion/clinic administered): [facility] [id number] [place] [address]  Pharmacy Notified: Yes- **Instructed pharmacy to notify patient when script is ready to /ship.**  Patient Notified: Yes

## 2021-01-07 NOTE — TELEPHONE ENCOUNTER
Central Prior Authorization Team   [phone]    PA Initiation    Medication: modafinil (PROVIGIL) 200 MG tablet  Insurance Company: [employer] ([facility]) - Phone [phone] Fax [phone]  Pharm[facility]illing the Rx[id number] [place] - [address]   Filling Pharmacy Phone: [phone]  Filling Pharmacy Fax: [phone]  Start Date: 1/7/2021

## 2021-01-07 NOTE — TELEPHONE ENCOUNTER
Called pharmacy and pharmacy stated that patients ID # had changed even though patient has pharmacy benefit with [employer] ([facility]) and a new PA is Needed with New ID. Called insurance and insurance stated that ID # has changed and new PA is Needed for medication. Open new Encounter for PA Request with new ID #. See New Encounter for status of PA and outcome.

## 2021-01-11 RX ORDER — CETIRIZINE HYDROCHLORIDE, PSEUDOEPHEDRINE HYDROCHLORIDE 5; 120 MG/1; MG/1
TABLET, FILM COATED, EXTENDED RELEASE ORAL
Qty: 24 TABLET | Refills: 1 | Status: SHIPPED | OUTPATIENT
Start: 2021-01-11 | End: 2021-10-13

## 2021-01-28 ENCOUNTER — HOME INFUSION (PRE-WILLOW HOME INFUSION) (OUTPATIENT)
Dept: PHARMACY | Facility: CLINIC | Age: 51
End: 2021-01-28

## 2021-01-29 NOTE — PROGRESS NOTES
This is a recent snapshot of the patient's [facility] medical record.  For current drug dose and complete information and questions, call [phone] or In Basket pool, fv home infusion ([id number])  CSN Number:  [id number]

## 2021-02-01 ENCOUNTER — APPOINTMENT (OUTPATIENT)
Dept: LAB | Facility: CLINIC | Age: 51
End: 2021-02-01
Attending: PSYCHIATRY & NEUROLOGY
Payer: COMMERCIAL

## 2021-02-16 ENCOUNTER — DOCUMENTATION ONLY (OUTPATIENT)
Dept: CARE COORDINATION | Facility: CLINIC | Age: 51
End: 2021-02-16

## 2021-03-01 ENCOUNTER — APPOINTMENT (OUTPATIENT)
Dept: LAB | Facility: CLINIC | Age: 51
End: 2021-03-01
Attending: PSYCHIATRY & NEUROLOGY
Payer: COMMERCIAL

## 2021-03-02 ENCOUNTER — HOME INFUSION (PRE-WILLOW HOME INFUSION) (OUTPATIENT)
Dept: PHARMACY | Facility: CLINIC | Age: 51
End: 2021-03-02

## 2021-03-03 ENCOUNTER — HOSPITAL ENCOUNTER (EMERGENCY)
Facility: CLINIC | Age: 51
End: 2021-03-03
Payer: COMMERCIAL

## 2021-03-03 NOTE — PROGRESS NOTES
This is a recent snapshot of the patient's [facility] medical record.  For current drug dose and complete information and questions, call [phone] or In Basket pool, fv home infusion ([id number])  CSN Number:  [id number]

## 2021-03-15 ENCOUNTER — HOME INFUSION (PRE-WILLOW HOME INFUSION) (OUTPATIENT)
Dept: PHARMACY | Facility: CLINIC | Age: 51
End: 2021-03-15

## 2021-03-16 NOTE — PROGRESS NOTES
This is a recent snapshot of the patient's [facility] medical record.  For current drug dose and complete information and questions, call [phone] or In Basket pool, fv home infusion ([id number])  CSN Number:  [id number]

## 2021-03-22 ENCOUNTER — IMMUNIZATION (OUTPATIENT)
Dept: NURSING | Facility: CLINIC | Age: 51
End: 2021-03-22
Payer: COMMERCIAL

## 2021-03-22 PROCEDURE — 0001A PR COVID VAC PFIZER DIL RECON 30 MCG/0.3 ML IM: CPT

## 2021-03-22 PROCEDURE — 91300 PR COVID VAC PFIZER DIL RECON 30 MCG/0.3 ML IM: CPT

## 2021-04-01 ENCOUNTER — APPOINTMENT (OUTPATIENT)
Dept: LAB | Facility: CLINIC | Age: 51
End: 2021-04-01
Attending: PSYCHIATRY & NEUROLOGY
Payer: COMMERCIAL

## 2021-04-12 ENCOUNTER — IMMUNIZATION (OUTPATIENT)
Dept: NURSING | Facility: CLINIC | Age: 51
End: 2021-04-12
Attending: INTERNAL MEDICINE
Payer: COMMERCIAL

## 2021-04-12 PROCEDURE — 91300 PR COVID VAC PFIZER DIL RECON 30 MCG/0.3 ML IM: CPT

## 2021-04-12 PROCEDURE — 0002A PR COVID VAC PFIZER DIL RECON 30 MCG/0.3 ML IM: CPT

## 2021-04-18 ENCOUNTER — HEALTH MAINTENANCE LETTER (OUTPATIENT)
Age: 51
End: 2021-04-18

## 2021-04-19 ENCOUNTER — HOME INFUSION (PRE-WILLOW HOME INFUSION) (OUTPATIENT)
Dept: PHARMACY | Facility: CLINIC | Age: 51
End: 2021-04-19

## 2021-04-20 NOTE — PROGRESS NOTES
This is a recent snapshot of the patient's [facility] medical record.  For current drug dose and complete information and questions, call [phone] or In Basket pool, fv home infusion ([id number])  CSN Number:  [id number]

## 2021-04-21 ENCOUNTER — HOME INFUSION (PRE-WILLOW HOME INFUSION) (OUTPATIENT)
Dept: PHARMACY | Facility: CLINIC | Age: 51
End: 2021-04-21

## 2021-04-22 NOTE — PROGRESS NOTES
This is a recent snapshot of the patient's [facility] medical record.  For current drug dose and complete information and questions, call [phone] or In Basket pool, fv home infusion ([id number])  CSN Number:  [id number]

## 2021-05-15 DIAGNOSIS — G47.10 EXCESSIVE SLEEPINESS: ICD-10-CM

## 2021-05-17 ENCOUNTER — OFFICE VISIT (OUTPATIENT)
Dept: NEUROLOGY | Facility: CLINIC | Age: 51
End: 2021-05-17
Attending: NURSE PRACTITIONER
Payer: COMMERCIAL

## 2021-05-17 VITALS
OXYGEN SATURATION: 98 % | SYSTOLIC BLOOD PRESSURE: 142 MMHG | DIASTOLIC BLOOD PRESSURE: 96 MMHG | HEART RATE: 97 BPM | RESPIRATION RATE: 16 BRPM

## 2021-05-17 DIAGNOSIS — G35 MS (MULTIPLE SCLEROSIS) (H): ICD-10-CM

## 2021-05-17 DIAGNOSIS — E55.9 VITAMIN D DEFICIENCY: Primary | ICD-10-CM

## 2021-05-17 DIAGNOSIS — E55.9 VITAMIN D DEFICIENCY: ICD-10-CM

## 2021-05-17 PROCEDURE — 36415 COLL VENOUS BLD VENIPUNCTURE: CPT | Performed by: PATHOLOGY

## 2021-05-17 PROCEDURE — 82306 VITAMIN D 25 HYDROXY: CPT | Mod: 90 | Performed by: PATHOLOGY

## 2021-05-17 PROCEDURE — 99214 OFFICE O/P EST MOD 30 MIN: CPT | Performed by: NURSE PRACTITIONER

## 2021-05-17 PROCEDURE — 99000 SPECIMEN HANDLING OFFICE-LAB: CPT | Performed by: PATHOLOGY

## 2021-05-17 PROCEDURE — G0463 HOSPITAL OUTPT CLINIC VISIT: HCPCS

## 2021-05-17 RX ORDER — MODAFINIL 200 MG/1
TABLET ORAL
Qty: 30 TABLET | OUTPATIENT
Start: 2021-05-17

## 2021-05-17 ASSESSMENT — PAIN SCALES - GENERAL: PAINLEVEL: EXTREME PAIN (8)

## 2021-05-17 NOTE — LETTER
"5/17/2021       RE: Chayo Reid  [address]  [place] [facility] [place] [address]     Dear Colleague,    Thank you for referring your patient, Chayo Reid, to the [facility] MULT[facility]LE [facility] [place] at [facility]. Please see a copy of my visit note below.    [facility] OUTPATIENT MULTIPLE SCLEROSIS CLINIC VISIT NOTE    CHIEF COMPLAINT: Chayo is a 50 year old female who presents to the clinic today for regularly scheduled follow up of Relapsing Remitting MS. She was most recently seen by Dr. Stuart on 9/15/2020. She presents to the evaluation alone.      HISTORY OF PRESENT ILLNESS: In 2005, patient was found to have white matter lesions on brain MRI during a headache work up. MRI cervical spine and LP was unremarkable. In 2012, follow-up imaging showed slight increase in white matter lesions. She also had visual evoked potential which showed prolonged abduction in right anterior field. Repeat LP showed 6 oligoclonal bands and normal IgG index. She was diagnosed with Radiologically Isolated Syndrome (RIS). Due to accumulation of lesions in 2013, she was started on GA which she continued till 2018. In Fall, 2018, follow-up MRI brain showed new lesion and treatment was switched to Ocrelizumab.     INTERVAL HISTORY SINCE LAST VISIT: Most recent visit with Dr. Stuart on 9/15/2020. For chronic leg pain, she was referred to Pain clinic and this visit is pending. Today, her main concerns are ongoing pain in her legs and increased weakness in her right leg. She reports this as \"during daytime my inside hurts and at night really bad pain in her legs, right > left and right arm. She also had 2 falls within the past 3-4 weeks as her \"right leg gave out\" while standing up from a sitting position. She currently uses Tramadol at hs which have been helpful. She also uses Medical cannabis tablet at night and this has been helpful. " Previously used Gabapentin, Lyrica and Cymbalta without much help with pain.       Her general health has been stable. No recent infection or illness. She had both doses of Pfizer vaccine, second dose on 4/12/2021. She did well after vaccination. For Multiple Sclerosis, she is currently on Ocrevus and her most recent infusion was on 10/1/2020. CD 19 counts from 3/2021 showed depleted B cells and patient was instructed to have it rechecked in May.     She denies any concerns with vision, swallowing, hearing, bowel or bladder. She reports some word finding difficulty. For fatigue, she currently use Adderall 10 mg twice daily. Due to pain at night, she reports interrupted sleep which is also making her fatigue worse during the day time.     DATA REVIEWED:  9/2020 MRI Brain:  Impression:   The study demonstrates 10-15 foci of T2-hyperintensity within the  cerebral white matter consistent with the diagnosis of multiple  sclerosis. No significant change from prior study. No new lesions.    Vit D: 1000 international unit(s) daily. Most recent level from 11/2019 was 77.     PHYSICAL EXAMINATION:   VITAL SIGNS: BP (!) 142/96   Pulse 97   Resp 16   SpO2 98%    MENTAL STATUS: Alert,awake and oriented times four.   CRANIAL NERVES: Visual fields are full to confrontation. The pupils are equal, round and react to light and there is no Antonio Arlene pupil. Funduscopic examination demonstrates no optic pallor, papilledema or retinal hemorrhage/exudate. Extraocular movements are intact with no internuclear ophthalmoplegia. No nystagmus. Facial strength and sensation are  normal. Hearing is  normal. Palate elevation and tongue protrusion are normal.   POWER: Strength is as follows in the following muscles/groups (Right/Left,MRC scale): Shoulder abduction 4+/5, elbow flexion 5/5, elbow extension 5/5, wrist extension 5/5, digit extension 5/5, digit flexion 4+/5, Hip flexion 4-/5, knee extension 5/5, knee flexion 5/5, foot dorsiflexion  4-/5.   SENSORY: Increased sensitivity to light touch in her right arm > left arm.   REFLEXES: Reflexes are symmetric at biceps, triceps, brachioradialis. Asymmetric knees and ankles rflexes, right > left. MOTOR/CEREBELLAR: There are no tremors, myoclonus or other abnormal movements. Tone is within normal limits in the limbs. There is no appendicular ataxia on finger-to-nose testing and rapid alternating movements are normal in the extremities. There is no pronator drift. Romberg negative.   GAIT: Gait is  narrow-based and steady and the patient is able to walk on heels, toes and in tandem without difficulty.      MEDICAL DECISION MAKIN. Multiple Sclerosis, on Ocrevus since 2018: In , patient was found to have white matter lesions during a headache work-up.  She was diagnosed with a radiologically isolated syndrome in  when imaging showed additional lesions.  Due to accumulation of lesions in , she was started on GA which she continued till 2018. In 2018, follow-up MRI brain showed new lesion and treatment was switched to Ocrelizumab.  Her most recent ocrelizumab infusion was on 10/1/2020.  Patient received both doses of Pfizer vaccine and her second dose was given on . We will check her CD19 cell count today to determine the timing of her next ocrelizumab infusion.    Today's clinical exam shows some weakness in her right upper and lower extremities along with hyperreflexia in her right lower extremity as noted about. MS relapse is very uncommon on ocrelizumab, but theoretically possible.  Will complete brain and spine imaging to rule out new lesions.  I will contact the patient when the results are available.    2.  Chronic pain: Establish care with pain management clinic.  She has tramadol available for as needed use.    3.  Fatigue: Previously treated with the Provigil.  Treatment was switched to Adderall and she currently uses 10 mg twice daily.  She will continue this  dose.    4.  Vitamin D supplementation: Patient currently uses 1000 international units daily.  Her most recent level  from November 2019 was 77.  We will check vitamin D level today and will make dose changes if needed.    5. Please contact us with questions or concerns.     Loren Lennon CNP  [facility] Neurology    32 minutes spent on the date of the encounter on chart review, history and examination, documentation and further activities as noted above

## 2021-05-17 NOTE — NURSING NOTE
Chief Complaint   Patient presents with     MS     [facility] RETURN MULTIPLE SCLEROSIS F/U       Cuauhtemoc Salter, EMT

## 2021-05-17 NOTE — PROGRESS NOTES
"[facility] OUTPATIENT MULTIPLE SCLEROSIS CLINIC VISIT NOTE    CHIEF COMPLAINT: Chayo is a 50 year old female who presents to the clinic today for regularly scheduled follow up of Relapsing Remitting MS. She was most recently seen by Dr. Stuart on 9/15/2020. She presents to the evaluation alone.      HISTORY OF PRESENT ILLNESS: In 2005, patient was found to have white matter lesions on brain MRI during a headache work up. MRI cervical spine and LP was unremarkable. In 2012, follow-up imaging showed slight increase in white matter lesions. She also had visual evoked potential which showed prolonged abduction in right anterior field. Repeat LP showed 6 oligoclonal bands and normal IgG index. She was diagnosed with Radiologically Isolated Syndrome (RIS). Due to accumulation of lesions in 2013, she was started on GA which she continued till 2018. In Fall, 2018, follow-up MRI brain showed new lesion and treatment was switched to Ocrelizumab.     INTERVAL HISTORY SINCE LAST VISIT: Most recent visit with Dr. Stuart on 9/15/2020. For chronic leg pain, she was referred to Pain clinic and this visit is pending. Today, her main concerns are ongoing pain in her legs and increased weakness in her right leg. She reports this as \"during daytime my inside hurts and at night really bad pain in her legs, right > left and right arm. She also had 2 falls within the past 3-4 weeks as her \"right leg gave out\" while standing up from a sitting position. She currently uses Tramadol at hs which have been helpful. She also uses Medical cannabis tablet at night and this has been helpful. Previously used Gabapentin, Lyrica and Cymbalta without much help with pain.       Her general health has been stable. No recent infection or illness. She had both doses of Pfizer vaccine, second dose on 4/12/2021. She did well after vaccination. For Multiple Sclerosis, she is currently on Ocrevus and her most recent infusion was on " 10/1/2020. CD 19 counts from 3/2021 showed depleted B cells and patient was instructed to have it rechecked in May.     She denies any concerns with vision, swallowing, hearing, bowel or bladder. She reports some word finding difficulty. For fatigue, she currently use Adderall 10 mg twice daily. Due to pain at night, she reports interrupted sleep which is also making her fatigue worse during the day time.     DATA REVIEWED:  9/2020 MRI Brain:  Impression:   The study demonstrates 10-15 foci of T2-hyperintensity within the  cerebral white matter consistent with the diagnosis of multiple  sclerosis. No significant change from prior study. No new lesions.    Vit D: 1000 international unit(s) daily. Most recent level from 11/2019 was 77.     PHYSICAL EXAMINATION:   VITAL SIGNS: BP (!) 142/96   Pulse 97   Resp 16   SpO2 98%    MENTAL STATUS: Alert,awake and oriented times four.   CRANIAL NERVES: Visual fields are full to confrontation. The pupils are equal, round and react to light and there is no Antonio Arlene pupil. Funduscopic examination demonstrates no optic pallor, papilledema or retinal hemorrhage/exudate. Extraocular movements are intact with no internuclear ophthalmoplegia. No nystagmus. Facial strength and sensation are  normal. Hearing is  normal. Palate elevation and tongue protrusion are normal.   POWER: Strength is as follows in the following muscles/groups (Right/Left,MRC scale): Shoulder abduction 4+/5, elbow flexion 5/5, elbow extension 5/5, wrist extension 5/5, digit extension 5/5, digit flexion 4+/5, Hip flexion 4-/5, knee extension 5/5, knee flexion 5/5, foot dorsiflexion 4-/5.   SENSORY: Increased sensitivity to light touch in her right arm > left arm.   REFLEXES: Reflexes are symmetric at biceps, triceps, brachioradialis. Asymmetric knees and ankles rflexes, right > left. MOTOR/CEREBELLAR: There are no tremors, myoclonus or other abnormal movements. Tone is within normal limits in the limbs. There  is no appendicular ataxia on finger-to-nose testing and rapid alternating movements are normal in the extremities. There is no pronator drift. Romberg negative.   GAIT: Gait is  narrow-based and steady and the patient is able to walk on heels, toes and in tandem without difficulty.      MEDICAL DECISION MAKIN. Multiple Sclerosis, on Ocrevus since 2018: In , patient was found to have white matter lesions during a headache work-up.  She was diagnosed with a radiologically isolated syndrome in  when imaging showed additional lesions.  Due to accumulation of lesions in , she was started on GA which she continued till 2018. In 2018, follow-up MRI brain showed new lesion and treatment was switched to Ocrelizumab.  Her most recent ocrelizumab infusion was on 10/1/2020.  Patient received both doses of Pfizer vaccine and her second dose was given on . We will check her CD19 cell count today to determine the timing of her next ocrelizumab infusion.    Today's clinical exam shows some weakness in her right upper and lower extremities along with hyperreflexia in her right lower extremity as noted about. MS relapse is very uncommon on ocrelizumab, but theoretically possible.  Will complete brain and spine imaging to rule out new lesions.  I will contact the patient when the results are available.    2.  Chronic pain: Establish care with pain management clinic.  She has tramadol available for as needed use.    3.  Fatigue: Previously treated with the Provigil.  Treatment was switched to Adderall and she currently uses 10 mg twice daily.  She will continue this dose.    4.  Vitamin D supplementation: Patient currently uses 1000 international units daily.  Her most recent level  from 2019 was 77.  We will check vitamin D level today and will make dose changes if needed.    5. Please contact us with questions or concerns.     Loren Lennon, CNP  [facility]      32 minutes spent on the  date of the encounter on chart review, history and examination, documentation and further activities as noted above

## 2021-05-17 NOTE — PATIENT INSTRUCTIONS
1. Labs today. Will contact you when we have the results.     2. MRI Brain and spine in 2 weeks.     3. Pain clinic referral. Please call  [phone] to schedule.

## 2021-05-18 ENCOUNTER — MYC MEDICAL ADVICE (OUTPATIENT)
Dept: NEUROLOGY | Facility: CLINIC | Age: 51
End: 2021-05-18

## 2021-05-18 ENCOUNTER — HOME INFUSION (PRE-WILLOW HOME INFUSION) (OUTPATIENT)
Dept: PHARMACY | Facility: CLINIC | Age: 51
End: 2021-05-18

## 2021-05-18 DIAGNOSIS — G89.29 OTHER CHRONIC PAIN: Primary | ICD-10-CM

## 2021-05-18 LAB — DEPRECATED CALCIDIOL+CALCIFEROL SERPL-MC: 68 UG/L (ref 20–75)

## 2021-05-20 ENCOUNTER — HOME INFUSION (PRE-WILLOW HOME INFUSION) (OUTPATIENT)
Dept: PHARMACY | Facility: CLINIC | Age: 51
End: 2021-05-20

## 2021-05-27 ENCOUNTER — HOSPITAL ENCOUNTER (OUTPATIENT)
Dept: MRI IMAGING | Facility: CLINIC | Age: 51
End: 2021-05-27
Attending: NURSE PRACTITIONER
Payer: COMMERCIAL

## 2021-05-27 DIAGNOSIS — G35 MS (MULTIPLE SCLEROSIS) (H): ICD-10-CM

## 2021-05-27 PROCEDURE — 255N000002 HC RX 255 OP 636: Performed by: NURSE PRACTITIONER

## 2021-05-27 PROCEDURE — 72141 MRI NECK SPINE W/O DYE: CPT

## 2021-05-27 PROCEDURE — 70551 MRI BRAIN STEM W/O DYE: CPT | Mod: 26 | Performed by: STUDENT IN AN ORGANIZED HEALTH CARE EDUCATION/TRAINING PROGRAM

## 2021-05-27 PROCEDURE — A9585 GADOBUTROL INJECTION: HCPCS | Performed by: NURSE PRACTITIONER

## 2021-05-27 PROCEDURE — 72141 MRI NECK SPINE W/O DYE: CPT | Mod: 26 | Performed by: RADIOLOGY

## 2021-05-27 PROCEDURE — 70551 MRI BRAIN STEM W/O DYE: CPT

## 2021-05-27 PROCEDURE — 72146 MRI CHEST SPINE W/O DYE: CPT | Mod: 26 | Performed by: RADIOLOGY

## 2021-05-27 PROCEDURE — 72146 MRI CHEST SPINE W/O DYE: CPT

## 2021-05-27 RX ORDER — GADOBUTROL 604.72 MG/ML
10 INJECTION INTRAVENOUS ONCE
Status: COMPLETED | OUTPATIENT
Start: 2021-05-27 | End: 2021-05-27

## 2021-05-27 RX ADMIN — GADOBUTROL 8.2 ML: 604.72 INJECTION INTRAVENOUS at 12:27

## 2021-05-28 ENCOUNTER — HOME INFUSION (PRE-WILLOW HOME INFUSION) (OUTPATIENT)
Dept: PHARMACY | Facility: CLINIC | Age: 51
End: 2021-05-28

## 2021-05-28 ENCOUNTER — MYC MEDICAL ADVICE (OUTPATIENT)
Dept: NEUROLOGY | Facility: CLINIC | Age: 51
End: 2021-05-28

## 2021-06-01 ENCOUNTER — MYC MEDICAL ADVICE (OUTPATIENT)
Dept: NEUROLOGY | Facility: CLINIC | Age: 51
End: 2021-06-01

## 2021-06-01 DIAGNOSIS — G35 MS (MULTIPLE SCLEROSIS) (H): Primary | ICD-10-CM

## 2021-06-02 ENCOUNTER — HOME INFUSION (PRE-WILLOW HOME INFUSION) (OUTPATIENT)
Dept: PHARMACY | Facility: CLINIC | Age: 51
End: 2021-06-02

## 2021-06-03 ENCOUNTER — MYC REFILL (OUTPATIENT)
Dept: NEUROLOGY | Facility: CLINIC | Age: 51
End: 2021-06-03

## 2021-06-03 DIAGNOSIS — M79.2 NEUROPATHIC PAIN: ICD-10-CM

## 2021-06-03 DIAGNOSIS — G47.10 EXCESSIVE SLEEPINESS: ICD-10-CM

## 2021-06-03 NOTE — TELEPHONE ENCOUNTER
Patient sent [employer] message requesting refill of their tramadol; Patient was last seen in May and has no follow up appointment with Dr. Stuart; Pended rx to Dr. Stuart for signature and will send electronically to the pharmacy once signed.    Polly Rodríguez, MS RN Care Coordinator

## 2021-06-03 NOTE — PROGRESS NOTES
This is a recent snapshot of the patient's [facility] medical record.  For current drug dose and complete information and questions, call [phone] or In Basket pool, fv home infusion ([id number])  CSN Number:  [id number]

## 2021-06-03 NOTE — TELEPHONE ENCOUNTER
Patient sent [employer] message requesting refill of their Adderall; Patient was last seen in May and has no follow up appointment with Dr. Stuart; Pended rx to Dr. Stuart for signature and will send electronically to the pharmacy once signed.    Polly Rodríguez, MS RN Care Coordinator

## 2021-06-04 RX ORDER — TRAMADOL HYDROCHLORIDE 50 MG/1
TABLET ORAL
Qty: 180 TABLET | Refills: 3 | Status: SHIPPED | OUTPATIENT
Start: 2021-06-04 | End: 2021-10-13

## 2021-06-04 RX ORDER — DEXTROAMPHETAMINE SACCHARATE, AMPHETAMINE ASPARTATE, DEXTROAMPHETAMINE SULFATE AND AMPHETAMINE SULFATE 2.5; 2.5; 2.5; 2.5 MG/1; MG/1; MG/1; MG/1
10 TABLET ORAL 2 TIMES DAILY PRN
Qty: 60 TABLET | Refills: 0 | Status: SHIPPED | OUTPATIENT
Start: 2021-06-04 | End: 2021-07-06

## 2021-06-08 ENCOUNTER — TELEPHONE (OUTPATIENT)
Dept: NEUROLOGY | Facility: CLINIC | Age: 51
End: 2021-06-08

## 2021-06-08 DIAGNOSIS — G35 MS (MULTIPLE SCLEROSIS) (H): Primary | ICD-10-CM

## 2021-06-08 NOTE — TELEPHONE ENCOUNTER
Patient is due to have her CD19 checked at this time; Loren Lennon has sent the patient a [employer] message regarding this and the lab has yet to be completed; I will follow up with the patient again next week if she has not had this done.    Polly Rodríguez, MS RN Care Coordinator

## 2021-06-08 NOTE — TELEPHONE ENCOUNTER
MyChart message returned to Chayo with Dr Perez's input; Will await her response.    Polly Rodríguez, MS RN Care Coordinator     9

## 2021-06-09 NOTE — PROGRESS NOTES
This is a recent snapshot of the patient's [facility] medical record.  For current drug dose and complete information and questions, call [phone] or In Basket pool, fv home infusion ([id number])  CSN Number:  [id number]

## 2021-06-10 ENCOUNTER — HOME INFUSION (PRE-WILLOW HOME INFUSION) (OUTPATIENT)
Dept: PHARMACY | Facility: CLINIC | Age: 51
End: 2021-06-10

## 2021-06-14 NOTE — PROGRESS NOTES
This is a recent snapshot of the patient's [facility] medical record.  For current drug dose and complete information and questions, call [phone] or In Basket pool, fv home infusion ([id number])  CSN Number:  [id number]

## 2021-06-16 ENCOUNTER — HOME INFUSION (PRE-WILLOW HOME INFUSION) (OUTPATIENT)
Dept: PHARMACY | Facility: CLINIC | Age: 51
End: 2021-06-16

## 2021-06-18 ENCOUNTER — HOME INFUSION (PRE-WILLOW HOME INFUSION) (OUTPATIENT)
Dept: PHARMACY | Facility: CLINIC | Age: 51
End: 2021-06-18

## 2021-06-18 NOTE — PROGRESS NOTES
This is a recent snapshot of the patient's [facility] medical record.  For current drug dose and complete information and questions, call [phone] or In Basket pool, fv home infusion ([id number])  CSN Number:  [id number]

## 2021-06-21 ENCOUNTER — HOME INFUSION (PRE-WILLOW HOME INFUSION) (OUTPATIENT)
Dept: PHARMACY | Facility: CLINIC | Age: 51
End: 2021-06-21

## 2021-06-21 DIAGNOSIS — G35 MS (MULTIPLE SCLEROSIS) (H): ICD-10-CM

## 2021-06-21 LAB
ALBUMIN UR-MCNC: NEGATIVE MG/DL
APPEARANCE UR: ABNORMAL
BILIRUB UR QL STRIP: NEGATIVE
CD19 CELLS # BLD: 165 CELLS/UL (ref 107–698)
CD19 CELLS NFR BLD: 8 % (ref 6–27)
COLOR UR AUTO: YELLOW
GLUCOSE UR STRIP-MCNC: NEGATIVE MG/DL
HGB UR QL STRIP: NEGATIVE
KETONES UR STRIP-MCNC: NEGATIVE MG/DL
LEUKOCYTE ESTERASE UR QL STRIP: NEGATIVE
MUCOUS THREADS #/AREA URNS LPF: PRESENT /LPF
NITRATE UR QL: NEGATIVE
PH UR STRIP: 7 PH (ref 5–7)
RBC #/AREA URNS AUTO: 2 /HPF (ref 0–2)
SOURCE: ABNORMAL
SP GR UR STRIP: 1.02 (ref 1–1.03)
SQUAMOUS #/AREA URNS AUTO: 4 /HPF (ref 0–1)
UROBILINOGEN UR STRIP-MCNC: 0 MG/DL (ref 0–2)
WBC #/AREA URNS AUTO: 1 /HPF (ref 0–5)

## 2021-06-21 PROCEDURE — 81001 URINALYSIS AUTO W/SCOPE: CPT | Performed by: PATHOLOGY

## 2021-06-21 PROCEDURE — 86355 B CELLS TOTAL COUNT: CPT | Performed by: PATHOLOGY

## 2021-06-21 PROCEDURE — 36415 COLL VENOUS BLD VENIPUNCTURE: CPT | Performed by: PATHOLOGY

## 2021-06-21 NOTE — TELEPHONE ENCOUNTER
Lab work not yet completed; Another [employer] message sent to patient with a reminder.    Polly Rodríguez, MS RN Care Coordinator

## 2021-06-22 RX ORDER — DIPHENHYDRAMINE HCL 25 MG
50 CAPSULE ORAL ONCE
Status: CANCELLED | OUTPATIENT
Start: 2021-06-22

## 2021-06-22 RX ORDER — HEPARIN SODIUM,PORCINE 10 UNIT/ML
5 VIAL (ML) INTRAVENOUS
Status: CANCELLED | OUTPATIENT
Start: 2021-06-22

## 2021-06-22 RX ORDER — ACETAMINOPHEN 325 MG/1
650 TABLET ORAL ONCE
Status: CANCELLED | OUTPATIENT
Start: 2021-06-22

## 2021-06-22 RX ORDER — HEPARIN SODIUM (PORCINE) LOCK FLUSH IV SOLN 100 UNIT/ML 100 UNIT/ML
5 SOLUTION INTRAVENOUS
Status: CANCELLED | OUTPATIENT
Start: 2021-06-22

## 2021-06-22 RX ORDER — METHYLPREDNISOLONE SODIUM SUCCINATE 125 MG/2ML
125 INJECTION, POWDER, LYOPHILIZED, FOR SOLUTION INTRAMUSCULAR; INTRAVENOUS ONCE
Status: CANCELLED | OUTPATIENT
Start: 2021-06-22

## 2021-06-22 NOTE — TELEPHONE ENCOUNTER
Lab work completed by patient; Loren, please advise; Thank you.    Polly Rodríguez, MS RN Care Coordinator

## 2021-06-22 NOTE — TELEPHONE ENCOUNTER
Patient is due to have her next Ocrevus infusion based on CD19 count; [employer] message sent to patient advising this; New Ocrevus therapy plan orders placed and routed to Loren Lennon for review and signature; I will notify [facility] once new orders signed.    Polly Rodríguez, MS RN Care Coordinator

## 2021-06-22 NOTE — TELEPHONE ENCOUNTER
New Ocrevus orders signed; [facility] notified to contact patient to schedule.    Polly Rodríguez MS RN Care Coordinator

## 2021-06-22 NOTE — TELEPHONE ENCOUNTER
Urinalysis negative for infection. CD 19 counts show repopulation and we can proceed with next infusion.

## 2021-06-29 NOTE — PROGRESS NOTES
This is a recent snapshot of the patient's [facility] medical record.  For current drug dose and complete information and questions, call [phone] or In Basket pool, fv home infusion ([id number])  CSN Number:  [id number]

## 2021-07-01 ENCOUNTER — APPOINTMENT (OUTPATIENT)
Dept: LAB | Facility: CLINIC | Age: 51
End: 2021-07-01
Attending: PSYCHIATRY & NEUROLOGY
Payer: COMMERCIAL

## 2021-07-01 ENCOUNTER — HOME INFUSION (PRE-WILLOW HOME INFUSION) (OUTPATIENT)
Dept: PHARMACY | Facility: CLINIC | Age: 51
End: 2021-07-01

## 2021-07-01 NOTE — PROGRESS NOTES
This is a recent snapshot of the patient's [facility] medical record.  For current drug dose and complete information and questions, call [phone] or In Basket pool, fv home infusion ([id number])  CSN Number:  [id number]

## 2021-07-02 NOTE — PROGRESS NOTES
This is a recent snapshot of the patient's [facility] medical record.  For current drug dose and complete information and questions, call [phone] or In Basket pool, fv home infusion ([id number])  CSN Number:  [id number]

## 2021-07-06 ENCOUNTER — MYC REFILL (OUTPATIENT)
Dept: NEUROLOGY | Facility: CLINIC | Age: 51
End: 2021-07-06

## 2021-07-06 ENCOUNTER — HOME INFUSION (PRE-WILLOW HOME INFUSION) (OUTPATIENT)
Dept: PHARMACY | Facility: CLINIC | Age: 51
End: 2021-07-06

## 2021-07-06 DIAGNOSIS — G47.10 EXCESSIVE SLEEPINESS: ICD-10-CM

## 2021-07-07 ENCOUNTER — HOME INFUSION (PRE-WILLOW HOME INFUSION) (OUTPATIENT)
Dept: PHARMACY | Facility: CLINIC | Age: 51
End: 2021-07-07

## 2021-07-07 RX ORDER — DEXTROAMPHETAMINE SACCHARATE, AMPHETAMINE ASPARTATE, DEXTROAMPHETAMINE SULFATE AND AMPHETAMINE SULFATE 2.5; 2.5; 2.5; 2.5 MG/1; MG/1; MG/1; MG/1
10 TABLET ORAL 2 TIMES DAILY PRN
Qty: 60 TABLET | Refills: 0 | Status: SHIPPED | OUTPATIENT
Start: 2021-07-07 | End: 2021-09-04

## 2021-07-07 NOTE — TELEPHONE ENCOUNTER
Patient requesting refill of their Adderall; Patient was last seen in May and has no follow up appointment scheduled. Pended rx to Dr. Stuart for signature and will send electronically to the pharmacy once signed.    Marlene Cartagena RN

## 2021-07-08 ENCOUNTER — HOME INFUSION (PRE-WILLOW HOME INFUSION) (OUTPATIENT)
Dept: PHARMACY | Facility: CLINIC | Age: 51
End: 2021-07-08

## 2021-07-10 ASSESSMENT — ENCOUNTER SYMPTOMS
POLYPHAGIA: 0
CONSTIPATION: 0
BACK PAIN: 0
PARALYSIS: 0
DIARRHEA: 0
NUMBNESS: 1
STIFFNESS: 0
ABDOMINAL PAIN: 0
JAUNDICE: 0
NAUSEA: 0
DIZZINESS: 1
SPEECH CHANGE: 1
BLOATING: 0
NIGHT SWEATS: 1
MUSCLE WEAKNESS: 1
TREMORS: 1
INSOMNIA: 1
INCREASED ENERGY: 1
FEVER: 0
DISTURBANCES IN COORDINATION: 0
HEARTBURN: 1
BLOOD IN STOOL: 0
ALTERED TEMPERATURE REGULATION: 1
NECK PAIN: 0
SEIZURES: 0
HALLUCINATIONS: 0
VOMITING: 0
PANIC: 0
WEIGHT GAIN: 1
NERVOUS/ANXIOUS: 1
MYALGIAS: 1
TINGLING: 1
MEMORY LOSS: 0
DEPRESSION: 0
DECREASED CONCENTRATION: 1
BOWEL INCONTINENCE: 0
ARTHRALGIAS: 1
LOSS OF CONSCIOUSNESS: 0
CHILLS: 1
HEADACHES: 1
DECREASED APPETITE: 1
FATIGUE: 1
WEAKNESS: 1
RECTAL PAIN: 0
WEIGHT LOSS: 0
MUSCLE CRAMPS: 0
JOINT SWELLING: 0
POLYDIPSIA: 0

## 2021-07-12 ENCOUNTER — HOME INFUSION (PRE-WILLOW HOME INFUSION) (OUTPATIENT)
Dept: PHARMACY | Facility: CLINIC | Age: 51
End: 2021-07-12

## 2021-07-12 ENCOUNTER — OFFICE VISIT (OUTPATIENT)
Dept: ANESTHESIOLOGY | Facility: CLINIC | Age: 51
End: 2021-07-12
Attending: NURSE PRACTITIONER
Payer: COMMERCIAL

## 2021-07-12 VITALS
WEIGHT: 205 LBS | DIASTOLIC BLOOD PRESSURE: 100 MMHG | HEART RATE: 91 BPM | BODY MASS INDEX: 32.95 KG/M2 | SYSTOLIC BLOOD PRESSURE: 153 MMHG | RESPIRATION RATE: 18 BRPM | OXYGEN SATURATION: 99 % | HEIGHT: 66 IN

## 2021-07-12 DIAGNOSIS — G89.29 OTHER CHRONIC PAIN: ICD-10-CM

## 2021-07-12 PROCEDURE — 99204 OFFICE O/P NEW MOD 45 MIN: CPT | Performed by: ANESTHESIOLOGY

## 2021-07-12 ASSESSMENT — ENCOUNTER SYMPTOMS
NERVOUS/ANXIOUS: 1
DIARRHEA: 0
POLYDIPSIA: 0
MEMORY LOSS: 0
FEVER: 0
HEADACHES: 1
LOSS OF CONSCIOUSNESS: 0
BACK PAIN: 0
CHILLS: 1
NECK PAIN: 0
MYALGIAS: 1
DEPRESSION: 0
SPEECH CHANGE: 1
NAUSEA: 0
DIZZINESS: 1
WEIGHT LOSS: 0
TREMORS: 1
WEAKNESS: 1
BLOOD IN STOOL: 0
SEIZURES: 0
INSOMNIA: 1
CONSTIPATION: 0
HEARTBURN: 1
TINGLING: 1
HALLUCINATIONS: 0
ABDOMINAL PAIN: 0
VOMITING: 0

## 2021-07-12 ASSESSMENT — MIFFLIN-ST. JEOR: SCORE: 1561.62

## 2021-07-12 ASSESSMENT — PATIENT HEALTH QUESTIONNAIRE - PHQ9: SUM OF ALL RESPONSES TO PHQ QUESTIONS 1-9: 10

## 2021-07-12 ASSESSMENT — PAIN SCALES - GENERAL: PAINLEVEL: EXTREME PAIN (8)

## 2021-07-12 NOTE — PATIENT INSTRUCTIONS
Treatment planning:    Recommendations will be written in the providers note for your Primary Care provider (OR other providers in your care team) to review and make changes to your therapies based on their discretion.        Recommended Follow up:      Follow up as needed.          Please call [phone], option #1 to schedule your clinic appointment if you don't already have an appointment scheduled.        To speak with a nurse, schedule/reschedule/cancel a clinic appointment, or request a medication refill call: [phone], option #1.    You can also reach us by [employer]: [email or web address]

## 2021-07-12 NOTE — PROGRESS NOTES
Pain Clinic New Patient Consult Note:    Referring Provider: Saji   Primary care provider: Ludivina Ref-Primary, Physician.    Chayo Reid is a 50 year old y.o. old female who presents to the pain clinic with a 3 year history of diffuse right-sided pain and a 5 year history of MS.     Alpa Domingo is a 50 year old who presents for the following health issues     Answers for HPI/ROS submitted by the patient on 7/10/2021  General Symptoms: Yes  Skin Symptoms: No  HENT Symptoms: No  EYE SYMPTOMS: No  HEART SYMPTOMS: No  LUNG SYMPTOMS: No  INTESTINAL SYMPTOMS: Yes  URINARY SYMPTOMS: No  GYNECOLOGIC SYMPTOMS: No  BREAST SYMPTOMS: No  SKELETAL SYMPTOMS: Yes  BLOOD SYMPTOMS: No  NERVOUS SYSTEM SYMPTOMS: Yes  MENTAL HEALTH SYMPTOMS: Yes  Loss of appetite: Yes  Weight gain: Yes  Night sweats: Yes  Increased stress: No  Feeling hot or cold when others believe the temperature is normal: Yes  Loss of height: No  Post-operative complications: No  Surgical site pain: No  Change in or Loss of Energy: Yes  Hyperactivity: No  Confusion: No  Bloating: No  Fecal incontinence: No  Yellowing of skin or eyes: No  Vomit with blood: No  Change in stools: No  Bone pain: Yes  Muscle cramps: No  Muscle weakness: Yes  Joint stiffness: No  Bone fracture: No  Trouble with coordination: No  Difficulty walking: No  Paralysis: No  Mood changes: No  Panic attacks: No      HPI:  Patient Supplied Answers To the [facility] Pain Questionnaire  [facility] Pain -  Patient Entered Questionnaire/Answers 7/10/2021   What number best describes your pain right now:  0 = No pain  to  10 = Worst pain imaginable 9   How would you describe the pain? numbness   Which of the following worsen your pain? lying down   Which of the following improve or reduce your pain?  nothing relieves the pain   What number best describes your average pain for the past week:  0 = No pain  to  10 = Worst pain imaginable 9   What number best describes your LOWEST pain in past 24 hours:  0  "= No pain  to  10 = Worst pain imaginable 6   What number best describes your WORST pain in past 24 hours:  0 = No pain  to  10 = Worst pain imaginable 9   When is your pain worst? Constant   What non-medicine treatments have you already had for your pain? physical therapy   Have you tried treating your pain with medication?  Yes   Are you currently taking medications for your pain? Yes       Chayo Reid is a 50 year old y.o. old female who presents to the pain clinic with a 3 year history of diffuse right-sided pain and a 5 year history of MS. The pain is associated with allodynia in arms, right arm and leg weakness, numbness, and paresthesia. She has had 3 falls due to pain and weakness on the right side, the falls did not result in any additional injury. She describes the pain as constant and in the \"bone.\" There was no inciting event to the pain symptoms. To mitigate the symptoms she has taken medical marijuana and Adderall. The marijuana helps her fall asleep as the pain was preventing her from falling asleep and she awakes 2-3 times a night due to the pain. She has associated fatigue for which she takes Adderall. She takes a hot bath and massages the right side before bed which somewhat helps the symptoms. She has used Pregabalin 50mg (2017), Duloxetine 60mg (2017), and Baclofen 10mg (2019). Medications were stopped by patient as symptoms did not improve or resolve.   At home, she has a good support system and lives with her . The diagnosis of MS 5 years ago has prevented her from working and she now spends the majority of her time at home. The right-sided pain symptoms have made it difficult to walk and exercise. She denies bowel and bladder changes or any recent infection or antibiotic use.    Tests/Imaging reviewed with the patient:    MRI 5/27/2021 MRI Brain  1. The study demonstrates  5-10,  foci of T2-hyperintensity within the  cerebral white matter consistent with history of multiple " sclerosis.  2. There is no significant interval change from the prior MRI dated  2020.    MRI Thoracic spine  No abnormal cord signal.     The thoracic vertebral column appears in normal alignment.      There is no loss of disk height at any level. No spinal canal or  neural foraminal stenosis.      Normal paraspinous soft tissues.     Significant Medical History:   Past Medical History:   Diagnosis Date     Corneal ulcer      Demyelinating disease (H) 2011    Multiple sclerosis     Fatigue      Hyperopia with astigmatism and presbyopia      Migraine 2011     OAB (overactive bladder)      Ptosis      Past Surgical History:  Past Surgical History:   Procedure Laterality Date     BREAST SURGERY  2012      SECTION  ,      HYSTERECTOMY  2015    partial; still has tubes and ovaries     TUBAL LIGATION Bilateral      Family History:  Family History   Problem Relation Age of Onset     Neurologic Disorder Maternal Grandmother         Multiple Sclerosis     Ovarian Cancer Mother 76        diagnosed 2016, stage 3     Glaucoma No family hx of      Macular Degeneration No family hx of        Social History:  Social History     Socioeconomic History     Marital status:      Spouse name: Not on file     Number of children: Not on file     Years of education: Not on file     Highest education level: Not on file   Occupational History     Not on file   Tobacco Use     Smoking status: Never Smoker     Smokeless tobacco: Never Used   Substance and Sexual Activity     Alcohol use: Yes     Alcohol/week: 1.0 standard drinks     Types: 1 Glasses of wine per week     Comment: occasional     Drug use: No     Sexual activity: Yes     Birth control/protection: Post-menopausal   Other Topics Concern     Parent/sibling w/ CABG, MI or angioplasty before 65F 55M? Not Asked   Social History Narrative     Not on file     Social Determinants of Health     Financial Resource Strain:      Difficulty of Paying  Living Expenses:    Food Insecurity:      Worried About Running Out of Food in the Last Year:      Ran Out of Food in the Last Year:    Transportation Needs:      Lack of Transportation (Medical):      Lack of Transportation (Non-Medical):    Physical Activity:      Days of Exercise per Week:      Minutes of Exercise per Session:    Stress:      Feeling of Stress :    Social Connections:      Frequency of Communication with Friends and Family:      Frequency of Social Gatherings with Friends and Family:      Attends Zoroastrian Services:      Active Member of Clubs or Organizations:      Attends Club or Organization Meetings:      Marital Status:    Intimate Partner Violence:      Fear of Current or Ex-Partner:      Emotionally Abused:      Physically Abused:      Sexually Abused:      Social History     Social History Narrative     Not on file       Allergies:  No Known Allergies    Current Medications:   Current Outpatient Medications   Medication Sig Dispense Refill     amphetamine-dextroamphetamine (ADDERALL) 10 MG tablet Take 1 tablet (10 mg) by mouth 2 times daily as needed (severe fatigue) 60 tablet 0     cetirizine-pseudoePHEDrine ER ([employer] ALLERGY RELIEF-D) 5-120 MG 12 hr tablet TAKE 1 TABLET BY MOUTH DAILY AS NEEDED FOR ALLERGIES 24 tablet 1     Cholecalciferol (VITAMIN D) 1000 UNITS capsule Take 1 capsule by mouth daily       medical cannabis (Patient's own supply.  Not a prescription) See Admin Instructions (This is NOT a prescription, and does not certify that the patient has a qualifying medical condition for medical cannabis.  The purpose of this order is  to document that the patient reports taking medical cannabis.)       traMADol (ULTRAM) 50 MG tablet TAKE 1-2 TABS BY MOUTH EVERY 8 HOURS AS NEEDED FOR SEVERE PAIN 180 tablet 3          Current Pain Medications:  Medications related to Pain Management (From now, onward)    None           Past Pain Medications:    See HPI    Work History:     Current work  "status: Not working due to MS      Review of Systems:  Review of Systems   Constitutional: Positive for chills. Negative for fever and weight loss.   Gastrointestinal: Positive for heartburn. Negative for abdominal pain, blood in stool, constipation, diarrhea, melena, nausea and vomiting.   Musculoskeletal: Positive for myalgias. Negative for back pain and neck pain.   Neurological: Positive for dizziness, tingling, tremors, speech change, weakness and headaches. Negative for seizures and loss of consciousness.   Endo/Heme/Allergies: Negative for polydipsia.   Psychiatric/Behavioral: Negative for depression, hallucinations and memory loss. The patient is nervous/anxious and has insomnia.    All other systems reviewed and are negative.    Physical Exam:     Vitals:    07/12/21 1155   BP: (!) 153/100   Pulse: 91   Resp: 18   SpO2: 99%   Weight: 93 kg (205 lb)   Height: 1.676 m (5' 6\")       General Appearance: No distress, seated comfortably  Mood: Euthymic  HE ENT: Non constricted pupils  Respiratory: Non labored breathing  GI: Soft, non distended, no TTP  Skin: No rashes over exposed skin  MS: bilateral symmetric, no atrophy  Neuro: strength 5/5  Gait: nonantalgic, ambulates with out assistance    Laboratory results:  Recent Labs   Lab Test 11/05/19  1657 03/19/19  0900    140   POTASSIUM 4.2 3.6   CHLORIDE 105 106   CO2 30 28   ANIONGAP 5 6   GLC 95 76   BUN 20 18   CR 0.93 1.02   BRENDAN 8.9 8.7       CBC RESULTS:   Recent Labs   Lab Test 11/05/19  1657   WBC 10.4   RBC 4.69   HGB 14.0   HCT 41.5   MCV 89   MCH 29.9   MCHC 33.7   RDW 12.1            Imaging:       ASSESSMENT AND PLAN:     Encounter Diagnosis:    Neuropathic pain  Multiple sclerosis    Chayo Reid is a 50 year old y.o. old female who presents to the pain clinic with severe broad area of right side body pain.     I have summarized the patient s past medical history, discussed their clinical findings and the potential differential " diagnosis with the patient. Significant past medical history pertinent to the patient s current condition includes severe pain in the right side of the body with allodynia.  The clinical findings reveal normal neurologic exam. The differential diagnosis discussed with the patient are listed above. I have discussed anatomy and possible sources of the pain using models and/or pictures (diagrams). I have discussed multi- disciplinary pain management options withthe patient as pertaining to their case as detailed above. The pain management options we discussed included, but were not limited to the recommendations below.  I also discussed with patient the risks, benefits and alternatives to each pain management option.  All of the patient s questions and concerns were answered to the best of my ability.    RECOMMENDATIONS:     1. Medications: We are recommending the patient to maximize on neuropathic medication. Dosing, side effects, risks/benefits/alternatives were discussed with the patient in detail.    2. Procedure: The patient can consider meeting with neurosurgery to explore options such as dbs.     3. Physical therapy: I have refered the patient for outpatient physical therapy for stretching, strengthening and home exercise program for multiple sclerosis. The patient will also discuss spine care and posture. Pool therapy and stretches can be considered if available.    Follow up:  As needed.

## 2021-07-12 NOTE — PROGRESS NOTES
This is a recent snapshot of the patient's [facility] medical record.  For current drug dose and complete information and questions, call [phone] or In Basket pool, fv home infusion ([id number])  CSN Number:  [id number]

## 2021-07-12 NOTE — LETTER
7/12/2021       RE: Chayo Reid  [address] [facility] [address]     Dear Colleague,    Thank you for referring your patient, Chayo Reid, to the [facility]IN[facility] FOR COMPREHENSIVE PAIN MANAGEMENT [place] at [facility]. Please see a copy of my visit note below.      Pain Clinic New Patient Consult Note:    Referring Provider: Saji   Primary care provider: Ludivina Ref-Primary, Physician.    Chayo Reid is a 50 year old y.o. old female who presents to the pain clinic with a 3 year history of diffuse right-sided pain and a 5 year history of MS.     Subjective   Chayo is a 50 year old who presents for the following health issues     Answers for HPI/ROS submitted by the patient on 7/10/2021  General Symptoms: Yes  Skin Symptoms: No  HENT Symptoms: No  EYE SYMPTOMS: No  HEART SYMPTOMS: No  LUNG SYMPTOMS: No  INTESTINAL SYMPTOMS: Yes  URINARY SYMPTOMS: No  GYNECOLOGIC SYMPTOMS: No  BREAST SYMPTOMS: No  SKELETAL SYMPTOMS: Yes  BLOOD SYMPTOMS: No  NERVOUS SYSTEM SYMPTOMS: Yes  MENTAL HEALTH SYMPTOMS: Yes  Loss of appetite: Yes  Weight gain: Yes  Night sweats: Yes  Increased stress: No  Feeling hot or cold when others believe the temperature is normal: Yes  Loss of height: No  Post-operative complications: No  Surgical site pain: No  Change in or Loss of Energy: Yes  Hyperactivity: No  Confusion: No  Bloating: No  Fecal incontinence: No  Yellowing of skin or eyes: No  Vomit with blood: No  Change in stools: No  Bone pain: Yes  Muscle cramps: No  Muscle weakness: Yes  Joint stiffness: No  Bone fracture: No  Trouble with coordination: No  Difficulty walking: No  Paralysis: No  Mood changes: No  Panic attacks: No      HPI:  Patient Supplied Answers To the [facility] Pain Questionnaire  [facility] Pain -  Patient Entered Questionnaire/Answers 7/10/2021   What number best describes your pain right now:  0 = No pain  to  10 = Worst pain imaginable 9  "  How would you describe the pain? numbness   Which of the following worsen your pain? lying down   Which of the following improve or reduce your pain?  nothing relieves the pain   What number best describes your average pain for the past week:  0 = No pain  to  10 = Worst pain imaginable 9   What number best describes your LOWEST pain in past 24 hours:  0 = No pain  to  10 = Worst pain imaginable 6   What number best describes your WORST pain in past 24 hours:  0 = No pain  to  10 = Worst pain imaginable 9   When is your pain worst? Constant   What non-medicine treatments have you already had for your pain? physical therapy   Have you tried treating your pain with medication?  Yes   Are you currently taking medications for your pain? Yes       Chayo Reid is a 50 year old y.o. old female who presents to the pain clinic with a 3 year history of diffuse right-sided pain and a 5 year history of MS. The pain is associated with allodynia in arms, right arm and leg weakness, numbness, and paresthesia. She has had 3 falls due to pain and weakness on the right side, the falls did not result in any additional injury. She describes the pain as constant and in the \"bone.\" There was no inciting event to the pain symptoms. To mitigate the symptoms she has taken medical marijuana and Adderall. The marijuana helps her fall asleep as the pain was preventing her from falling asleep and she awakes 2-3 times a night due to the pain. She has associated fatigue for which she takes Adderall. She takes a hot bath and massages the right side before bed which somewhat helps the symptoms. She has used Pregabalin 50mg (2017), Duloxetine 60mg (2017), and Baclofen 10mg (2019). Medications were stopped by patient as symptoms did not improve or resolve.   At home, she has a good support system and lives with her . The diagnosis of MS 5 years ago has prevented her from working and she now spends the majority of her time at home. " The right-sided pain symptoms have made it difficult to walk and exercise. She denies bowel and bladder changes or any recent infection or antibiotic use.    Tests/Imaging reviewed with the patient:    MRI 2021 MRI Brain  1. The study demonstrates  5-10,  foci of T2-hyperintensity within the  cerebral white matter consistent with history of multiple sclerosis.  2. There is no significant interval change from the prior MRI dated  2020.    MRI Thoracic spine  No abnormal cord signal.     The thoracic vertebral column appears in normal alignment.      There is no loss of disk height at any level. No spinal canal or  neural foraminal stenosis.      Normal paraspinous soft tissues.     Significant Medical History:   Past Medical History:   Diagnosis Date     Corneal ulcer      Demyelinating disease (H)     Multiple sclerosis     Fatigue      Hyperopia with astigmatism and presbyopia      Migraine 2011     OAB (overactive bladder)      Ptosis      Past Surgical History:  Past Surgical History:   Procedure Laterality Date     BREAST SURGERY  2012      SECTION  ,      HYSTERECTOMY  2015    partial; still has tubes and ovaries     TUBAL LIGATION Bilateral      Family History:  Family History   Problem Relation Age of Onset     Neurologic Disorder Maternal Grandmother         Multiple Sclerosis     Ovarian Cancer Mother 76        diagnosed 2016, stage 3     Glaucoma No family hx of      Macular Degeneration No family hx of        Social History:  Social History     Socioeconomic History     Marital status:      Spouse name: Not on file     Number of children: Not on file     Years of education: Not on file     Highest education level: Not on file   Occupational History     Not on file   Tobacco Use     Smoking status: Never Smoker     Smokeless tobacco: Never Used   Substance and Sexual Activity     Alcohol use: Yes     Alcohol/week: 1.0 standard drinks     Types: 1 Glasses of  wine per week     Comment: occasional     Drug use: No     Sexual activity: Yes     Birth control/protection: Post-menopausal   Other Topics Concern     Parent/sibling w/ CABG, MI or angioplasty before 65F 55M? Not Asked   Social History Narrative     Not on file     Social Determinants of Health     Financial Resource Strain:      Difficulty of Paying Living Expenses:    Food Insecurity:      Worried About Running Out of Food in the Last Year:      Ran Out of Food in the Last Year:    Transportation Needs:      Lack of Transportation (Medical):      Lack of Transportation (Non-Medical):    Physical Activity:      Days of Exercise per Week:      Minutes of Exercise per Session:    Stress:      Feeling of Stress :    Social Connections:      Frequency of Communication with Friends and Family:      Frequency of Social Gatherings with Friends and Family:      Attends [employer] Services:      Active Member of Clubs or Organizations:      Attends Club or Organization Meetings:      Marital Status:    Intimate Partner Violence:      Fear of Current or Ex-Partner:      Emotionally Abused:      Physically Abused:      Sexually Abused:      Social History     Social History Narrative     Not on file       Allergies:  No Known Allergies    Current Medications:   Current Outpatient Medications   Medication Sig Dispense Refill     amphetamine-dextroamphetamine (ADDERALL) 10 MG tablet Take 1 tablet (10 mg) by mouth 2 times daily as needed (severe fatigue) 60 tablet 0     cetirizine-pseudoePHEDrine ER ([employer] ALLERGY RELIEF-D) 5-120 MG 12 hr tablet TAKE 1 TABLET BY MOUTH DAILY AS NEEDED FOR ALLERGIES 24 tablet 1     Cholecalciferol (VITAMIN D) 1000 UNITS capsule Take 1 capsule by mouth daily       medical cannabis (Patient's own supply.  Not a prescription) See Admin Instructions (This is NOT a prescription, and does not certify that the patient has a qualifying medical condition for medical cannabis.  The purpose of this order is   "to document that the patient reports taking medical cannabis.)       traMADol (ULTRAM) 50 MG tablet TAKE 1-2 TABS BY MOUTH EVERY 8 HOURS AS NEEDED FOR SEVERE PAIN 180 tablet 3          Current Pain Medications:  Medications related to Pain Management (From now, onward)    None           Past Pain Medications:    See HPI    Work History:     Current work status: Not working due to MS      Review of Systems:  Review of Systems   Constitutional: Positive for chills. Negative for fever and weight loss.   Gastrointestinal: Positive for heartburn. Negative for abdominal pain, blood in stool, constipation, diarrhea, melena, nausea and vomiting.   Musculoskeletal: Positive for myalgias. Negative for back pain and neck pain.   Neurological: Positive for dizziness, tingling, tremors, speech change, weakness and headaches. Negative for seizures and loss of consciousness.   Endo/Heme/Allergies: Negative for polydipsia.   Psychiatric/Behavioral: Negative for depression, hallucinations and memory loss. The patient is nervous/anxious and has insomnia.    All other systems reviewed and are negative.    Physical Exam:     Vitals:    07/12/21 1155   BP: (!) 153/100   Pulse: 91   Resp: 18   SpO2: 99%   Weight: 93 kg (205 lb)   Height: 1.676 m (5' 6\")       General Appearance: No distress, seated comfortably  Mood: Euthymic  HE ENT: Non constricted pupils  Respiratory: Non labored breathing  GI: Soft, non distended, no TTP  Skin: No rashes over exposed skin  MS: bilateral symmetric, no atrophy  Neuro: strength 5/5  Gait: nonantalgic, ambulates with out assistance    Laboratory results:  Recent Labs   Lab Test 11/05/19  1657 03/19/19  0900    140   POTASSIUM 4.2 3.6   CHLORIDE 105 106   CO2 30 28   ANIONGAP 5 6   GLC 95 76   BUN 20 18   CR 0.93 1.02   BRENDAN 8.9 8.7       CBC RESULTS:   Recent Labs   Lab Test 11/05/19  1657   WBC 10.4   RBC 4.69   HGB 14.0   HCT 41.5   MCV 89   MCH 29.9   MCHC 33.7   RDW 12.1    "         Imaging:       ASSESSMENT AND PLAN:     Encounter Diagnosis:    Neuropathic pain  Multiple sclerosis    Chayo Reid is a 50 year old y.o. old female who presents to the pain clinic with severe broad area of right side body pain.     I have summarized the patient s past medical history, discussed their clinical findings and the potential differential diagnosis with the patient. Significant past medical history pertinent to the patient s current condition includes severe pain in the right side of the body with allodynia.  The clinical findings reveal normal neurologic exam. The differential diagnosis discussed with the patient are listed above. I have discussed anatomy and possible sources of the pain using models and/or pictures (diagrams). I have discussed multi- disciplinary pain management options withthe patient as pertaining to their case as detailed above. The pain management options we discussed included, but were not limited to the recommendations below.  I also discussed with patient the risks, benefits and alternatives to each pain management option.  All of the patient s questions and concerns were answered to the best of my ability.    RECOMMENDATIONS:     1. Medications: We are recommending the patient to maximize on neuropathic medication. Dosing, side effects, risks/benefits/alternatives were discussed with the patient in detail.    2. Procedure: The patient can consider meeting with neurosurgery to explore options such as dbs.     3. Physical therapy: I have refered the patient for outpatient physical therapy for stretching, strengthening and home exercise program for multiple sclerosis. The patient will also discuss spine care and posture. Pool therapy and stretches can be considered if available.    Follow up:  As needed.                                                                                                 Again, thank you for allowing me to participate in the care of your  patient.      Sincerely,    Delisa Meyer MD

## 2021-07-13 NOTE — PROGRESS NOTES
This is a recent snapshot of the patient's [facility] medical record.  For current drug dose and complete information and questions, call [phone] or In Basket pool, [facility] home infusion ([id number])  CSN Number:  [id number]

## 2021-07-15 NOTE — PROGRESS NOTES
This is a recent snapshot of the patient's [facility] medical record.  For current drug dose and complete information and questions, call [phone] or In Basket pool, [facility]v home infusion ([id number])  CSN Number:  [id number]

## 2021-07-19 NOTE — PROGRESS NOTES
This is a recent snapshot of the patient's [facility] medical record.  For current drug dose and complete information and questions, call [phone] or In Basket pool, [facility]v home infusion ([id number])  CSN Number:  [id number]

## 2021-07-28 ENCOUNTER — TELEPHONE (OUTPATIENT)
Dept: PHARMACY | Facility: CLINIC | Age: 51
End: 2021-07-28

## 2021-07-28 NOTE — TELEPHONE ENCOUNTER
Drug including DOSE: Ocrevus 600mg q 6 months  J Code:     NDC: [address]-02  ICD 10 code: G35     Date(s) of Service: 08/21/2021        Insurance Name: [facility]  Insurance ID: [id number]     Group: [id number]     Ordering Physician: SAI FONTENOT           NPI: [id number]           [facility]  NPI: [id number]

## 2021-07-29 NOTE — PROGRESS NOTES
This is a recent snapshot of the patient's [facility] medical record.  For current drug dose and complete information and questions, call [phone] or In Basket pool, [facility]v home infusion ([id number])  CSN Number:  [id number]

## 2021-08-10 NOTE — TELEPHONE ENCOUNTER
Prior Authorization Approval    Authorization Effective Date: 8/21/2021  Authorization Expiration Date: 8/21/2022  Medication: Ocrevus  Approved Dose/Quantity: 600mg q 6 months  Reference #: [id number]   Insurance Company: [employer] ([facility]) - Phone [phone] Fax [phone]  Which Pharmacy is filling the prescription (Not needed for infusion/clinic administered): [facility]  Pharmacy Notified: Yes

## 2021-08-10 NOTE — TELEPHONE ENCOUNTER
PA Initiation    Medication: Ocrevus  Insurance Company: [employer] ([facility]) - Phone [phone] Fax [phone]  Pharmacy Filling the Rx: [facility]  Filling Pharmacy Phone:    Filling Pharmacy Fax:    Start Date: 7/28/2021    Central Prior Authorization Team   Phone: [phone]

## 2021-08-11 ENCOUNTER — HOME INFUSION (PRE-WILLOW HOME INFUSION) (OUTPATIENT)
Dept: PHARMACY | Facility: CLINIC | Age: 51
End: 2021-08-11

## 2021-08-16 ENCOUNTER — HOME INFUSION (PRE-WILLOW HOME INFUSION) (OUTPATIENT)
Dept: PHARMACY | Facility: CLINIC | Age: 51
End: 2021-08-16

## 2021-08-18 ENCOUNTER — HOME INFUSION (PRE-WILLOW HOME INFUSION) (OUTPATIENT)
Dept: PHARMACY | Facility: CLINIC | Age: 51
End: 2021-08-18

## 2021-08-18 NOTE — PROGRESS NOTES
This is a recent snapshot of the patient's [facility] medical record.  For current drug dose and complete information and questions, call [phone] or In Basket pool, fv home infusion ([id number])  CSN Number:  [id number]

## 2021-08-20 NOTE — PROGRESS NOTES
This is a recent snapshot of the patient's [facility] medical record.  For current drug dose and complete information and questions, call [phone] or In Basket pool, fv home infusion ([id number])  CSN Number:  [id number]

## 2021-08-25 NOTE — PROGRESS NOTES
This is a recent snapshot of the patient's [facility] medical record.  For current drug dose and complete information and questions, call [phone] or In Basket pool, [facility]v home infusion ([id number])  CSN Number:  [id number]

## 2021-09-04 ENCOUNTER — MYC REFILL (OUTPATIENT)
Dept: NEUROLOGY | Facility: CLINIC | Age: 51
End: 2021-09-04

## 2021-09-04 DIAGNOSIS — G47.10 EXCESSIVE SLEEPINESS: ICD-10-CM

## 2021-09-07 NOTE — TELEPHONE ENCOUNTER
Patient requesting refill of their Adderall; Patient was last seen in May and has follow up appointment in November with Dr. Stuart. Pended rx to Dr. Stuart for signature and will send electronically to the pharmacy once signed.    Marlene Cartagena RN

## 2021-09-08 RX ORDER — DEXTROAMPHETAMINE SACCHARATE, AMPHETAMINE ASPARTATE, DEXTROAMPHETAMINE SULFATE AND AMPHETAMINE SULFATE 2.5; 2.5; 2.5; 2.5 MG/1; MG/1; MG/1; MG/1
10 TABLET ORAL 2 TIMES DAILY PRN
Qty: 60 TABLET | Refills: 0 | Status: SHIPPED | OUTPATIENT
Start: 2021-09-08 | End: 2021-10-26

## 2021-10-03 ENCOUNTER — HEALTH MAINTENANCE LETTER (OUTPATIENT)
Age: 51
End: 2021-10-03

## 2021-10-13 ENCOUNTER — OFFICE VISIT (OUTPATIENT)
Dept: FAMILY MEDICINE | Facility: CLINIC | Age: 51
End: 2021-10-13
Payer: COMMERCIAL

## 2021-10-13 VITALS
HEART RATE: 82 BPM | DIASTOLIC BLOOD PRESSURE: 88 MMHG | WEIGHT: 204 LBS | OXYGEN SATURATION: 98 % | BODY MASS INDEX: 32.93 KG/M2 | SYSTOLIC BLOOD PRESSURE: 130 MMHG | RESPIRATION RATE: 14 BRPM | TEMPERATURE: 97 F

## 2021-10-13 DIAGNOSIS — Z00.00 PREVENTATIVE HEALTH CARE: ICD-10-CM

## 2021-10-13 DIAGNOSIS — Z13.220 SCREENING FOR HYPERLIPIDEMIA: ICD-10-CM

## 2021-10-13 DIAGNOSIS — R73.09 ELEVATED GLUCOSE LEVEL: ICD-10-CM

## 2021-10-13 DIAGNOSIS — Z01.818 PREOP GENERAL PHYSICAL EXAM: ICD-10-CM

## 2021-10-13 DIAGNOSIS — Z12.31 ENCOUNTER FOR SCREENING MAMMOGRAM FOR BREAST CANCER: ICD-10-CM

## 2021-10-13 DIAGNOSIS — S73.191A TEAR OF RIGHT ACETABULAR LABRUM, INITIAL ENCOUNTER: ICD-10-CM

## 2021-10-13 DIAGNOSIS — Z23 NEED FOR PROPHYLACTIC VACCINATION AND INOCULATION AGAINST INFLUENZA: ICD-10-CM

## 2021-10-13 DIAGNOSIS — R74.01 ELEVATED ALT MEASUREMENT: ICD-10-CM

## 2021-10-13 LAB — HGB BLD-MCNC: 14.6 G/DL (ref 11.7–15.7)

## 2021-10-13 PROCEDURE — 83036 HEMOGLOBIN GLYCOSYLATED A1C: CPT | Performed by: FAMILY MEDICINE

## 2021-10-13 PROCEDURE — 90750 HZV VACC RECOMBINANT IM: CPT | Performed by: FAMILY MEDICINE

## 2021-10-13 PROCEDURE — 36415 COLL VENOUS BLD VENIPUNCTURE: CPT | Performed by: FAMILY MEDICINE

## 2021-10-13 PROCEDURE — 80053 COMPREHEN METABOLIC PANEL: CPT | Performed by: FAMILY MEDICINE

## 2021-10-13 PROCEDURE — 90471 IMMUNIZATION ADMIN: CPT | Performed by: FAMILY MEDICINE

## 2021-10-13 PROCEDURE — 90472 IMMUNIZATION ADMIN EACH ADD: CPT | Performed by: FAMILY MEDICINE

## 2021-10-13 PROCEDURE — 85018 HEMOGLOBIN: CPT | Performed by: FAMILY MEDICINE

## 2021-10-13 PROCEDURE — 99204 OFFICE O/P NEW MOD 45 MIN: CPT | Mod: 25 | Performed by: FAMILY MEDICINE

## 2021-10-13 PROCEDURE — 80061 LIPID PANEL: CPT | Performed by: FAMILY MEDICINE

## 2021-10-13 PROCEDURE — 90682 RIV4 VACC RECOMBINANT DNA IM: CPT | Performed by: FAMILY MEDICINE

## 2021-10-13 PROCEDURE — 96127 BRIEF EMOTIONAL/BEHAV ASSMT: CPT | Performed by: FAMILY MEDICINE

## 2021-10-13 ASSESSMENT — ANXIETY QUESTIONNAIRES
2. NOT BEING ABLE TO STOP OR CONTROL WORRYING: NOT AT ALL
3. WORRYING TOO MUCH ABOUT DIFFERENT THINGS: NOT AT ALL
4. TROUBLE RELAXING: NOT AT ALL
5. BEING SO RESTLESS THAT IT IS HARD TO SIT STILL: NOT AT ALL
6. BECOMING EASILY ANNOYED OR IRRITABLE: NOT AT ALL
7. FEELING AFRAID AS IF SOMETHING AWFUL MIGHT HAPPEN: NOT AT ALL
7. FEELING AFRAID AS IF SOMETHING AWFUL MIGHT HAPPEN: NOT AT ALL
1. FEELING NERVOUS, ANXIOUS, OR ON EDGE: NOT AT ALL
GAD7 TOTAL SCORE: 0
GAD7 TOTAL SCORE: 0
8. IF YOU CHECKED OFF ANY PROBLEMS, HOW DIFFICULT HAVE THESE MADE IT FOR YOU TO DO YOUR WORK, TAKE CARE OF THINGS AT HOME, OR GET ALONG WITH OTHER PEOPLE?: NOT DIFFICULT AT ALL
GAD7 TOTAL SCORE: 0

## 2021-10-13 ASSESSMENT — PATIENT HEALTH QUESTIONNAIRE - PHQ9
10. IF YOU CHECKED OFF ANY PROBLEMS, HOW DIFFICULT HAVE THESE PROBLEMS MADE IT FOR YOU TO DO YOUR WORK, TAKE CARE OF THINGS AT HOME, OR GET ALONG WITH OTHER PEOPLE: NOT DIFFICULT AT ALL
SUM OF ALL RESPONSES TO PHQ QUESTIONS 1-9: 7
SUM OF ALL RESPONSES TO PHQ QUESTIONS 1-9: 7

## 2021-10-13 NOTE — PATIENT INSTRUCTIONS
Do not take ibuprofen, naproxen, or aspirin for 7 days before upcoming surgery. Use acetaminophen instead for pain relief if needed. Differ taking your Adderall and Vitamin D on morning of surgery.    Preparing for Your Surgery  Getting started  A nurse will call you to review your health history and instructions. They will give you an arrival time based on your scheduled surgery time.  Please be ready to share the following:    Your doctor's clinic name and phone number    Your medical, surgical and anesthesia history    A list of allergies and sensitivities    A list of medicines, including herbal treatments and over-the-counter drugs    Whether the patient has a legal guardian (ask how to send us the papers in advance)  If you have a child who's having surgery, please ask for a copy of Preparing for Your Child's Surgery.    Preparing for surgery    Within 30 days of surgery: Have a pre-op exam (sometimes called an H&P, or History and Physical). This can be done at a clinic or pre-operative center.  ? If you're having a , you may not need this exam. Talk to your care team    At your pre-op exam, talk to your care team about all medicines you take. If you need to stop any medicines before surgery, ask when to start taking them again.  ? We do this for your safety. Many medicines can make you bleed too much during surgery. Some change how well surgery (anesthesia) drugs work.    Call your insurance company to let them know you're having surgery. (If you don't have insurance, call [phone].)    Call your clinic if there's any change in your health. This includes signs of a cold or flu (sore throat, runny nose, cough, rash, fever). It also includes a scrape or scratch near the surgery site.    If you have questions on the day of surgery, call your hospital or surgery center.  Eating and drinking guidelines  For your safety: Unless your surgeon tells you otherwise, follow the guidelines below.    Eat and  drink as usual until 8 hours before surgery. After that, no food or milk.    Drink clear liquids until 2 hours before surgery. These are liquids you can see through, like water, Gatorade and Propel Water. You may also have black coffee and tea (no cream or milk).    Nothing by mouth within 2 hours of surgery. This includes gum, candy and breath mints.    If you drink, stop drinking alcohol the night before surgery.    If your care team tells you to take medicine on the morning of surgery, it's okay to take it with a sip of water.  Preventing infection    Shower or bathe the night before and morning of your surgery. Follow the instructions your clinic gave you. (If no instructions, use regular soap.)    Don't shave or clip hair near your surgery site. We'll remove the hair if needed.    Don't smoke or vape the morning of surgery. You may chew nicotine gum up to 2 hours before surgery. A nicotine patch is okay.  ? Note: Some surgeries require you to completely quit smoking and nicotine. Check with your surgeon.    Your care team will make every effort to keep you safe from infection. We will:  ? Clean our hands often with soap and water (or an alcohol-based hand rub).  ? Clean the skin at your surgery site with a special soap that kills germs.  ? Give you a special gown to keep you warm. (Cold raises the risk of infection.)  ? Wear special hair covers, masks, gowns and gloves during surgery.  ? Give antibiotic medicine, if prescribed. Not all surgeries need antibiotics.  What to bring on the day of surgery    Photo ID and insurance card    Copy of your health care directive, if you have one    Glasses and hearing aides (bring cases)  ? You can't wear contacts during surgery    Inhaler and eye drops, if you use them (tell us about these when you arrive)    CPAP machine or breathing device, if you use them    A few personal items, if spending the night    If you have . . .  ? A pacemaker or ICD (cardiac defibrillator):  Bring the ID card.  ? An implanted stimulator: Bring the remote control.  ? A legal guardian: Bring a copy of the certified (court-stamped) guardianship papers.  Please remove any jewelry, including body piercings. Leave jewelry and other valuables at home.  If you're going home the day of surgery  Important: If you don't follow the rules below, we must cancel your surgery.     Arrange for someone to drive you home after surgery. You may not drive, take a taxi or take public transportation by yourself (unless you'll have local anesthesia only).    Arrange for a responsible adult to stay with you overnight. If you don't, we may keep you in the hospital overnight, and you may need to pay the costs yourself.  Questions?   If you have any questions for your care team, list them here: _________________________________________________________________________________________________________________________________________________________________________________________________________________________________________________________________________________________________________________________  For informational purposes only. Not to replace the advice of your health care provider. Copyright   2003, 2019 [facility]. All rights reserved. Clinically reviewed by Mandy Nolasco MD. [employer] [id number] - REV 4/20.

## 2021-10-13 NOTE — PROGRESS NOTES
[facility]  [address] [address]  Phone: [phone]  Primary Provider: No Ref-Primary, Physician  Pre-op Performing Provider: CHARLES HERRERA    PREOPERAnswers for HPI/ROS submitted by the patient on 10/13/2021  If you checked off any problems, how difficult have these problems made it for you to do your work, take care of things at home, or get along with other people?: Not difficult at all  PHQ9 TOTAL SCORE: 7  JOSE 7 TOTAL SCORE: 0    ATIVE EVALUATION:  Today's date: 10/13/2021    Chayo Reid is a 51 year old female who presents for a preoperative evaluation.    Surgical Information:  Surgery/Procedure: rt hip arthroscopy. Labral repair  Surgery Location: [facility]  Surgeon: Dr Gamboa  Surgery Date: 10/28/21  Time of Surgery:   Where patient plans to recover: At home with family  Fax number for surgical facility: [phone]    Type of Anesthesia Anticipated: General    Assessment & Plan     The proposed surgical procedure is considered INTERMEDIATE risk.  See after visit summary and result note from studies for helpful information and advice given to patient.    Preop general physical exam  Patient cleared for surgery.   - Hemoglobin  - Comprehensive metabolic panel (BMP + Alb, Alk Phos, ALT, AST, Total. Bili, TP)  - Hemoglobin  - Comprehensive metabolic panel (BMP + Alb, Alk Phos, ALT, AST, Total. Bili, TP)    Tear of right acetabular labrum, initial encounter  To be addressed by upcoming surgery.  - Hemoglobin  - Hemoglobin    Encounter for screening mammogram for breast cancer    - MA SCREENING DIGITAL BILAT - Future  (s+30)    Screening for hyperlipidemia      Elevated ALT measurement    - Comprehensive metabolic panel (BMP + Alb, Alk Phos, ALT, AST, Total. Bili, TP)  - Comprehensive metabolic panel (BMP + Alb, Alk Phos, ALT, AST, Total. Bili, TP)    Preventative health care    - Lipid panel reflex to direct LDL Fasting  -  Comprehensive metabolic panel (BMP + Alb, Alk Phos, ALT, AST, Total. Bili, TP)  - ZOSTER VACCINE RECOMBINANT ADJUVANTED IM NJX  - Lipid panel reflex to direct LDL Fasting  - Comprehensive metabolic panel (BMP + Alb, Alk Phos, ALT, AST, Total. Bili, TP)    Elevated glucose level    - Hemoglobin A1c    Need for prophylactic vaccination and inoculation against influenza    - INFLUENZA QUAD, RECOMBINANT, P-FREE (RIV4) (FLUBLOK)               Medication Instructions:  Do not take ibuprofen, naproxen, or aspirin for 7 days before upcoming surgery. Use acetaminophen instead for pain relief if needed. Differ taking your Adderall and Vitamin D on morning of surgery.    RECOMMENDATION:  APPROVAL GIVEN to proceed with proposed procedure, without further diagnostic evaluation.                  Subjective     HPI related to upcoming procedure: patient has had pain in right hip for about 3 years. No history of injury.     Preop Questions 10/10/2021   1. Have you ever had a heart attack or stroke? No   2. Have you ever had surgery on your heart or blood vessels, such as a stent placement, a coronary artery bypass, or surgery on an artery in your head, neck, heart, or legs? No   3. Do you have chest pain with activity? No   4. Do you have a history of  heart failure? No   5. Do you currently have a cold, bronchitis or symptoms of other infection? No   6. Do you have a cough, shortness of breath, or wheezing? No   7. Do you or anyone in your family have previous history of blood clots? No   8. Do you or does anyone in your family have a serious bleeding problem such as prolonged bleeding following surgeries or cuts? No   9. Have you ever had problems with anemia or been told to take iron pills? No   10. Have you had any abnormal blood loss such as black, tarry or bloody stools, or abnormal vaginal bleeding? No   11. Have you ever had a blood transfusion? No   12. Are you willing to have a blood transfusion if it is medically needed  before, during, or after your surgery? Yes-if necessary.    13. Have you or any of your relatives ever had problems with anesthesia? No   14. Do you have sleep apnea, excessive snoring or daytime drowsiness? No   15. Do you have any artifical heart valves or other implanted medical devices like a pacemaker, defibrillator, or continuous glucose monitor? No   16. Do you have artificial joints? No   17. Are you allergic to latex? No   18. Is there any chance that you may be pregnant? No       Health Care Directive:  Patient does not have a Health Care Directive or Living Will: Discussed advance care planning with patient; information given to patient to review.    Preoperative Review of :   reviewed - controlled substances reflected in medication list.        Review of Systems  Constitutional, neuro, ENT, endocrine, pulmonary, cardiac, gastrointestinal, genitourinary, musculoskeletal, integument and psychiatric systems are negative, except as otherwise noted.    Patient feels tired, she feels due to her MS, and has poor sleep due to right hip pain, or both. Otherwise she feels good at time of exam.     She takes her medical cannabis in the evening.     Patient Active Problem List    Diagnosis Date Noted     Cyst of right ovary 2017     Priority: Medium     Dysfunctional uterine bleeding 2015     Priority: Medium     MS (multiple sclerosis) (H) 2013     Priority: Medium     Fatigue 2013     Priority: Medium     Palpitations 2008     Priority: Medium     Seizure (H) 2005     Priority: Medium     Overview:   Spells        Past Medical History:   Diagnosis Date     Corneal ulcer      Demyelinating disease (H) 2011    Multiple sclerosis     Fatigue      Hyperopia with astigmatism and presbyopia      Migraine 2011     OAB (overactive bladder)      Ptosis      Past Surgical History:   Procedure Laterality Date     BREAST SURGERY  2012      SECTION  ,      HYSTERECTOMY   02/19/2015    partial; still has tubes and ovaries     TUBAL LIGATION Bilateral 2001     Current Outpatient Medications   Medication Sig Dispense Refill     amphetamine-dextroamphetamine (ADDERALL) 10 MG tablet Take 1 tablet (10 mg) by mouth 2 times daily as needed (severe fatigue) 60 tablet 0     Cholecalciferol (VITAMIN D) 1000 UNITS capsule Take 1 capsule by mouth daily       medical cannabis (Patient's own supply.  Not a prescription) See Admin Instructions (This is NOT a prescription, and does not certify that the patient has a qualifying medical condition for medical cannabis.  The purpose of this order is  to document that the patient reports taking medical cannabis.)         No Known Allergies     Social History     Tobacco Use     Smoking status: Never Smoker     Smokeless tobacco: Never Used   Substance Use Topics     Alcohol use: Yes     Alcohol/week: 1.0 standard drinks     Types: 1 Glasses of wine per week     Comment: occasional       History   Drug Use No         Objective     /88 (Cuff Size: Adult Large)   Pulse 82   Temp 97  F (36.1  C) (Tympanic)   Resp 14   Wt 92.5 kg (204 lb)   SpO2 98%   BMI 32.93 kg/m      Physical Exam  General: Vital signs reviewed.  Patient is in no acute appearing distress.  Breathing appears nonlabored.  Patient is alert and oriented ×3.      ENT: Ear exam shows bilateral tympanic membranes to be clear without injection, nasal turbinates show no injection or edema, no pharyngeal injection or exudate.    Neck: supple with no adenoapthy, palpable abnormal masses, or thyroid abnormality.    Eyes: No scleral, lid, or periorbital injection or edema noted.  No eye mattering noted.  Corneas are clear. Pupils are equal round and reactive to light with normal consensual eye movement.    Heart: Heart rate is regular without murmur.    Lungs: Lungs are clear to auscultation with good airflow bilaterally.    Abdomen:  Abdomen is soft, nontender.  No palpable abnormal  masses or organomegaly.  Bowel sounds are normal.    Back: No areas of tenderness.    Skin: Warm and dry, with no rash or abnormal lesions noted.    Extremities: No ankle edema noted.  No joint edema noted. She is able to get up and down from exam room chair and table without visible difficulty, though she admits to having some right hip discomfort.    Neuro: No acute focal deficits  Or other abnormalities noted.    Psych: Patient is very pleasant, making good eye contact, with clear and fluent speech.  Answers questions appropriately. No psychomotor agitation.     Recent Labs   Lab Test 11/05/19  1657   HGB 14.0         POTASSIUM 4.2   CR 0.93        Diagnostics:  Recent Results (from the past 168 hour(s))   Lipid panel reflex to direct LDL Fasting    Collection Time: 10/13/21 11:24 AM   Result Value Ref Range    Cholesterol 199 <200 mg/dL    Triglycerides 310 (H) <150 mg/dL    Direct Measure HDL 40 (L) >=50 mg/dL    LDL Cholesterol Calculated 97 <=100 mg/dL    Non HDL Cholesterol 159 (H) <130 mg/dL    Patient Fasting > 8hrs? Yes    Hemoglobin    Collection Time: 10/13/21 11:24 AM   Result Value Ref Range    Hemoglobin 14.6 11.7 - 15.7 g/dL   Comprehensive metabolic panel (BMP + Alb, Alk Phos, ALT, AST, Total. Bili, TP)    Collection Time: 10/13/21 11:24 AM   Result Value Ref Range    Sodium 137 133 - 144 mmol/L    Potassium 4.4 3.4 - 5.3 mmol/L    Chloride 107 94 - 109 mmol/L    Carbon Dioxide (CO2) 24 20 - 32 mmol/L    Anion Gap 6 3 - 14 mmol/L    Urea Nitrogen 17 7 - 30 mg/dL    Creatinine 1.00 0.52 - 1.04 mg/dL    Calcium 8.9 8.5 - 10.1 mg/dL    Glucose 100 (H) 70 - 99 mg/dL    Alkaline Phosphatase 77 40 - 150 U/L    AST 18 0 - 45 U/L    ALT 24 0 - 50 U/L    Protein Total 7.4 6.8 - 8.8 g/dL    Albumin 3.4 3.4 - 5.0 g/dL    Bilirubin Total 0.4 0.2 - 1.3 mg/dL    GFR Estimate 65 >60 mL/min/1.73m2   Hemoglobin A1c    Collection Time: 10/13/21 11:24 AM   Result Value Ref Range    Hemoglobin A1C 5.4 0.0  - 5.6 %      No EKG required, no history of coronary heart disease, significant arrhythmia, peripheral arterial disease or other structural heart disease.    Revised Cardiac Risk Index (RCRI):  The patient has the following serious cardiovascular risks for perioperative complications:   - No serious cardiac risks = 0 points     RCRI Interpretation: 0 points: Class I (very low risk - 0.4% complication rate)           Signed Electronically by: Yousuf Booth DO  Copy of this evaluation report is provided to requesting physician.

## 2021-10-14 LAB
ALBUMIN SERPL-MCNC: 3.4 G/DL (ref 3.4–5)
ALP SERPL-CCNC: 77 U/L (ref 40–150)
ALT SERPL W P-5'-P-CCNC: 24 U/L (ref 0–50)
ANION GAP SERPL CALCULATED.3IONS-SCNC: 6 MMOL/L (ref 3–14)
AST SERPL W P-5'-P-CCNC: 18 U/L (ref 0–45)
BILIRUB SERPL-MCNC: 0.4 MG/DL (ref 0.2–1.3)
BUN SERPL-MCNC: 17 MG/DL (ref 7–30)
CALCIUM SERPL-MCNC: 8.9 MG/DL (ref 8.5–10.1)
CHLORIDE BLD-SCNC: 107 MMOL/L (ref 94–109)
CHOLEST SERPL-MCNC: 199 MG/DL
CO2 SERPL-SCNC: 24 MMOL/L (ref 20–32)
CREAT SERPL-MCNC: 1 MG/DL (ref 0.52–1.04)
FASTING STATUS PATIENT QL REPORTED: YES
GFR SERPL CREATININE-BSD FRML MDRD: 65 ML/MIN/1.73M2
GLUCOSE BLD-MCNC: 100 MG/DL (ref 70–99)
HBA1C MFR BLD: 5.4 % (ref 0–5.6)
HDLC SERPL-MCNC: 40 MG/DL
LDLC SERPL CALC-MCNC: 97 MG/DL
NONHDLC SERPL-MCNC: 159 MG/DL
POTASSIUM BLD-SCNC: 4.4 MMOL/L (ref 3.4–5.3)
PROT SERPL-MCNC: 7.4 G/DL (ref 6.8–8.8)
SODIUM SERPL-SCNC: 137 MMOL/L (ref 133–144)
TRIGL SERPL-MCNC: 310 MG/DL

## 2021-10-14 ASSESSMENT — PATIENT HEALTH QUESTIONNAIRE - PHQ9: SUM OF ALL RESPONSES TO PHQ QUESTIONS 1-9: 7

## 2021-10-14 ASSESSMENT — ANXIETY QUESTIONNAIRES: GAD7 TOTAL SCORE: 0

## 2021-10-26 ENCOUNTER — MYC REFILL (OUTPATIENT)
Dept: NEUROLOGY | Facility: CLINIC | Age: 51
End: 2021-10-26

## 2021-10-26 DIAGNOSIS — G47.10 EXCESSIVE SLEEPINESS: ICD-10-CM

## 2021-10-27 RX ORDER — DEXTROAMPHETAMINE SACCHARATE, AMPHETAMINE ASPARTATE, DEXTROAMPHETAMINE SULFATE AND AMPHETAMINE SULFATE 2.5; 2.5; 2.5; 2.5 MG/1; MG/1; MG/1; MG/1
10 TABLET ORAL 2 TIMES DAILY PRN
Qty: 60 TABLET | Refills: 0 | Status: SHIPPED | OUTPATIENT
Start: 2021-10-27 | End: 2021-12-06

## 2021-11-10 ENCOUNTER — HOME INFUSION (PRE-WILLOW HOME INFUSION) (OUTPATIENT)
Dept: PHARMACY | Facility: CLINIC | Age: 51
End: 2021-11-10
Payer: COMMERCIAL

## 2021-11-11 NOTE — PROGRESS NOTES
This is a recent snapshot of the patient's [facility] medical record.  For current drug dose and complete information and questions, call [phone] or In Basket pool, [facility] home infusion ([id number])  CSN Number:  [id number]

## 2021-11-12 ENCOUNTER — HOME INFUSION (PRE-WILLOW HOME INFUSION) (OUTPATIENT)
Dept: PHARMACY | Facility: CLINIC | Age: 51
End: 2021-11-12
Payer: COMMERCIAL

## 2021-11-22 NOTE — PROGRESS NOTES
This is a recent snapshot of the patient's [facility] medical record.  For current drug dose and complete information and questions, call [phone] or In Basket pool, [facility]v home infusion ([id number])  CSN Number:  [id number]

## 2021-12-06 ENCOUNTER — MYC REFILL (OUTPATIENT)
Dept: NEUROLOGY | Facility: CLINIC | Age: 51
End: 2021-12-06
Payer: COMMERCIAL

## 2021-12-06 DIAGNOSIS — G47.10 EXCESSIVE SLEEPINESS: ICD-10-CM

## 2021-12-07 NOTE — TELEPHONE ENCOUNTER
Patient requesting refill of their Adderall; Patient was last seen in May and has follow up appointment in February with Dr Stuart. Pended rx to Dr Stuart for signature and will send electronically to the pharmacy once signed.    Berta Jones RN

## 2021-12-08 RX ORDER — DEXTROAMPHETAMINE SACCHARATE, AMPHETAMINE ASPARTATE, DEXTROAMPHETAMINE SULFATE AND AMPHETAMINE SULFATE 2.5; 2.5; 2.5; 2.5 MG/1; MG/1; MG/1; MG/1
10 TABLET ORAL 2 TIMES DAILY PRN
Qty: 60 TABLET | Refills: 0 | Status: SHIPPED | OUTPATIENT
Start: 2021-12-08 | End: 2022-01-08

## 2022-01-08 ENCOUNTER — MYC REFILL (OUTPATIENT)
Dept: NEUROLOGY | Facility: CLINIC | Age: 52
End: 2022-01-08
Payer: COMMERCIAL

## 2022-01-08 DIAGNOSIS — G47.10 EXCESSIVE SLEEPINESS: ICD-10-CM

## 2022-01-10 RX ORDER — DEXTROAMPHETAMINE SACCHARATE, AMPHETAMINE ASPARTATE, DEXTROAMPHETAMINE SULFATE AND AMPHETAMINE SULFATE 2.5; 2.5; 2.5; 2.5 MG/1; MG/1; MG/1; MG/1
10 TABLET ORAL 2 TIMES DAILY PRN
Qty: 60 TABLET | Refills: 0 | Status: SHIPPED | OUTPATIENT
Start: 2022-01-10 | End: 2022-02-01

## 2022-01-10 NOTE — TELEPHONE ENCOUNTER
Patient requesting refill of their Adderall; Patient was last seen in May and has follow up appointment in [place]br[place] with Dr. Stuart. Pended rx to Dr. Stuart for signature and will send electronically to the pharmacy once signed.    Marlene Cartagena RN

## 2022-02-01 ENCOUNTER — OFFICE VISIT (OUTPATIENT)
Dept: NEUROLOGY | Facility: CLINIC | Age: 52
End: 2022-02-01
Attending: PSYCHIATRY & NEUROLOGY
Payer: COMMERCIAL

## 2022-02-01 VITALS
OXYGEN SATURATION: 99 % | SYSTOLIC BLOOD PRESSURE: 164 MMHG | WEIGHT: 208.8 LBS | BODY MASS INDEX: 33.56 KG/M2 | DIASTOLIC BLOOD PRESSURE: 104 MMHG | HEIGHT: 66 IN | HEART RATE: 107 BPM

## 2022-02-01 DIAGNOSIS — G35 MS (MULTIPLE SCLEROSIS) (H): Primary | ICD-10-CM

## 2022-02-01 DIAGNOSIS — G47.10 EXCESSIVE SLEEPINESS: ICD-10-CM

## 2022-02-01 PROCEDURE — G0463 HOSPITAL OUTPT CLINIC VISIT: HCPCS

## 2022-02-01 PROCEDURE — 99214 OFFICE O/P EST MOD 30 MIN: CPT | Performed by: PSYCHIATRY & NEUROLOGY

## 2022-02-01 RX ORDER — DEXTROAMPHETAMINE SACCHARATE, AMPHETAMINE ASPARTATE, DEXTROAMPHETAMINE SULFATE AND AMPHETAMINE SULFATE 5; 5; 5; 5 MG/1; MG/1; MG/1; MG/1
10 TABLET ORAL 2 TIMES DAILY PRN
Qty: 60 TABLET | Refills: 0 | Status: SHIPPED | OUTPATIENT
Start: 2022-02-01 | End: 2022-02-24

## 2022-02-01 RX ORDER — BACLOFEN 10 MG/1
10 TABLET ORAL AT BEDTIME
Qty: 30 TABLET | Refills: 11 | Status: SHIPPED | OUTPATIENT
Start: 2022-02-01 | End: 2022-03-11

## 2022-02-01 ASSESSMENT — MIFFLIN-ST. JEOR: SCORE: 1573.86

## 2022-02-01 ASSESSMENT — PAIN SCALES - GENERAL: PAINLEVEL: NO PAIN (0)

## 2022-02-01 NOTE — PROGRESS NOTES
Service Date: 02/01/2022    REASON FOR VISIT:  Chayo Reid is a 51-year-old woman whom I follow for multiple sclerosis.  She returns for routine followup.  She was last seen in clinic in 05/2021 by Loren Lennon.    HISTORY OF PRESENT ILLNESS:  Chayo has been doing quite well overall.  She had been having chronic leg pain, but tells me she was diagnosed with a labral tear in her right hip, which was repaired, and she is feeling much better.  Her walking and bladder are doing okay.  She tells me she discontinued the ocrelizumab infusions on her own.  As best I can tell, the last one was in 10/2020.  She felt more tired and crabby while on it and felt it was causing weight gain.  Her history is one of a radiologically isolated syndrome followed by lesion accumulation, including enhancing lesions in 2018, which led to a change from glatiramer acetate to the ocrelizumab.  She does notice continued evanescent tingling and twitching in her legs as well as general tightness in the legs.  We discussed trying some low-dose baclofen in the evening, and she would like to do that.  Fatigue remains her major symptom, and she wonders about trying a higher dose of Adderall.    PHYSICAL EXAMINATION:  Blood pressure 164/104, pulse 107, weight 208 pounds.  She is alert and oriented.  Affect is bright, and language functions are normal.  Cranial nerves are unremarkable.  Muscle bulk, tone, strength and dexterity are normal in the arms and legs.  Light touch is intact in all 4 limbs.  Reflexes are normal and symmetric, and her gait is normal.    IMPRESSION:  Relapsing-remitting multiple sclerosis, clinically stable, off immunotherapy for about 16 months.    I spent 36 minutes on her care today, including chart review, face-to-face and documentation time.  We discussed spasticity management and the plan for treatment going forward.  I told her she is definitely young enough that we will need to continue MRI surveillance, and if  there is any evidence of ongoing disease activity find some other treatment option that she can tolerate.    PLAN:    1.  Baclofen 10 mg p.o. every evening.  2.  Increase Adderall to 20 mg b.i.d. as needed.  3.  Return in 6 months with repeat MRI of the brain.    Umer Stuart MD        D: 2022   T: 2022   MT: rebecca    Name:     THO BENITEZJavan  MRN:      [id number]        Account:      [id number]   :      1970           Service Date: 2022       Document: [id number]

## 2022-02-02 ENCOUNTER — HOME INFUSION (PRE-WILLOW HOME INFUSION) (OUTPATIENT)
Dept: PHARMACY | Facility: CLINIC | Age: 52
End: 2022-02-02

## 2022-02-08 ENCOUNTER — MEDICAL CORRESPONDENCE (OUTPATIENT)
Dept: HEALTH INFORMATION MANAGEMENT | Facility: CLINIC | Age: 52
End: 2022-02-08

## 2022-02-16 DIAGNOSIS — G47.10 EXCESSIVE SLEEPINESS: ICD-10-CM

## 2022-02-16 NOTE — TELEPHONE ENCOUNTER
Prior Authorization Retail Medication Request    Medication/Dose: ADDERALL  ICD code (if different than what is on RX):  G47.10  Previously Tried and Failed:    Rationale:      Insurance Name:  [employer]  Insurance ID:  [id number]      Pharmacy Information (if different than what is on RX)  Name:  [facility]  Phone:  [phone]

## 2022-02-22 NOTE — TELEPHONE ENCOUNTER
In starting the Prior Auth request I notice the dose on rx order is different from notes in the office visit on 02/01/2022.   Please confirm if should be 10mg BID (rx is written 20mg, taking half tab twice daily), or if should be 20mg BID. If it should be 20mg BID, will need a new script sent to pharmacy to process to see if it indeed needs a Prior Auth.

## 2022-02-24 RX ORDER — DEXTROAMPHETAMINE SACCHARATE, AMPHETAMINE ASPARTATE, DEXTROAMPHETAMINE SULFATE AND AMPHETAMINE SULFATE 5; 5; 5; 5 MG/1; MG/1; MG/1; MG/1
20 TABLET ORAL 2 TIMES DAILY PRN
Qty: 60 TABLET | Refills: 0 | Status: SHIPPED | OUTPATIENT
Start: 2022-02-24 | End: 2022-03-11

## 2022-02-24 NOTE — TELEPHONE ENCOUNTER
"Received a message from PA team that Adderall dose on Rx does not match dose discussed at Chayo's 2/1 appointment.  In clinic notes, Dr. Stuart states the plan is to \"increase Adderall to 20 mg BID as needed\".  Rx updated and pended to Dr. Stuart for signature.    Marlene Cartagena RN    "

## 2022-03-11 ENCOUNTER — MYC REFILL (OUTPATIENT)
Dept: NEUROLOGY | Facility: CLINIC | Age: 52
End: 2022-03-11

## 2022-03-11 DIAGNOSIS — G47.10 EXCESSIVE SLEEPINESS: ICD-10-CM

## 2022-03-14 RX ORDER — DEXTROAMPHETAMINE SACCHARATE, AMPHETAMINE ASPARTATE, DEXTROAMPHETAMINE SULFATE AND AMPHETAMINE SULFATE 5; 5; 5; 5 MG/1; MG/1; MG/1; MG/1
20 TABLET ORAL 2 TIMES DAILY PRN
Qty: 60 TABLET | Refills: 0 | Status: SHIPPED | OUTPATIENT
Start: 2022-03-14 | End: 2022-04-28

## 2022-03-14 NOTE — TELEPHONE ENCOUNTER
Patient requesting refill of their Adderall; Patient was last seen in February and has follow up appointment in August with Dr Stuart. Pended rx to Dr Stuart for signature and will send electronically to the pharmacy once signed.    Berta Jones RN

## 2022-04-09 ENCOUNTER — VIRTUAL VISIT (OUTPATIENT)
Dept: URGENT CARE | Facility: CLINIC | Age: 52
End: 2022-04-09
Payer: COMMERCIAL

## 2022-04-09 DIAGNOSIS — U07.1 INFECTION DUE TO 2019 NOVEL CORONAVIRUS: Primary | ICD-10-CM

## 2022-04-09 DIAGNOSIS — G35 MS (MULTIPLE SCLEROSIS) (H): ICD-10-CM

## 2022-04-09 PROCEDURE — 99214 OFFICE O/P EST MOD 30 MIN: CPT | Mod: GT | Performed by: PHYSICIAN ASSISTANT

## 2022-04-09 NOTE — PROGRESS NOTES
Assessment:     COVID-19 positive patient.  Encounter for consideration of medication intervention.    Patient does qualify for a prescription.   Full discussion with patient including medication options, risks and benefits.   Potential drug interactions reviewed with patient.      Plan:  Treatment planned -  Paxlovid, Rx sent to [place] [facility]  [facility] [phone]    [address], [address]  [place] [address]    Hours:  Mon-Fri: 8:00a - 6:00p  Sat-Sun: 8:00a - 4:00p    Drive-thru services available.    Temporary change to home medications:   None    See patient instructions      Virtual Urgent Care      Patient preference to obtain AVS:  Florence Yuen      Chayo  is a 51 year old  who has a confirmed new positive COVID-19 diagnosis.    She has been identified as high risk for complications of this infection, and is being evaluated via a billable Video visit:     Video-Visit Details    Type of service:  Video Visit    Video Visit Start Time:1:51 PM    Video End Time:2:02 PM    Originating Location (pt. Location): Home    Distant Location (provider location):  [facility]     Platform used for Video Visit: Tatiana    Concern for COVID-19  Exactly how many days ago did these symptoms start? 2 days     Are any of the following symptoms significant for you?    New or worsening difficulty breathing? No    Worsening cough? Yes, it's a dry cough.     Fever or chills? Yes, I felt feverish or had chills.    Headache: YES    Sore throat: YES    Chest pain: no    Diarrhea: no    Body aches? YES    What treatments has patient tried? Nonsteroidals and Cough syrup   Does patient live in a nursing home, group home, or shelter? no  Does patient have a way to get food/medications during quarantined? Yes, I have a friend or family member who can help me.         No flowsheet data found.          Constitutional, HEENT, cardiovascular, pulmonary, gi and gu systems are negative,  "except as otherwise noted.    Objective    Vitals:  No vitals were obtained today due to virtual visit.  Estimated body mass index is 33.7 kg/m  as calculated from the following:    Height as of 2/1/22: 1.676 m (5' 6\").    Weight as of 2/1/22: 94.7 kg (208 lb 12.8 oz).   GENERAL: alert, no distress and over weight  EYES: Eyes grossly normal to inspection.  No discharge or erythema, or obvious scleral/conjunctival abnormalities.  RESP: No audible wheeze, cough, or visible cyanosis.  No visible retractions or increased work of breathing.    SKIN: Visible skin clear. No significant rash, abnormal pigmentation or lesions.  NEURO: Cranial nerves grossly intact.  Mentation and speech appropriate for age.  PSYCH: Mentation appears normal, affect normal/bright, judgement and insight intact, normal speech and appearance well-groomed.  GFR Estimate   Date Value Ref Range Status   10/13/2021 65 >60 mL/min/1.73m2 Final     Comment:     As of July 11, 2021, eGFR is calculated by the CKD-EPI creatinine equation, without race adjustment. eGFR can be influenced by muscle mass, exercise, and diet. The reported eGFR is an estimation only and is only applicable if the renal function is stable.   11/05/2019 72 >60 mL/min/[1.73_m2] Final     Comment:     Non  GFR Calc  Starting 12/18/2018, serum creatinine based estimated GFR (eGFR) will be   calculated using the Chronic Kidney Disease Epidemiology Collaboration   (CKD-EPI) equation.                  "

## 2022-04-09 NOTE — PATIENT INSTRUCTIONS
FACT SHEET FOR PATIENTS, PARENTS, AND CAREGIVERS  EMERGENCY USE AUTHORIZATION (EUA) OF PAXLOVID FOR CORONAVIRUS DISEASE 2019 (COVID-19)  You are being given this Fact Sheet because your healthcare provider believes it is necessary to provide you with PAXLOVID for the treatment of mild-to-moderate coronavirus disease (COVID-19) caused by the SARS-CoV-2 virus. This Fact Sheet contains information to help you understand the risks and benefits of taking the PAXLOVID you have received or may receive.  The U.S. Food and Drug Administration (FDA) has issued an Emergency Use Authorization (EUA) to make PAXLOVID available during the COVID-19 pandemic (for more details about an EUA please see  What is an Emergency Use Authorization?  at the end of this document). PAXLOVID is not an FDA-approved medicine in the [place]. Read this Fact Sheet for information about PAXLOVID. Talk to your healthcare provider about your options or if you have any questions. It is your choice to take PAXLOVID.  What is COVID-19?  COVID-19 is caused by a virus called a coronavirus. You can get COVID-19 through close contact with another person who has the virus.  COVID-19 illnesses have ranged from very mild-to-severe, including illness resulting in death. While information so far suggests that most COVID-19 illness is mild, serious illness can happen and may cause some of your other medical conditions to become worse. Older people and people of all ages with severe, long lasting (chronic) medical conditions like heart disease, lung disease, and diabetes, for example seem to be at higher risk of being hospitalized for COVID-19.  What is PAXLOVID?  PAXLOVID is an investigational medicine used to treat mild-to-moderate COVID-19 in adults and children [12 years of age and older weighing at least 88 pounds (40 kg)] with positive results of direct SARS-CoV-2 viral testing, and who are at high risk for progression to severe COVID-19, including  hospitalization or death. PAXLOVID is investigational because it is still being studied. There is limited information about the safety and effectiveness of using PAXLOVID to treat people with mild-to-moderate COVID-19.  The FDA has authorized the emergency use of PAXLOVID for the treatment of mild-to- moderate COVID-19 in adults and children [12 years of age and older weighing at least 88 pounds (40 kg)] with a positive test for the virus that causes COVID-19, and who are at high risk for progression to severe COVID-19, including hospitalization or death, under an EUA.  1 Revised: 18 March 2022    What should I tell my healthcare provider before I take PAXLOVID?  Tell your healthcare provider if you:    Have any allergies    Have liver or kidney disease    Are pregnant or plan to become pregnant    Are breastfeeding a child    Have any serious illnesses  Tell your healthcare provider about all the medicines you take, including prescription and over-the-counter medicines, vitamins, and herbal supplements.  Some medicines may interact with PAXLOVID and may cause serious side effects. Keep a list of your medicines to show your healthcare provider and pharmacist when you get a new medicine.  You can ask your healthcare provider or pharmacist for a list of medicines that interact with PAXLOVID. Do not start taking a new medicine without telling your healthcare provider. Your healthcare provider can tell you if it is safe to take PAXLOVID with other medicines.  Tell your healthcare provider if you are taking combined hormonal contraceptive.  PAXLOVID may affect how your birth control pills work. Females who are able to become pregnant should use another effective alternative form of contraception or an additional barrier method of contraception. Talk to your healthcare provider if you have any questions about contraceptive methods that might be right for you.  How do I take PAXLOVID?    PAXLOVID consists of 2 medicines:  nirmatrelvir and ritonavir.  o Take 2 pink tablets of nirmatrelvir with 1 white tablet of ritonavir by mouth  2 times each day (in the morning and in the evening) for 5 days. For each  dose, take all 3 tablets at the same time.  o If you have kidney disease, talk to your healthcare provider. You  may need a different dose.    Swallow the tablets whole. Do not chew, break, or crush the tablets.    Take PAXLOVID with or without food.    Do not stop taking PAXLOVID without talking to your healthcare provider, even if  you feel better.    If you miss a dose of PAXLOVID within 8 hours of the time it is usually taken, take it as soon as you remember. If you miss a dose by more than 8 hours, skip the missed dose and take the next dose at your regular time. Do not take  2 doses of PAXLOVID at the same time.    If you take too much PAXLOVID, call your healthcare provider or go to the nearest hospital emergency room right away.    If you are taking a ritonavir- or cobicistat-containing medicine to treat hepatitis C or Human Immunodeficiency Virus (HIV), you should continue to take your medicine as prescribed by your healthcare provider.  2 Revised: 18 March 2022    Talk to your healthcare provider if you do not feel better or if you feel worse after 5 days.  Who should generally not take PAXLOVID?  Do not take PAXLOVID if:    You are allergic to nirmatrelvir, ritonavir, or any of the ingredients in PAXLOVID.    You are taking any of the following medicines:  o Alfuzosin  o Pethidine, propoxyphene  o Ranolazine  o Amiodarone, dronedarone, flecainide, propafenone, quinidine o Colchicine  o Lurasidone, pimozide, clozapine  o Dihydroergotamine, ergotamine, methylergonovine  o Lovastatin, simvastatin  o Sildenafil (Revatio ) for pulmonary arterial hypertension (PAH) o Triazolam, oral midazolam  o Apalutamide  o Carbamazepine, phenobarbital, phenytoin  o Rifampin  o Tamarack s Wort (hypericum perforatum)  Taking PAXLOVID with these  medicines may cause serious or life-threatening side effects or affect how PAXLOVID works.  These are not the only medicines that may cause serious side effects if taken with PAXLOVID. PAXLOVID may increase or decrease the levels of multiple other medicines. It is very important to tell your healthcare provider about all of the medicines you are taking because additional laboratory tests or changes in the dose of your other medicines may be necessary while you are taking PAXLOVID. Your healthcare provider may also tell you about specific symptoms to watch out for that may indicate that you need to stop or decrease the dose of some of your other medicines.  What are the important possible side effects of PAXLOVID?  Possible side effects of PAXLOVID are:    Allergic Reactions. Allergic reactions can happen in people taking  PAXLOVID, even after only 1 dose. Stop taking PAXLOVID and call your healthcare provider right away if you get any of the following symptoms of an allergic reaction:  o hives  o trouble swallowing or breathing  o swelling of the mouth, lips, or face o throat tightness  o hoarseness  3 Revised: 18 March 2022    o skin rash    Liver Problems. Tell your healthcare provider right away if you have any of  these signs and symptoms of liver problems: loss of appetite, yellowing of your skin and the whites of eyes (jaundice), dark-colored urine, pale colored stools and itchy skin, stomach area (abdominal) pain.    Resistance to HIV Medicines. If you have untreated HIV infection, PAXLOVID may lead to some HIV medicines not working as well in the future.    Other possible side effects include:  o altered sense of taste  o diarrhea  o high blood pressure o muscle aches  These are not all the possible side effects of PAXLOVID. Not many people have taken PAXLOVID. Serious and unexpected side effects may happen. PAXLOVID is still being studied, so it is possible that all of the risks are not known at this  time.  What other treatment choices are there?  Veklury (remdesivir) is FDA-approved for the treatment of mild-to-moderate COVID-19 in certain adults and children. Talk with your doctor to see if Veklury is appropriate for you.  Like PAXLOVID, FDA may also allow for the emergency use of other medicines to treat people with COVID-19. Go to https://www.fda.gov/emergency-preparedness-and- response/mcm-legal-regulatory-and-policy-framework/emergency-use-authorization for information on the emergency use of other medicines that are authorized by FDA to treat people with COVID-19. Your healthcare provider may talk with you about clinical trials for which you may be eligible.  It is your choice to be treated or not to be treated with PAXLOVID. Should you decide not to receive it or for your child not to receive it, it will not change your standard medical care.  What if I am pregnant or breastfeeding?  There is no experience treating pregnant women or breastfeeding mothers with PAXLOVID. For a mother and unborn baby, the benefit of taking PAXLOVID may be greater than the risk from the treatment. If you are pregnant, discuss your options and specific situation with your healthcare provider.  It is recommended that you use effective barrier contraception or do not have sexual activity while taking PAXLOVID.  If you are breastfeeding, discuss your options and specific situation with your healthcare provider.  4 Revised: 18 March 2022    How do I report side effects with PAXLOVID?  Contact your healthcare provider if you have any side effects that bother you or do not go away.  Report side effects to FDA MedWatch at www.fda.gov/medwatch or call 9-297-RMA- 9869 or you can report side effects to [employer] at the contact information provided below.  Telephone number  www.[employer] [phone] [phone] How should I store PAXLOVID?  Store PAXLOVID tablets at room temperature, between 68?F to 77?F (20?C to  25?C).  How can I learn more about COVID-19?    Ask your healthcare provider.    Visit https://www.cdc.gov/COVID19.    Contact your local or state public health department.  What is an Emergency Use Authorization (EUA)?  The [place] FDA has made PAXLOVID available under an emergency access mechanism called an Emergency Use Authorization (EUA). The EUA is supported by a  of Health and Human Service ([facility]) declaration that circumstances exist to justify the emergency use of drugs and biological products during the COVID-19 pandemic.  PAXLOVID for the treatment of mild-to-moderate COVID-19 in adults and children  [12 years of age and older weighing at least 88 pounds (40 kg)] with positive results of direct SARS-CoV-2 viral testing, and who are at high risk for progression to severe COVID-19, including hospitalization or death, has not undergone the same type of review as an FDA-approved product. In issuing an EUA under the COVID-19 public health emergency, the FDA has determined, among other things, that based on the total amount of scientific evidence available including data from adequate and well-controlled clinical trials, if available, it is reasonable to believe that the product may be effective for diagnosing, treating, or preventing COVID-19, or a serious or life-threatening disease or condition caused by COVID-19; that the known and potential benefits of the product, when used to diagnose, treat, or prevent such disease or condition, outweigh the known and potential risks of such product; and that there are no adequate, approved, and available alternatives.  All of these criteria must be met to allow for the product to be used in the treatment of patients during the COVID-19 pandemic. The EUA for PAXLOVID is in effect for the duration of the COVID-19 declaration justifying emergency use of this product, unless terminated or revoked (after which the products may no longer be used under the  EUA).    Website  Fax number         5 Revised: 18 March 2022    Additional Information  For general questions, visit the website or call the telephone number provided below.  Website Telephone number  You can also go to www.[employer] or call [phone] for more information.  [id number]  Revised: 18 March 2022      www.[employer]     [phone] (2-697-B89-PACK)      6 Revised: 18 March 2022

## 2022-04-28 ENCOUNTER — MYC REFILL (OUTPATIENT)
Dept: NEUROLOGY | Facility: CLINIC | Age: 52
End: 2022-04-28

## 2022-04-28 DIAGNOSIS — G47.10 EXCESSIVE SLEEPINESS: ICD-10-CM

## 2022-04-29 RX ORDER — DEXTROAMPHETAMINE SACCHARATE, AMPHETAMINE ASPARTATE, DEXTROAMPHETAMINE SULFATE AND AMPHETAMINE SULFATE 5; 5; 5; 5 MG/1; MG/1; MG/1; MG/1
20 TABLET ORAL 2 TIMES DAILY PRN
Qty: 60 TABLET | Refills: 0 | Status: SHIPPED | OUTPATIENT
Start: 2022-04-29 | End: 2022-06-28

## 2022-05-14 ENCOUNTER — HEALTH MAINTENANCE LETTER (OUTPATIENT)
Age: 52
End: 2022-05-14

## 2022-05-17 NOTE — TELEPHONE ENCOUNTER
NEED TO KNOW WHAT MEDICATION SHE IS REFERRING TO. LOOKING AT HER MED LIST SHE IS OKAY TO TAKE HER MEDS UNLESS SHE HAS TO TAKE THEM WITH FOOD. Anjali Capone Patient sent MyChart message stating that due to the weather, she will not be able to make her appointment today; [employer] message sent to Chayo advising her of remaining openings this week; Will await to hear back from her.    Polly Rodríguez MS RN Care Coordinator

## 2022-06-28 ENCOUNTER — MYC REFILL (OUTPATIENT)
Dept: NEUROLOGY | Facility: CLINIC | Age: 52
End: 2022-06-28

## 2022-06-28 DIAGNOSIS — G47.10 EXCESSIVE SLEEPINESS: ICD-10-CM

## 2022-06-30 RX ORDER — DEXTROAMPHETAMINE SACCHARATE, AMPHETAMINE ASPARTATE, DEXTROAMPHETAMINE SULFATE AND AMPHETAMINE SULFATE 5; 5; 5; 5 MG/1; MG/1; MG/1; MG/1
20 TABLET ORAL 2 TIMES DAILY PRN
Qty: 60 TABLET | Refills: 0 | Status: SHIPPED | OUTPATIENT
Start: 2022-06-30 | End: 2022-08-19

## 2022-07-22 NOTE — PROGRESS NOTES
This is a recent snapshot of the patient's [facility] medical record.  For current drug dose and complete information and questions, call [phone] or In Basket pool, [facility] ([id number])  CSN Number:  [id number]

## 2022-08-19 ENCOUNTER — MYC REFILL (OUTPATIENT)
Dept: NEUROLOGY | Facility: CLINIC | Age: 52
End: 2022-08-19

## 2022-08-19 DIAGNOSIS — G47.10 EXCESSIVE SLEEPINESS: ICD-10-CM

## 2022-08-19 NOTE — TELEPHONE ENCOUNTER
[facility] does not have access to 20 mg Adderall currently. Message sent to pt to inquire where she would like rx sent.     Berta Jones RN

## 2022-08-22 RX ORDER — DEXTROAMPHETAMINE SACCHARATE, AMPHETAMINE ASPARTATE, DEXTROAMPHETAMINE SULFATE AND AMPHETAMINE SULFATE 5; 5; 5; 5 MG/1; MG/1; MG/1; MG/1
20 TABLET ORAL 2 TIMES DAILY PRN
Qty: 60 TABLET | Refills: 0 | Status: SHIPPED | OUTPATIENT
Start: 2022-08-22 | End: 2022-10-13

## 2022-08-22 NOTE — TELEPHONE ENCOUNTER
Patient requesting refill of their Adderall; Patient was last seen in February and has follow up appointment in January of next year with Dr Stuart. Pended rx to Dr Stuart for signature and will send electronically to the pharmacy once signed. Pt's usual [facility] pharmacy does not have Adderall 20 mg, so we will send to [facility] instead.    Berta Jones RN

## 2022-09-04 ENCOUNTER — HEALTH MAINTENANCE LETTER (OUTPATIENT)
Age: 52
End: 2022-09-04

## 2022-09-20 NOTE — PROGRESS NOTES
1. 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 Multiple sclerosis without evidence of recent optic neuritis today    Right small and exquisitely central micro scotoma present for about 2 months.  Visual acuity today relatively stable in the right eye at 20/25 +2/-2. Positive Watsky-Jin test at slit lamp today localizing patient's subjective scotoma to her right fovea with vitreomacular traction and probable evolving partial macular hole demonstrated on OCT macula today.     No evidence scotoma related to optic neuritis or her multiple sclerosis. OCT RNFL shows right temporal thinning, appears grossly stable compared to past OCT in 2013 based upon asymmetric retinal thinning seen at that time on OCT also and asymmetry is stable today.  Also no clinical evidence of optic neuritis (no eye pain, no eye pain with movement, full color vision, no rAPD, no disc edema).     PLAN:  - Refer to retina clinic for right vitreomacular traction.   - Discussed return precautions.     Note to Dr. Stuart    Chayo Reid is a pleasant 52 year old White female who presents to my neuro-ophthalmology clinic today having been referred by Dr. Stuart for subjective visual disturbance in the setting of multiple sclerosis.  .  HPI:    Patient previously seen by Dr. Aldana in 2013 and has followed with Neurology for years for incidentally discovered multiple sclerosis.  diagnosed around 2013 based upon abnormal MRI.  She was on glatiramer acetate until 2018 when enhancing lesions were noted, at which time she was switched to infusions of ocrelizumab, which she took until self-discontinuing in 10/2020. She now takes baclofen and Adderal as well as medical marijuanna for her multiple sclerosis symptoms. She has not required ophthalmology follow up for visual symptoms in many years.      Referral from Dr. Stuart stemmed from the patient's concern on 8/16/22 of 2 months of a blurry  "spot in her right eye. Given she does not have an appointment with Dr. Stuart until January, she was interested in seeing Neuro-Ophthalmology for evaluation her concern of possible multiple-sclerosis related scotoma.    Today, she reports that a blurry spot appeared in the exact center of the right eye vision around 2 months ago (around mid July). At first it was present intermittently, present for a few days and then gone for a few days, but for the past 2 weeks this blurry spot has been constant. She developed a \"blister\" around the outside corner of her right eye around the time this started, which resolved after one day. No other trauma or event that she can connect to the onset of her scotoma. The scotoma is described as a \"blurry, grey blob, like a cloud.\" It has stayed the same size since appearing, about \"40-50%\" of the total visual field of the right eye. This spot does not drift around in the vision. No other changes in vision. The right eye peripheral vision feels unaffected. No eye pain. No pain with eye movement. No headaches. No flashes, no floaters. No diplopia. No metamorphopsia. No weakness, numbness, or tingling. No other new/changing symptoms per patient. She reports ongoing pain on the right side of the body as well as fatigue that she attributes to MS and has been ongoing for years.     The patient is presenting with an acute illness that potentially poses a threat to life or bodily function (vision).    Independent historians:  Patient    Review of outside testing:    Next MRI Brain W & W/O Contrast scheduled for January 2023.     5/27/21: MRI Brain W & W/O Contrast  Impression:   1. The study demonstrates  5-10,  foci of T2-hyperintensity within the cerebral white matter consistent with history of multiple sclerosis.  2. There is no significant interval change from the prior MRI dated 9/14/2020.    9/14/2020: MRI Brain W & W/O Contrast  Impression:   The study demonstrates 10-15 foci of " T2-hyperintensity within the cerebral white matter consistent with the diagnosis of multiple sclerosis. No significant change from prior study. No new lesions.    4/16/19: MRI Brain W & W/O Contrast  Impression:   1. The study demonstrates 10-15 foci of T2-hyperintensity within the cerebral white matter consistent with the clinical suspicion of demyelinating disease.   2. There are 2 new foci of hyperintense signal within the right temporal lobe, suspicious for interval demyelination.  3. No abnormal intracranial enhancement to suggest active demyelination.    8/22/2018: MRI Brain W & W/O Contrast  Impression:   10-15 foci of T2-hyperintensity within the cerebral white matter consistent with the clinical suspicion of demyelinating disease. New active demyelination associated with a lesion in the posterior left corona radiata.    Many MRI's in the intervening years with essentially the same impression.    7/9/2013: MRI Brain W & W/O Contrast  Impression:   The study demonstrates 5-10 foci of T2 hyperintensity within the  cerebral white matter as described above consistent with the clinical  suspicion of demyelinating disease.     My interpretation performed today of outside testing:  As above    Review of outside clinical notes:    2/1/22:   IMPRESSION:  Relapsing-remitting multiple sclerosis, clinically stable, off immunotherapy for about 16 months.    Patient switched from glatiramer acetate to ocrelizumab in 2018 following enhancing lesions implying an exacerbation of her relapsing remitting MS. Patient self-discontinued ocrelizumab infusions in approximately 10/2020. Following this visit she is taking baclofen 10 mg at night and Adderal 20 mg BID for her multiple sclerosis symptoms. -- Visit with Dr. Umer Stuart    9/24/2013: Patient has a history of white matter lesions on her MRI which were incidentally discovered and have never shown enhancement. Six months ago, her vision did not seem to be quite  normal. In July, she developed irritation of the eye and was diagnosed with corneal ulcers in both eyes. She denies double vision. She underwent a lumbar puncture which showed oligoclonal bands. With neurology, she was noted to have a visual evoked potential which showed some colonization in the right eye. Exam notable for 20/40 acuity in the right eye, 20/20 left eye and punctate corneal changes on slit lamp. 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 An OCT showed no macular abnormalities. --Visit with Dr. Jamir Aldana    Past medical history:    Patient Active Problem List   Diagnosis     MS (multiple sclerosis) (H)     Fatigue     Cyst of right ovary     Dysfunctional uterine bleeding     Palpitations     Seizure (H)       Patient has a current medication list which includes the following prescription(s): amphetamine-dextroamphetamine, baclofen, vitamin d, medical cannabis, and nirmatrelvir and ritonavir, and the following Facility-Administered Medications: barium sulfate 40% and barium sulfate 40%.    Family history / social history:  Patient's family history includes Neurologic Disorder in her maternal grandmother; Ovarian Cancer (age of onset: 76) in her mother.     Patient  reports that she has never smoked. She has never used smokeless tobacco. She reports current alcohol use of about 1.0 standard drink of alcohol per week. She reports that she does not use drugs.     Exam:  Uncorrected distance visual acuity was not recorded in the right eye and 20/100 in the left eye with pin hole to 20/25 in the left eye. Corrected distance visual acuity was 20/25 -2+2 in the right eye. Comments: Monovision, contact right eye only    Brought glasses with. Informed patient to take out CL right eye for gtop. Intraocular pressure was 26 in the right eye and 24 in the left eye using ICare. Comments: Over CL right eye.  Color vision 11/11 right eye and  11/11 left eye.  Pupils equal and slowly reactive with no rAPD on attending check.  Anterior segment exam shows oily tear film with mild PEEs.  Fundus exam shows possible trace temporal pallor of right optic nerve head and normal left optic nerve head.     Extraocular motility full in both eyes.    Tests ordered and interpreted today:  Octopus automated 30 degree visual field showed few points of depression in the right eye mostly full- no clear central scotoma (patient's microscotoma too small to capture on visual field- drawn on Amsler today). Left eye shows essentially full visual field.    OCT RNFL today 9/21/2022:  Right eye: temporal retinal nerve fiber layer thinning (comparing to 2013 OCT macula it was clear there was asymmetric retinal nerve fiber layer thickness and gcl thickness in the right eye at that time also).  Left eye: normal compared to age-matched controls        45 minutes were spent on the date of the encounter by me doing chart review, history and exam, documentation, and further activities as noted above    Complete documentation of historical and exam elements from today's encounter can be found in the full encounter summary report (not reduplicated in this progress note).  I personally obtained the chief complaint(s) and history of present illness.  I confirmed and edited as necessary the review of systems, past medical/surgical history, family history, social history, and examination findings as documented by others; and I examined the patient myself.  I personally reviewed the relevant tests, images, and reports as documented above.  I formulated and edited as necessary the assessment and plan and discussed the findings and management plan with the patient and family.  I personally reviewed the ophthalmic test(s) associated with this encounter, agree with the interpretation(s) as documented by the resident/fellow, and have edited the corresponding report(s) as necessary.     A medical  student was also involved in the care of the patient today. I was present with the medical student who participated in the service and in the documentation of the note. I have  verified the history and personally performed the physical exam and medical decision making. I extensively reviewed and edited when necessary the assessment and plan. I agree with the assessment and plan of care as documented in the note    MD Buck Bailey MD  Ophthalmology, PGY-3    Pre-Charting: Evelyne Duarte, MS4

## 2022-09-21 ENCOUNTER — OFFICE VISIT (OUTPATIENT)
Dept: OPHTHALMOLOGY | Facility: CLINIC | Age: 52
End: 2022-09-21
Attending: OPHTHALMOLOGY
Payer: COMMERCIAL

## 2022-09-21 DIAGNOSIS — H53.10 SUBJECTIVE VISUAL DISTURBANCE: Primary | ICD-10-CM

## 2022-09-21 DIAGNOSIS — G35 MS (MULTIPLE SCLEROSIS) (H): ICD-10-CM

## 2022-09-21 DIAGNOSIS — H43.821 VITREOMACULAR TRACTION SYNDROME OF RIGHT EYE: ICD-10-CM

## 2022-09-21 DIAGNOSIS — H53.40 VISUAL FIELD DEFECT: ICD-10-CM

## 2022-09-21 PROCEDURE — 92083 EXTENDED VISUAL FIELD XM: CPT | Performed by: OPHTHALMOLOGY

## 2022-09-21 PROCEDURE — 99204 OFFICE O/P NEW MOD 45 MIN: CPT | Mod: GC | Performed by: OPHTHALMOLOGY

## 2022-09-21 PROCEDURE — G0463 HOSPITAL OUTPT CLINIC VISIT: HCPCS | Mod: 25 | Performed by: TECHNICIAN/TECHNOLOGIST

## 2022-09-21 PROCEDURE — 92133 CPTRZD OPH DX IMG PST SGM ON: CPT | Performed by: OPHTHALMOLOGY

## 2022-09-21 ASSESSMENT — EXTERNAL EXAM - LEFT EYE: OS_EXAM: NORMAL

## 2022-09-21 ASSESSMENT — SLIT LAMP EXAM - LIDS
COMMENTS: NORMAL
COMMENTS: NORMAL

## 2022-09-21 ASSESSMENT — CONF VISUAL FIELD
OD_NORMAL: 1
OS_NORMAL: 1
METHOD: COUNTING FINGERS

## 2022-09-21 ASSESSMENT — VISUAL ACUITY
OD_CC: 20/25
OS_SC: 20/100
METHOD: SNELLEN - LINEAR
OS_PH_SC: 20/25
CORRECTION_TYPE: CONTACTS
OD_CC+: -2+2

## 2022-09-21 ASSESSMENT — EXTERNAL EXAM - RIGHT EYE: OD_EXAM: NORMAL

## 2022-09-21 ASSESSMENT — REFRACTION_WEARINGRX
OS_AXIS: 040
OD_SPHERE: -2.75
OS_SPHERE: -1.75
OD_CYLINDER: +0.50
OD_AXIS: 144
OS_CYLINDER: +0.75

## 2022-09-21 ASSESSMENT — CUP TO DISC RATIO
OD_RATIO: 0.2
OS_RATIO: 0.15

## 2022-09-21 ASSESSMENT — TONOMETRY
IOP_METHOD: ICARE
OD_IOP_MMHG: 26
OS_IOP_MMHG: 24

## 2022-09-21 NOTE — LETTER
2022    RE: Chayo Reid  : 1970  MRN: [id number]    Dear Dr. Stuart,    Thank you for referring your patient, Chayo Reid, to my neuro-ophthalmology clinic recently.  After a thorough neuro-ophthalmic history and examination, I came to the following conclusions:     1. 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 Multiple sclerosis without evidence of recent optic neuritis today    Right small and exquisitely central micro scotoma present for about 2 months.  Visual acuity today relatively stable in the right eye at 20/25 +2/-2. Positive Watsky-Jin test at slit lamp today localizing patient's subjective scotoma to her right fovea with vitreomacular traction and probable evolving partial macular hole demonstrated on OCT macula today.     No evidence scotoma related to optic neuritis or her multiple sclerosis. OCT RNFL shows right temporal thinning, appears grossly stable compared to past OCT in 2013 based upon asymmetric retinal thinning seen at that time on OCT also and asymmetry is stable today.  Also no clinical evidence of optic neuritis (no eye pain, no eye pain with movement, full color vision, no rAPD, no disc edema).     PLAN:  - Refer to retina clinic for right vitreomacular traction.   - Discussed return precautions.     Note to Dr. Stuart    Chyao Reid is a pleasant 52 year old White female who presents to my neuro-ophthalmology clinic today having been referred by Dr. Stuart for subjective visual disturbance in the setting of multiple sclerosis.  .  HPI:    Patient previously seen by Dr. Aldana in  and has followed with Neurology for years for incidentally discovered multiple sclerosis.  diagnosed around  based upon abnormal MRI.  She was on glatiramer acetate until 2018 when enhancing lesions were noted, at which time she was switched to infusions of ocrelizumab, which she took until  "self-discontinuing in 10/2020. She now takes baclofen and Adderal as well as medical marijuanna for her multiple sclerosis symptoms. She has not required ophthalmology follow up for visual symptoms in many years.      Referral from Dr. Stuart stemmed from the patient's concern on 8/16/22 of 2 months of a blurry spot in her right eye. Given she does not have an appointment with Dr. Stuart until January, she was interested in seeing Neuro-Ophthalmology for evaluation her concern of possible multiple-sclerosis related scotoma.    Today, she reports that a blurry spot appeared in the exact center of the right eye vision around 2 months ago (around mid July). At first it was present intermittently, present for a few days and then gone for a few days, but for the past 2 weeks this blurry spot has been constant. She developed a \"blister\" around the outside corner of her right eye around the time this started, which resolved after one day. No other trauma or event that she can connect to the onset of her scotoma. The scotoma is described as a \"blurry, grey blob, like a cloud.\" It has stayed the same size since appearing, about \"40-50%\" of the total visual field of the right eye. This spot does not drift around in the vision. No other changes in vision. The right eye peripheral vision feels unaffected. No eye pain. No pain with eye movement. No headaches. No flashes, no floaters. No diplopia. No metamorphopsia. No weakness, numbness, or tingling. No other new/changing symptoms per patient. She reports ongoing pain on the right side of the body as well as fatigue that she attributes to MS and has been ongoing for years.     The patient is presenting with an acute illness that potentially poses a threat to life or bodily function (vision).    Independent historians:  Patient    Review of outside testing:    Next MRI Brain W & W/O Contrast scheduled for January 2023.     5/27/21: MRI Brain W & W/O Contrast  Impression: "   1. The study demonstrates  5-10,  foci of T2-hyperintensity within the cerebral white matter consistent with history of multiple sclerosis.  2. There is no significant interval change from the prior MRI dated 9/14/2020.    9/14/2020: MRI Brain W & W/O Contrast  Impression:   The study demonstrates 10-15 foci of T2-hyperintensity within the cerebral white matter consistent with the diagnosis of multiple sclerosis. No significant change from prior study. No new lesions.    4/16/19: MRI Brain W & W/O Contrast  Impression:   1. The study demonstrates 10-15 foci of T2-hyperintensity within the cerebral white matter consistent with the clinical suspicion of demyelinating disease.   2. There are 2 new foci of hyperintense signal within the right temporal lobe, suspicious for interval demyelination.  3. No abnormal intracranial enhancement to suggest active demyelination.    8/22/2018: MRI Brain W & W/O Contrast  Impression:   10-15 foci of T2-hyperintensity within the cerebral white matter consistent with the clinical suspicion of demyelinating disease. New active demyelination associated with a lesion in the posterior left corona radiata.    Many MRI's in the intervening years with essentially the same impression.    7/9/2013: MRI Brain W & W/O Contrast  Impression:   The study demonstrates 5-10 foci of T2 hyperintensity within the  cerebral white matter as described above consistent with the clinical  suspicion of demyelinating disease.     My interpretation performed today of outside testing:  As above    Review of outside clinical notes:    2/1/22:   IMPRESSION:  Relapsing-remitting multiple sclerosis, clinically stable, off immunotherapy for about 16 months.    Patient switched from glatiramer acetate to ocrelizumab in 2018 following enhancing lesions implying an exacerbation of her relapsing remitting MS. Patient self-discontinued ocrelizumab infusions in approximately 10/2020. Following this visit she is taking  baclofen 10 mg at night and Adderal 20 mg BID for her multiple sclerosis symptoms. -- Visit with Dr. Umer Stuart    9/24/2013: Patient has a history of white matter lesions on her MRI which were incidentally discovered and have never shown enhancement. Six months ago, her vision did not seem to be quite normal. In July, she developed irritation of the eye and was diagnosed with corneal ulcers in both eyes. She denies double vision. She underwent a lumbar puncture which showed oligoclonal bands. With neurology, she was noted to have a visual evoked potential which showed some colonization in the right eye. Exam notable for 20/40 acuity in the right eye, 20/20 left eye and punctate corneal changes on slit lamp. 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 An OCT showed no macular abnormalities. --Visit with Dr. Jamir Aldana    Past medical history:    Patient Active Problem List   Diagnosis     MS (multiple sclerosis) (H)     Fatigue     Cyst of right ovary     Dysfunctional uterine bleeding     Palpitations     Seizure (H)       Patient has a current medication list which includes the following prescription(s): amphetamine-dextroamphetamine, baclofen, vitamin d, medical cannabis, and nirmatrelvir and ritonavir, and the following Facility-Administered Medications: barium sulfate 40% and barium sulfate 40%.    Family history / social history:  Patient's family history includes Neurologic Disorder in her maternal grandmother; Ovarian Cancer (age of onset: 76) in her mother.     Patient  reports that she has never smoked. She has never used smokeless tobacco. She reports current alcohol use of about 1.0 standard drink of alcohol per week. She reports that she does not use drugs.     Exam:  Uncorrected distance visual acuity was not recorded in the right eye and 20/100 in the left eye with pin hole to 20/25 in the left eye. Corrected  distance visual acuity was 20/25 -2+2 in the right eye. Comments: Monovision, contact right eye only    Brought glasses with. Informed patient to take out CL right eye for gtop. Intraocular pressure was 26 in the right eye and 24 in the left eye using ICare. Comments: Over CL right eye.  Color vision 11/11 right eye and 11/11 left eye.  Pupils equal and slowly reactive with no rAPD on attending check.  Anterior segment exam shows oily tear film with mild PEEs.  Fundus exam shows possible trace temporal pallor of right optic nerve head and normal left optic nerve head.     Extraocular motility full in both eyes.    Tests ordered and interpreted today:  Octopus automated 30 degree visual field showed few points of depression in the right eye mostly full- no clear central scotoma (patient's microscotoma too small to capture on visual field- drawn on Amsler today). Left eye shows essentially full visual field.    OCT RNFL today 9/21/2022:  Right eye: temporal retinal nerve fiber layer thinning (comparing to 2013 OCT macula it was clear there was asymmetric retinal nerve fiber layer thickness and gcl thickness in the right eye at that time also).  Left eye: normal compared to age-matched controls       Again, thank you for trusting me with the care of your patient.  For further exam details, please feel free to contact our office for additional records.  If you wish to contact me regarding this patient please email me at [email or web address] or give my clinic a call to arrange a phone conversation.    Sincerely,    Uzair Benitez MD  , Neuro-Ophthalmology and Adult Strabismus Surgery  The Marlene Roberts Chair in Neuro-Ophthalmology  Department of Ophthalmology and Visual Neurosciences  [facility]    DX: vitreomacular traction, central microscotoma

## 2022-09-28 DIAGNOSIS — H43.821 VITREOMACULAR TRACTION SYNDROME OF RIGHT EYE: Primary | ICD-10-CM

## 2022-10-12 ENCOUNTER — OFFICE VISIT (OUTPATIENT)
Dept: OPHTHALMOLOGY | Facility: CLINIC | Age: 52
End: 2022-10-12
Attending: OPHTHALMOLOGY
Payer: COMMERCIAL

## 2022-10-12 DIAGNOSIS — H43.821 VITREOMACULAR TRACTION SYNDROME OF RIGHT EYE: Primary | ICD-10-CM

## 2022-10-12 DIAGNOSIS — H53.10 SUBJECTIVE VISUAL DISTURBANCE: ICD-10-CM

## 2022-10-12 DIAGNOSIS — G35 MS (MULTIPLE SCLEROSIS) (H): ICD-10-CM

## 2022-10-12 DIAGNOSIS — H43.811 PVD (POSTERIOR VITREOUS DETACHMENT), RIGHT: ICD-10-CM

## 2022-10-12 PROCEDURE — 99214 OFFICE O/P EST MOD 30 MIN: CPT | Mod: GC | Performed by: OPHTHALMOLOGY

## 2022-10-12 PROCEDURE — 92250 FUNDUS PHOTOGRAPHY W/I&R: CPT | Performed by: OPHTHALMOLOGY

## 2022-10-12 PROCEDURE — 92134 CPTRZ OPH DX IMG PST SGM RTA: CPT | Performed by: OPHTHALMOLOGY

## 2022-10-12 PROCEDURE — G0463 HOSPITAL OUTPT CLINIC VISIT: HCPCS

## 2022-10-12 ASSESSMENT — CONF VISUAL FIELD
OD_INFERIOR_TEMPORAL_RESTRICTION: 0
OD_INFERIOR_NASAL_RESTRICTION: 0
OD_SUPERIOR_TEMPORAL_RESTRICTION: 0
OS_INFERIOR_TEMPORAL_RESTRICTION: 0
OS_NORMAL: 1
METHOD: COUNTING FINGERS
OS_INFERIOR_NASAL_RESTRICTION: 0
OS_SUPERIOR_NASAL_RESTRICTION: 0
OD_SUPERIOR_NASAL_RESTRICTION: 0
OD_NORMAL: 1
OS_SUPERIOR_TEMPORAL_RESTRICTION: 0

## 2022-10-12 ASSESSMENT — SLIT LAMP EXAM - LIDS
COMMENTS: NORMAL
COMMENTS: NORMAL

## 2022-10-12 ASSESSMENT — VISUAL ACUITY
METHOD: SNELLEN - LINEAR
OD_CC+: +2
OD_CC: 20/30
OS_CC: 20/20
CORRECTION_TYPE: GLASSES
OS_CC+: -2

## 2022-10-12 ASSESSMENT — REFRACTION_WEARINGRX
OD_CYLINDER: +0.50
OD_AXIS: 144
OS_SPHERE: -1.75
OS_CYLINDER: +0.75
OD_SPHERE: -2.75
OS_AXIS: 040

## 2022-10-12 ASSESSMENT — EXTERNAL EXAM - LEFT EYE: OS_EXAM: NORMAL

## 2022-10-12 ASSESSMENT — CUP TO DISC RATIO
OD_RATIO: 0.2
OS_RATIO: 0.15

## 2022-10-12 ASSESSMENT — TONOMETRY
OS_IOP_MMHG: 22
OD_IOP_MMHG: 20
IOP_METHOD: TONOPEN

## 2022-10-12 ASSESSMENT — EXTERNAL EXAM - RIGHT EYE: OD_EXAM: NORMAL

## 2022-10-12 NOTE — PROGRESS NOTES
CC -   Scotoma OD - referred from Dr. Benitez    HPI - Initial visit; Vision blurred OD for 2 months with small central scotoma. Some pain behind the right eye for 1 week. Denies flashing lights, floaters, curtains. Referred for VMT from Dr. Benitez.      PAST OCULAR SURGERY  No trauma or surgery  CL user      RETINAL IMAGING:  OCT 10/12/22  OD - Interval release of PHF over fovea with resolution of abnormal contour and cystic changes compared to 9/21/22; no fluid or disruption of ellipsoid zone  OS - Normal contour with no fluid or disruption of ellipsoid zone; PHF attached    Optos 10/12/22  OU consistent with exam    ASSESSMENT & PLAN   #VMT OD   -released VMA from fovea however, still with outer retinal disruptions at fovea: will likely resolve over time  -Discussed s/s of RD/RT; pt knows to call if she experiences any    #MS without evidence of optic neuritis  -Follows with Dr. Benitez    return to clinic: 2 months; DFE, OCT macula each eye (detailed scan)    Complete documentation of historical and exam elements from today's encounter can be found in the full encounter summary report (not reduplicated in this progress note). I personally obtained the chief complaint(s) and history of present illness.  I confirmed and edited as necessary the review of systems, past medical/surgical history, family history, social history, and examination findings as documented by others; and I examined the patient myself. I personally reviewed the relevant tests, images, and reports as documented above. I formulated and edited as necessary the assessment and plan and discussed the findings and management plan with the patient and family.     Rob Silverman MD

## 2022-10-12 NOTE — NURSING NOTE
Chief Complaints and History of Present Illnesses   Patient presents with     Retinal Evaluation     Right vitreomacular traction with foveal cystic fluid and evolving partial macular hole     Chief Complaint(s) and History of Present Illness(es)     Retinal Evaluation            Comments: Right vitreomacular traction with foveal cystic fluid and evolving partial macular hole          Comments    Ref by Dr Benitez  Pt C/o eye pain right eye , started last week and has been getting worse.  3/10 Now  No flashes, floaters, rednessor itching    Katya Delong COT 12:45 PM October 12, 2022

## 2022-10-13 ENCOUNTER — MYC REFILL (OUTPATIENT)
Dept: NEUROLOGY | Facility: CLINIC | Age: 52
End: 2022-10-13

## 2022-10-13 DIAGNOSIS — G47.10 EXCESSIVE SLEEPINESS: ICD-10-CM

## 2022-10-14 RX ORDER — DEXTROAMPHETAMINE SACCHARATE, AMPHETAMINE ASPARTATE, DEXTROAMPHETAMINE SULFATE AND AMPHETAMINE SULFATE 5; 5; 5; 5 MG/1; MG/1; MG/1; MG/1
20 TABLET ORAL 2 TIMES DAILY PRN
Qty: 60 TABLET | Refills: 0 | Status: SHIPPED | OUTPATIENT
Start: 2022-10-14 | End: 2022-11-17

## 2022-10-14 NOTE — TELEPHONE ENCOUNTER
Patient requesting refill of their Adderall; Patient was last seen in February and has follow up appointment in January of next year with Dr Stuart. Pended rx to Dr Stuart for signature and will send electronically to the pharmacy once signed.    Berta Jones RN

## 2022-11-17 ENCOUNTER — MYC REFILL (OUTPATIENT)
Dept: NEUROLOGY | Facility: CLINIC | Age: 52
End: 2022-11-17

## 2022-11-17 DIAGNOSIS — G47.10 EXCESSIVE SLEEPINESS: ICD-10-CM

## 2022-11-18 RX ORDER — DEXTROAMPHETAMINE SACCHARATE, AMPHETAMINE ASPARTATE, DEXTROAMPHETAMINE SULFATE AND AMPHETAMINE SULFATE 5; 5; 5; 5 MG/1; MG/1; MG/1; MG/1
20 TABLET ORAL 2 TIMES DAILY PRN
Qty: 60 TABLET | Refills: 0 | Status: SHIPPED | OUTPATIENT
Start: 2022-11-18 | End: 2023-01-18

## 2022-11-18 NOTE — TELEPHONE ENCOUNTER
Patient requesting refill of their Adderall; Patient was last seen in February 2022 and has follow up appointment in January 2023 with Dr Stuart. Pended rx to Dr Stuart for signature and will send electronically to the pharmacy once signed.    Berta Jones RN

## 2022-12-02 DIAGNOSIS — H43.821 VITREOMACULAR TRACTION SYNDROME OF RIGHT EYE: Primary | ICD-10-CM

## 2023-01-18 ENCOUNTER — MYC REFILL (OUTPATIENT)
Dept: NEUROLOGY | Facility: CLINIC | Age: 53
End: 2023-01-18
Payer: COMMERCIAL

## 2023-01-18 DIAGNOSIS — G47.10 EXCESSIVE SLEEPINESS: ICD-10-CM

## 2023-01-19 RX ORDER — DEXTROAMPHETAMINE SACCHARATE, AMPHETAMINE ASPARTATE, DEXTROAMPHETAMINE SULFATE AND AMPHETAMINE SULFATE 5; 5; 5; 5 MG/1; MG/1; MG/1; MG/1
20 TABLET ORAL 2 TIMES DAILY PRN
Qty: 60 TABLET | Refills: 0 | Status: SHIPPED | OUTPATIENT
Start: 2023-01-19 | End: 2023-02-24

## 2023-01-24 ENCOUNTER — ANCILLARY PROCEDURE (OUTPATIENT)
Dept: MRI IMAGING | Facility: CLINIC | Age: 53
End: 2023-01-24
Attending: PSYCHIATRY & NEUROLOGY
Payer: COMMERCIAL

## 2023-01-24 ENCOUNTER — OFFICE VISIT (OUTPATIENT)
Dept: NEUROLOGY | Facility: CLINIC | Age: 53
End: 2023-01-24
Attending: PSYCHIATRY & NEUROLOGY
Payer: COMMERCIAL

## 2023-01-24 VITALS
HEART RATE: 87 BPM | SYSTOLIC BLOOD PRESSURE: 169 MMHG | OXYGEN SATURATION: 99 % | DIASTOLIC BLOOD PRESSURE: 113 MMHG | BODY MASS INDEX: 33.18 KG/M2 | WEIGHT: 205.6 LBS

## 2023-01-24 DIAGNOSIS — M62.838 MUSCLE SPASTICITY: ICD-10-CM

## 2023-01-24 DIAGNOSIS — G35 MS (MULTIPLE SCLEROSIS) (H): ICD-10-CM

## 2023-01-24 DIAGNOSIS — F41.9 ANXIETY: Primary | ICD-10-CM

## 2023-01-24 PROCEDURE — A9585 GADOBUTROL INJECTION: HCPCS | Performed by: RADIOLOGY

## 2023-01-24 PROCEDURE — 70553 MRI BRAIN STEM W/O & W/DYE: CPT | Mod: GC | Performed by: RADIOLOGY

## 2023-01-24 PROCEDURE — 99213 OFFICE O/P EST LOW 20 MIN: CPT | Mod: GC | Performed by: PSYCHIATRY & NEUROLOGY

## 2023-01-24 PROCEDURE — G0463 HOSPITAL OUTPT CLINIC VISIT: HCPCS

## 2023-01-24 PROCEDURE — 99212 OFFICE O/P EST SF 10 MIN: CPT | Performed by: PSYCHIATRY & NEUROLOGY

## 2023-01-24 RX ORDER — BACLOFEN 10 MG/1
20 TABLET ORAL 2 TIMES DAILY
Qty: 60 TABLET | Refills: 11 | Status: SHIPPED | OUTPATIENT
Start: 2023-01-24 | End: 2023-06-27

## 2023-01-24 RX ORDER — GADOBUTROL 604.72 MG/ML
10 INJECTION INTRAVENOUS ONCE
Status: COMPLETED | OUTPATIENT
Start: 2023-01-24 | End: 2023-01-24

## 2023-01-24 RX ADMIN — GADOBUTROL 9.5 ML: 604.72 INJECTION INTRAVENOUS at 13:32

## 2023-01-24 ASSESSMENT — PAIN SCALES - GENERAL: PAINLEVEL: MODERATE PAIN (5)

## 2023-01-24 NOTE — PROGRESS NOTES
Neurology Clinic Visit    Reason: Multiple Sclerosis       2023   Source of information: Patient and chart review    History of Present Symptom:  Chayo Redi is a 52 year old female who presents for follow up for multiple sclerosis.  She has been off of DMT since  per her preference.  Overall her symptoms are stable from an MS standpoint.  She states the main symptoms she notes is pain or discomfort. Reports rightness in the right arm and leg at night or when she is still and trying to relax.  She describes this as an aching discomfort and tightness as well as sudden spasming of the muscles. Baclofen and medical marijuana has been helpful at night. Some mild grogginess in the morning but it is manageable.     Reports worsening anxiety in general.  She describes difficulty coping if things do not go according to plan or if there is something she is concerned about.  She spent a lot of energy worrying.  She is interested to try a medication for this.  Her  has noticed it as well.    Adderall increased at the last visit to up to 20 mg twice a day as needed. She takes this on average 3 days per week and it is very helpful on the days that she needs it.     Disease onset:  (white matter changes on MRI)    diagnosis was made after lesion accumulation and CSF + OCB  Previous disease modifying therapy:   Glatiramer acetate [id number] (poor disease control)  Ocrelizumab 2018-10/2020 (self discontinued)     The patient's medical, surgical, social, and family history were personally reviewed with the patient.  Past Medical History:   Diagnosis Date     Corneal ulcer      Demyelinating disease (H)     Multiple sclerosis     Fatigue      Hyperopia with astigmatism and presbyopia      Migraine 2011     OAB (overactive bladder)      Ptosis       Past Surgical History:   Procedure Laterality Date     BREAST SURGERY  2012      SECTION  ,      HYSTERECTOMY  2015    partial;  still has tubes and ovaries     LAPAROSCOPIC HYSTERECTOMY TOTAL Bilateral      TUBAL LIGATION Bilateral 2001     Social History     Tobacco Use     Smoking status: Never     Smokeless tobacco: Never   Substance Use Topics     Alcohol use: Yes     Alcohol/week: 1.0 standard drink     Types: 1 Glasses of wine per week     Comment: occasional     Drug use: No     Family History   Problem Relation Age of Onset     Neurologic Disorder Maternal Grandmother         Multiple Sclerosis     Ovarian Cancer Mother 76        diagnosed 12/2016, stage 3     Glaucoma No family hx of      Macular Degeneration No family hx of      Current Outpatient Medications   Medication     amphetamine-dextroamphetamine (ADDERALL) 20 MG tablet     baclofen (LIORESAL) 10 MG tablet     Cholecalciferol (VITAMIN D) 1000 UNITS capsule     medical cannabis (Patient's own supply.  Not a prescription)     nirmatrelvir and ritonavir (PAXLOVID) therapy pack     No current facility-administered medications for this visit.     Facility-Administered Medications Ordered in Other Visits   Medication     barium sulfate 40% (VARIBAR NECTAR) oral suspension     barium sulfate 40% (VARIBAR THIN HONEY) oral suspension     No Known Allergies      Review of Systems:  14-point review of systems was completed. The pertinent positives and negatives are in the HPI.    Physical Examination   Vitals: BP (!) 169/113 (BP Location: Left arm, Patient Position: Sitting, Cuff Size: Adult Regular)   Pulse 87   Wt 93.3 kg (205 lb 9.6 oz)   SpO2 99%   BMI 33.18 kg/m    General: Patient appears comfortable in no acute distress.   HEENT: NC/AT, no icterus, moist mucous membranes  Chest: non-labored on RA  Extremities: Warm, no edema  Skin: No rash or lesion   Psych: Affect appropriate for situation   Neuro:  Mental status: Awake, alert, attentive. Language is fluent with intact comprehension of commands.  Cranial nerves: PERRL with no relative afferent pupillary defect, conjugate  gaze, EOMI, visual fields intact, face symmetric, shoulder shrug strong, tongue protrusion/uvula midline, no dysarthria.   Motor: Mild increase in tone at the right ankle and leg.     R L  Deltoid  5 5  Biceps  5 5  Triceps 5 5  Wrist ext 5 5  Finger ext 5 5  Finger abd 5 5    Hip flexion 5 5  Knee flexion 5 5  Knee ext 5 5  Dorsiflexion 5 5    Reflexes: 2+ reflexes symmetric in biceps, brachioradialis, brisk right compared to left patellae, and achilles. No clonus, toes down-going.  Sensory: Intact to light touch, pin, vibration, and proprioception.    Coordination: FNF without ataxia or dysmetria.    Gait: Normal width, stride length, turn, with symmetric arm swing. Tandem walk intact.      Laboratory:  No new lab data.    Imaging:  MRI from today reviewed personally.  She has stable mild burden of demyelinating lesions in periventricular and juxtacortical regions.  No areas of contrast-enhancement and does not.    Assessment/Plan:  Chayo Reid is a 52 year old female who presents for follow-up for multiple sclerosis who remains off of DMT.  She had B-cell reconstitution June 2021.  She has been stable from an imaging standpoint since then.  We discussed the need for frequent monitoring for now.    We discussed her muscle spasticity with spasms on the right arm and leg.  This is helped by baclofen and medical marijuana.  This is most bothersome at night.    We discussed her anxiety and I do think she would benefit from an selective serotonin reuptake inhibitor.  We discussed fluoxetine which can help with the fatigue somewhat.  We discussed side effects including low libido, headache, nausea.    -Increase baclofen to twice daily per day  -Trial of fluoxetine 20 mg  -Plan for MRI and follow-up in 1 year    Patient seen and discussed with Dr. Stuart.  I have reviewed the plan with the patient, who is in agreement.      Marlene Richter DO  Multiple Sclerosis Fellow      I saw and examined this patient, and  have read and edited the resident's note.  I agree with the findings, assessment, and plan.  I spent 24 minutes on her care on the date of service including chart and MRI review and face to face time.  We discussed spasticity management, her anxiety, and MRI surveillance.  Plan as above.    Umer Stuart MD

## 2023-01-24 NOTE — NURSING NOTE
Chief Complaint   Patient presents with     MS     RECHECK     MS follow up      Vitals were taken and medications were reconciled.   Carlos Vargas, EMT  2:21 PM

## 2023-02-14 ENCOUNTER — OFFICE VISIT (OUTPATIENT)
Dept: OPHTHALMOLOGY | Facility: CLINIC | Age: 53
End: 2023-02-14
Attending: OPHTHALMOLOGY
Payer: COMMERCIAL

## 2023-02-14 DIAGNOSIS — G35 MS (MULTIPLE SCLEROSIS) (H): ICD-10-CM

## 2023-02-14 DIAGNOSIS — H53.10 SUBJECTIVE VISUAL DISTURBANCE: ICD-10-CM

## 2023-02-14 DIAGNOSIS — H43.811 PVD (POSTERIOR VITREOUS DETACHMENT), RIGHT: ICD-10-CM

## 2023-02-14 DIAGNOSIS — H43.821 VITREOMACULAR TRACTION SYNDROME OF RIGHT EYE: Primary | ICD-10-CM

## 2023-02-14 PROCEDURE — G0463 HOSPITAL OUTPT CLINIC VISIT: HCPCS

## 2023-02-14 PROCEDURE — 92134 CPTRZ OPH DX IMG PST SGM RTA: CPT | Performed by: OPHTHALMOLOGY

## 2023-02-14 PROCEDURE — 99214 OFFICE O/P EST MOD 30 MIN: CPT | Performed by: OPHTHALMOLOGY

## 2023-02-14 ASSESSMENT — SLIT LAMP EXAM - LIDS
COMMENTS: NORMAL
COMMENTS: NORMAL

## 2023-02-14 ASSESSMENT — CONF VISUAL FIELD
OS_SUPERIOR_NASAL_RESTRICTION: 0
OD_SUPERIOR_TEMPORAL_RESTRICTION: 0
OD_SUPERIOR_NASAL_RESTRICTION: 0
OD_INFERIOR_TEMPORAL_RESTRICTION: 0
OD_INFERIOR_NASAL_RESTRICTION: 0
OD_NORMAL: 1
OS_INFERIOR_TEMPORAL_RESTRICTION: 0
OS_NORMAL: 1
OS_INFERIOR_NASAL_RESTRICTION: 0
OS_SUPERIOR_TEMPORAL_RESTRICTION: 0

## 2023-02-14 ASSESSMENT — EXTERNAL EXAM - RIGHT EYE: OD_EXAM: NORMAL

## 2023-02-14 ASSESSMENT — REFRACTION_WEARINGRX
OD_CYLINDER: +0.50
OS_AXIS: 040
OS_CYLINDER: +0.75
OD_SPHERE: -2.75
OD_AXIS: 144
OS_SPHERE: -1.75

## 2023-02-14 ASSESSMENT — VISUAL ACUITY
CORRECTION_TYPE: GLASSES
OS_CC+: -2
OS_CC: 20/20
METHOD: SNELLEN - LINEAR
OD_CC: 20/25
OD_CC+: -1

## 2023-02-14 ASSESSMENT — CUP TO DISC RATIO
OD_RATIO: 0.2
OS_RATIO: 0.15

## 2023-02-14 ASSESSMENT — TONOMETRY
OS_IOP_MMHG: 21
OD_IOP_MMHG: 21
OS_IOP_MMHG: 27
IOP_METHOD: APPLANATION
IOP_METHOD: TONOPEN
OD_IOP_MMHG: 23

## 2023-02-14 ASSESSMENT — EXTERNAL EXAM - LEFT EYE: OS_EXAM: NORMAL

## 2023-02-14 NOTE — PROGRESS NOTES
CC -   Scotoma OD - referred from Dr. Benitez    Interval hx: has not noticed the central scotoma for long time    HPI - first presented with blurred vision OD x 2 months with small central scotoma. Some pain behind the right eye for 1 week. Denies flashing lights, floaters, curtains. Referred for VMT from Dr. Benitez.    PAST OCULAR SURGERY  No trauma or surgery  CL user      RETINAL IMAGING:  OCT 2/14/23  OD - PHF released from fovea with resolution of abnormal contour and cystic changes compared to 9/21/22; no fluid; No disruption of ellipsoid zone  OS - Normal contour with no fluid or disruption of ellipsoid zone; PHF attached    Optos 10/12/22  OU consistent with exam    ASSESSMENT & PLAN   #VMT OD   -released VMA from fovea however, uter retinal disruptions at fovea resolved  -Discussed s/s of RD/RT; pt knows to call if she experiences any  -Follow-up with retina as needed    #MS without evidence of optic neuritis  -Follows with Dr. Benitez    dispo: continue to follow with Dr. Benitez; with retina if needed in future    Complete documentation of historical and exam elements from today's encounter can be found in the full encounter summary report (not reduplicated in this progress note). I personally obtained the chief complaint(s) and history of present illness.  I confirmed and edited as necessary the review of systems, past medical/surgical history, family history, social history, and examination findings as documented by others; and I examined the patient myself. I personally reviewed the relevant tests, images, and reports as documented above. I formulated and edited as necessary the assessment and plan and discussed the findings and management plan with the patient and family.     Rob Silverman MD

## 2023-02-14 NOTE — NURSING NOTE
Chief Complaints and History of Present Illnesses   Patient presents with     Follow Up     Chief Complaint(s) and History of Present Illness(es)     Follow Up            Laterality: both eyes    Associated symptoms: Negative for flashes and floaters    Treatments tried: no treatments    Pain scale: 0/10          Comments    Retina follow up.    The patient reports stable vision.  Aida Sheth COA, COA 10:32 AM 02/14/2023

## 2023-02-24 ENCOUNTER — MYC REFILL (OUTPATIENT)
Dept: NEUROLOGY | Facility: CLINIC | Age: 53
End: 2023-02-24
Payer: COMMERCIAL

## 2023-02-24 DIAGNOSIS — G47.10 EXCESSIVE SLEEPINESS: ICD-10-CM

## 2023-02-24 NOTE — CONFIDENTIAL NOTE
Patient requesting refill of their Adderall; Patient was last seen in January and has follow up appointment next January with Dr. Stuart. Pended rx to Dr. Stuart for signature and will send electronically to the pharmacy once signed.    Marlene Cartagena RN       (1) pregnancy or postpartum (now or within 1 month)

## 2023-02-26 RX ORDER — DEXTROAMPHETAMINE SACCHARATE, AMPHETAMINE ASPARTATE, DEXTROAMPHETAMINE SULFATE AND AMPHETAMINE SULFATE 5; 5; 5; 5 MG/1; MG/1; MG/1; MG/1
20 TABLET ORAL 2 TIMES DAILY PRN
Qty: 60 TABLET | Refills: 0 | Status: SHIPPED | OUTPATIENT
Start: 2023-02-26 | End: 2023-04-11

## 2023-04-11 ENCOUNTER — MYC REFILL (OUTPATIENT)
Dept: NEUROLOGY | Facility: CLINIC | Age: 53
End: 2023-04-11
Payer: COMMERCIAL

## 2023-04-11 DIAGNOSIS — G47.10 EXCESSIVE SLEEPINESS: ICD-10-CM

## 2023-04-12 NOTE — TELEPHONE ENCOUNTER
Patient requesting refill of their Adderall; Patient was last seen in Jan 2023 and has follow up appointment in Jan 2024 with Dr Stuart.  Pended rx to Dr Stuart for signature and will send electronically to the pharmacy once signed.    Berta Jones RN

## 2023-04-13 RX ORDER — DEXTROAMPHETAMINE SACCHARATE, AMPHETAMINE ASPARTATE, DEXTROAMPHETAMINE SULFATE AND AMPHETAMINE SULFATE 5; 5; 5; 5 MG/1; MG/1; MG/1; MG/1
20 TABLET ORAL 2 TIMES DAILY PRN
Qty: 60 TABLET | Refills: 0 | Status: SHIPPED | OUTPATIENT
Start: 2023-04-13 | End: 2023-05-21

## 2023-04-26 NOTE — PROGRESS NOTES
NCM placed call to patient for TCM, LM requesting a call back to [phone] with a condition update.      Discharge Dx:     Acute on chronic diarrhea, rectal bleeding, abnormal CT scan of the colon and small bowel This is a recent snapshot of the patient's [facility] medical record.  For current drug dose and complete information and questions, call [phone] or In Basket pool, [facility] ([id number])  CSN Number:  [id number]

## 2023-05-21 ENCOUNTER — MYC REFILL (OUTPATIENT)
Dept: NEUROLOGY | Facility: CLINIC | Age: 53
End: 2023-05-21
Payer: COMMERCIAL

## 2023-05-21 DIAGNOSIS — G47.10 EXCESSIVE SLEEPINESS: ICD-10-CM

## 2023-05-22 RX ORDER — DEXTROAMPHETAMINE SACCHARATE, AMPHETAMINE ASPARTATE, DEXTROAMPHETAMINE SULFATE AND AMPHETAMINE SULFATE 5; 5; 5; 5 MG/1; MG/1; MG/1; MG/1
20 TABLET ORAL 2 TIMES DAILY PRN
Qty: 60 TABLET | Refills: 0 | Status: SHIPPED | OUTPATIENT
Start: 2023-05-22 | End: 2023-06-27

## 2023-06-03 ENCOUNTER — HEALTH MAINTENANCE LETTER (OUTPATIENT)
Age: 53
End: 2023-06-03

## 2023-06-25 ENCOUNTER — MYC MEDICAL ADVICE (OUTPATIENT)
Dept: NEUROLOGY | Facility: CLINIC | Age: 53
End: 2023-06-25
Payer: COMMERCIAL

## 2023-06-25 DIAGNOSIS — F41.9 ANXIETY: ICD-10-CM

## 2023-06-25 DIAGNOSIS — G35 MS (MULTIPLE SCLEROSIS) (H): ICD-10-CM

## 2023-06-25 DIAGNOSIS — G47.10 EXCESSIVE SLEEPINESS: ICD-10-CM

## 2023-06-26 NOTE — TELEPHONE ENCOUNTER
Pt requesting refills of baclofen, fluoxetine, and adderall sent to new pharmacy. Pt last seen Jan 2023 and has follow-up Jan 2024 with Dr Stuart. Routed to Dr Stuart for signature.    Berta Jones RN

## 2023-06-27 RX ORDER — DEXTROAMPHETAMINE SACCHARATE, AMPHETAMINE ASPARTATE, DEXTROAMPHETAMINE SULFATE AND AMPHETAMINE SULFATE 5; 5; 5; 5 MG/1; MG/1; MG/1; MG/1
20 TABLET ORAL 2 TIMES DAILY PRN
Qty: 60 TABLET | Refills: 0 | Status: SHIPPED | OUTPATIENT
Start: 2023-06-27 | End: 2023-08-04

## 2023-06-27 RX ORDER — BACLOFEN 10 MG/1
20 TABLET ORAL 2 TIMES DAILY
Qty: 60 TABLET | Refills: 11 | Status: SHIPPED | OUTPATIENT
Start: 2023-06-27 | End: 2024-01-26

## 2023-07-05 DIAGNOSIS — G35 MS (MULTIPLE SCLEROSIS) (H): ICD-10-CM

## 2023-07-05 RX ORDER — BACLOFEN 10 MG/1
20 TABLET ORAL 2 TIMES DAILY
Qty: 60 TABLET | Refills: 11 | OUTPATIENT
Start: 2023-07-05

## 2023-07-05 NOTE — TELEPHONE ENCOUNTER
Received refill request for baclofen from [facility]. Called pharmacy as there should be refills on file. They confirmed there are refills on file and pt picked up medication on 6/28. Refill request declined.    Berta Jones RN

## 2023-07-20 DIAGNOSIS — F41.9 ANXIETY: ICD-10-CM

## 2023-07-27 NOTE — TELEPHONE ENCOUNTER
Left message returning patient's call to schedule procedure. Scheduling telephone number provided.   Received refill request for multiple medications from [facility]; Patient was last seen on 3/10/2020 and has follow up appointment on 9/15/2020 with dr guillermo. Pended to ms pool review/approval    Trini Sena MA

## 2023-08-01 DIAGNOSIS — G35 MS (MULTIPLE SCLEROSIS) (H): ICD-10-CM

## 2023-08-01 RX ORDER — BACLOFEN 10 MG/1
TABLET ORAL
Qty: 60 TABLET | Refills: 11 | OUTPATIENT
Start: 2023-08-01

## 2023-08-01 NOTE — TELEPHONE ENCOUNTER
Refill request for baclofen declined as there are refills available (sent with 11 refills in June).    Berta Jones RN

## 2023-08-04 ENCOUNTER — MYC REFILL (OUTPATIENT)
Dept: NEUROLOGY | Facility: CLINIC | Age: 53
End: 2023-08-04
Payer: COMMERCIAL

## 2023-08-04 DIAGNOSIS — G47.10 EXCESSIVE SLEEPINESS: ICD-10-CM

## 2023-08-04 RX ORDER — DEXTROAMPHETAMINE SACCHARATE, AMPHETAMINE ASPARTATE, DEXTROAMPHETAMINE SULFATE AND AMPHETAMINE SULFATE 5; 5; 5; 5 MG/1; MG/1; MG/1; MG/1
20 TABLET ORAL 2 TIMES DAILY PRN
Qty: 60 TABLET | Refills: 0 | Status: SHIPPED | OUTPATIENT
Start: 2023-08-04 | End: 2023-09-25

## 2023-09-25 ENCOUNTER — MYC REFILL (OUTPATIENT)
Dept: NEUROLOGY | Facility: CLINIC | Age: 53
End: 2023-09-25
Payer: COMMERCIAL

## 2023-09-25 DIAGNOSIS — G47.10 EXCESSIVE SLEEPINESS: ICD-10-CM

## 2023-09-26 RX ORDER — DEXTROAMPHETAMINE SACCHARATE, AMPHETAMINE ASPARTATE, DEXTROAMPHETAMINE SULFATE AND AMPHETAMINE SULFATE 5; 5; 5; 5 MG/1; MG/1; MG/1; MG/1
20 TABLET ORAL 2 TIMES DAILY PRN
Qty: 60 TABLET | Refills: 0 | Status: SHIPPED | OUTPATIENT
Start: 2023-09-26 | End: 2024-01-08

## 2024-01-08 ENCOUNTER — MYC REFILL (OUTPATIENT)
Dept: NEUROLOGY | Facility: CLINIC | Age: 54
End: 2024-01-08
Payer: COMMERCIAL

## 2024-01-08 DIAGNOSIS — G47.10 EXCESSIVE SLEEPINESS: ICD-10-CM

## 2024-01-09 RX ORDER — DEXTROAMPHETAMINE SACCHARATE, AMPHETAMINE ASPARTATE, DEXTROAMPHETAMINE SULFATE AND AMPHETAMINE SULFATE 5; 5; 5; 5 MG/1; MG/1; MG/1; MG/1
20 TABLET ORAL 2 TIMES DAILY PRN
Qty: 60 TABLET | Refills: 0 | Status: SHIPPED | OUTPATIENT
Start: 2024-01-09 | End: 2024-01-23

## 2024-01-09 NOTE — TELEPHONE ENCOUNTER
Patient requesting refill of their Adderall 20 mg; Patient was last seen in Jan 2023 and has follow up appointment in Jan 2024 with Dr Stuart. Pended rx to Dr Mckee for signature, as Dr Stuart is currently out of the clinic and unable to sign prescriptions.    Berta Jones RN

## 2024-01-23 ENCOUNTER — LAB (OUTPATIENT)
Dept: LAB | Facility: CLINIC | Age: 54
End: 2024-01-23
Payer: COMMERCIAL

## 2024-01-23 ENCOUNTER — OFFICE VISIT (OUTPATIENT)
Dept: NEUROLOGY | Facility: CLINIC | Age: 54
End: 2024-01-23
Attending: PSYCHIATRY & NEUROLOGY
Payer: COMMERCIAL

## 2024-01-23 ENCOUNTER — ANCILLARY PROCEDURE (OUTPATIENT)
Dept: MRI IMAGING | Facility: CLINIC | Age: 54
End: 2024-01-23
Attending: PSYCHIATRY & NEUROLOGY
Payer: COMMERCIAL

## 2024-01-23 VITALS
OXYGEN SATURATION: 100 % | SYSTOLIC BLOOD PRESSURE: 132 MMHG | WEIGHT: 193.4 LBS | BODY MASS INDEX: 31.22 KG/M2 | HEART RATE: 86 BPM | DIASTOLIC BLOOD PRESSURE: 83 MMHG

## 2024-01-23 DIAGNOSIS — G25.81 RESTLESS LEGS SYNDROME: ICD-10-CM

## 2024-01-23 DIAGNOSIS — G35 MS (MULTIPLE SCLEROSIS) (H): ICD-10-CM

## 2024-01-23 DIAGNOSIS — M79.2 NEUROPATHIC PAIN: ICD-10-CM

## 2024-01-23 DIAGNOSIS — G47.10 EXCESSIVE SLEEPINESS: ICD-10-CM

## 2024-01-23 DIAGNOSIS — G25.81 RESTLESS LEGS SYNDROME: Primary | ICD-10-CM

## 2024-01-23 LAB
FERRITIN SERPL-MCNC: 12 NG/ML (ref 11–328)
IRON BINDING CAPACITY (ROCHE): 362 UG/DL (ref 240–430)
IRON SATN MFR SERPL: 13 % (ref 15–46)
IRON SERPL-MCNC: 48 UG/DL (ref 37–145)

## 2024-01-23 PROCEDURE — 99214 OFFICE O/P EST MOD 30 MIN: CPT | Performed by: PSYCHIATRY & NEUROLOGY

## 2024-01-23 PROCEDURE — 83550 IRON BINDING TEST: CPT | Performed by: PATHOLOGY

## 2024-01-23 PROCEDURE — A9585 GADOBUTROL INJECTION: HCPCS | Performed by: RADIOLOGY

## 2024-01-23 PROCEDURE — 70553 MRI BRAIN STEM W/O & W/DYE: CPT | Mod: GC | Performed by: RADIOLOGY

## 2024-01-23 PROCEDURE — 99214 OFFICE O/P EST MOD 30 MIN: CPT | Mod: GC | Performed by: PSYCHIATRY & NEUROLOGY

## 2024-01-23 PROCEDURE — 36415 COLL VENOUS BLD VENIPUNCTURE: CPT | Performed by: PATHOLOGY

## 2024-01-23 PROCEDURE — 82728 ASSAY OF FERRITIN: CPT | Performed by: PATHOLOGY

## 2024-01-23 PROCEDURE — 83540 ASSAY OF IRON: CPT | Performed by: PATHOLOGY

## 2024-01-23 RX ORDER — GABAPENTIN 300 MG/1
300 CAPSULE ORAL AT BEDTIME
Qty: 30 CAPSULE | Refills: 11 | Status: SHIPPED | OUTPATIENT
Start: 2024-01-23 | End: 2024-02-09

## 2024-01-23 RX ORDER — GADOBUTROL 604.72 MG/ML
10 INJECTION INTRAVENOUS ONCE
Status: DISCONTINUED | OUTPATIENT
Start: 2024-01-23 | End: 2024-01-23

## 2024-01-23 RX ORDER — MODAFINIL 100 MG/1
100 TABLET ORAL DAILY
Qty: 30 TABLET | Refills: 5 | Status: SHIPPED | OUTPATIENT
Start: 2024-01-23 | End: 2024-05-14

## 2024-01-23 RX ORDER — GADOBUTROL 604.72 MG/ML
10 INJECTION INTRAVENOUS ONCE
Status: COMPLETED | OUTPATIENT
Start: 2024-01-23 | End: 2024-01-23

## 2024-01-23 RX ADMIN — GADOBUTROL 9.5 ML: 604.72 INJECTION INTRAVENOUS at 12:17

## 2024-01-23 ASSESSMENT — PAIN SCALES - GENERAL: PAINLEVEL: MODERATE PAIN (5)

## 2024-01-23 NOTE — Clinical Note
"1/23/2024       RE: Chayo Reid  [address]  [facility] [address]     Dear Colleague,    Thank you for referring your patient, Chayo Reid, to the [facility]ULT[facility]OS[facility] [place] at [facility]. Please see a copy of my visit note below.    Date of Service: 1/23/2024    [facility] Neurology   MS Clinic Follow-up     Subjective: Ms. Reid is a 53-year-old woman who returns to clinic for follow-up of her diagnosis of multiple sclerosis.  She was last seen on 1/24/2023, at which time her baclofen was increased due to right-sided spasticity and she was started on Prozac for management of her anxiety.  Of note, she has been off of disease modifying therapy since 2020 per her preference.    Today, the patient states that she has been having a good amount of \"twitching\" that occurs predominantly at night but can happen during the day as well.  This can involve all of her extremities, not just the legs.  She does get some relief when she moves her extremities.  She states that this has been going on for over a year but has gotten worse over the past year.  The twitching seems to be persistent until she is able to fall asleep.  She does not feel as though the twitching has improved with baclofen at all.    In addition to the above, the patient reports having pain and tightness in both of her feet starting this past summer.  The pain seems to be most present after periods of inactivity and gets a little better with activity.  She has been wearing compression socks with little \"nubs\" on the bottom of them, which have been helpful.      The patient also reports having post-coital pain involving the entire body with the exception of the head.  This is the same pain she will experience following an argument.  She describes the pain as a \"tightening\" of the body, involving the arms, legs and torso.  This pain will stick around for " at least two days before resolving.  She estimates that this has been going on for about two years at least.     In terms of the patient's fluoxetine, she relates that this has been very helpful in reducing her anxiety.  She is still taking her baclofen, but usually only takes this at night because it makes her sleepy.  She also is using medical marijuana gummies to help her with sleep, which have been fairly helpful.  In terms of her Adderall, she is still taking this on an as-needed basis, although she notes that this has been hard for her to get because of shortages.      Aside from the above, the patient denies any new numbness, weakness or tingling.  She similarly denies any changes in vision.  She has not had any issues with bowel or bladder function.  She feels like her balance has been stable and she has not had any falls since she was last seen.      Disease onset: 2005 (white matter changes on MRI)   2013 diagnosis was made after lesion accumulation and CSF + OCB  Previous disease modifying therapy:   Glatiramer acetate 8935-2170 (poor disease control)  Ocrelizumab 2018-10/2020 (self discontinued)     No Known Allergies    Current Outpatient Medications   Medication     baclofen (LIORESAL) 10 MG tablet     Cholecalciferol (VITAMIN D) 1000 UNITS capsule     FLUoxetine (PROZAC) 20 MG capsule     gabapentin (NEURONTIN) 300 MG capsule     medical cannabis (Patient's own supply.  Not a prescription)     modafinil (PROVIGIL) 100 MG tablet     nirmatrelvir and ritonavir (PAXLOVID) therapy pack     No current facility-administered medications for this visit.     Facility-Administered Medications Ordered in Other Visits   Medication     barium sulfate 40% (VARIBAR NECTAR) oral suspension     barium sulfate 40% (VARIBAR THIN HONEY) oral suspension        Past medical, surgical, social and family history was personally reviewed. Pertinent details noted above.     Physical Examination:   /83 (BP Location: Left  arm, Patient Position: Sitting, Cuff Size: Adult Large)   Pulse 86   Wt 87.7 kg (193 lb 6.4 oz)   SpO2 100%   BMI 31.22 kg/m      General: no acute distress  Neuro:  Cranial nerves: PERRL without RAPD, EOMI without CONNOR, gaze is conjugate, VFF, face is symmetric at rest and with activation, hearing is intact to conversation, tongue protrudes midline with normal lateral movements, no dysarthria, and shoulder shrug is symmetric.  Motor: No abnormal movements. Bulk is appropriate. Strength is 5/5 and symmetric with testing of the deltoids, biceps, triceps, wrist extension, hip flexors, knee flexors/extensors, and ankle dorsi- and plantarflexion.  Sensory: Romberg is absent.  Reflexes: Normoreflexic throughout upper and lower extremities.  Coordination: FNF without dysmetria bilaterally.  Gait: Normal base and stride length. Tandem walk is steady.    Tests/Imaging:   MRI brain with and without contrast (1/23/2024):  Official radiology report is pending.  Per my personal review, there does not appear to be any new demyelinating lesions relative to her previous scan or evidence of active demyelination.    Assessment: Ms. Reid is a 53-year-old woman who returns to clinic for follow-up of her multiple sclerosis.  She had B-cell reconstitution in June 2021 and has remained off of disease modifying therapy since October 2020.  Despite being off of DMT, she has remained clinically stable.  She also had repeat head imaging this morning which, as we reviewed, fortunately did not show any evidence of active demyelination or new lesions relative to her prior imaging.  In light of these findings, we will plan to repeat her imaging in approximately 2 years or sooner if new symptoms develop.    With respect to the patient's report of twitching, it is difficult for me to know whether these are paroxysmal symptoms of MS versus restless leg syndrome.  To evaluate for the latter, we will obtain iron studies as well as a ferritin  today.  Regardless, I do think she could potentially benefit from some gabapentin, which may also benefit her with respect to the pain she has been experiencing following coitus and arguments.  The potential side effects of this medication, including tiredness, dizziness, and edema were discussed.    Additionally, given the challenge she has been facing in getting her Adderall prescriptions filled, we discussed that we could switch her prescription back to Provigil, which she was interested in proceeding with.    Plan:   - Start gabapentin 300 mg nightly  - Check iron and ferritin levels today  - Stop Adderall and restart modafinil  - Plan to repeat imaging in approximately 2 years  - Follow-up in 1 year    Patient seen and discussed with the attending, Dr. Stuart.    Chance Le MD  Neurology Resident, PGY-4    Note was completed with the assistance of Dragon Fluency software which can often result in accidental word substitutions.      I saw and examined this patient, and have read and edited the resident's note.  I agree with the findings, assessment, and plan.  I spent 33 minutes on her care on date of service including chart review and face-to-face time.  Chayo has remained stable from an MS perspective despite being off immunotherapy.  Reviewed issues  continues to be pain, we discussed that.  Plan as above.    Umer Stuart MD      Again, thank you for allowing me to participate in the care of your patient.      Sincerely,    Umer Stuart MD

## 2024-01-23 NOTE — PROGRESS NOTES
"Date of Service: 1/23/2024    [facility] Neurology   MS Clinic Follow-up     Subjective: Ms. Reid is a 53-year-old woman who returns to clinic for follow-up of her diagnosis of multiple sclerosis.  She was last seen on 1/24/2023, at which time her baclofen was increased due to right-sided spasticity and she was started on Prozac for management of her anxiety.  Of note, she has been off of disease modifying therapy since 2020 per her preference.    Today, the patient states that she has been having a good amount of \"twitching\" that occurs predominantly at night but can happen during the day as well.  This can involve all of her extremities, not just the legs.  She does get some relief when she moves her extremities.  She states that this has been going on for over a year but has gotten worse over the past year.  The twitching seems to be persistent until she is able to fall asleep.  She does not feel as though the twitching has improved with baclofen at all.    In addition to the above, the patient reports having pain and tightness in both of her feet starting this past summer.  The pain seems to be most present after periods of inactivity and gets a little better with activity.  She has been wearing compression socks with little \"nubs\" on the bottom of them, which have been helpful.      The patient also reports having post-coital pain involving the entire body with the exception of the head.  This is the same pain she will experience following an argument.  She describes the pain as a \"tightening\" of the body, involving the arms, legs and torso.  This pain will stick around for at least two days before resolving.  She estimates that this has been going on for about two years at least.     In terms of the patient's fluoxetine, she relates that this has been very helpful in reducing her anxiety.  She is still taking her baclofen, but usually only takes this at night because it makes her sleepy.  She also is using " medical marijuana gummies to help her with sleep, which have been fairly helpful.  In terms of her Adderall, she is still taking this on an as-needed basis, although she notes that this has been hard for her to get because of shortages.      Aside from the above, the patient denies any new numbness, weakness or tingling.  She similarly denies any changes in vision.  She has not had any issues with bowel or bladder function.  She feels like her balance has been stable and she has not had any falls since she was last seen.      Disease onset: 2005 (white matter changes on MRI)   2013 diagnosis was made after lesion accumulation and CSF + OCB  Previous disease modifying therapy:   Glatiramer acetate [id number] (poor disease control)  Ocrelizumab 2018-10/2020 (self discontinued)     No Known Allergies    Current Outpatient Medications   Medication     baclofen (LIORESAL) 10 MG tablet     Cholecalciferol (VITAMIN D) 1000 UNITS capsule     FLUoxetine (PROZAC) 20 MG capsule     gabapentin (NEURONTIN) 300 MG capsule     medical cannabis (Patient's own supply.  Not a prescription)     modafinil (PROVIGIL) 100 MG tablet     nirmatrelvir and ritonavir (PAXLOVID) therapy pack     No current facility-administered medications for this visit.     Facility-Administered Medications Ordered in Other Visits   Medication     barium sulfate 40% (VARIBAR NECTAR) oral suspension     barium sulfate 40% (VARIBAR THIN HONEY) oral suspension        Past medical, surgical, social and family history was personally reviewed. Pertinent details noted above.     Physical Examination:   /83 (BP Location: Left arm, Patient Position: Sitting, Cuff Size: Adult Large)   Pulse 86   Wt 87.7 kg (193 lb 6.4 oz)   SpO2 100%   BMI 31.22 kg/m      General: no acute distress  Neuro:  Cranial nerves: PERRL without RAPD, EOMI without CONNOR, gaze is conjugate, VFF, face is symmetric at rest and with activation, hearing is intact to conversation, tongue  protrudes midline with normal lateral movements, no dysarthria, and shoulder shrug is symmetric.  Motor: No abnormal movements. Bulk is appropriate. Strength is 5/5 and symmetric with testing of the deltoids, biceps, triceps, wrist extension, hip flexors, knee flexors/extensors, and ankle dorsi- and plantarflexion.  Sensory: Romberg is absent.  Reflexes: Normoreflexic throughout upper and lower extremities.  Coordination: FNF without dysmetria bilaterally.  Gait: Normal base and stride length. Tandem walk is steady.    Tests/Imaging:   MRI brain with and without contrast (1/23/2024):  Official radiology report is pending.  Per my personal review, there does not appear to be any new demyelinating lesions relative to her previous scan or evidence of active demyelination.    Assessment: Ms. Reid is a 53-year-old woman who returns to clinic for follow-up of her multiple sclerosis.  She had B-cell reconstitution in June 2021 and has remained off of disease modifying therapy since October 2020.  Despite being off of DMT, she has remained clinically stable.  She also had repeat head imaging this morning which, as we reviewed, fortunately did not show any evidence of active demyelination or new lesions relative to her prior imaging.  In light of these findings, we will plan to repeat her imaging in approximately 2 years or sooner if new symptoms develop.    With respect to the patient's report of twitching, it is difficult for me to know whether these are paroxysmal symptoms of MS versus restless leg syndrome.  To evaluate for the latter, we will obtain iron studies as well as a ferritin today.  Regardless, I do think she could potentially benefit from some gabapentin, which may also benefit her with respect to the pain she has been experiencing following coitus and arguments.  The potential side effects of this medication, including tiredness, dizziness, and edema were discussed.    Additionally, given the challenge  she has been facing in getting her Adderall prescriptions filled, we discussed that we could switch her prescription back to Provigil, which she was interested in proceeding with.    Plan:   - Start gabapentin 300 mg nightly  - Check iron and ferritin levels today  - Stop Adderall and restart modafinil  - Plan to repeat imaging in approximately 2 years  - Follow-up in 1 year    Patient seen and discussed with the attending, Dr. Stuart.    Chance Le MD  Neurology Resident, PGY-4    Note was completed with the assistance of Dragon Fluency software which can often result in accidental word substitutions.      I saw and examined this patient, and have read and edited the resident's note.  I agree with the findings, assessment, and plan.  I spent 33 minutes on her care on date of service including chart review and face-to-face time.  Chayo has remained stable from an MS perspective despite being off immunotherapy.  Reviewed issues  continues to be pain, we discussed that.  Plan as above.    Umer Stuart MD

## 2024-01-23 NOTE — NURSING NOTE
Chief Complaint   Patient presents with    MS    RECHECK     Annual follow up      Vitals were taken and medications were reconciled.   Carlos Vargas, EMT  1:53 PM

## 2024-01-25 DIAGNOSIS — G47.10 EXCESSIVE SLEEPINESS: ICD-10-CM

## 2024-01-25 RX ORDER — DEXTROAMPHETAMINE SACCHARATE, AMPHETAMINE ASPARTATE, DEXTROAMPHETAMINE SULFATE AND AMPHETAMINE SULFATE 5; 5; 5; 5 MG/1; MG/1; MG/1; MG/1
TABLET ORAL
Qty: 60 TABLET | Refills: 0 | OUTPATIENT
Start: 2024-01-25

## 2024-01-25 NOTE — TELEPHONE ENCOUNTER
Refill request for Adderall declined as this medication was stopped at clinic visit two days ago on 1/23, when modafinil was restarted.     Berta Jones RN

## 2024-01-26 DIAGNOSIS — G35 MS (MULTIPLE SCLEROSIS) (H): ICD-10-CM

## 2024-01-26 RX ORDER — BACLOFEN 10 MG/1
20 TABLET ORAL 2 TIMES DAILY
Qty: 360 TABLET | Refills: 1 | Status: SHIPPED | OUTPATIENT
Start: 2024-01-26

## 2024-01-26 NOTE — TELEPHONE ENCOUNTER
Received refill request for baclofen from [facility]; Patient was last seen in January and has follow up appointment next January with Dr. Stuart. Refilled per MS refill protocol.    Marlene Cartagena RN

## 2024-02-03 DIAGNOSIS — F41.9 ANXIETY: ICD-10-CM

## 2024-02-05 NOTE — TELEPHONE ENCOUNTER
Received refill request for 90 day supply of fluoxetine from [facility]; Patient was last seen in Jan 2024 and has follow up appointment in Jan 2025 with Dr Stuart. Refilled per MS refill protocol.    Berta Jones RN

## 2024-02-09 ENCOUNTER — MYC MEDICAL ADVICE (OUTPATIENT)
Dept: NEUROLOGY | Facility: CLINIC | Age: 54
End: 2024-02-09
Payer: COMMERCIAL

## 2024-02-09 DIAGNOSIS — G25.81 RESTLESS LEGS SYNDROME: ICD-10-CM

## 2024-02-09 RX ORDER — GABAPENTIN 300 MG/1
600 CAPSULE ORAL AT BEDTIME
Qty: 60 CAPSULE | Refills: 11 | Status: SHIPPED | OUTPATIENT
Start: 2024-02-09

## 2024-02-09 NOTE — CONFIDENTIAL NOTE
Chayo sent a my chart stating that she would like to increase her gabapentin dose to 2 (300 mg each) capsules each evening (current dose is 1 capsule per day).  Updated script pended to Dr. Stuart.    Marlene Cartagena RN

## 2024-03-08 DIAGNOSIS — F41.9 ANXIETY: ICD-10-CM

## 2024-05-14 ENCOUNTER — MYC REFILL (OUTPATIENT)
Dept: NEUROLOGY | Facility: CLINIC | Age: 54
End: 2024-05-14
Payer: COMMERCIAL

## 2024-05-14 DIAGNOSIS — G47.10 EXCESSIVE SLEEPINESS: ICD-10-CM

## 2024-05-14 DIAGNOSIS — F41.9 ANXIETY: ICD-10-CM

## 2024-05-15 RX ORDER — MODAFINIL 100 MG/1
100 TABLET ORAL DAILY
Qty: 30 TABLET | Refills: 5 | Status: SHIPPED | OUTPATIENT
Start: 2024-05-15

## 2024-05-15 NOTE — TELEPHONE ENCOUNTER
Pt requesting modafinil and fluoxetine prescriptions sent to different pharmacy ([facility] [place]). Fluoxetine was sent to this pharmacy in Feb, refill request declined. Modafinil rx pended to Dr Stuart for signature. Pt was last seen Jan 2024 and has follow-up in Jan 2025 with Dr Stuart.     Berta Jones RN

## 2024-07-07 ENCOUNTER — HEALTH MAINTENANCE LETTER (OUTPATIENT)
Age: 54
End: 2024-07-07

## 2024-10-01 ENCOUNTER — ANCILLARY PROCEDURE (OUTPATIENT)
Dept: MAMMOGRAPHY | Facility: CLINIC | Age: 54
End: 2024-10-01
Payer: COMMERCIAL

## 2024-10-01 DIAGNOSIS — Z12.31 VISIT FOR SCREENING MAMMOGRAM: ICD-10-CM

## 2024-10-01 PROCEDURE — 77067 SCR MAMMO BI INCL CAD: CPT | Mod: TC | Performed by: RADIOLOGY

## 2024-10-01 PROCEDURE — 77063 BREAST TOMOSYNTHESIS BI: CPT | Mod: TC | Performed by: RADIOLOGY

## 2024-10-22 DIAGNOSIS — F41.9 ANXIETY: ICD-10-CM

## 2024-10-22 NOTE — TELEPHONE ENCOUNTER
Received refill request for fluoxetine from [facility]; Patient was last seen in Jan 2024 and has follow up appointment in Jan 2025 with Dr Stuart. Refilled per MS refill protocol.    Berta Jones RN

## 2024-11-08 NOTE — TELEPHONE ENCOUNTER
Hi    I would prefer just one 60mg tablet per day    Sincerely   Pietro Flores MD [facility]  Staff Neurologist   10/19/17        No

## 2024-12-12 SDOH — HEALTH STABILITY: PHYSICAL HEALTH
ON AVERAGE, HOW MANY DAYS PER WEEK DO YOU ENGAGE IN MODERATE TO STRENUOUS EXERCISE (LIKE A BRISK WALK)?: PATIENT DECLINED

## 2024-12-12 SDOH — HEALTH STABILITY: PHYSICAL HEALTH: ON AVERAGE, HOW MANY MINUTES DO YOU ENGAGE IN EXERCISE AT THIS LEVEL?: PATIENT DECLINED

## 2024-12-12 ASSESSMENT — SOCIAL DETERMINANTS OF HEALTH (SDOH): HOW OFTEN DO YOU GET TOGETHER WITH FRIENDS OR RELATIVES?: ONCE A WEEK

## 2024-12-16 ENCOUNTER — OFFICE VISIT (OUTPATIENT)
Dept: FAMILY MEDICINE | Facility: CLINIC | Age: 54
End: 2024-12-16
Payer: COMMERCIAL

## 2024-12-16 VITALS
HEIGHT: 65 IN | BODY MASS INDEX: 34.22 KG/M2 | WEIGHT: 205.4 LBS | TEMPERATURE: 97.3 F | RESPIRATION RATE: 15 BRPM | DIASTOLIC BLOOD PRESSURE: 92 MMHG | OXYGEN SATURATION: 97 % | SYSTOLIC BLOOD PRESSURE: 138 MMHG | HEART RATE: 89 BPM

## 2024-12-16 DIAGNOSIS — Z13.6 CARDIOVASCULAR SCREENING; LDL GOAL LESS THAN 130: ICD-10-CM

## 2024-12-16 DIAGNOSIS — N95.1 MENOPAUSAL SYNDROME (HOT FLASHES): ICD-10-CM

## 2024-12-16 DIAGNOSIS — R03.0 ELEVATED BLOOD-PRESSURE READING WITHOUT DIAGNOSIS OF HYPERTENSION: ICD-10-CM

## 2024-12-16 DIAGNOSIS — G35 MS (MULTIPLE SCLEROSIS) (H): ICD-10-CM

## 2024-12-16 DIAGNOSIS — Z00.00 ANNUAL PHYSICAL EXAM: Primary | ICD-10-CM

## 2024-12-16 DIAGNOSIS — Z13.1 SCREENING FOR DIABETES MELLITUS: ICD-10-CM

## 2024-12-16 DIAGNOSIS — G25.81 RESTLESS LEG SYNDROME: ICD-10-CM

## 2024-12-16 DIAGNOSIS — Z12.11 SCREEN FOR COLON CANCER: ICD-10-CM

## 2024-12-16 DIAGNOSIS — E61.1 IRON DEFICIENCY: ICD-10-CM

## 2024-12-16 LAB
ERYTHROCYTE [DISTWIDTH] IN BLOOD BY AUTOMATED COUNT: 13.4 % (ref 10–15)
EST. AVERAGE GLUCOSE BLD GHB EST-MCNC: 126 MG/DL
HBA1C MFR BLD: 6 % (ref 0–5.6)
HCT VFR BLD AUTO: 38 % (ref 35–47)
HGB BLD-MCNC: 12 G/DL (ref 11.7–15.7)
MCH RBC QN AUTO: 25.4 PG (ref 26.5–33)
MCHC RBC AUTO-ENTMCNC: 31.6 G/DL (ref 31.5–36.5)
MCV RBC AUTO: 80 FL (ref 78–100)
PLATELET # BLD AUTO: 286 10E3/UL (ref 150–450)
RBC # BLD AUTO: 4.73 10E6/UL (ref 3.8–5.2)
WBC # BLD AUTO: 8.9 10E3/UL (ref 4–11)

## 2024-12-16 PROCEDURE — 90472 IMMUNIZATION ADMIN EACH ADD: CPT

## 2024-12-16 PROCEDURE — 99396 PREV VISIT EST AGE 40-64: CPT | Mod: 25

## 2024-12-16 PROCEDURE — 90750 HZV VACC RECOMBINANT IM: CPT

## 2024-12-16 PROCEDURE — 90673 RIV3 VACCINE NO PRESERV IM: CPT

## 2024-12-16 PROCEDURE — 90471 IMMUNIZATION ADMIN: CPT

## 2024-12-16 PROCEDURE — 80061 LIPID PANEL: CPT

## 2024-12-16 PROCEDURE — 99214 OFFICE O/P EST MOD 30 MIN: CPT | Mod: 25

## 2024-12-16 PROCEDURE — 90715 TDAP VACCINE 7 YRS/> IM: CPT

## 2024-12-16 PROCEDURE — 85027 COMPLETE CBC AUTOMATED: CPT

## 2024-12-16 PROCEDURE — 36415 COLL VENOUS BLD VENIPUNCTURE: CPT

## 2024-12-16 PROCEDURE — 83036 HEMOGLOBIN GLYCOSYLATED A1C: CPT

## 2024-12-16 PROCEDURE — 80053 COMPREHEN METABOLIC PANEL: CPT

## 2024-12-16 RX ORDER — PROGESTERONE 100 MG/1
100 CAPSULE ORAL DAILY
Qty: 90 CAPSULE | Refills: 1 | Status: SHIPPED | OUTPATIENT
Start: 2024-12-16

## 2024-12-16 RX ORDER — ESTRADIOL 0.03 MG/D
1 FILM, EXTENDED RELEASE TRANSDERMAL
Qty: 8 PATCH | Refills: 5 | Status: SHIPPED | OUTPATIENT
Start: 2024-12-16

## 2024-12-16 ASSESSMENT — PAIN SCALES - GENERAL: PAINLEVEL_OUTOF10: NO PAIN (0)

## 2024-12-16 NOTE — PROGRESS NOTES
"Preventive Care Visit  [facility] SAMMI Becker DNP, Geriatric Medicine  Dec 16, 2024      Assessment & Plan     Annual physical exam  UTD on mammogram, overdue for colonoscopy. Update labs and vaccines today  - Comprehensive metabolic panel; Future    MS (multiple sclerosis) (H)  Follows with neurology    Menopausal syndrome (hot flashes)  S/p hysterectomy, increase in menopausal symptoms. Discussed risks/benefits of HRT, will start low dose estradiol patch and PO progesterone to help with insomnia. Follow-up in 3 months to reassess  - estradiol (VIVELLE-DOT) 0.025 MG/24HR bi-weekly patch; Place 1 patch over 96 hours onto the skin twice a week.  - progesterone (PROMETRIUM) 100 MG capsule; Take 1 capsule (100 mg) by mouth daily.    Iron deficiency  Reviewed previous iron studies, advised treating with OTC iron supplementation and recheck labs in 3 months  - CBC with platelets; Future  - Ferritin; Future  - Iron & Iron Binding Capacity; Future    Restless leg syndrome  On gabapentin through neurology. Advised treating iron deficiency to see if this improves symptoms    Elevated blood-pressure reading without diagnosis of hypertension  Reports hx of white coat hypertension. Advised to check BP 10-12 times over next month and notify if consistently >140/90, ideal  <120/80. Plan to follow-up in clinic within 3 months to reassess.    Screening for diabetes mellitus  - Hemoglobin A1c; Future    CARDIOVASCULAR SCREENING; LDL GOAL LESS THAN 130  - Lipid panel reflex to direct LDL Fasting; Future    Screen for colon cancer  Overdue for screening, will provide referral for scheduling  - Colonoscopy Screening  Referral; Future    Patient has been advised of split billing requirements and indicates understanding: Yes        BMI  Estimated body mass index is 34.18 kg/m  as calculated from the following:    Height as of this encounter: 1.651 m (5' 5\").    Weight as of this encounter: 93.2 kg (205 lb " 6.4 oz).   Weight management plan: Discussed healthy diet and exercise guidelines    Counseling  Appropriate preventive services were addressed with this patient via screening, questionnaire, or discussion as appropriate for fall prevention, nutrition, physical activity, Tobacco-use cessation, social engagement, weight loss and cognition.  Checklist reviewing preventive services available has been given to the patient.  Reviewed patient's diet, addressing concerns and/or questions.       MEDICATIONS:        - Start taking estradiol patch and progesterone       - Continue other medications without change  FUTURE APPOINTMENTS:       - Follow-up visit in 3 months       - Follow-up for annual visit or as needed  Work on weight loss  Regular exercise  See Patient Instructions    Subjective   Chayo is a 54 year old, presenting for the following:  Physical        12/16/2024    11:06 AM   Additional Questions   Roomed by Jael BAEZA MA          Here for physical  Interested in addressing hot flashes and insomnia.   Home BPs ~130s. Reports always high in doctor and dentist offices            Health Care Directive  Patient does not have a Health Care Directive: Patient states has Advance Directive and will bring in a copy to clinic.      12/12/2024   General Health   How would you rate your overall physical health? (!) FAIR   Feel stress (tense, anxious, or unable to sleep) Only a little      (!) STRESS CONCERN      12/12/2024   Nutrition   Three or more servings of calcium each day? (!) NO   Diet: Regular (no restrictions)   How many servings of fruit and vegetables per day? (!) 0-1   How many sweetened beverages each day? 0-1            12/12/2024   Exercise   Days per week of moderate/strenous exercise Patient declined   Average minutes spent exercising at this level Patient declined            12/12/2024   Social Factors   Frequency of gathering with friends or relatives Once a week   Worry food won't last until get money to  buy more No   Food not last or not have enough money for food? No   Do you have housing? (Housing is defined as stable permanent housing and does not include staying ouside in a car, in a tent, in an abandoned building, in an overnight shelter, or couch-surfing.) Yes   Are you worried about losing your housing? No   Lack of transportation? No   Unable to get utilities (heat,electricity)? No            12/16/2024   Fall Risk   Gait Speed Test (Document in seconds) 3.05   Gait Speed Test Interpretation Less than or equal to 5.00 seconds - PASS             12/12/2024   Dental   Dentist two times every year? Yes                 Today's PHQ-2 Score:       12/16/2024    11:09 AM   PHQ-2 ( 1999 [employer])   Q1: Little interest or pleasure in doing things 0   Q2: Feeling down, depressed or hopeless 0   PHQ-2 Score 0         12/12/2024   Substance Use   Alcohol more than 3/day or more than 7/wk No   Do you use any other substances recreationally? (!) CANNABIS PRODUCTS        Social History     Tobacco Use    Smoking status: Never    Smokeless tobacco: Never   Substance Use Topics    Alcohol use: Not Currently     Alcohol/week: 1.0 standard drink of alcohol     Types: 1 Glasses of wine per week     Comment: Very little    Drug use: No           10/1/2024   LAST FHS-7 RESULTS   1st degree relative breast or ovarian cancer No   Any relative bilateral breast cancer No   Any male have breast cancer No   Any ONE woman have BOTH breast AND ovarian cancer No   Any woman with breast cancer before 50yrs No   2 or more relatives with breast AND/OR ovarian cancer No   2 or more relatives with breast AND/OR bowel cancer No           Mammogram Screening - Mammogram every 1-2 years updated in Health Maintenance based on mutual decision making        12/12/2024   STI Screening   New sexual partner(s) since last STI/HIV test? No        History of abnormal Pap smear: Status post hysterectomy with removal of cervix and no history of CIN2 or  "greater or cervical cancer. Health Maintenance and Surgical History updated.        2/3/2015    12:00 AM   PAP / HPV   PAP-ABSTRACT See Scanned Document           This result is from an external source.     ASCVD Risk   The 10-year ASCVD risk score (Henry YARBROUGH, et al., 2019) is: 3.1%    Values used to calculate the score:      Age: 54 years      Sex: Female      Is Non- : No      Diabetic: No      Tobacco smoker: No      Systolic Blood Pressure: 138 mmHg      Is BP treated: No      HDL Cholesterol: 40 mg/dL      Total Cholesterol: 199 mg/dL           Reviewed and updated as needed this visit by Provider   Tobacco   Meds  Problems  Med Hx  Surg Hx  Fam Hx  Soc Hx Sexual   Activity          Past Medical History:   Diagnosis Date    Corneal ulcer     Demyelinating disease (H)     Multiple sclerosis    Fatigue     Hyperopia with astigmatism and presbyopia     Migraine     OAB (overactive bladder)     Ptosis      Past Surgical History:   Procedure Laterality Date    BREAST SURGERY       SECTION  ,     HYSTERECTOMY  2015    partial; still has tubes and ovaries    HYSTERECTOMY, PAP NO LONGER INDICATED      LAPAROSCOPIC HYSTERECTOMY TOTAL Bilateral     TUBAL LIGATION Bilateral      Lab work is in process      Review of Systems  Constitutional, HEENT, cardiovascular, pulmonary, gi and gu systems are negative, except as otherwise noted.     Objective    Exam  BP (!) 138/92   Pulse 89   Temp 97.3  F (36.3  C) (Temporal)   Resp 15   Ht 1.651 m (5' 5\")   Wt 93.2 kg (205 lb 6.4 oz)   SpO2 97%   BMI 34.18 kg/m     Estimated body mass index is 34.18 kg/m  as calculated from the following:    Height as of this encounter: 1.651 m (5' 5\").    Weight as of this encounter: 93.2 kg (205 lb 6.4 oz).    Physical Exam  Vitals reviewed.   HENT:      Head: Normocephalic.      Right Ear: Tympanic membrane, ear canal and external ear normal.      Left Ear: " Tympanic membrane, ear canal and external ear normal.   Eyes:      Pupils: Pupils are equal, round, and reactive to light.   Cardiovascular:      Rate and Rhythm: Normal rate and regular rhythm.      Pulses: Normal pulses.      Heart sounds: Normal heart sounds. No murmur heard.  Pulmonary:      Effort: Pulmonary effort is normal. No respiratory distress.      Breath sounds: Normal breath sounds. No wheezing, rhonchi or rales.   Chest:      Comments: Exam deferred  Abdominal:      General: Bowel sounds are normal. There is no distension.      Palpations: Abdomen is soft. There is no mass.      Tenderness: There is no abdominal tenderness.   Musculoskeletal:         General: Normal range of motion.      Cervical back: Normal range of motion and neck supple.      Right lower leg: No edema.      Left lower leg: No edema.   Skin:     General: Skin is warm and dry.   Neurological:      Mental Status: She is alert and oriented to person, place, and time.   Psychiatric:         Mood and Affect: Mood normal.         Behavior: Behavior normal.               Signed Electronically by: Marlene Becker, DNP, APRN, CNP

## 2024-12-16 NOTE — NURSING NOTE
Prior to immunization administration, verified patients identity using patient s name and date of birth. Please see Immunization Activity for additional information.     Screening Questionnaire for Adult Immunization    Are you sick today?   No   Do you have allergies to medications, food, a vaccine component or latex?   No   Have you ever had a serious reaction after receiving a vaccination?   No   Do you have a long-term health problem with heart, lung, kidney, or metabolic disease (e.g., diabetes), asthma, a blood disorder, no spleen, complement component deficiency, a cochlear implant, or a spinal fluid leak?  Are you on long-term aspirin therapy?   No   Do you have cancer, leukemia, HIV/AIDS, or any other immune system problem?   No   Do you have a parent, brother, or sister with an immune system problem?   No   In the past 3 months, have you taken medications that affect  your immune system, such as prednisone, other steroids, or anticancer drugs; drugs for the treatment of rheumatoid arthritis, Crohn s disease, or psoriasis; or have you had radiation treatments?   No   Have you had a seizure, or a brain or other nervous system problem?   No   During the past year, have you received a transfusion of blood or blood    products, or been given immune (gamma) globulin or antiviral drug?   No   For women: Are you pregnant or is there a chance you could become       pregnant during the next month?   No   Have you received any vaccinations in the past 4 weeks?   No     Immunization questionnaire answers were all negative.      Patient instructed to remain in clinic for 15 minutes afterwards, and to report any adverse reactions.     Screening performed by Jael Lambert CMA on 12/16/2024 at 2:12 PM.

## 2024-12-16 NOTE — PATIENT INSTRUCTIONS
"I would recommend starting \"Slow Fe\" iron supplement over-the-counter every other day to help reduce some common GI side effects (nausea, flatulence, GI upset). Consuming iron with vitamin C can improve absorption.       Purchase a home blood pressure cuff that checks your blood pressure on your arm not your wrist.     Omron is a good brand. You can check if the cuff you purchased is valid by going  to validateBP.org    Take your blood pressure after 5 minutes of rest in the AM and PM for one week and record the  readings (14 total readings).    Send me a [employer] message with those readings upon doing so.    "

## 2024-12-17 LAB
ALBUMIN SERPL BCG-MCNC: 4.3 G/DL (ref 3.5–5.2)
ALP SERPL-CCNC: 98 U/L (ref 40–150)
ALT SERPL W P-5'-P-CCNC: 41 U/L (ref 0–50)
ANION GAP SERPL CALCULATED.3IONS-SCNC: 10 MMOL/L (ref 7–15)
AST SERPL W P-5'-P-CCNC: 31 U/L (ref 0–45)
BILIRUB SERPL-MCNC: 0.3 MG/DL
BUN SERPL-MCNC: 21.3 MG/DL (ref 6–20)
CALCIUM SERPL-MCNC: 9.3 MG/DL (ref 8.8–10.4)
CHLORIDE SERPL-SCNC: 103 MMOL/L (ref 98–107)
CHOLEST SERPL-MCNC: 229 MG/DL
CREAT SERPL-MCNC: 0.87 MG/DL (ref 0.51–0.95)
EGFRCR SERPLBLD CKD-EPI 2021: 78 ML/MIN/1.73M2
FASTING STATUS PATIENT QL REPORTED: NO
FASTING STATUS PATIENT QL REPORTED: NO
GLUCOSE SERPL-MCNC: 110 MG/DL (ref 70–99)
HCO3 SERPL-SCNC: 28 MMOL/L (ref 22–29)
HDLC SERPL-MCNC: 54 MG/DL
LDLC SERPL CALC-MCNC: 140 MG/DL
NONHDLC SERPL-MCNC: 175 MG/DL
POTASSIUM SERPL-SCNC: 4.7 MMOL/L (ref 3.4–5.3)
PROT SERPL-MCNC: 7.6 G/DL (ref 6.4–8.3)
SODIUM SERPL-SCNC: 141 MMOL/L (ref 135–145)
TRIGL SERPL-MCNC: 176 MG/DL

## 2025-01-07 ENCOUNTER — MYC MEDICAL ADVICE (OUTPATIENT)
Dept: FAMILY MEDICINE | Facility: CLINIC | Age: 55
End: 2025-01-07
Payer: COMMERCIAL

## 2025-01-07 ENCOUNTER — OFFICE VISIT (OUTPATIENT)
Dept: NEUROLOGY | Facility: CLINIC | Age: 55
End: 2025-01-07
Attending: PSYCHIATRY & NEUROLOGY
Payer: COMMERCIAL

## 2025-01-07 VITALS
SYSTOLIC BLOOD PRESSURE: 161 MMHG | WEIGHT: 207.5 LBS | HEIGHT: 67 IN | DIASTOLIC BLOOD PRESSURE: 99 MMHG | OXYGEN SATURATION: 98 % | BODY MASS INDEX: 32.57 KG/M2 | HEART RATE: 83 BPM

## 2025-01-07 DIAGNOSIS — G25.81 RESTLESS LEGS SYNDROME: ICD-10-CM

## 2025-01-07 DIAGNOSIS — G47.10 EXCESSIVE SLEEPINESS: ICD-10-CM

## 2025-01-07 DIAGNOSIS — N95.1 MENOPAUSAL SYNDROME (HOT FLASHES): ICD-10-CM

## 2025-01-07 DIAGNOSIS — G35 MS (MULTIPLE SCLEROSIS) (H): Primary | ICD-10-CM

## 2025-01-07 RX ORDER — GABAPENTIN 300 MG/1
900 CAPSULE ORAL AT BEDTIME
Qty: 90 CAPSULE | Refills: 11 | Status: SHIPPED | OUTPATIENT
Start: 2025-01-07

## 2025-01-07 RX ORDER — MODAFINIL 100 MG/1
100 TABLET ORAL DAILY
Qty: 30 TABLET | Refills: 5 | Status: SHIPPED | OUTPATIENT
Start: 2025-01-07

## 2025-01-07 RX ORDER — ESTRADIOL 0.05 MG/D
1 PATCH, EXTENDED RELEASE TRANSDERMAL
Qty: 8 PATCH | Refills: 3 | Status: SHIPPED | OUTPATIENT
Start: 2025-01-09

## 2025-01-07 ASSESSMENT — PAIN SCALES - GENERAL: PAINLEVEL_OUTOF10: MODERATE PAIN (4)

## 2025-01-07 NOTE — NURSING NOTE
Chief Complaint   Patient presents with    MS    RECHECK     Annual follow up      Vitals were taken and medications were reconciled.   Carlos Vargas, EMT  10:52 AM

## 2025-01-07 NOTE — TELEPHONE ENCOUNTER
To Marlene Becker,  See pts MyChart message. Pharmacy pended for your review if needed.     Doretha Vu RN

## 2025-02-05 NOTE — PATIENT INSTRUCTIONS
Prior to immunization administration, verified patients identity using patient s name and date of birth. Please see Immunization Activity for additional information.     Screening Questionnaire for Adult Immunization    Are you sick today?   No   Do you have allergies to medications, food, a vaccine component or latex?   No   Have you ever had a serious reaction after receiving a vaccination?   No   Do you have a long-term health problem with heart, lung, kidney, or metabolic disease (e.g., diabetes), asthma, a blood disorder, no spleen, complement component deficiency, a cochlear implant, or a spinal fluid leak?  Are you on long-term aspirin therapy?   No   Do you have cancer, leukemia, HIV/AIDS, or any other immune system problem?   No   Do you have a parent, brother, or sister with an immune system problem?   No   In the past 3 months, have you taken medications that affect  your immune system, such as prednisone, other steroids, or anticancer drugs; drugs for the treatment of rheumatoid arthritis, Crohn s disease, or psoriasis; or have you had radiation treatments?   No   Have you had a seizure, or a brain or other nervous system problem?   No   During the past year, have you received a transfusion of blood or blood    products, or been given immune (gamma) globulin or antiviral drug?   No   For women: Are you pregnant or is there a chance you could become       pregnant during the next month?   No   Have you received any vaccinations in the past 4 weeks?   No     Immunization questionnaire answers were all negative.      Patient instructed to remain in clinic for 15 minutes afterwards, and to report any adverse reactions.     Screening performed by Yoselin Iniguez CMA on 2/5/2025 at 2:58 PM.         We get an EMG    Will get a swallow study    Take 2 Elavil tonight    Tomorrow take 1 Cymbalta in the morning and  2 Elavil at night    Do this for 1 week     Then take 2 Cymbalta in the morning and 1 elavil at night for a week    Then just Cymbalta 2 pill per days    Talk to PCP about your stuffiness.